# Patient Record
Sex: MALE | Race: WHITE | Employment: OTHER | URBAN - NONMETROPOLITAN AREA
[De-identification: names, ages, dates, MRNs, and addresses within clinical notes are randomized per-mention and may not be internally consistent; named-entity substitution may affect disease eponyms.]

---

## 2017-03-01 ENCOUNTER — HOSPITAL ENCOUNTER (OUTPATIENT)
Dept: PSYCHIATRY | Age: 42
Setting detail: THERAPIES SERIES
Discharge: HOME | End: 2017-03-01
Payer: COMMERCIAL

## 2017-03-01 VITALS
OXYGEN SATURATION: 98 % | WEIGHT: 165 LBS | RESPIRATION RATE: 20 BRPM | BODY MASS INDEX: 27.49 KG/M2 | TEMPERATURE: 98.3 F | DIASTOLIC BLOOD PRESSURE: 81 MMHG | SYSTOLIC BLOOD PRESSURE: 121 MMHG | HEART RATE: 84 BPM | HEIGHT: 65 IN

## 2017-03-01 DIAGNOSIS — F31.9 BIPOLAR AFFECTIVE DISORDER, REMISSION STATUS UNSPECIFIED (HCC): Primary | ICD-10-CM

## 2017-03-01 PROBLEM — F90.0 ADHD (ATTENTION DEFICIT HYPERACTIVITY DISORDER), INATTENTIVE TYPE: Chronic | Status: ACTIVE | Noted: 2017-03-01

## 2017-03-01 PROBLEM — F31.4 BIPOLAR I DISORDER, MOST RECENT EPISODE DEPRESSED, SEVERE WITHOUT PSYCHOTIC FEATURES (HCC): Status: ACTIVE | Noted: 2017-03-01

## 2017-03-01 PROCEDURE — 90792 PSYCH DIAG EVAL W/MED SRVCS: CPT | Performed by: PSYCHIATRY & NEUROLOGY

## 2017-03-01 RX ORDER — GLIPIZIDE 5 MG/1
5 TABLET ORAL
COMMUNITY
End: 2017-09-28 | Stop reason: ALTCHOICE

## 2017-03-01 RX ORDER — OXCARBAZEPINE 300 MG/1
300 TABLET, FILM COATED ORAL 2 TIMES DAILY
Qty: 60 TABLET | Refills: 3 | Status: ON HOLD | OUTPATIENT
Start: 2017-03-01 | End: 2017-03-15

## 2017-03-02 ENCOUNTER — HOSPITAL ENCOUNTER (OUTPATIENT)
Dept: PSYCHIATRY | Age: 42
Setting detail: THERAPIES SERIES
Discharge: HOME | End: 2017-03-02
Payer: COMMERCIAL

## 2017-03-02 PROCEDURE — 90853 GROUP PSYCHOTHERAPY: CPT

## 2017-03-02 PROCEDURE — G0177 OPPS/PHP; TRAIN & EDUC SERV: HCPCS

## 2017-03-03 ENCOUNTER — HOSPITAL ENCOUNTER (OUTPATIENT)
Dept: PSYCHIATRY | Age: 42
Setting detail: THERAPIES SERIES
Discharge: HOME | End: 2017-03-03
Payer: COMMERCIAL

## 2017-03-03 PROCEDURE — 99213 OFFICE O/P EST LOW 20 MIN: CPT | Performed by: PSYCHIATRY & NEUROLOGY

## 2017-03-03 PROCEDURE — 90853 GROUP PSYCHOTHERAPY: CPT

## 2017-03-03 ASSESSMENT — SLEEP AND FATIGUE QUESTIONNAIRES
DO YOU HAVE DIFFICULTY SLEEPING: YES
DIFFICULTY FALLING ASLEEP: NO
DO YOU USE A SLEEP AID: YES
SLEEP PATTERN: DISTURBED/INTERRUPTED SLEEP;RESTLESSNESS
RESTFUL SLEEP: YES
AVERAGE NUMBER OF SLEEP HOURS: 5
DIFFICULTY ARISING: NO
DIFFICULTY STAYING ASLEEP: YES

## 2017-03-03 ASSESSMENT — PATIENT HEALTH QUESTIONNAIRE - PHQ9: SUM OF ALL RESPONSES TO PHQ QUESTIONS 1-9: 12

## 2017-03-03 ASSESSMENT — LIFESTYLE VARIABLES: HISTORY_ALCOHOL_USE: YES

## 2017-03-06 ENCOUNTER — HOSPITAL ENCOUNTER (OUTPATIENT)
Dept: PSYCHIATRY | Age: 42
Setting detail: THERAPIES SERIES
End: 2017-03-06
Payer: COMMERCIAL

## 2017-03-06 ENCOUNTER — HOSPITAL ENCOUNTER (OUTPATIENT)
Dept: PSYCHIATRY | Age: 42
Discharge: HOME | End: 2017-03-06
Payer: COMMERCIAL

## 2017-03-06 DIAGNOSIS — F31.60 MIXED BIPOLAR I DISORDER (HCC): Primary | ICD-10-CM

## 2017-03-06 DIAGNOSIS — F31.60 MIXED BIPOLAR I DISORDER (HCC): ICD-10-CM

## 2017-03-06 PROCEDURE — G0410 GRP PSYCH PARTIAL HOSP 45-50: HCPCS

## 2017-03-06 PROCEDURE — G0177 OPPS/PHP; TRAIN & EDUC SERV: HCPCS

## 2017-03-06 PROCEDURE — 90832 PSYTX W PT 30 MINUTES: CPT | Performed by: NURSE PRACTITIONER

## 2017-03-07 ENCOUNTER — TELEPHONE (OUTPATIENT)
Dept: PSYCHIATRY | Age: 42
End: 2017-03-07

## 2017-03-07 ENCOUNTER — HOSPITAL ENCOUNTER (OUTPATIENT)
Dept: PSYCHIATRY | Age: 42
Discharge: HOME | End: 2017-03-07
Payer: COMMERCIAL

## 2017-03-07 PROCEDURE — G0410 GRP PSYCH PARTIAL HOSP 45-50: HCPCS | Performed by: SOCIAL WORKER

## 2017-03-07 PROCEDURE — G0177 OPPS/PHP; TRAIN & EDUC SERV: HCPCS | Performed by: SOCIAL WORKER

## 2017-03-08 ENCOUNTER — HOSPITAL ENCOUNTER (OUTPATIENT)
Dept: PSYCHIATRY | Age: 42
Discharge: HOME | End: 2017-03-08
Payer: COMMERCIAL

## 2017-03-08 PROCEDURE — 90832 PSYTX W PT 30 MINUTES: CPT | Performed by: NURSE PRACTITIONER

## 2017-03-08 PROCEDURE — G0177 OPPS/PHP; TRAIN & EDUC SERV: HCPCS

## 2017-03-08 PROCEDURE — G0410 GRP PSYCH PARTIAL HOSP 45-50: HCPCS

## 2017-03-08 RX ORDER — NALTREXONE HYDROCHLORIDE 50 MG/1
TABLET, FILM COATED ORAL
Qty: 30 TABLET | Refills: 0 | Status: SHIPPED | OUTPATIENT
Start: 2017-03-08 | End: 2017-04-05 | Stop reason: HOSPADM

## 2017-03-09 ENCOUNTER — HOSPITAL ENCOUNTER (OUTPATIENT)
Dept: PSYCHIATRY | Age: 42
Discharge: HOME | End: 2017-03-09
Payer: COMMERCIAL

## 2017-03-09 PROCEDURE — G0177 OPPS/PHP; TRAIN & EDUC SERV: HCPCS

## 2017-03-09 PROCEDURE — G0410 GRP PSYCH PARTIAL HOSP 45-50: HCPCS

## 2017-03-10 ENCOUNTER — HOSPITAL ENCOUNTER (OUTPATIENT)
Dept: PSYCHIATRY | Age: 42
Discharge: HOME | End: 2017-03-10
Payer: COMMERCIAL

## 2017-03-10 VITALS — HEART RATE: 98 BPM | SYSTOLIC BLOOD PRESSURE: 131 MMHG | RESPIRATION RATE: 18 BRPM | DIASTOLIC BLOOD PRESSURE: 86 MMHG

## 2017-03-10 PROCEDURE — G0410 GRP PSYCH PARTIAL HOSP 45-50: HCPCS

## 2017-03-10 PROCEDURE — G0177 OPPS/PHP; TRAIN & EDUC SERV: HCPCS

## 2017-03-13 ENCOUNTER — HOSPITAL ENCOUNTER (OUTPATIENT)
Dept: PSYCHIATRY | Age: 42
Discharge: HOME | End: 2017-03-13
Payer: COMMERCIAL

## 2017-03-13 ENCOUNTER — HOSPITAL ENCOUNTER (INPATIENT)
Age: 42
LOS: 2 days | Discharge: HOME | DRG: 885 | End: 2017-03-15
Attending: EMERGENCY MEDICINE | Admitting: PSYCHIATRY & NEUROLOGY
Payer: COMMERCIAL

## 2017-03-13 VITALS — HEART RATE: 87 BPM | DIASTOLIC BLOOD PRESSURE: 87 MMHG | SYSTOLIC BLOOD PRESSURE: 134 MMHG | RESPIRATION RATE: 18 BRPM

## 2017-03-13 DIAGNOSIS — F31.9 BIPOLAR 1 DISORDER (HCC): ICD-10-CM

## 2017-03-13 DIAGNOSIS — R45.851 SUICIDE IDEATION: Primary | ICD-10-CM

## 2017-03-13 LAB
ALBUMIN SERPL-MCNC: 4.7 G/DL (ref 3.5–5.2)
ALP BLD-CCNC: 65 U/L (ref 40–130)
ALT SERPL-CCNC: 41 U/L (ref 5–41)
AMPHETAMINE SCREEN, URINE: POSITIVE
ANION GAP SERPL CALCULATED.3IONS-SCNC: 14 MMOL/L (ref 7–19)
AST SERPL-CCNC: 20 U/L (ref 5–40)
BARBITURATE SCREEN URINE: NEGATIVE
BASOPHILS ABSOLUTE: 0 K/UL (ref 0–0.2)
BASOPHILS RELATIVE PERCENT: 0.6 % (ref 0–1)
BENZODIAZEPINE SCREEN, URINE: NEGATIVE
BILIRUB SERPL-MCNC: <0.2 MG/DL (ref 0.2–1.2)
BILIRUBIN URINE: NEGATIVE
BLOOD, URINE: NEGATIVE
BUN BLDV-MCNC: 15 MG/DL (ref 6–20)
CALCIUM SERPL-MCNC: 9.7 MG/DL (ref 8.6–10)
CANNABINOID SCREEN URINE: NEGATIVE
CHLORIDE BLD-SCNC: 97 MMOL/L (ref 98–111)
CLARITY: CLEAR
CO2: 29 MMOL/L (ref 22–29)
COCAINE METABOLITE SCREEN URINE: POSITIVE
COLOR: YELLOW
CREAT SERPL-MCNC: 0.9 MG/DL (ref 0.5–1.2)
EOSINOPHILS ABSOLUTE: 0.7 K/UL (ref 0–0.6)
EOSINOPHILS RELATIVE PERCENT: 10.4 % (ref 0–5)
ETHANOL: <10 MG/DL (ref 0–0.08)
GFR NON-AFRICAN AMERICAN: >60
GLOBULIN: 2.9 G/DL
GLUCOSE BLD-MCNC: 94 MG/DL (ref 74–109)
GLUCOSE URINE: 250 MG/DL
HCT VFR BLD CALC: 45 % (ref 42–52)
HEMOGLOBIN: 15.6 G/DL (ref 14–18)
KETONES, URINE: NEGATIVE MG/DL
LEUKOCYTE ESTERASE, URINE: NEGATIVE
LYMPHOCYTES ABSOLUTE: 2.5 K/UL (ref 1.1–4.5)
LYMPHOCYTES RELATIVE PERCENT: 40 % (ref 20–40)
Lab: ABNORMAL
MCH RBC QN AUTO: 31.1 PG (ref 27–31)
MCHC RBC AUTO-ENTMCNC: 34.7 G/DL (ref 33–37)
MCV RBC AUTO: 89.6 FL (ref 80–94)
MONOCYTES ABSOLUTE: 0.4 K/UL (ref 0–0.9)
MONOCYTES RELATIVE PERCENT: 6.9 % (ref 0–10)
NEUTROPHILS ABSOLUTE: 2.7 K/UL (ref 1.5–7.5)
NEUTROPHILS RELATIVE PERCENT: 41.8 % (ref 50–65)
NITRITE, URINE: NEGATIVE
OPIATE SCREEN URINE: NEGATIVE
PDW BLD-RTO: 12.6 % (ref 11.5–14.5)
PH UA: 6
PLATELET # BLD: 216 K/UL (ref 130–400)
PMV BLD AUTO: 9.4 FL (ref 7.4–10.4)
POTASSIUM SERPL-SCNC: 4.4 MMOL/L (ref 3.5–5)
PROTEIN UA: NEGATIVE MG/DL
RBC # BLD: 5.02 M/UL (ref 4.7–6.1)
SODIUM BLD-SCNC: 140 MMOL/L (ref 136–145)
SPECIFIC GRAVITY UA: 1.01
TOTAL PROTEIN: 7.6 G/DL (ref 6.6–8.7)
UROBILINOGEN, URINE: 0.2 E.U./DL
WBC # BLD: 6.4 K/UL (ref 4.8–10.8)

## 2017-03-13 PROCEDURE — 6370000000 HC RX 637 (ALT 250 FOR IP): Performed by: PSYCHIATRY & NEUROLOGY

## 2017-03-13 PROCEDURE — 80307 DRUG TEST PRSMV CHEM ANLYZR: CPT

## 2017-03-13 PROCEDURE — 99283 EMERGENCY DEPT VISIT LOW MDM: CPT | Performed by: EMERGENCY MEDICINE

## 2017-03-13 PROCEDURE — 36415 COLL VENOUS BLD VENIPUNCTURE: CPT

## 2017-03-13 PROCEDURE — 85025 COMPLETE CBC W/AUTO DIFF WBC: CPT

## 2017-03-13 PROCEDURE — G0480 DRUG TEST DEF 1-7 CLASSES: HCPCS

## 2017-03-13 PROCEDURE — 90832 PSYTX W PT 30 MINUTES: CPT | Performed by: NURSE PRACTITIONER

## 2017-03-13 PROCEDURE — 1240000000 HC EMOTIONAL WELLNESS R&B

## 2017-03-13 PROCEDURE — 99285 EMERGENCY DEPT VISIT HI MDM: CPT

## 2017-03-13 PROCEDURE — G0410 GRP PSYCH PARTIAL HOSP 45-50: HCPCS

## 2017-03-13 PROCEDURE — 81003 URINALYSIS AUTO W/O SCOPE: CPT

## 2017-03-13 PROCEDURE — 80053 COMPREHEN METABOLIC PANEL: CPT

## 2017-03-13 RX ORDER — ACETAMINOPHEN 325 MG/1
650 TABLET ORAL EVERY 4 HOURS PRN
Status: DISCONTINUED | OUTPATIENT
Start: 2017-03-13 | End: 2017-03-13 | Stop reason: SDUPTHER

## 2017-03-13 RX ORDER — MAGNESIUM HYDROXIDE/ALUMINUM HYDROXICE/SIMETHICONE 120; 1200; 1200 MG/30ML; MG/30ML; MG/30ML
30 SUSPENSION ORAL PRN
Status: DISCONTINUED | OUTPATIENT
Start: 2017-03-13 | End: 2017-03-15 | Stop reason: HOSPADM

## 2017-03-13 RX ORDER — SERTRALINE HYDROCHLORIDE 25 MG/1
25 TABLET, FILM COATED ORAL DAILY
Status: DISCONTINUED | OUTPATIENT
Start: 2017-03-13 | End: 2017-03-14

## 2017-03-13 RX ORDER — ACETAMINOPHEN 325 MG/1
650 TABLET ORAL EVERY 4 HOURS PRN
Status: DISCONTINUED | OUTPATIENT
Start: 2017-03-13 | End: 2017-03-15 | Stop reason: HOSPADM

## 2017-03-13 RX ORDER — TRAZODONE HYDROCHLORIDE 50 MG/1
50 TABLET ORAL NIGHTLY PRN
Status: DISCONTINUED | OUTPATIENT
Start: 2017-03-13 | End: 2017-03-14

## 2017-03-13 RX ADMIN — TRAZODONE HYDROCHLORIDE 50 MG: 50 TABLET ORAL at 22:13

## 2017-03-13 ASSESSMENT — SLEEP AND FATIGUE QUESTIONNAIRES
AVERAGE NUMBER OF SLEEP HOURS: 10
SLEEP PATTERN: DIFFICULTY ARISING;DISTURBED/INTERRUPTED SLEEP
RESTFUL SLEEP: NO
DIFFICULTY ARISING: YES
DO YOU USE A SLEEP AID: NO
DIFFICULTY STAYING ASLEEP: YES
DO YOU HAVE DIFFICULTY SLEEPING: YES
DIFFICULTY FALLING ASLEEP: NO

## 2017-03-13 ASSESSMENT — PATIENT HEALTH QUESTIONNAIRE - PHQ9: SUM OF ALL RESPONSES TO PHQ QUESTIONS 1-9: 24

## 2017-03-13 ASSESSMENT — LIFESTYLE VARIABLES: HISTORY_ALCOHOL_USE: YES

## 2017-03-14 PROCEDURE — 6370000000 HC RX 637 (ALT 250 FOR IP): Performed by: PSYCHIATRY & NEUROLOGY

## 2017-03-14 PROCEDURE — 1240000000 HC EMOTIONAL WELLNESS R&B

## 2017-03-14 PROCEDURE — 90792 PSYCH DIAG EVAL W/MED SRVCS: CPT | Performed by: PSYCHIATRY & NEUROLOGY

## 2017-03-14 RX ORDER — GLIPIZIDE 5 MG/1
5 TABLET ORAL
Status: DISCONTINUED | OUTPATIENT
Start: 2017-03-14 | End: 2017-03-15 | Stop reason: HOSPADM

## 2017-03-14 RX ORDER — CLONAZEPAM 0.5 MG/1
0.5 TABLET ORAL 3 TIMES DAILY
Status: DISCONTINUED | OUTPATIENT
Start: 2017-03-14 | End: 2017-03-15 | Stop reason: HOSPADM

## 2017-03-14 RX ORDER — NALTREXONE HYDROCHLORIDE 50 MG/1
25 TABLET, FILM COATED ORAL
Status: DISCONTINUED | OUTPATIENT
Start: 2017-03-15 | End: 2017-03-15 | Stop reason: HOSPADM

## 2017-03-14 RX ORDER — LAMOTRIGINE 100 MG/1
350 TABLET ORAL DAILY
Status: DISCONTINUED | OUTPATIENT
Start: 2017-03-14 | End: 2017-03-15 | Stop reason: HOSPADM

## 2017-03-14 RX ORDER — LAMOTRIGINE 100 MG/1
350 TABLET ORAL DAILY
Status: DISCONTINUED | OUTPATIENT
Start: 2017-03-15 | End: 2017-03-14

## 2017-03-14 RX ORDER — DEXTROAMPHETAMINE SACCHARATE, AMPHETAMINE ASPARTATE MONOHYDRATE, DEXTROAMPHETAMINE SULFATE AND AMPHETAMINE SULFATE 2.5; 2.5; 2.5; 2.5 MG/1; MG/1; MG/1; MG/1
30 CAPSULE, EXTENDED RELEASE ORAL 2 TIMES DAILY
Status: DISCONTINUED | OUTPATIENT
Start: 2017-03-14 | End: 2017-03-15 | Stop reason: HOSPADM

## 2017-03-14 RX ORDER — OXCARBAZEPINE 300 MG/1
300 TABLET, FILM COATED ORAL 2 TIMES DAILY
Status: DISCONTINUED | OUTPATIENT
Start: 2017-03-14 | End: 2017-03-14

## 2017-03-14 RX ORDER — LAMOTRIGINE 150 MG/1
300 TABLET ORAL DAILY
Status: DISCONTINUED | OUTPATIENT
Start: 2017-03-14 | End: 2017-03-14

## 2017-03-14 RX ORDER — IBUPROFEN 400 MG/1
800 TABLET ORAL EVERY 8 HOURS PRN
Status: DISCONTINUED | OUTPATIENT
Start: 2017-03-14 | End: 2017-03-15 | Stop reason: HOSPADM

## 2017-03-14 RX ADMIN — TRAZODONE HYDROCHLORIDE 150 MG: 100 TABLET ORAL at 21:17

## 2017-03-14 RX ADMIN — IBUPROFEN 800 MG: 400 TABLET, FILM COATED ORAL at 13:10

## 2017-03-14 RX ADMIN — OXCARBAZEPINE 450 MG: 300 TABLET, FILM COATED ORAL at 21:17

## 2017-03-14 RX ADMIN — ACETAMINOPHEN 650 MG: 325 TABLET, FILM COATED ORAL at 17:36

## 2017-03-14 RX ADMIN — METFORMIN HYDROCHLORIDE 1000 MG: 500 TABLET, FILM COATED ORAL at 17:37

## 2017-03-14 RX ADMIN — DEXTROAMPHETAMINE SACCHARATE, AMPHETAMINE ASPARTATE MONOHYDRATE, DEXTROAMPHETAMINE SULFATE, AND AMPHETAMINE SULFATE 30 MG: 2.5; 2.5; 2.5; 2.5 CAPSULE, EXTENDED RELEASE ORAL at 13:17

## 2017-03-14 RX ADMIN — GLIPIZIDE 5 MG: 5 TABLET ORAL at 17:36

## 2017-03-14 RX ADMIN — LAMOTRIGINE 350 MG: 100 TABLET ORAL at 13:10

## 2017-03-14 RX ADMIN — IBUPROFEN 800 MG: 400 TABLET, FILM COATED ORAL at 21:16

## 2017-03-14 RX ADMIN — METFORMIN HYDROCHLORIDE 1000 MG: 500 TABLET, FILM COATED ORAL at 12:16

## 2017-03-14 RX ADMIN — CLONAZEPAM 0.5 MG: 0.5 TABLET ORAL at 13:10

## 2017-03-14 RX ADMIN — CLONAZEPAM 0.5 MG: 0.5 TABLET ORAL at 21:18

## 2017-03-14 RX ADMIN — OXCARBAZEPINE 450 MG: 150 TABLET ORAL at 13:26

## 2017-03-14 ASSESSMENT — PAIN SCALES - GENERAL
PAINLEVEL_OUTOF10: 8
PAINLEVEL_OUTOF10: 9
PAINLEVEL_OUTOF10: 4

## 2017-03-15 VITALS
OXYGEN SATURATION: 100 % | BODY MASS INDEX: 26.66 KG/M2 | TEMPERATURE: 98.6 F | RESPIRATION RATE: 20 BRPM | SYSTOLIC BLOOD PRESSURE: 130 MMHG | WEIGHT: 160 LBS | HEIGHT: 65 IN | HEART RATE: 100 BPM | DIASTOLIC BLOOD PRESSURE: 83 MMHG

## 2017-03-15 LAB
GLUCOSE BLD-MCNC: 165 MG/DL (ref 70–99)
PERFORMED ON: ABNORMAL
TSH SERPL DL<=0.05 MIU/L-ACNC: 2.76 UIU/ML (ref 0.27–4.2)
VITAMIN B-12: 521 PG/ML (ref 211–946)
VITAMIN D 25-HYDROXY: 12.1 NG/ML

## 2017-03-15 PROCEDURE — 82306 VITAMIN D 25 HYDROXY: CPT

## 2017-03-15 PROCEDURE — 6370000000 HC RX 637 (ALT 250 FOR IP): Performed by: PSYCHIATRY & NEUROLOGY

## 2017-03-15 PROCEDURE — 99239 HOSP IP/OBS DSCHRG MGMT >30: CPT | Performed by: PSYCHIATRY & NEUROLOGY

## 2017-03-15 PROCEDURE — 5130000000 HC BRIDGE APPOINTMENT

## 2017-03-15 PROCEDURE — 36415 COLL VENOUS BLD VENIPUNCTURE: CPT

## 2017-03-15 PROCEDURE — 82607 VITAMIN B-12: CPT

## 2017-03-15 PROCEDURE — 82948 REAGENT STRIP/BLOOD GLUCOSE: CPT

## 2017-03-15 PROCEDURE — 84443 ASSAY THYROID STIM HORMONE: CPT

## 2017-03-15 RX ORDER — LAMOTRIGINE 100 MG/1
50 TABLET ORAL DAILY
Qty: 30 TABLET | Refills: 0 | Status: SHIPPED | OUTPATIENT
Start: 2017-03-15 | End: 2017-04-05 | Stop reason: HOSPADM

## 2017-03-15 RX ORDER — OXCARBAZEPINE 300 MG/1
450 TABLET, FILM COATED ORAL 2 TIMES DAILY
Qty: 90 TABLET | Refills: 0 | Status: SHIPPED | OUTPATIENT
Start: 2017-03-15 | End: 2017-03-21 | Stop reason: DRUGHIGH

## 2017-03-15 RX ADMIN — METFORMIN HYDROCHLORIDE 1000 MG: 500 TABLET, FILM COATED ORAL at 08:41

## 2017-03-15 RX ADMIN — DEXTROAMPHETAMINE SACCHARATE, AMPHETAMINE ASPARTATE MONOHYDRATE, DEXTROAMPHETAMINE SULFATE, AND AMPHETAMINE SULFATE 30 MG: 2.5; 2.5; 2.5; 2.5 CAPSULE, EXTENDED RELEASE ORAL at 08:53

## 2017-03-15 RX ADMIN — LAMOTRIGINE 350 MG: 100 TABLET ORAL at 08:42

## 2017-03-15 RX ADMIN — GLIPIZIDE 5 MG: 5 TABLET ORAL at 08:40

## 2017-03-15 RX ADMIN — OXCARBAZEPINE 450 MG: 300 TABLET, FILM COATED ORAL at 08:42

## 2017-03-16 ENCOUNTER — HOSPITAL ENCOUNTER (OUTPATIENT)
Dept: PSYCHIATRY | Age: 42
Setting detail: THERAPIES SERIES
Discharge: HOME | End: 2017-03-16
Payer: COMMERCIAL

## 2017-03-16 VITALS
HEIGHT: 65 IN | SYSTOLIC BLOOD PRESSURE: 129 MMHG | HEART RATE: 96 BPM | RESPIRATION RATE: 18 BRPM | TEMPERATURE: 98 F | WEIGHT: 171 LBS | BODY MASS INDEX: 28.49 KG/M2 | DIASTOLIC BLOOD PRESSURE: 83 MMHG

## 2017-03-16 DIAGNOSIS — F31.60 BIPOLAR I DISORDER, MOST RECENT EPISODE MIXED (HCC): Primary | ICD-10-CM

## 2017-03-16 PROCEDURE — 90853 GROUP PSYCHOTHERAPY: CPT

## 2017-03-16 PROCEDURE — 90832 PSYTX W PT 30 MINUTES: CPT | Performed by: NURSE PRACTITIONER

## 2017-03-16 PROCEDURE — G0177 OPPS/PHP; TRAIN & EDUC SERV: HCPCS

## 2017-03-16 RX ORDER — ERGOCALCIFEROL 1.25 MG/1
50000 CAPSULE ORAL WEEKLY
Qty: 4 CAPSULE | Refills: 2 | Status: SHIPPED | OUTPATIENT
Start: 2017-03-16 | End: 2017-09-28 | Stop reason: ALTCHOICE

## 2017-03-17 ASSESSMENT — SLEEP AND FATIGUE QUESTIONNAIRES
DO YOU USE A SLEEP AID: NO
DO YOU HAVE DIFFICULTY SLEEPING: NO
AVERAGE NUMBER OF SLEEP HOURS: 6

## 2017-03-17 ASSESSMENT — LIFESTYLE VARIABLES: HISTORY_ALCOHOL_USE: NO

## 2017-03-20 ENCOUNTER — TELEPHONE (OUTPATIENT)
Dept: PSYCHIATRY | Age: 42
End: 2017-03-20

## 2017-03-21 ENCOUNTER — HOSPITAL ENCOUNTER (OUTPATIENT)
Dept: PSYCHIATRY | Age: 42
Setting detail: THERAPIES SERIES
Discharge: HOME | End: 2017-03-21
Payer: COMMERCIAL

## 2017-03-21 VITALS
TEMPERATURE: 97.8 F | HEART RATE: 94 BPM | RESPIRATION RATE: 18 BRPM | DIASTOLIC BLOOD PRESSURE: 82 MMHG | SYSTOLIC BLOOD PRESSURE: 121 MMHG

## 2017-03-21 DIAGNOSIS — Z79.899 HIGH RISK MEDICATION USE: Primary | ICD-10-CM

## 2017-03-21 DIAGNOSIS — F31.9 BIPOLAR I DISORDER, CURRENT EPISODE DEPRESSED (HCC): ICD-10-CM

## 2017-03-21 DIAGNOSIS — Z79.899 HIGH RISK MEDICATION USE: ICD-10-CM

## 2017-03-21 LAB
ANION GAP SERPL CALCULATED.3IONS-SCNC: 12 MMOL/L (ref 7–19)
BUN BLDV-MCNC: 17 MG/DL (ref 6–20)
CALCIUM SERPL-MCNC: 9 MG/DL (ref 8.6–10)
CHLORIDE BLD-SCNC: 96 MMOL/L (ref 98–111)
CO2: 30 MMOL/L (ref 22–29)
CREAT SERPL-MCNC: 0.9 MG/DL (ref 0.5–1.2)
GFR NON-AFRICAN AMERICAN: >60
GLUCOSE BLD-MCNC: 218 MG/DL (ref 74–109)
POTASSIUM SERPL-SCNC: 3.6 MMOL/L (ref 3.5–5)
SODIUM BLD-SCNC: 138 MMOL/L (ref 136–145)

## 2017-03-21 PROCEDURE — 90853 GROUP PSYCHOTHERAPY: CPT

## 2017-03-21 RX ORDER — OXCARBAZEPINE 300 MG/1
TABLET, FILM COATED ORAL
Qty: 90 TABLET | Refills: 0
Start: 2017-03-21 | End: 2017-04-05

## 2017-03-22 ENCOUNTER — HOSPITAL ENCOUNTER (OUTPATIENT)
Dept: PSYCHIATRY | Age: 42
Setting detail: THERAPIES SERIES
Discharge: HOME | End: 2017-03-22
Payer: COMMERCIAL

## 2017-03-22 VITALS
TEMPERATURE: 98.3 F | SYSTOLIC BLOOD PRESSURE: 124 MMHG | HEART RATE: 100 BPM | RESPIRATION RATE: 18 BRPM | DIASTOLIC BLOOD PRESSURE: 82 MMHG

## 2017-03-22 PROCEDURE — G0177 OPPS/PHP; TRAIN & EDUC SERV: HCPCS | Performed by: SOCIAL WORKER

## 2017-03-22 PROCEDURE — 90853 GROUP PSYCHOTHERAPY: CPT | Performed by: SOCIAL WORKER

## 2017-03-23 ENCOUNTER — HOSPITAL ENCOUNTER (OUTPATIENT)
Dept: PSYCHIATRY | Age: 42
Setting detail: THERAPIES SERIES
Discharge: HOME | End: 2017-03-23
Payer: COMMERCIAL

## 2017-03-23 PROCEDURE — 90853 GROUP PSYCHOTHERAPY: CPT

## 2017-03-27 ENCOUNTER — HOSPITAL ENCOUNTER (OUTPATIENT)
Dept: PSYCHIATRY | Age: 42
Setting detail: THERAPIES SERIES
Discharge: HOME | End: 2017-03-27
Payer: COMMERCIAL

## 2017-03-27 VITALS — DIASTOLIC BLOOD PRESSURE: 79 MMHG | SYSTOLIC BLOOD PRESSURE: 119 MMHG | RESPIRATION RATE: 18 BRPM | HEART RATE: 92 BPM

## 2017-03-27 PROCEDURE — 90853 GROUP PSYCHOTHERAPY: CPT

## 2017-03-27 PROCEDURE — G0177 OPPS/PHP; TRAIN & EDUC SERV: HCPCS

## 2017-03-28 ENCOUNTER — HOSPITAL ENCOUNTER (OUTPATIENT)
Dept: PSYCHIATRY | Age: 42
Setting detail: THERAPIES SERIES
Discharge: HOME | End: 2017-03-28
Payer: COMMERCIAL

## 2017-03-28 ENCOUNTER — TELEPHONE (OUTPATIENT)
Dept: PSYCHIATRY | Age: 42
End: 2017-03-28

## 2017-03-28 PROCEDURE — G0177 OPPS/PHP; TRAIN & EDUC SERV: HCPCS

## 2017-03-28 PROCEDURE — 90853 GROUP PSYCHOTHERAPY: CPT

## 2017-03-30 ENCOUNTER — HOSPITAL ENCOUNTER (OUTPATIENT)
Dept: PSYCHIATRY | Age: 42
Setting detail: THERAPIES SERIES
Discharge: HOME | End: 2017-03-30
Payer: COMMERCIAL

## 2017-03-30 PROCEDURE — G0177 OPPS/PHP; TRAIN & EDUC SERV: HCPCS

## 2017-04-04 ENCOUNTER — APPOINTMENT (OUTPATIENT)
Dept: PSYCHIATRY | Age: 42
End: 2017-04-04
Payer: COMMERCIAL

## 2017-04-05 ENCOUNTER — HOSPITAL ENCOUNTER (OUTPATIENT)
Dept: PSYCHIATRY | Age: 42
Setting detail: THERAPIES SERIES
Discharge: HOME | End: 2017-04-05
Payer: COMMERCIAL

## 2017-04-05 PROCEDURE — 90853 GROUP PSYCHOTHERAPY: CPT

## 2017-04-05 PROCEDURE — G0177 OPPS/PHP; TRAIN & EDUC SERV: HCPCS

## 2017-04-05 PROCEDURE — 90832 PSYTX W PT 30 MINUTES: CPT | Performed by: NURSE PRACTITIONER

## 2017-04-05 RX ORDER — OXCARBAZEPINE 300 MG/1
600 TABLET, FILM COATED ORAL 2 TIMES DAILY
Qty: 120 TABLET | Refills: 0 | Status: SHIPPED | OUTPATIENT
Start: 2017-04-05 | End: 2017-04-20 | Stop reason: HOSPADM

## 2017-04-05 RX ORDER — HYDROXYZINE PAMOATE 25 MG/1
CAPSULE ORAL
Qty: 60 CAPSULE | Refills: 0 | Status: SHIPPED | OUTPATIENT
Start: 2017-04-05 | End: 2017-04-20

## 2017-04-05 RX ORDER — LAMOTRIGINE 200 MG/1
200 TABLET ORAL 2 TIMES DAILY
Qty: 60 TABLET | Refills: 0 | Status: SHIPPED | OUTPATIENT
Start: 2017-04-05 | End: 2017-04-20

## 2017-04-06 ENCOUNTER — HOSPITAL ENCOUNTER (OUTPATIENT)
Dept: PSYCHIATRY | Age: 42
Setting detail: THERAPIES SERIES
Discharge: HOME | End: 2017-04-06
Payer: COMMERCIAL

## 2017-04-06 PROCEDURE — 90853 GROUP PSYCHOTHERAPY: CPT

## 2017-04-06 PROCEDURE — G0177 OPPS/PHP; TRAIN & EDUC SERV: HCPCS

## 2017-04-11 ENCOUNTER — HOSPITAL ENCOUNTER (OUTPATIENT)
Dept: PSYCHIATRY | Age: 42
Setting detail: THERAPIES SERIES
Discharge: HOME | End: 2017-04-11
Payer: COMMERCIAL

## 2017-04-11 VITALS — RESPIRATION RATE: 18 BRPM | DIASTOLIC BLOOD PRESSURE: 79 MMHG | HEART RATE: 95 BPM | SYSTOLIC BLOOD PRESSURE: 120 MMHG

## 2017-04-11 PROCEDURE — G0177 OPPS/PHP; TRAIN & EDUC SERV: HCPCS

## 2017-04-11 PROCEDURE — 90853 GROUP PSYCHOTHERAPY: CPT

## 2017-04-11 RX ORDER — DEXTROAMPHETAMINE SACCHARATE, AMPHETAMINE ASPARTATE, DEXTROAMPHETAMINE SULFATE AND AMPHETAMINE SULFATE 7.5; 7.5; 7.5; 7.5 MG/1; MG/1; MG/1; MG/1
30 TABLET ORAL 2 TIMES DAILY
Qty: 30 TABLET | Refills: 0 | Status: CANCELLED | OUTPATIENT
Start: 2017-04-11

## 2017-04-12 ENCOUNTER — HOSPITAL ENCOUNTER (OUTPATIENT)
Dept: PSYCHIATRY | Age: 42
Setting detail: THERAPIES SERIES
Discharge: HOME | End: 2017-04-12
Payer: COMMERCIAL

## 2017-04-12 PROCEDURE — G0177 OPPS/PHP; TRAIN & EDUC SERV: HCPCS

## 2017-04-12 PROCEDURE — 90853 GROUP PSYCHOTHERAPY: CPT

## 2017-04-12 RX ORDER — DEXTROAMPHETAMINE SACCHARATE, AMPHETAMINE ASPARTATE, DEXTROAMPHETAMINE SULFATE AND AMPHETAMINE SULFATE 7.5; 7.5; 7.5; 7.5 MG/1; MG/1; MG/1; MG/1
30 TABLET ORAL 2 TIMES DAILY
Qty: 60 TABLET | Refills: 0 | Status: SHIPPED | OUTPATIENT
Start: 2017-04-12 | End: 2017-05-04

## 2017-04-13 ENCOUNTER — TELEPHONE (OUTPATIENT)
Dept: PSYCHIATRY | Age: 42
End: 2017-04-13

## 2017-04-17 ENCOUNTER — TELEPHONE (OUTPATIENT)
Dept: PSYCHIATRY | Age: 42
End: 2017-04-17

## 2017-04-18 ENCOUNTER — OFFICE VISIT (OUTPATIENT)
Dept: CARDIOLOGY | Facility: CLINIC | Age: 42
End: 2017-04-18

## 2017-04-18 VITALS
SYSTOLIC BLOOD PRESSURE: 144 MMHG | DIASTOLIC BLOOD PRESSURE: 80 MMHG | BODY MASS INDEX: 27.99 KG/M2 | WEIGHT: 168 LBS | HEART RATE: 100 BPM | HEIGHT: 65 IN

## 2017-04-18 DIAGNOSIS — Z82.49 FAMILY HISTORY OF EARLY CAD: ICD-10-CM

## 2017-04-18 DIAGNOSIS — F41.9 ANXIETY: ICD-10-CM

## 2017-04-18 DIAGNOSIS — I10 ESSENTIAL HYPERTENSION: Primary | ICD-10-CM

## 2017-04-18 DIAGNOSIS — F31.9 BIPOLAR 1 DISORDER (HCC): ICD-10-CM

## 2017-04-18 DIAGNOSIS — E11.9 TYPE 2 DIABETES MELLITUS WITHOUT COMPLICATION, UNSPECIFIED LONG TERM INSULIN USE STATUS: ICD-10-CM

## 2017-04-18 DIAGNOSIS — F98.8 ADD (ATTENTION DEFICIT DISORDER): ICD-10-CM

## 2017-04-18 DIAGNOSIS — R06.09 DOE (DYSPNEA ON EXERTION): ICD-10-CM

## 2017-04-18 PROCEDURE — 99204 OFFICE O/P NEW MOD 45 MIN: CPT | Performed by: INTERNAL MEDICINE

## 2017-04-18 PROCEDURE — 93000 ELECTROCARDIOGRAM COMPLETE: CPT | Performed by: INTERNAL MEDICINE

## 2017-04-18 RX ORDER — TRAZODONE HYDROCHLORIDE 150 MG/1
TABLET ORAL NIGHTLY PRN
COMMUNITY
Start: 2016-08-22 | End: 2022-02-07

## 2017-04-18 RX ORDER — CLONAZEPAM 0.5 MG/1
2 TABLET ORAL NIGHTLY PRN
COMMUNITY
Start: 2016-08-24 | End: 2022-06-28

## 2017-04-18 RX ORDER — DEXTROAMPHETAMINE SACCHARATE, AMPHETAMINE ASPARTATE, DEXTROAMPHETAMINE SULFATE AND AMPHETAMINE SULFATE 7.5; 7.5; 7.5; 7.5 MG/1; MG/1; MG/1; MG/1
TABLET ORAL
COMMUNITY
Start: 2017-04-12 | End: 2022-06-28

## 2017-04-18 RX ORDER — HYDROXYZINE PAMOATE 25 MG/1
CAPSULE ORAL
COMMUNITY
Start: 2017-04-05 | End: 2017-05-19 | Stop reason: ALTCHOICE

## 2017-04-18 RX ORDER — LAMOTRIGINE 200 MG/1
200 TABLET ORAL DAILY
COMMUNITY
Start: 2017-04-05 | End: 2022-06-28

## 2017-04-18 RX ORDER — OXCARBAZEPINE 300 MG/1
TABLET, FILM COATED ORAL
COMMUNITY
Start: 2017-04-05 | End: 2017-04-25

## 2017-04-18 RX ORDER — GLIPIZIDE 5 MG/1
TABLET ORAL
COMMUNITY
End: 2018-04-17

## 2017-04-18 RX ORDER — LISINOPRIL 5 MG/1
5 TABLET ORAL DAILY
COMMUNITY
End: 2018-04-17

## 2017-04-18 NOTE — PROGRESS NOTES
Rah Shin  [id number]  1975  41 y.o.  male    Referring Provider: CARINA Holt    Reason for Visit: Bicuspid aortic valve  Mild shortness of breath  Overall doing well  Denies any chest pain  No excessive shortness of breath  Compliant with medications    History of present illness:  Rah Shin is a 41 y.o. yo male with history of bicuspid aortic valve  who presents today for   Chief Complaint   Patient presents with   • bicuspid aortic valve     new pt    .    History  Past Medical History:   Diagnosis Date   • ADD (attention deficit disorder)    • Anxiety    • Bipolar 1 disorder    • Diabetes mellitus    • Hypertension    ,   Past Surgical History:   Procedure Laterality Date   • CARDIAC CATHETERIZATION     ,   Family History   Problem Relation Age of Onset   • Heart disease Mother    ,   Social History   Substance Use Topics   • Smoking status: Never Smoker   • Smokeless tobacco: Never Used   • Alcohol use No   ,     Medications  Current Outpatient Prescriptions   Medication Sig Dispense Refill   • amphetamine-dextroamphetamine (ADDERALL) 30 MG tablet Take  by mouth.     • clonazePAM (KlonoPIN) 0.5 MG tablet Take  by mouth.     • glipiZIDE (GLUCOTROL) 5 MG tablet Take  by mouth.     • hydrOXYzine (VISTARIL) 25 MG capsule Take one capsule once or twice daily prn itching.     • lamoTRIgine (LaMICtal) 200 MG tablet Take  by mouth.     • lisinopril (PRINIVIL,ZESTRIL) 5 MG tablet Take 5 mg by mouth Daily.     • metFORMIN (GLUCOPHAGE) 1000 MG tablet Take  by mouth.     • OXcarbazepine (TRILEPTAL) 300 MG tablet Take  by mouth.     • traZODone (DESYREL) 150 MG tablet Take  by mouth.       No current facility-administered medications for this visit.        Allergies:  Contrast dye    Review of Systems  Review of Systems   Constitution: Negative.   HENT: Negative.    Eyes: Negative.    Cardiovascular: Positive for dyspnea on exertion. Negative for chest pain, claudication, cyanosis,  "irregular heartbeat, leg swelling, near-syncope, orthopnea, palpitations, paroxysmal nocturnal dyspnea and syncope.   Respiratory: Negative.    Endocrine: Negative.    Hematologic/Lymphatic: Negative.    Skin: Negative.    Gastrointestinal: Negative for anorexia.   Genitourinary: Negative.    Neurological: Negative.    Psychiatric/Behavioral: Negative.        Objective     Physical Exam:  /80  Pulse 100  Ht 65\" (165.1 cm)  Wt 168 lb (76.2 kg)  BMI 27.96 kg/m2  Physical Exam   Constitutional: He appears well-developed.   HENT:   Head: Normocephalic.   Neck: Normal carotid pulses and no JVD present. No tracheal tenderness present. Carotid bruit is not present. No tracheal deviation and no edema present.   Cardiovascular: Regular rhythm and normal pulses.    Murmur heard.   Systolic murmur is present with a grade of 2/6   Pulmonary/Chest: Effort normal. No stridor.   Abdominal: Soft.   Neurological: He is alert. He has normal strength. No cranial nerve deficit or sensory deficit.   Skin: Skin is warm.   Psychiatric: He has a normal mood and affect. His speech is normal and behavior is normal.       Results Review:       ECG 12 Lead  Date/Time: 4/18/2017 9:17 AM  Performed by: REKHA DENG  Authorized by: REKHA DENG   Comparison: not compared with previous ECG   Rhythm: sinus rhythm  Rate: normal  QRS axis: normal  Clinical impression: normal ECG            Assessment/Plan   Patient Active Problem List   Diagnosis   • Bipolar 1 disorder   • Hypertension   • Diabetes mellitus   • ADD (attention deficit disorder)   • Anxiety   • GILL (dyspnea on exertion)   • Family history of early CAD       No palpitations. No significant pedal edema. Compliant with medications and diet. Latest labs and medications reviewed.    Plan:  Echo and treadmill stress test  Close follow up with you as scheduled.  Intensive factor modifications.  See order list.    Counseled regarding disease appropriate diet, fluid, caffeine, " stimulants and sodium intake as well as importance of compliance to diet, exercise and regular follow up.  Avoid NSAIDS and COX2 inhibitors. Use Acetaminophen PRN.    Return in about 4 weeks (around 5/16/2017).

## 2017-04-19 ENCOUNTER — HOSPITAL ENCOUNTER (OUTPATIENT)
Dept: PSYCHIATRY | Age: 42
Setting detail: THERAPIES SERIES
Discharge: HOME | End: 2017-04-19
Payer: COMMERCIAL

## 2017-04-19 PROCEDURE — 90832 PSYTX W PT 30 MINUTES: CPT | Performed by: NURSE PRACTITIONER

## 2017-04-19 PROCEDURE — 90853 GROUP PSYCHOTHERAPY: CPT

## 2017-04-20 ENCOUNTER — HOSPITAL ENCOUNTER (OUTPATIENT)
Dept: PSYCHIATRY | Age: 42
Setting detail: THERAPIES SERIES
Discharge: HOME | End: 2017-04-20
Payer: COMMERCIAL

## 2017-04-20 VITALS
HEART RATE: 72 BPM | SYSTOLIC BLOOD PRESSURE: 130 MMHG | DIASTOLIC BLOOD PRESSURE: 88 MMHG | TEMPERATURE: 98.1 F | RESPIRATION RATE: 18 BRPM

## 2017-04-20 PROCEDURE — G0177 OPPS/PHP; TRAIN & EDUC SERV: HCPCS

## 2017-04-20 PROCEDURE — 90853 GROUP PSYCHOTHERAPY: CPT

## 2017-04-20 PROCEDURE — 90832 PSYTX W PT 30 MINUTES: CPT | Performed by: NURSE PRACTITIONER

## 2017-04-20 RX ORDER — HYDROXYZINE PAMOATE 25 MG/1
CAPSULE ORAL
Qty: 60 CAPSULE | Refills: 1 | Status: SHIPPED | OUTPATIENT
Start: 2017-04-20 | End: 2017-09-28

## 2017-04-20 RX ORDER — LAMOTRIGINE 200 MG/1
200 TABLET ORAL 2 TIMES DAILY
Qty: 60 TABLET | Refills: 1 | Status: SHIPPED | OUTPATIENT
Start: 2017-04-20 | End: 2017-06-07

## 2017-04-20 RX ORDER — CLONAZEPAM 0.5 MG/1
TABLET ORAL
Qty: 90 TABLET | Refills: 0 | Status: SHIPPED | OUTPATIENT
Start: 2017-04-20 | End: 2019-01-23 | Stop reason: HOSPADM

## 2017-04-20 RX ORDER — CLONAZEPAM 0.25 MG/1
TABLET, ORALLY DISINTEGRATING ORAL
Qty: 30 TABLET | Refills: 0 | Status: SHIPPED | OUTPATIENT
Start: 2017-04-20 | End: 2017-05-31

## 2017-04-20 RX ORDER — TRAZODONE HYDROCHLORIDE 150 MG/1
150 TABLET ORAL NIGHTLY
Qty: 30 TABLET | Refills: 0 | Status: SHIPPED | OUTPATIENT
Start: 2017-04-20 | End: 2017-06-07

## 2017-04-24 ENCOUNTER — HOSPITAL ENCOUNTER (OUTPATIENT)
Dept: PSYCHIATRY | Age: 42
Setting detail: THERAPIES SERIES
Discharge: HOME | End: 2017-04-24
Payer: COMMERCIAL

## 2017-04-24 PROCEDURE — G0177 OPPS/PHP; TRAIN & EDUC SERV: HCPCS | Performed by: SOCIAL WORKER

## 2017-04-24 PROCEDURE — 90853 GROUP PSYCHOTHERAPY: CPT | Performed by: SOCIAL WORKER

## 2017-04-25 ENCOUNTER — HOSPITAL ENCOUNTER (OUTPATIENT)
Dept: CARDIOLOGY | Facility: HOSPITAL | Age: 42
Discharge: HOME | End: 2017-04-25
Attending: INTERNAL MEDICINE

## 2017-04-25 ENCOUNTER — HOSPITAL ENCOUNTER (OUTPATIENT)
Dept: CARDIOLOGY | Facility: HOSPITAL | Age: 42
Discharge: HOME | End: 2017-04-25
Attending: INTERNAL MEDICINE | Admitting: INTERNAL MEDICINE

## 2017-04-25 VITALS — SYSTOLIC BLOOD PRESSURE: 95 MMHG | DIASTOLIC BLOOD PRESSURE: 78 MMHG | HEART RATE: 75 BPM

## 2017-04-25 VITALS
DIASTOLIC BLOOD PRESSURE: 61 MMHG | BODY MASS INDEX: 27.99 KG/M2 | HEIGHT: 65 IN | WEIGHT: 168 LBS | SYSTOLIC BLOOD PRESSURE: 144 MMHG

## 2017-04-25 DIAGNOSIS — R06.09 DOE (DYSPNEA ON EXERTION): ICD-10-CM

## 2017-04-25 PROCEDURE — 93018 CV STRESS TEST I&R ONLY: CPT | Performed by: INTERNAL MEDICINE

## 2017-04-25 PROCEDURE — 93306 TTE W/DOPPLER COMPLETE: CPT | Performed by: INTERNAL MEDICINE

## 2017-04-25 PROCEDURE — 93017 CV STRESS TEST TRACING ONLY: CPT

## 2017-04-25 PROCEDURE — 93306 TTE W/DOPPLER COMPLETE: CPT

## 2017-04-27 ENCOUNTER — HOSPITAL ENCOUNTER (OUTPATIENT)
Dept: PSYCHIATRY | Age: 42
Setting detail: THERAPIES SERIES
Discharge: HOME | End: 2017-04-27
Payer: COMMERCIAL

## 2017-04-27 LAB
BH CV STRESS BP STAGE 1: NORMAL
BH CV STRESS BP STAGE 2: NORMAL
BH CV STRESS BP STAGE 3: NORMAL
BH CV STRESS DURATION MIN STAGE 1: 3
BH CV STRESS DURATION MIN STAGE 2: 3
BH CV STRESS DURATION MIN STAGE 3: 3
BH CV STRESS DURATION MIN STAGE 4: 1
BH CV STRESS DURATION SEC STAGE 1: 0
BH CV STRESS DURATION SEC STAGE 2: 0
BH CV STRESS DURATION SEC STAGE 3: 0
BH CV STRESS DURATION SEC STAGE 4: 17
BH CV STRESS GRADE STAGE 1: 10
BH CV STRESS GRADE STAGE 2: 12
BH CV STRESS GRADE STAGE 3: 14
BH CV STRESS GRADE STAGE 4: 16
BH CV STRESS HR STAGE 1: 106
BH CV STRESS HR STAGE 2: 118
BH CV STRESS HR STAGE 3: 138
BH CV STRESS HR STAGE 4: 150
BH CV STRESS METS STAGE 1: 5
BH CV STRESS METS STAGE 2: 7.5
BH CV STRESS METS STAGE 3: 10
BH CV STRESS METS STAGE 4: 13.5
BH CV STRESS PROTOCOL 1: NORMAL
BH CV STRESS RECOVERY BP: NORMAL MMHG
BH CV STRESS RECOVERY HR: 91 BPM
BH CV STRESS SPEED STAGE 1: 1.7
BH CV STRESS SPEED STAGE 2: 2.5
BH CV STRESS SPEED STAGE 3: 3.4
BH CV STRESS SPEED STAGE 4: 4.2
BH CV STRESS STAGE 1: 1
BH CV STRESS STAGE 2: 2
BH CV STRESS STAGE 3: 3
BH CV STRESS STAGE 4: 4
MAXIMAL PREDICTED HEART RATE: 177 BPM
PERCENT MAX PREDICTED HR: 84.75 %
STRESS BASELINE BP: NORMAL MMHG
STRESS BASELINE HR: 75 BPM
STRESS PERCENT HR: 100 %
STRESS POST ESTIMATED WORKLOAD: 13.5 METS
STRESS POST EXERCISE DUR MIN: 10 MIN
STRESS POST EXERCISE DUR SEC: 17 SEC
STRESS POST PEAK HR: 150 BPM
STRESS TARGET HR: 150 BPM

## 2017-04-27 PROCEDURE — G0177 OPPS/PHP; TRAIN & EDUC SERV: HCPCS

## 2017-04-27 PROCEDURE — 90853 GROUP PSYCHOTHERAPY: CPT

## 2017-04-28 ENCOUNTER — TELEPHONE (OUTPATIENT)
Dept: PSYCHIATRY | Age: 42
End: 2017-04-28

## 2017-04-28 ENCOUNTER — TELEPHONE (OUTPATIENT)
Dept: CARDIOLOGY | Facility: CLINIC | Age: 42
End: 2017-04-28

## 2017-04-28 LAB
BH CV ECHO MEAS - AO MAX PG (FULL): 8.4 MMHG
BH CV ECHO MEAS - AO MAX PG: 10.4 MMHG
BH CV ECHO MEAS - AO MEAN PG (FULL): 4 MMHG
BH CV ECHO MEAS - AO MEAN PG: 5 MMHG
BH CV ECHO MEAS - AO ROOT AREA: 8.6 CM^2
BH CV ECHO MEAS - AO ROOT DIAM: 3.3 CM
BH CV ECHO MEAS - AO V2 MAX: 161 CM/SEC
BH CV ECHO MEAS - AO V2 MEAN: 104 CM/SEC
BH CV ECHO MEAS - AO V2 VTI: 28.1 CM
BH CV ECHO MEAS - AVA(I,A): 1.6 CM^2
BH CV ECHO MEAS - AVA(I,D): 1.6 CM^2
BH CV ECHO MEAS - AVA(V,A): 1.4 CM^2
BH CV ECHO MEAS - AVA(V,D): 1.4 CM^2
BH CV ECHO MEAS - CONTRAST EF (2CH): 48.1 ML/M^2
BH CV ECHO MEAS - CONTRAST EF 4CH: 50.1 ML/M^2
BH CV ECHO MEAS - EDV(CUBED): 72.5 ML
BH CV ECHO MEAS - EDV(MOD-SP2): 79.7 ML
BH CV ECHO MEAS - EDV(MOD-SP4): 79.6 ML
BH CV ECHO MEAS - EDV(TEICH): 77.3 ML
BH CV ECHO MEAS - EF(CUBED): 71.3 %
BH CV ECHO MEAS - EF(MOD-SP2): 48.1 %
BH CV ECHO MEAS - EF(MOD-SP4): 50.1 %
BH CV ECHO MEAS - EF(TEICH): 63.4 %
BH CV ECHO MEAS - ESV(CUBED): 20.8 ML
BH CV ECHO MEAS - ESV(MOD-SP2): 41.4 ML
BH CV ECHO MEAS - ESV(MOD-SP4): 39.7 ML
BH CV ECHO MEAS - ESV(TEICH): 28.3 ML
BH CV ECHO MEAS - FS: 34.1 %
BH CV ECHO MEAS - IVS/LVPW: 1
BH CV ECHO MEAS - IVSD: 0.94 CM
BH CV ECHO MEAS - LA DIMENSION: 3.3 CM
BH CV ECHO MEAS - LA/AO: 1
BH CV ECHO MEAS - LAT PEAK E' VEL: 8.6 CM/SEC
BH CV ECHO MEAS - LV MASS(C)D: 121.8 GRAMS
BH CV ECHO MEAS - LV MAX PG: 2 MMHG
BH CV ECHO MEAS - LV MEAN PG: 1 MMHG
BH CV ECHO MEAS - LV V1 MAX: 70.1 CM/SEC
BH CV ECHO MEAS - LV V1 MEAN: 42.3 CM/SEC
BH CV ECHO MEAS - LV V1 VTI: 13.9 CM
BH CV ECHO MEAS - LVIDD: 4.2 CM
BH CV ECHO MEAS - LVIDS: 2.8 CM
BH CV ECHO MEAS - LVLD AP2: 7.1 CM
BH CV ECHO MEAS - LVLD AP4: 7.9 CM
BH CV ECHO MEAS - LVLS AP2: 6.4 CM
BH CV ECHO MEAS - LVLS AP4: 6.8 CM
BH CV ECHO MEAS - LVOT AREA (M): 3.1 CM^2
BH CV ECHO MEAS - LVOT AREA: 3.1 CM^2
BH CV ECHO MEAS - LVOT DIAM: 2 CM
BH CV ECHO MEAS - LVPWD: 0.91 CM
BH CV ECHO MEAS - MV A MAX VEL: 64 CM/SEC
BH CV ECHO MEAS - MV DEC TIME: 0.17 SEC
BH CV ECHO MEAS - MV E MAX VEL: 77.6 CM/SEC
BH CV ECHO MEAS - MV E/A: 1.2
BH CV ECHO MEAS - PI END-D VEL: 121 CM/SEC
BH CV ECHO MEAS - SV(AO): 240.3 ML
BH CV ECHO MEAS - SV(CUBED): 51.7 ML
BH CV ECHO MEAS - SV(LVOT): 43.7 ML
BH CV ECHO MEAS - SV(MOD-SP2): 38.3 ML
BH CV ECHO MEAS - SV(MOD-SP4): 39.9 ML
BH CV ECHO MEAS - SV(TEICH): 49 ML
E/E' RATIO: 9.5
LEFT ATRIUM VOLUME: 34 CM3
LV EF 2D ECHO EST: 55 %

## 2017-05-01 ENCOUNTER — APPOINTMENT (OUTPATIENT)
Dept: PSYCHIATRY | Age: 42
End: 2017-05-01
Payer: COMMERCIAL

## 2017-05-03 ENCOUNTER — HOSPITAL ENCOUNTER (OUTPATIENT)
Dept: PSYCHIATRY | Age: 42
Setting detail: THERAPIES SERIES
Discharge: HOME | End: 2017-05-03
Payer: COMMERCIAL

## 2017-05-03 PROCEDURE — G0177 OPPS/PHP; TRAIN & EDUC SERV: HCPCS | Performed by: SOCIAL WORKER

## 2017-05-03 PROCEDURE — 90853 GROUP PSYCHOTHERAPY: CPT | Performed by: SOCIAL WORKER

## 2017-05-04 ENCOUNTER — HOSPITAL ENCOUNTER (OUTPATIENT)
Dept: PSYCHIATRY | Age: 42
Setting detail: THERAPIES SERIES
Discharge: HOME | End: 2017-05-04
Payer: COMMERCIAL

## 2017-05-04 VITALS — SYSTOLIC BLOOD PRESSURE: 124 MMHG | DIASTOLIC BLOOD PRESSURE: 78 MMHG | HEART RATE: 92 BPM | RESPIRATION RATE: 18 BRPM

## 2017-05-04 DIAGNOSIS — F31.4 BIPOLAR I DISORDER, MOST RECENT EPISODE DEPRESSED, SEVERE WITHOUT PSYCHOTIC FEATURES (HCC): Primary | ICD-10-CM

## 2017-05-04 PROCEDURE — 90853 GROUP PSYCHOTHERAPY: CPT

## 2017-05-04 PROCEDURE — G0177 OPPS/PHP; TRAIN & EDUC SERV: HCPCS

## 2017-05-04 PROCEDURE — 90832 PSYTX W PT 30 MINUTES: CPT | Performed by: NURSE PRACTITIONER

## 2017-05-04 RX ORDER — DEXTROAMPHETAMINE SACCHARATE, AMPHETAMINE ASPARTATE, DEXTROAMPHETAMINE SULFATE AND AMPHETAMINE SULFATE 7.5; 7.5; 7.5; 7.5 MG/1; MG/1; MG/1; MG/1
30 TABLET ORAL 2 TIMES DAILY
Qty: 60 TABLET | Refills: 0 | Status: SHIPPED | OUTPATIENT
Start: 2017-05-04 | End: 2017-06-07

## 2017-05-05 ENCOUNTER — HOSPITAL ENCOUNTER (OUTPATIENT)
Dept: PSYCHIATRY | Age: 42
Setting detail: THERAPIES SERIES
Discharge: HOME | End: 2017-05-05
Payer: COMMERCIAL

## 2017-05-05 DIAGNOSIS — F31.4 BIPOLAR I DISORDER, MOST RECENT EPISODE DEPRESSED, SEVERE WITHOUT PSYCHOTIC FEATURES (HCC): ICD-10-CM

## 2017-05-05 LAB
ANION GAP SERPL CALCULATED.3IONS-SCNC: 15 MMOL/L (ref 7–19)
BUN BLDV-MCNC: 12 MG/DL (ref 6–20)
CALCIUM SERPL-MCNC: 9.5 MG/DL (ref 8.6–10)
CHLORIDE BLD-SCNC: 99 MMOL/L (ref 98–111)
CO2: 26 MMOL/L (ref 22–29)
CREAT SERPL-MCNC: 1 MG/DL (ref 0.5–1.2)
GFR NON-AFRICAN AMERICAN: >60
GLUCOSE BLD-MCNC: 129 MG/DL (ref 74–109)
POTASSIUM SERPL-SCNC: 4.3 MMOL/L (ref 3.5–5)
SODIUM BLD-SCNC: 140 MMOL/L (ref 136–145)

## 2017-05-05 PROCEDURE — G0177 OPPS/PHP; TRAIN & EDUC SERV: HCPCS

## 2017-05-05 PROCEDURE — 90853 GROUP PSYCHOTHERAPY: CPT

## 2017-05-08 LAB
VITAMIN D2 AND D3, TOTAL: 40.6 NG/ML (ref 30–80)
VITAMIN D2, 25 HYDROXY: 32.8 NG/ML
VITAMIN D3,25 HYDROXY: 7.8 NG/ML

## 2017-05-09 ENCOUNTER — HOSPITAL ENCOUNTER (OUTPATIENT)
Dept: PSYCHIATRY | Age: 42
Setting detail: THERAPIES SERIES
Discharge: HOME | End: 2017-05-09
Payer: COMMERCIAL

## 2017-05-09 PROCEDURE — 90853 GROUP PSYCHOTHERAPY: CPT | Performed by: SOCIAL WORKER

## 2017-05-09 PROCEDURE — G0177 OPPS/PHP; TRAIN & EDUC SERV: HCPCS | Performed by: SOCIAL WORKER

## 2017-05-10 ENCOUNTER — APPOINTMENT (OUTPATIENT)
Dept: PSYCHIATRY | Age: 42
End: 2017-05-10
Payer: COMMERCIAL

## 2017-05-11 ENCOUNTER — APPOINTMENT (OUTPATIENT)
Dept: PSYCHIATRY | Age: 42
End: 2017-05-11
Payer: COMMERCIAL

## 2017-05-11 ENCOUNTER — HOSPITAL ENCOUNTER (OUTPATIENT)
Dept: PSYCHIATRY | Age: 42
Setting detail: THERAPIES SERIES
Discharge: HOME | End: 2017-05-11
Payer: COMMERCIAL

## 2017-05-11 PROCEDURE — 90853 GROUP PSYCHOTHERAPY: CPT | Performed by: SOCIAL WORKER

## 2017-05-12 ENCOUNTER — HOSPITAL ENCOUNTER (OUTPATIENT)
Dept: PSYCHIATRY | Age: 42
Setting detail: THERAPIES SERIES
Discharge: HOME | End: 2017-05-12
Payer: COMMERCIAL

## 2017-05-12 PROCEDURE — G0177 OPPS/PHP; TRAIN & EDUC SERV: HCPCS | Performed by: SOCIAL WORKER

## 2017-05-12 PROCEDURE — 90853 GROUP PSYCHOTHERAPY: CPT | Performed by: SOCIAL WORKER

## 2017-05-15 ENCOUNTER — APPOINTMENT (OUTPATIENT)
Dept: PSYCHIATRY | Age: 42
End: 2017-05-15
Payer: COMMERCIAL

## 2017-05-16 ENCOUNTER — HOSPITAL ENCOUNTER (OUTPATIENT)
Dept: PSYCHIATRY | Age: 42
Setting detail: THERAPIES SERIES
Discharge: HOME | End: 2017-05-16
Payer: COMMERCIAL

## 2017-05-16 VITALS — DIASTOLIC BLOOD PRESSURE: 68 MMHG | SYSTOLIC BLOOD PRESSURE: 125 MMHG | HEART RATE: 81 BPM

## 2017-05-16 PROCEDURE — 90832 PSYTX W PT 30 MINUTES: CPT | Performed by: NURSE PRACTITIONER

## 2017-05-16 PROCEDURE — G0177 OPPS/PHP; TRAIN & EDUC SERV: HCPCS | Performed by: SOCIAL WORKER

## 2017-05-16 PROCEDURE — 90853 GROUP PSYCHOTHERAPY: CPT | Performed by: SOCIAL WORKER

## 2017-05-16 RX ORDER — CLONIDINE HYDROCHLORIDE 0.1 MG/1
TABLET ORAL
Qty: 60 TABLET | Refills: 0 | Status: SHIPPED | OUTPATIENT
Start: 2017-05-16 | End: 2017-06-07

## 2017-05-17 ENCOUNTER — HOSPITAL ENCOUNTER (OUTPATIENT)
Dept: PSYCHIATRY | Age: 42
Setting detail: THERAPIES SERIES
Discharge: HOME | End: 2017-05-17
Payer: COMMERCIAL

## 2017-05-17 PROCEDURE — 90853 GROUP PSYCHOTHERAPY: CPT | Performed by: SOCIAL WORKER

## 2017-05-17 PROCEDURE — G0177 OPPS/PHP; TRAIN & EDUC SERV: HCPCS | Performed by: SOCIAL WORKER

## 2017-05-18 ENCOUNTER — HOSPITAL ENCOUNTER (OUTPATIENT)
Dept: PSYCHIATRY | Age: 42
Setting detail: THERAPIES SERIES
Discharge: HOME | End: 2017-05-18
Payer: COMMERCIAL

## 2017-05-18 PROCEDURE — 90853 GROUP PSYCHOTHERAPY: CPT | Performed by: SOCIAL WORKER

## 2017-05-19 ENCOUNTER — OFFICE VISIT (OUTPATIENT)
Dept: CARDIOLOGY | Facility: CLINIC | Age: 42
End: 2017-05-19

## 2017-05-19 ENCOUNTER — APPOINTMENT (OUTPATIENT)
Dept: PSYCHIATRY | Age: 42
End: 2017-05-19
Payer: COMMERCIAL

## 2017-05-19 VITALS
SYSTOLIC BLOOD PRESSURE: 100 MMHG | BODY MASS INDEX: 27.66 KG/M2 | HEART RATE: 78 BPM | HEIGHT: 65 IN | OXYGEN SATURATION: 98 % | DIASTOLIC BLOOD PRESSURE: 52 MMHG | WEIGHT: 166 LBS

## 2017-05-19 DIAGNOSIS — Z82.49 FAMILY HISTORY OF EARLY CAD: ICD-10-CM

## 2017-05-19 DIAGNOSIS — F98.8 ADD (ATTENTION DEFICIT DISORDER): ICD-10-CM

## 2017-05-19 DIAGNOSIS — R06.09 DOE (DYSPNEA ON EXERTION): ICD-10-CM

## 2017-05-19 DIAGNOSIS — F31.9 BIPOLAR 1 DISORDER (HCC): ICD-10-CM

## 2017-05-19 DIAGNOSIS — E11.9 TYPE 2 DIABETES MELLITUS WITHOUT COMPLICATION, UNSPECIFIED LONG TERM INSULIN USE STATUS: ICD-10-CM

## 2017-05-19 DIAGNOSIS — I35.1 NONRHEUMATIC AORTIC VALVE INSUFFICIENCY: ICD-10-CM

## 2017-05-19 DIAGNOSIS — I10 ESSENTIAL HYPERTENSION: Primary | ICD-10-CM

## 2017-05-19 DIAGNOSIS — F41.9 ANXIETY: ICD-10-CM

## 2017-05-19 PROCEDURE — 99214 OFFICE O/P EST MOD 30 MIN: CPT | Performed by: INTERNAL MEDICINE

## 2017-05-19 RX ORDER — CLONIDINE HYDROCHLORIDE 0.1 MG/1
0.1 TABLET ORAL 2 TIMES DAILY
COMMUNITY
Start: 2017-05-18 | End: 2022-06-28

## 2017-05-19 RX ORDER — ERGOCALCIFEROL 1.25 MG/1
CAPSULE ORAL
COMMUNITY
Start: 2017-05-18 | End: 2018-04-17

## 2017-05-24 ENCOUNTER — HOSPITAL ENCOUNTER (OUTPATIENT)
Dept: PSYCHIATRY | Age: 42
Setting detail: THERAPIES SERIES
Discharge: HOME | End: 2017-05-24
Payer: COMMERCIAL

## 2017-05-24 VITALS — SYSTOLIC BLOOD PRESSURE: 104 MMHG | DIASTOLIC BLOOD PRESSURE: 67 MMHG | HEART RATE: 86 BPM | RESPIRATION RATE: 18 BRPM

## 2017-05-24 PROCEDURE — 90853 GROUP PSYCHOTHERAPY: CPT | Performed by: SOCIAL WORKER

## 2017-05-24 PROCEDURE — G0177 OPPS/PHP; TRAIN & EDUC SERV: HCPCS | Performed by: SOCIAL WORKER

## 2017-05-25 ENCOUNTER — TELEPHONE (OUTPATIENT)
Dept: PSYCHIATRY | Age: 42
End: 2017-05-25

## 2017-05-26 ENCOUNTER — HOSPITAL ENCOUNTER (OUTPATIENT)
Dept: PSYCHIATRY | Age: 42
Setting detail: THERAPIES SERIES
Discharge: HOME | End: 2017-05-26
Payer: COMMERCIAL

## 2017-05-26 PROCEDURE — 90853 GROUP PSYCHOTHERAPY: CPT | Performed by: SOCIAL WORKER

## 2017-05-30 ENCOUNTER — HOSPITAL ENCOUNTER (OUTPATIENT)
Dept: PSYCHIATRY | Age: 42
Setting detail: THERAPIES SERIES
Discharge: HOME | End: 2017-05-30
Payer: COMMERCIAL

## 2017-05-30 VITALS — RESPIRATION RATE: 18 BRPM | SYSTOLIC BLOOD PRESSURE: 120 MMHG | DIASTOLIC BLOOD PRESSURE: 79 MMHG | HEART RATE: 90 BPM

## 2017-05-30 PROCEDURE — 90853 GROUP PSYCHOTHERAPY: CPT | Performed by: SOCIAL WORKER

## 2017-05-31 ENCOUNTER — TELEPHONE (OUTPATIENT)
Dept: PSYCHIATRY | Age: 42
End: 2017-05-31

## 2017-05-31 ENCOUNTER — HOSPITAL ENCOUNTER (OUTPATIENT)
Dept: PSYCHIATRY | Age: 42
Setting detail: THERAPIES SERIES
Discharge: HOME | End: 2017-05-31
Payer: COMMERCIAL

## 2017-05-31 PROCEDURE — 90853 GROUP PSYCHOTHERAPY: CPT | Performed by: SOCIAL WORKER

## 2017-06-06 ENCOUNTER — APPOINTMENT (OUTPATIENT)
Dept: PSYCHIATRY | Age: 42
End: 2017-06-06
Payer: COMMERCIAL

## 2017-06-07 ENCOUNTER — HOSPITAL ENCOUNTER (OUTPATIENT)
Dept: PSYCHIATRY | Age: 42
Setting detail: THERAPIES SERIES
Discharge: HOME | End: 2017-06-07
Payer: COMMERCIAL

## 2017-06-07 VITALS — RESPIRATION RATE: 18 BRPM | SYSTOLIC BLOOD PRESSURE: 135 MMHG | DIASTOLIC BLOOD PRESSURE: 85 MMHG | HEART RATE: 104 BPM

## 2017-06-07 PROCEDURE — 90832 PSYTX W PT 30 MINUTES: CPT | Performed by: NURSE PRACTITIONER

## 2017-06-07 PROCEDURE — 90853 GROUP PSYCHOTHERAPY: CPT | Performed by: SOCIAL WORKER

## 2017-06-07 RX ORDER — TRAZODONE HYDROCHLORIDE 150 MG/1
150 TABLET ORAL NIGHTLY
Qty: 30 TABLET | Refills: 0 | Status: SHIPPED | OUTPATIENT
Start: 2017-06-07 | End: 2017-10-26

## 2017-06-07 RX ORDER — LAMOTRIGINE 200 MG/1
200 TABLET ORAL 2 TIMES DAILY
Qty: 60 TABLET | Refills: 1 | Status: SHIPPED | OUTPATIENT
Start: 2017-06-07 | End: 2017-10-26

## 2017-06-07 RX ORDER — DEXTROAMPHETAMINE SACCHARATE, AMPHETAMINE ASPARTATE, DEXTROAMPHETAMINE SULFATE AND AMPHETAMINE SULFATE 7.5; 7.5; 7.5; 7.5 MG/1; MG/1; MG/1; MG/1
30 TABLET ORAL 2 TIMES DAILY
Qty: 60 TABLET | Refills: 0 | Status: SHIPPED | OUTPATIENT
Start: 2017-06-07 | End: 2017-10-17

## 2017-06-07 RX ORDER — CLONIDINE HYDROCHLORIDE 0.1 MG/1
TABLET ORAL
Qty: 60 TABLET | Refills: 0 | Status: SHIPPED | OUTPATIENT
Start: 2017-06-07 | End: 2022-06-09 | Stop reason: ALTCHOICE

## 2017-06-09 ENCOUNTER — HOSPITAL ENCOUNTER (OUTPATIENT)
Dept: PSYCHIATRY | Age: 42
Setting detail: THERAPIES SERIES
Discharge: HOME | End: 2017-06-09
Payer: COMMERCIAL

## 2017-06-09 PROCEDURE — 90853 GROUP PSYCHOTHERAPY: CPT | Performed by: SOCIAL WORKER

## 2017-06-13 ENCOUNTER — HOSPITAL ENCOUNTER (OUTPATIENT)
Dept: PSYCHIATRY | Age: 42
Setting detail: THERAPIES SERIES
Discharge: HOME | End: 2017-06-13
Payer: COMMERCIAL

## 2017-06-13 VITALS
RESPIRATION RATE: 18 BRPM | SYSTOLIC BLOOD PRESSURE: 111 MMHG | HEART RATE: 84 BPM | TEMPERATURE: 98.2 F | DIASTOLIC BLOOD PRESSURE: 75 MMHG

## 2017-06-13 PROCEDURE — 90832 PSYTX W PT 30 MINUTES: CPT | Performed by: NURSE PRACTITIONER

## 2017-06-13 PROCEDURE — 90853 GROUP PSYCHOTHERAPY: CPT | Performed by: SOCIAL WORKER

## 2017-09-28 ENCOUNTER — HOSPITAL ENCOUNTER (OUTPATIENT)
Dept: PSYCHIATRY | Age: 42
Setting detail: THERAPIES SERIES
Discharge: HOME | End: 2017-09-28
Payer: COMMERCIAL

## 2017-09-28 VITALS
BODY MASS INDEX: 27.49 KG/M2 | RESPIRATION RATE: 18 BRPM | SYSTOLIC BLOOD PRESSURE: 130 MMHG | DIASTOLIC BLOOD PRESSURE: 84 MMHG | HEIGHT: 65 IN | OXYGEN SATURATION: 100 % | WEIGHT: 165 LBS | HEART RATE: 104 BPM | TEMPERATURE: 98.3 F

## 2017-09-28 DIAGNOSIS — F43.10 PTSD (POST-TRAUMATIC STRESS DISORDER): ICD-10-CM

## 2017-09-28 DIAGNOSIS — F31.9 BIPOLAR I DISORDER (HCC): Primary | ICD-10-CM

## 2017-09-28 PROCEDURE — 90832 PSYTX W PT 30 MINUTES: CPT

## 2017-09-28 PROCEDURE — 90792 PSYCH DIAG EVAL W/MED SRVCS: CPT | Performed by: NURSE PRACTITIONER

## 2017-09-28 ASSESSMENT — SLEEP AND FATIGUE QUESTIONNAIRES
AVERAGE NUMBER OF SLEEP HOURS: 4
DIFFICULTY FALLING ASLEEP: YES
DO YOU USE A SLEEP AID: YES
DIFFICULTY ARISING: YES
RESTFUL SLEEP: YES
DO YOU HAVE DIFFICULTY SLEEPING: YES
DIFFICULTY STAYING ASLEEP: YES

## 2017-09-28 ASSESSMENT — PATIENT HEALTH QUESTIONNAIRE - PHQ9: SUM OF ALL RESPONSES TO PHQ QUESTIONS 1-9: 21

## 2017-09-28 ASSESSMENT — LIFESTYLE VARIABLES: HISTORY_ALCOHOL_USE: NO

## 2017-10-03 ENCOUNTER — HOSPITAL ENCOUNTER (OUTPATIENT)
Dept: PSYCHIATRY | Age: 42
Setting detail: THERAPIES SERIES
Discharge: HOME | End: 2017-10-03
Payer: COMMERCIAL

## 2017-10-03 VITALS
RESPIRATION RATE: 18 BRPM | HEART RATE: 94 BPM | DIASTOLIC BLOOD PRESSURE: 73 MMHG | SYSTOLIC BLOOD PRESSURE: 112 MMHG | TEMPERATURE: 97.9 F

## 2017-10-03 PROCEDURE — 90853 GROUP PSYCHOTHERAPY: CPT

## 2017-10-03 NOTE — PLAN OF CARE
Problem: Altered Mood, Depressive Behavior  Goal: STG-Medication therapy compliance  Pt will report any med side effects to staff. Outcome: Ongoing  D:  Pt here for IOP this am.  He rates his depression, anger and guilt a 6 with 10 being the highest.  He rates his anxiety a 7 on the same scale. He reports a decreased appetite and extremely low energy level. He denied any suicidal thoughts. He reports that he slept 9 hrs last night. Pt reports med compliance with no side effects. Pt states that his cost of the Vianne Noon with his insurance is over 800.00 which he cannot pay. Pt reports that a PA would not be helpful. Pt given coupon for the Vraylar and pt assist info for his to complete to see if he can get some help. He has samples at this time. A:  Support provided. Will check with his pharmacy to verify issues with Vianne Noon. R:  Pt with worried affect and is somatic with various minor complaints.

## 2017-10-04 ENCOUNTER — HOSPITAL ENCOUNTER (OUTPATIENT)
Dept: PSYCHIATRY | Age: 42
Setting detail: THERAPIES SERIES
Discharge: HOME | End: 2017-10-04
Payer: COMMERCIAL

## 2017-10-04 DIAGNOSIS — F43.10 PTSD (POST-TRAUMATIC STRESS DISORDER): ICD-10-CM

## 2017-10-04 DIAGNOSIS — F31.4 BIPOLAR I DISORDER, MOST RECENT EPISODE DEPRESSED, SEVERE WITHOUT PSYCHOTIC FEATURES (HCC): ICD-10-CM

## 2017-10-04 PROCEDURE — 90853 GROUP PSYCHOTHERAPY: CPT

## 2017-10-04 NOTE — PLAN OF CARE
Problem: Altered Mood, Depressive Behavior  Goal: LTG-Able to verbalize acceptance of life and situations over which he or she has no control                                                                      Group Therapy Note     Date: 10/03/2017  Start Time: 830  End Time:  945  Number of Participants: 8     Type of Group: Psychotherapy        Patient's Goal:  To process emotions and how those relate to others and those relationships     Notes:  Pt was present for group. Pt participated and discussed several issues which are bothering him right now. Pt stated he felt he needed to come back to group because he felt himself becoming more depressed and needed to be around people who understand. The group offered support and encouragement. Status After Intervention:  Unchanged     Participation Level: Active Listener and Interactive     Participation Quality: Appropriate, Attentive and Sharing        Speech:  normal        Thought Process/Content: Logical        Affective Functioning: Blunted        Mood: anxious and depressed        Level of consciousness:  Alert and Oriented x4        Response to Learning: Able to verbalize current knowledge/experience, Able to verbalize/acknowledge new learning and Able to retain information        Endings: None Reported     Modes of Intervention: Support, Socialization, Exploration and Clarifying        Discipline Responsible: /Counselor        Signature:   Duc Price

## 2017-10-04 NOTE — PLAN OF CARE
Problem: Altered Mood, Depressive Behavior  Goal: LTG-Able to verbalize acceptance of life and situations over which he or she has no control                                                                      Group Therapy Note     Date: 10/03/2017  Start Time: 1000  End Time:  1100  Number of Participants: 8     Type of Group: Psychotherapy        Patient's Goal:  To process emotions and how those relate to others and those relationships     Notes:  Pt was present for group. Pt participated and interacted with other group members. Pt was supportive and offered feedback as needed. Status After Intervention:  Unchanged     Participation Level: Active Listener and Interactive     Participation Quality: Appropriate, Attentive, Sharing and Supportive        Speech:  normal        Thought Process/Content: Logical        Affective Functioning: Blunted     Mood: depressed        Level of consciousness:  Alert, Oriented x4 and Attentive        Response to Learning: Able to verbalize current knowledge/experience, Able to verbalize/acknowledge new learning, Able to retain information and Capable of insight        Endings: None Reported     Modes of Intervention: Support, Socialization, Exploration and Clarifying        Discipline Responsible: /Counselor        Signature:   Liam Tao

## 2017-10-05 ENCOUNTER — HOSPITAL ENCOUNTER (OUTPATIENT)
Dept: PSYCHIATRY | Age: 42
Setting detail: THERAPIES SERIES
Discharge: HOME | End: 2017-10-05
Payer: COMMERCIAL

## 2017-10-05 ENCOUNTER — TELEPHONE (OUTPATIENT)
Dept: PSYCHIATRY | Age: 42
End: 2017-10-05

## 2017-10-05 DIAGNOSIS — F43.10 PTSD (POST-TRAUMATIC STRESS DISORDER): ICD-10-CM

## 2017-10-05 DIAGNOSIS — F31.4 BIPOLAR I DISORDER, MOST RECENT EPISODE DEPRESSED, SEVERE WITHOUT PSYCHOTIC FEATURES (HCC): ICD-10-CM

## 2017-10-05 PROCEDURE — 90853 GROUP PSYCHOTHERAPY: CPT

## 2017-10-05 NOTE — PLAN OF CARE
Problem: Altered Mood, Depressive Behavior  Goal: STG-Knowledge of positive coping patterns  Outcome: Ongoing                                                                      Group Therapy Note     Date: 10/5/2017  Start Time: 1000  End Time:  1100  Number of Participants: 9     Type of Group: Cognitive Skills     Wellness Binder Information  Module Name:  Social Wellness  Session Number:  4     Patient's Goal:  To be able to identify signs of healthy boundaries     Notes:  Pt participated in group discussion, identified signs of healthy boundaries.      Status After Intervention:  Unchanged     Participation Level: Interactive     Participation Quality: Attentive        Speech:  normal        Thought Process/Content: Logical        Affective Functioning: Congruent        Mood: anxious and depressed        Level of consciousness:  Alert and Oriented x4        Response to Learning: Able to verbalize current knowledge/experience, Able to verbalize/acknowledge new learning and Progressing to goal        Endings: None Reported     Modes of Intervention: Education        Discipline Responsible: Psychoeducational Specialist        Signature:  Martita Roth

## 2017-10-10 ENCOUNTER — HOSPITAL ENCOUNTER (OUTPATIENT)
Dept: PSYCHIATRY | Age: 42
Setting detail: THERAPIES SERIES
Discharge: HOME | End: 2017-10-10
Payer: COMMERCIAL

## 2017-10-10 VITALS
HEART RATE: 98 BPM | RESPIRATION RATE: 18 BRPM | TEMPERATURE: 98.3 F | DIASTOLIC BLOOD PRESSURE: 92 MMHG | SYSTOLIC BLOOD PRESSURE: 139 MMHG

## 2017-10-10 DIAGNOSIS — F31.4 BIPOLAR I DISORDER, MOST RECENT EPISODE DEPRESSED, SEVERE WITHOUT PSYCHOTIC FEATURES (HCC): ICD-10-CM

## 2017-10-10 DIAGNOSIS — F43.10 PTSD (POST-TRAUMATIC STRESS DISORDER): ICD-10-CM

## 2017-10-10 PROCEDURE — 90853 GROUP PSYCHOTHERAPY: CPT | Performed by: SOCIAL WORKER

## 2017-10-10 NOTE — PLAN OF CARE
Problem: Altered Mood, Depressive Behavior  Goal: LTG-Able to verbalize acceptance of life and situations over which he or she has no control                                                                      Group Therapy Note    Date: 10/10/2017  Start Time: 830  End Time:  945  Number of Participants: 9    Type of Group: Psychotherapy    Patient's Goal:  To process emotions and how those relate to others and those relationships    Notes:  Pt was present for group. Pt participated and offered feed back to other group members. Status After Intervention:  Unchanged    Participation Level: Active Listener    Participation Quality: Appropriate, Attentive and Supportive      Speech:  normal      Thought Process/Content: Logical  Linear      Affective Functioning: Blunted      Mood: anxious and depressed      Level of consciousness:  Alert, Oriented x4 and Attentive      Response to Learning: Able to verbalize current knowledge/experience, Able to verbalize/acknowledge new learning and Able to retain information      Endings: None Reported    Modes of Intervention: Support, Socialization, Exploration and Clarifying      Discipline Responsible: /Counselor      Signature:   Joyce Burgess

## 2017-10-11 ENCOUNTER — HOSPITAL ENCOUNTER (OUTPATIENT)
Dept: PSYCHIATRY | Age: 42
Setting detail: THERAPIES SERIES
Discharge: HOME | End: 2017-10-11
Payer: COMMERCIAL

## 2017-10-11 DIAGNOSIS — F43.10 PTSD (POST-TRAUMATIC STRESS DISORDER): ICD-10-CM

## 2017-10-11 DIAGNOSIS — F31.4 BIPOLAR I DISORDER, MOST RECENT EPISODE DEPRESSED, SEVERE WITHOUT PSYCHOTIC FEATURES (HCC): ICD-10-CM

## 2017-10-11 PROCEDURE — 90853 GROUP PSYCHOTHERAPY: CPT

## 2017-10-11 NOTE — PROGRESS NOTES
ADMINISTRATIVE NOTE:  Reviewed and approved group notes authored by LEISA Valentin today. We discussed progress of this patient and group issues and participation level.

## 2017-10-12 ENCOUNTER — HOSPITAL ENCOUNTER (OUTPATIENT)
Dept: PSYCHIATRY | Age: 42
Setting detail: THERAPIES SERIES
Discharge: HOME | End: 2017-10-12
Payer: COMMERCIAL

## 2017-10-12 DIAGNOSIS — F31.4 BIPOLAR I DISORDER, MOST RECENT EPISODE DEPRESSED, SEVERE WITHOUT PSYCHOTIC FEATURES (HCC): ICD-10-CM

## 2017-10-12 DIAGNOSIS — F43.10 PTSD (POST-TRAUMATIC STRESS DISORDER): ICD-10-CM

## 2017-10-12 PROCEDURE — 90853 GROUP PSYCHOTHERAPY: CPT

## 2017-10-12 NOTE — PLAN OF CARE
Problem: Altered Mood, Depressive Behavior  Goal: LTG-Able to verbalize acceptance of life and situations over which he or she has no control                                                                      Group Therapy Note    Date: 10/11/2017  Start Time: 1000  End Time:  1100  Number of Participants: 8    Type of Group: Psychotherapy      Patient's Goal:  To process emotions and how those relate to others and those relationships    Notes:  Pt was present for group. Pt participated in group and offered support of other group members. Status After Intervention:  Unchanged    Participation Level: Active Listener and Interactive    Participation Quality: Resistant and Lethargic      Speech:  normal      Thought Process/Content: Logical  Linear      Affective Functioning: Blunted      Mood: anxious and depressed      Level of consciousness:  Alert, Oriented x4 and Attentive      Response to Learning: Able to verbalize current knowledge/experience and Able to retain information      Endings: None Reported    Modes of Intervention: Support, Socialization, Exploration and Clarifying      Discipline Responsible: /Counselor      Signature:   Lizy Bullard

## 2017-10-12 NOTE — PROGRESS NOTES
ADMINISTRATIVE NOTE:  Reviewed and approved group notes authored by LEISA Kan today. We discussed progress of this patient and group issues and participation level.

## 2017-10-12 NOTE — PLAN OF CARE
Problem: Altered Mood, Depressive Behavior  Goal: STG-Knowledge of positive coping patterns  Outcome: Ongoing                                                                      Group Therapy Note    Date: 10/12/2017  Start Time: 1115  End Time:  1215  Number of Participants: 10    Type of Group: Cognitive Skills    Patient's Goal:  Handout: Cognitive Restructuring: Thoughts on Trial  Activity: Acting out a jury trail for the thought of \"I'm undeserving. \"  Process emotions and actions in how to relate to others or their relationship with others. Notes:  Pt attended process group as schedule. Pt participated by identified an issue to work on today regarding how they interact and relate to others. Participated in group discussion. Pt gave support and encouragement to group members. Pt was able to participate as the defendant and was supportive of the process. Status After Intervention:  Improved    Participation Level:  Active Listener and Interactive    Participation Quality: Appropriate, Attentive, Sharing and Supportive      Speech:  normal      Thought Process/Content: Logical  Linear      Affective Functioning: Congruent      Mood: anxious and depressed      Level of consciousness:  Alert, Oriented x4 and Attentive      Response to Learning: Able to verbalize current knowledge/experience and Able to change behavior      Endings: None Reported    Modes of Intervention: Education, Support, Socialization, Exploration and Clarifying      Discipline Responsible: /Counselor      Signature:  Lynden Runner, CSW

## 2017-10-12 NOTE — PLAN OF CARE
Problem: Altered Mood, Depressive Behavior  Goal: LTG-Able to verbalize acceptance of life and situations over which he or she has no control                                                                      Group Therapy Note    Date: 10/12/2017  Start Time: 830  End Time:  945  Number of Participants: 10    Type of Group: Psychotherapy      Patient's Goal:  To process emotions and how those relate to others and those relationships    Notes:  Pt was present for group. Pt participated and stated he is very depressed and feels he cannot do anything right now. Pt stated he was going to call social security but then a neighbor came and told him his music was too loud and this ruined his day and he was not able to do anything else. A group member discussed with pt the fact that the pt let the neighbor ruin his day and was spending way too much time worrying about this. The group offered feed back. Status After Intervention:  Unchanged    Participation Level: Active Listener and Interactive    Participation Quality: Sharing      Speech:  normal      Thought Process/Content: Logical  Linear      Affective Functioning: Blunted      Mood: anxious and depressed      Level of consciousness:  Alert and Oriented x4      Response to Learning: Able to verbalize current knowledge/experience and Able to retain information      Endings: None Reported    Modes of Intervention: Support, Socialization, Exploration and Clarifying      Discipline Responsible: /Counselor      Signature:   Lizy Bullard

## 2017-10-12 NOTE — PLAN OF CARE
Problem: Altered Mood, Depressive Behavior  Goal: LTG-Able to verbalize acceptance of life and situations over which he or she has no control                                                                      Group Therapy Note    Date: 10/11/2017  Start Time: 830  End Time:  945  Number of Participants: 9    Type of Group: Psychotherapy      Patient's Goal:  To process emotions and how those relate to others and those relationships    Notes:  Pt was present for group. Pt reported being very depressed but feels the meds he is on now are helping his anxiety. Pt stated he is having trouble taking care of things and has trouble concentrating. The group talked about making a small list of things to do and taking things slowly. The group offered support. Status After Intervention:  Unchanged    Participation Level: Active Listener and Interactive    Participation Quality: Resistant and Lethargic      Speech:  normal      Thought Process/Content: Logical  Linear      Affective Functioning: Blunted      Mood: anxious and depressed      Level of consciousness:  Alert, Oriented x4 and Attentive      Response to Learning: Able to verbalize current knowledge/experience and Able to retain information      Endings: None Reported    Modes of Intervention: Support, Socialization, Exploration and Clarifying      Discipline Responsible: /Counselor      Signature:   Anshul Amaro

## 2017-10-17 ENCOUNTER — HOSPITAL ENCOUNTER (OUTPATIENT)
Dept: PSYCHIATRY | Age: 42
Setting detail: THERAPIES SERIES
Discharge: HOME | End: 2017-10-17
Payer: COMMERCIAL

## 2017-10-17 DIAGNOSIS — F31.4 BIPOLAR I DISORDER, MOST RECENT EPISODE DEPRESSED, SEVERE WITHOUT PSYCHOTIC FEATURES (HCC): ICD-10-CM

## 2017-10-17 DIAGNOSIS — F43.10 PTSD (POST-TRAUMATIC STRESS DISORDER): ICD-10-CM

## 2017-10-17 PROCEDURE — 90853 GROUP PSYCHOTHERAPY: CPT

## 2017-10-17 RX ORDER — DEXTROAMPHETAMINE SACCHARATE, AMPHETAMINE ASPARTATE, DEXTROAMPHETAMINE SULFATE AND AMPHETAMINE SULFATE 7.5; 7.5; 7.5; 7.5 MG/1; MG/1; MG/1; MG/1
30 TABLET ORAL 2 TIMES DAILY
Qty: 60 TABLET | Refills: 0 | Status: SHIPPED | OUTPATIENT
Start: 2017-10-17 | End: 2022-06-09 | Stop reason: ALTCHOICE

## 2017-10-17 NOTE — PLAN OF CARE
Problem: Altered Mood, Depressive Behavior  Goal: LTG-Able to verbalize acceptance of life and situations over which he or she has no control                                                                      Group Therapy Note    Date: 10/17/2017  Start Time: 830  End Time:  945  Number of Participants: 12    Type of Group: Psychotherapy    Patient's Goal:  To process emotions and how those relate to others and those relationships    Notes:  Pt was present for group. Pt participated and stated he has been doing a little better. Pt reported he has been able to take care of several things on his list over the last few days. Pt stated he still feels depressed but it is not as bad as it was. Pt stated he has caught himself smiling and laughing more that before. The group offered support and encouragement. Status After Intervention:  Improved    Participation Level: Active Listener and Interactive    Participation Quality: Appropriate, Attentive, Sharing and Supportive      Speech:  normal      Thought Process/Content: Logical      Affective Functioning: Flat      Mood: anxious and depressed      Level of consciousness:  Alert, Oriented x4 and Attentive      Response to Learning: Able to verbalize current knowledge/experience, Able to verbalize/acknowledge new learning, Able to retain information, Capable of insight and Able to change behavior      Endings: None Reported    Modes of Intervention: Education, Support, Socialization and Exploration      Discipline Responsible: /Counselor      Signature:   Paula Majano

## 2017-10-18 ENCOUNTER — TELEPHONE (OUTPATIENT)
Dept: PSYCHIATRY | Age: 42
End: 2017-10-18

## 2017-10-20 ENCOUNTER — HOSPITAL ENCOUNTER (OUTPATIENT)
Dept: PSYCHIATRY | Age: 42
Setting detail: THERAPIES SERIES
Discharge: HOME | End: 2017-10-20
Payer: COMMERCIAL

## 2017-10-20 DIAGNOSIS — F43.10 PTSD (POST-TRAUMATIC STRESS DISORDER): ICD-10-CM

## 2017-10-20 DIAGNOSIS — F31.4 BIPOLAR I DISORDER, MOST RECENT EPISODE DEPRESSED, SEVERE WITHOUT PSYCHOTIC FEATURES (HCC): ICD-10-CM

## 2017-10-20 PROCEDURE — 90853 GROUP PSYCHOTHERAPY: CPT | Performed by: SOCIAL WORKER

## 2017-10-20 NOTE — PLAN OF CARE
Problem: Altered Mood, Depressive Behavior  Goal: LTG-Absence of self-harm  Outcome: Ongoing                                                                      Group Therapy Note    Date: 10/20/2017  Start Time: 1115  End Time:  1215  Number of Participants: 8    Type of Group: Cognitive Skills    Patient's Goal:  Watched and discussed 2 videos about mental health and suicide. Process emotions and actions in how to relate to others or their relationship with others. Notes:  Pt attended process group as schedule. Pt participated by identified an issue to work on today regarding how they interact and relate to others. Participated in group discussion. Pt gave support and encouragement to group members. Pt shared about personal thoughts about the videos and their past thoughts. Pt reported he could \"really relate\" to the suicide survivor in the video. Status After Intervention:  Unchanged    Participation Level:  Active Listener    Participation Quality: Appropriate and Attentive      Speech:  normal      Thought Process/Content: Linear      Affective Functioning: Blunted      Mood: depressed      Level of consciousness:  Alert, Oriented x4 and Attentive      Response to Learning: Progressing to goal      Endings: None Reported    Modes of Intervention: Education, Socialization, Exploration, Clarifying and Media      Discipline Responsible: /Counselor      Signature:  [facility], CSW

## 2017-10-20 NOTE — PLAN OF CARE
Problem: Altered Mood, Depressive Behavior  Goal: STG-Able to verbalize suicidal ideations  Outcome: Ongoing                                                                      Group Therapy Note    Date: 10/20/2017  Start Time: 1000  End Time:  1100  Number of Participants: 8    Type of Group: Psychoeducation    Patient's Goal:  Process emotions and actions about mental illness and suicide in how to relate to others or their relationship with others. Notes:  Pt attended process group as schedule. Pt participated by identified an issue to work on today regarding how they interact and relate to others. Participated in group discussion. Pt gave support and encouragement to group members. Status After Intervention:  Improved    Participation Level:  Active Listener and Interactive    Participation Quality: Appropriate, Attentive, Sharing and Supportive      Speech:  normal      Thought Process/Content: Logical  Linear      Affective Functioning: Congruent      Mood: euthymic      Level of consciousness:  Alert, Oriented x4 and Attentive      Response to Learning: Able to verbalize current knowledge/experience, Able to verbalize/acknowledge new learning, Able to retain information, Capable of insight, Able to change behavior and Progressing to goal      Endings: None Reported    Modes of Intervention: Education, Support, Socialization, Exploration, Clarifying, Problem-solving, Confrontation, Limit-setting and Reality-testing      Discipline Responsible: /Counselor      Signature:  LEISA Yoder

## 2017-10-24 ENCOUNTER — HOSPITAL ENCOUNTER (OUTPATIENT)
Dept: PSYCHIATRY | Age: 42
Setting detail: THERAPIES SERIES
Discharge: HOME | End: 2017-10-24
Payer: COMMERCIAL

## 2017-10-24 DIAGNOSIS — F43.10 PTSD (POST-TRAUMATIC STRESS DISORDER): ICD-10-CM

## 2017-10-24 DIAGNOSIS — F31.4 BIPOLAR I DISORDER, MOST RECENT EPISODE DEPRESSED, SEVERE WITHOUT PSYCHOTIC FEATURES (HCC): ICD-10-CM

## 2017-10-24 PROCEDURE — 90853 GROUP PSYCHOTHERAPY: CPT | Performed by: SOCIAL WORKER

## 2017-10-24 NOTE — PLAN OF CARE
Problem: Altered Mood, Depressive Behavior  Goal: LTG-Able to verbalize acceptance of life and situations over which he or she has no control                                                                      Group Therapy Note    Date: 10/24/2017  Start Time: 830  End Time:  945  Number of Participants: 7    Type of Group: Psychotherapy      Patient's Goal:  To process emotions and how those relate to others and those relationships    Notes:  Pt was present for group. Pt participated and was able to identify an area to work on today. Pt stated he is working on taking care of things that he has put off for a long time. Pt stated he is making short lists of things to do and then doing them. The group offered support and offered support and encouragement. Status After Intervention:  Improved    Participation Level: Active Listener and Interactive    Participation Quality: Appropriate, Attentive and Sharing      Speech:  normal      Thought Process/Content: Logical      Affective Functioning: Congruent      Mood: depressed      Level of consciousness:  Alert, Oriented x4 and Attentive      Response to Learning: Able to verbalize current knowledge/experience, Able to verbalize/acknowledge new learning and Able to retain information      Endings: None Reported    Modes of Intervention: Support, Socialization, Exploration and Clarifying      Discipline Responsible: /Counselor      Signature:   Dario Mcqueen

## 2017-10-24 NOTE — PROGRESS NOTES
ADMINISTRATIVE NOTE:  Reviewed and approved group notes authored by Quoc Anne for 10/20/2017. We discussed progress of this patient and group issues and participation level.

## 2017-10-24 NOTE — PLAN OF CARE
Problem: Altered Mood, Depressive Behavior  Goal: LTG-Able to verbalize acceptance of life and situations over which he or she has no control                                                                      Group Therapy Note    Date: 10/24/2017  Start Time: 1000  End Time:  1100  Number of Participants: 7    Type of Group: Psychotherapy    Patient's Goal:  To process emotions and how those relate to others and those relationships    Notes:  Pt was present for group. Pt participated and discussed how heis grateful for his parents and the time he has with them and for his cat who he feels is very therapeutic for him. The group offered support and encouragement. Status After Intervention:  Improved    Participation Level: Active Listener and Interactive    Participation Quality: Appropriate and Sharing      Speech:  normal      Thought Process/Content: Logical      Affective Functioning: Congruent      Mood: anxious      Level of consciousness:  Alert, Oriented x4 and Attentive      Response to Learning: Able to verbalize current knowledge/experience, Able to verbalize/acknowledge new learning and Able to retain information      Endings: None Reported    Modes of Intervention: Support, Socialization, Exploration and Clarifying      Discipline Responsible: /Counselor      Signature:   Duc Price

## 2017-10-26 ENCOUNTER — HOSPITAL ENCOUNTER (OUTPATIENT)
Dept: PSYCHIATRY | Age: 42
Setting detail: THERAPIES SERIES
Discharge: HOME | End: 2017-10-26
Payer: COMMERCIAL

## 2017-10-26 DIAGNOSIS — F43.10 PTSD (POST-TRAUMATIC STRESS DISORDER): ICD-10-CM

## 2017-10-26 DIAGNOSIS — F31.4 BIPOLAR I DISORDER, MOST RECENT EPISODE DEPRESSED, SEVERE WITHOUT PSYCHOTIC FEATURES (HCC): ICD-10-CM

## 2017-10-26 PROCEDURE — 90853 GROUP PSYCHOTHERAPY: CPT | Performed by: SOCIAL WORKER

## 2017-10-26 PROCEDURE — 90832 PSYTX W PT 30 MINUTES: CPT | Performed by: NURSE PRACTITIONER

## 2017-10-26 RX ORDER — TRAZODONE HYDROCHLORIDE 150 MG/1
150 TABLET ORAL NIGHTLY
Qty: 30 TABLET | Refills: 0 | Status: SHIPPED | OUTPATIENT
Start: 2017-10-26 | End: 2019-01-23

## 2017-10-26 RX ORDER — LAMOTRIGINE 200 MG/1
200 TABLET ORAL 2 TIMES DAILY
Qty: 60 TABLET | Refills: 1 | Status: SHIPPED | OUTPATIENT
Start: 2017-10-26 | End: 2022-06-09 | Stop reason: ALTCHOICE

## 2017-10-26 NOTE — PROGRESS NOTES
ADMINISTRATIVE NOTE:  Reviewed and approved group notes authored by LEISA Toledo today. We discussed progress of this patient and group issues and participation level.

## 2017-10-26 NOTE — BH NOTE
10/26/2017 3:59 PM   Progress Note        Dea Rasmussen 1975  Med Mgt & Psychotherapy Time Spent: 25 min       Psychotherapy Topics: stressors, coping, medications    CC:  Bipolar I Disorder  PTSD  OCD  ADHD      Subjective:  36 yo  male here for IOP is reports that his agitation and anxiety have decreased on Vraylar 3 mg I po daily. Says that while depression and anxiety are improved, he now feels \"a lot more depressed. \"  He attended a concert a few days ago of a favorite artist and \"I had to leave early; I wasn't able to enjoy it. That tells you how bad I feel. \"  He denies crying spells. \"I want to cry, because I think it would be a relief, but I cant' seem to cry. \"  Says he feels \"overwhelmed to say the least.\". Denies side effects of meds. Feels less hungry, and thinks he has lost some weight. Feels \"better about myself physically. \"  He says he feels better in terms of agitation and anxiety. Yet he continues to feel depressed. \"SSRIs have never really helped my mood in the past. The Abilify helped me a lot before it stopped helping, and now, how I feel about Ashutosh Jacob is almost as good as how I felt about Abilify. ..except it stopped working. \"  Denies SI or HI. Sleeping ok. Denies psychotic symptoms. Patient reports side effects as follows: none. Reports no suicidal ideation. Reports compliance with medications as good . Review of Systems - depressive symptoms; including low interest, low motivation, anhedonia    Current Meds:    Prior to Admission medications    Medication Sig Start Date End Date Taking? Authorizing Provider   amphetamine-dextroamphetamine (ADDERALL) 30 MG tablet Take 1 tablet by mouth 2 times daily .  10/17/17   PAM Glass   Cariprazine HCl (VRAYLAR) 3 MG CAPS Take 1 capsule by mouth Daily 9/28/17   PAM Glass   lamoTRIgine (LAMICTAL) 200 MG tablet Take 1 tablet by mouth 2 times daily 6/7/17   PAM Glass   traZODone (DESYREL) 150 MG tablet Take 1 tablet by mouth nightly 6/7/17   PAM Oscar   cloNIDine (CATAPRES) 0.1 MG tablet Take 1/2 to 1 tablet bid as directed 6/7/17   PAM Oscar   clonazePAM (KLONOPIN) 0.5 MG tablet Indications: Pt states he takes all 3 doses at hs. Take  Patient taking differently: Indications: Pt states he takes all 3 doses at hs.  5/31/17 called to [address] [place], [place] [address] to Kalli Seay as directed by Pam OROZCO  #90 no refill. Take 4/20/17   PAM Oscar   metFORMIN (GLUCOPHAGE) 1000 MG tablet Take 1,000 mg by mouth 2 times daily (with meals)    Historical Provider, MD     MSE:  Patient is  A & O x3. Appearance:  good grooming and good hygiene  Cognition:  Recent memory intact , remote memory intact , good fund of knowledge, above average intelligence level. Speech:  normal  Language: Naming: intact; Word Finding: intact  Conversation mood congruent  Behavior:  Cooperative; good eye contact  Mood:  Mildly irritable, complain of anger, anxiety. Affect: congruent  Thought Content: negative for   Thought Process: linear, goal directed and coherent  Judgement Insight:  Adequate and appropriate  Gait and Station: wnl  Musculoskeletal: no psychomotor agitation or retardation    Assesment:   1. Bipolar I disorder, most recent episode depressed, severe without psychotic features ([address]    2. PTSD (post-traumatic stress disorder)        Plan:  1. The risks, benefits, side effects, indications, contraindications, and adverse effects of the medications have been discussed. 2. The pt has verbalized understanding and has capacity to give informed consent. 3. The Dee Dee Sellers report has been reviewed according to [facility] regulations. 4. Supportive therapy offered. 5. Individual therapy: options discussed  6. Follow up: Continue IOP  7. The patient has been advised to call with any problems. 8. Controlled substance Treatment Plan: maintenance  9.  The above listed medications have been continued, modifications in meds and other orders/labs as follows: No orders of the defined types were placed in this encounter. No orders of the defined types were placed in this encounter.       10. Additional comments:  Carmelina OROZCO

## 2017-10-27 ENCOUNTER — HOSPITAL ENCOUNTER (OUTPATIENT)
Dept: PSYCHIATRY | Age: 42
Setting detail: THERAPIES SERIES
Discharge: HOME | End: 2017-10-27
Payer: COMMERCIAL

## 2017-10-27 DIAGNOSIS — F31.4 BIPOLAR I DISORDER, MOST RECENT EPISODE DEPRESSED, SEVERE WITHOUT PSYCHOTIC FEATURES (HCC): ICD-10-CM

## 2017-10-27 DIAGNOSIS — F43.10 PTSD (POST-TRAUMATIC STRESS DISORDER): ICD-10-CM

## 2017-10-27 PROCEDURE — 90853 GROUP PSYCHOTHERAPY: CPT

## 2017-10-27 NOTE — PLAN OF CARE
Problem: Altered Mood, Depressive Behavior  Goal: LTG-Able to verbalize acceptance of life and situations over which he or she has no control                                                                      Group Therapy Note    Date: 10/27/2017  Start Time: 1000  End Time:  1100  Number of Participants: 12    Type of Group: Psychotherapy      Patient's Goal:  To process emotions and how those relate to others and those relationships    Notes:  Pt was present for group. Pt participated and offered feed back and support to other group members. Status After Intervention:  Improved    Participation Level: Active Listener and Interactive    Participation Quality: Appropriate, Attentive, Sharing and Supportive      Speech:  normal      Thought Process/Content: Logical  Linear      Affective Functioning: Congruent      Mood: anxious and depressed      Level of consciousness:  Alert, Oriented x4 and Attentive      Response to Learning: Able to verbalize current knowledge/experience, Able to verbalize/acknowledge new learning, Able to retain information, Capable of insight and Able to change behavior      Endings: None Reported    Modes of Intervention: Education, Support, Socialization, Exploration and Clarifying      Discipline Responsible: /Counselor      Signature:   Cora Shah

## 2017-10-27 NOTE — PLAN OF CARE
Problem: Altered Mood, Depressive Behavior  Goal: LTG-Able to verbalize acceptance of life and situations over which he or she has no control                                                                      Group Therapy Note    Date: 10/27/2017  Start Time: 830  End Time:  945  Number of Participants: 12    Type of Group: Psychotherapy    Patient's Goal:  To process emotions and how those relate to others and those relationships    Notes:  Pt was present in group. Pt participated and was able to identify an issue to work on today and how this relates to others. Pt stated he was able to have a conversation with his partner about how he is feeling and was able to set some boundaries. Pt stated he feels better after talking to him and getting it off his chest. The group offered support. Status After Intervention:  Improved    Participation Level: Active Listener and Interactive    Participation Quality: Appropriate, Attentive, Sharing and Supportive      Speech:  normal      Thought Process/Content: Logical  Linear      Affective Functioning: Congruent      Mood: anxious and depressed      Level of consciousness:  Alert, Oriented x4 and Attentive      Response to Learning: Able to verbalize current knowledge/experience, Able to verbalize/acknowledge new learning, Able to retain information, Capable of insight and Able to change behavior      Endings: None Reported    Modes of Intervention: Education, Support, Socialization, Exploration and Clarifying      Discipline Responsible: /Counselor      Signature:   Padmini Zelaya

## 2017-10-31 ENCOUNTER — TELEPHONE (OUTPATIENT)
Dept: PSYCHIATRY | Age: 42
End: 2017-10-31

## 2017-11-01 ENCOUNTER — HOSPITAL ENCOUNTER (OUTPATIENT)
Dept: PSYCHIATRY | Age: 42
Setting detail: THERAPIES SERIES
Discharge: HOME | End: 2017-11-01
Payer: COMMERCIAL

## 2017-11-01 DIAGNOSIS — F43.10 PTSD (POST-TRAUMATIC STRESS DISORDER): ICD-10-CM

## 2017-11-01 DIAGNOSIS — F31.4 BIPOLAR I DISORDER, MOST RECENT EPISODE DEPRESSED, SEVERE WITHOUT PSYCHOTIC FEATURES (HCC): ICD-10-CM

## 2017-11-01 PROCEDURE — 90853 GROUP PSYCHOTHERAPY: CPT | Performed by: SOCIAL WORKER

## 2017-11-01 NOTE — PLAN OF CARE
Problem: Altered Mood, Depressive Behavior  Goal: LTG-Able to verbalize acceptance of life and situations over which he or she has no control                                                                      Group Therapy Note    Date: 11/1/2017  Start Time: 1000  End Time:  1100  Number of Participants: 10    Type of Group: Psychotherapy      Patient's Goal:  To process emotions and how those relate to others and those relationships    Notes:  Pt was present for group. Pt participated and was able to offer other group members support and encouragement. Status After Intervention:  Improved    Participation Level: Active Listener and Interactive    Participation Quality: Appropriate, Attentive, Sharing and Supportive      Speech:  normal      Thought Process/Content: Logical  Linear      Affective Functioning: Congruent      Mood: anxious and depressed      Level of consciousness:  Alert, Oriented x4 and Attentive      Response to Learning: Able to verbalize current knowledge/experience, Able to verbalize/acknowledge new learning and Able to retain information      Endings: None Reported    Modes of Intervention: Education, Support, Socialization, Exploration and Clarifying      Discipline Responsible: /Counselor      Signature:   uDc Price

## 2017-11-01 NOTE — PLAN OF CARE
Problem: Altered Mood, Depressive Behavior  Goal: LTG-Able to verbalize acceptance of life and situations over which he or she has no control                                                                      Group Therapy Note    Date: 11/1/2017  Start Time: 830  End Time:  945  Number of Participants: 10    Type of Group: Psychotherapy      Patient's Goal:  To process emotions and how those relate to others and those relationships    Notes:  Pt was present for group. Pt participated and was able to identify an issue to work on today and how this relates to others. Pt discussed how he is stressed over getting his brothers house for sale in [place] ready to sell. Discussed with pt that he is being discharged next week and pt stated he feels like he needs longer med management. Explained to pt we will make sure he has f/u with his doctor. The group discussed support groups that pt can attend in order to find support outside of IOP. Pt stated he was not aware of any support groups in the area and will check them out. The group offered support and encouragement. Status After Intervention:  Improved    Participation Level: Active Listener and Interactive    Participation Quality: Appropriate, Attentive and Sharing      Speech:  normal      Thought Process/Content: Logical  Linear      Affective Functioning: Incongruent      Mood: anxious and depressed      Level of consciousness:  Alert and Oriented x 3      Response to Learning: Able to verbalize current knowledge/experience, Able to verbalize/acknowledge new learning and Able to retain information      Endings: None Reported    Modes of Intervention: Education, Support, Socialization and Exploration      Discipline Responsible: /Counselor      Signature:   Dario Mcqueen

## 2017-11-01 NOTE — PLAN OF CARE
Problem: Altered Mood, Depressive Behavior  Goal: STG-Medication therapy compliance  Pt will report any med side effects to staff. Outcome: Ongoing  D:  Pt reported while he was at IOP this am that he had plenty of Vraylar 3 mg but needed some 1.5 mg samples so he can take the prescribed 4.5 mg.  A: Pt was given Vraylar 1.5 mg #7 samples per Carlo Parker APRMITCH. R:  Pt rated his depression 8 to 9, anger 4 and anxiety 7 with 10 being the highest.  Pt reported a good appetite and energy level. He denied any suicidal thoughts.

## 2017-11-07 ENCOUNTER — TELEPHONE (OUTPATIENT)
Dept: PSYCHIATRY | Age: 42
End: 2017-11-07

## 2017-11-07 NOTE — TELEPHONE ENCOUNTER
Pt was supposed to discharge from [facility] today 11/7/17. Pt needs to attend [facility] for discharge on 11/8/17, pt confirmed he would attend IOP. He has a therapist appointment on 11/9/17 and a Med appointment on 11-10-17.

## 2017-11-08 ENCOUNTER — HOSPITAL ENCOUNTER (OUTPATIENT)
Dept: PSYCHIATRY | Age: 42
Setting detail: THERAPIES SERIES
Discharge: HOME | End: 2017-11-08
Payer: COMMERCIAL

## 2017-11-08 VITALS
DIASTOLIC BLOOD PRESSURE: 82 MMHG | OXYGEN SATURATION: 98 % | SYSTOLIC BLOOD PRESSURE: 122 MMHG | RESPIRATION RATE: 18 BRPM | HEART RATE: 98 BPM

## 2017-11-08 DIAGNOSIS — F31.4 BIPOLAR I DISORDER, MOST RECENT EPISODE DEPRESSED, SEVERE WITHOUT PSYCHOTIC FEATURES (HCC): ICD-10-CM

## 2017-11-08 DIAGNOSIS — F43.10 PTSD (POST-TRAUMATIC STRESS DISORDER): ICD-10-CM

## 2017-11-08 PROCEDURE — 90832 PSYTX W PT 30 MINUTES: CPT | Performed by: NURSE PRACTITIONER

## 2017-11-08 PROCEDURE — 90853 GROUP PSYCHOTHERAPY: CPT

## 2017-11-08 NOTE — PROGRESS NOTES
Discharge Note     Pt discharging on this date. Pt denies SI, HI, and AVH at this time. Pt reports improvement in behavior and is leaving IOP in overall good condition. Pt reports improvement in depression and is functioning better. PT was given info on FISH and [facility].

## 2017-11-08 NOTE — PLAN OF CARE
Problem: Altered Mood, Depressive Behavior  Goal: LTG-Able to verbalize acceptance of life and situations over which he or she has no control                                                                      Group Therapy Note    Date: 11/8/2017  Start Time: 1000  End Time:  1100  Number of Participants: 11    Type of Group: Psychotherapy      Patient's Goal:  To process emotions and how those relate to others and those relationships    Notes:  Pt was present for group. Pt participated and offered other group members support and encouragement. Pt offered feed back as needed. Status After Intervention:  Improved    Participation Level: Active Listener and Interactive    Participation Quality: Appropriate, Attentive and Supportive      Speech:  normal      Thought Process/Content: Logical  Linear      Affective Functioning: Congruent      Mood: anxious and depressed      Level of consciousness:  Alert, Oriented x4 and Attentive      Response to Learning: Able to verbalize current knowledge/experience, Able to verbalize/acknowledge new learning, Able to retain information and Capable of insight      Endings: None Reported    Modes of Intervention: Education, Support, Socialization, Exploration and Clarifying      Discipline Responsible: /Counselor      Signature:   Elyssa Barreto

## 2017-11-08 NOTE — PLAN OF CARE
Problem: Altered Mood, Depressive Behavior  Goal: STG-Medication therapy compliance  Pt will report any med side effects to staff. Outcome: Met This Shift  DISCHARGE NOTE FOR IOP:  Met with pt 1:1 to provide discharge instructions. Pt denies any suicidal thoughts. Pt given written and verbal info FISH and [facility] and benefits discussed. Pt states he is not interested in [facility] but may attend [facility]. Reviewed f/u appts for med mgmt and therapy. Medications reviewed in detail. A:  Discharge instructions reviewed and compliance stressed. Pt admits he has not been compliant with the Metformin. He was encouraged to see PCP as soon as possible. R:  Pt admits that he feels better since on Vraylar. Pt verbalized understanding of above info and plans to follow.

## 2017-11-08 NOTE — PLAN OF CARE
Problem: Altered Mood, Depressive Behavior  Goal: STG-Knowledge of positive coping patterns  Outcome: Completed Date Met: 11/08/17                                                                      Group Therapy Note    Date: 11/8/2017  Start Time: 1115  End Time:  1215  Number of Participants: 11    Type of Group: Cognitive Skills    Patient's Goal:  Handout: Interpersonal Effectiveness Skills. Process emotions and actions in how to relate to others or their relationship with others. Notes:  Pt attended process group as schedule. Pt participated by identified an issue to work on today regarding how they interact and relate to others. Participated in group discussion. Pt gave support and encouragement to group members. Status After Intervention:  Improved    Participation Level:  Active Listener and Interactive    Participation Quality: Appropriate, Attentive, Sharing and Supportive      Speech:  normal      Thought Process/Content: Logical      Affective Functioning: Congruent      Mood: euthymic      Level of consciousness:  Alert, Oriented x4 and Attentive      Response to Learning: Able to verbalize current knowledge/experience and Progressing to goal      Endings: None Reported    Modes of Intervention: Education, Support, Socialization, Exploration and Clarifying      Discipline Responsible: /Counselor      Signature:  LEISA Brown

## 2017-11-08 NOTE — BH NOTE
2017 5:28 PM   Discharge Note        Jayda Flores 1975  Med Mgt and Psychotherapy Time Spent: 25 min          CC: Bipolar I Disorder  OCD       Subjective:  36 yo  male patient of [facility] is being discharged today from [facility]. He has made progress toward his treatment goals. He reports that his mood is improving since starting Vraylar. Now on 4.5 mg daily. He is asking for an increase of dose. He says he has had \"an intense weekend\". 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 \"I've been very involved with that. His house will be on the market soon. \"  Dylan Livings a \"final walk-through\" of brother's home last weekend, and \"I got emotional. My brother was brent mean, he had no empathy and was kind of sadistic, maybe he was even a sociopath, but he was still my brother. I have a lot of memories of him. \"  \"I'm trying to be a good role model for his son, who is 28. \"    States that his family and friends give him positive feedback about his mood since he has been on Vraylar. Veverly Arrant say they can tell a difference in me. I can really tell it's better for me than anything since I was on Abilify (before it stopped working. )\"  \"I still have some depression that comes and goes. Two days ago, my depression was 8/10 (10= worst), and today it's a lot better. \"     Patient reports side effects as follows: none. Reports no suicidal ideation. Reports compliance with medication is good. Review of Systems - some depression. Improved mood, overall. Sleeping well. Current Meds:    Prior to Admission medications    Medication Sig Start Date End Date Taking?  Authorizing Provider   Cariprazine HCl (VRAYLAR) 4.5 MG CAPS Take one capsule daily 10/26/17   PAM Bailey   lamoTRIgine (LAMICTAL) 200 MG tablet Take 1 tablet by mouth 2 times daily 10/26/17   PAM Bailey   traZODone (DESYREL) 150 MG tablet Take 1 tablet by mouth nightly 10/26/17   PAM Milan   Cariprazine HCl (VRAYLAR) 3 MG CAPS Take 1 capsule by mouth Daily 10-26-17  Add sample capsule of 1.5 mg to daily 3 mg capsule for a total daily dose of 4.5 mg daily  #7 samples given 10/26/17   Reinrajan Almaraz APRN   Cariprazine HCl (VRAYLAR) 4.5 MG CAPS Take 4.5 mg by mouth daily 10/26/17 11/25/17  Chano Sung APRN   amphetamine-dextroamphetamine (ADDERALL) 30 MG tablet Take 1 tablet by mouth 2 times daily . 10/17/17   Chano Sung APRMITCH   cloNIDine (CATAPRES) 0.1 MG tablet Take 1/2 to 1 tablet bid as directed 6/7/17   Valarie Almaraz APRMITCH   clonazePAM (KLONOPIN) 0.5 MG tablet Indications: Pt states he takes all 3 doses at hs. Take  Patient taking differently: Indications: Pt states he takes all 3 doses at hs.  5/31/17 called to [address] [place] [address] [address] to [place] Loone[place] as directed by Orlando Shanks APRN  #90 no refill. Take 4/20/17   Chano FERMIN AlmarazN   metFORMIN (GLUCOPHAGE) 1000 MG tablet Take 1,000 mg by mouth 2 times daily (with meals)    Historical Provider, MD       MSE:  Patient is  A & O x3. Appearance:  good grooming and good hygiene  Cognition:  Recent memory intact , remote memory intact , good fund of knowledge,  average intelligence level. Speech:  normal  Language: Naming: intact; Word Finding: intact  Conversation no evidence of delusions and mood congruent  Behavior:  Cooperative and Good eye contact  Mood: mildly depressed  Affect: congruent; smiles intermittently  Thought Content: negative delusions, hallucinations, obsessions, homicidal and suicidal  Thought Process: linear, goal directed and coherent  Judgement Insight:  normal and appropriate  Gait and Station:normal gait and station   Musculoskeletal:    Assesment:   1. Bipolar I disorder, most recent episode depressed, severe without psychotic features ([address]    2. PTSD (post-traumatic stress disorder)        Plan:  1.  The risks, benefits, side effects, indications, contraindications, and adverse effects of the medications

## 2018-01-22 ENCOUNTER — TELEPHONE (OUTPATIENT)
Dept: PSYCHIATRY | Age: 43
End: 2018-01-22

## 2018-04-17 ENCOUNTER — OFFICE VISIT (OUTPATIENT)
Dept: RETAIL CLINIC | Facility: CLINIC | Age: 43
End: 2018-04-17

## 2018-04-17 VITALS
DIASTOLIC BLOOD PRESSURE: 68 MMHG | TEMPERATURE: 99.1 F | RESPIRATION RATE: 18 BRPM | SYSTOLIC BLOOD PRESSURE: 110 MMHG | HEART RATE: 102 BPM | OXYGEN SATURATION: 98 %

## 2018-04-17 DIAGNOSIS — J02.9 PHARYNGITIS, UNSPECIFIED ETIOLOGY: ICD-10-CM

## 2018-04-17 DIAGNOSIS — J01.40 ACUTE PANSINUSITIS, RECURRENCE NOT SPECIFIED: Primary | ICD-10-CM

## 2018-04-17 PROCEDURE — 99213 OFFICE O/P EST LOW 20 MIN: CPT | Performed by: NURSE PRACTITIONER

## 2018-04-17 RX ORDER — ONDANSETRON 4 MG/1
4 TABLET, ORALLY DISINTEGRATING ORAL EVERY 8 HOURS PRN
Qty: 20 TABLET | Refills: 0 | Status: SHIPPED | OUTPATIENT
Start: 2018-04-17 | End: 2022-02-07

## 2018-04-17 RX ORDER — BROMPHENIRAMINE MALEATE, PSEUDOEPHEDRINE HYDROCHLORIDE, AND DEXTROMETHORPHAN HYDROBROMIDE 2; 30; 10 MG/5ML; MG/5ML; MG/5ML
5 SYRUP ORAL 4 TIMES DAILY PRN
Qty: 118 ML | Refills: 0 | Status: SHIPPED | OUTPATIENT
Start: 2018-04-17 | End: 2022-02-07

## 2018-04-17 RX ORDER — AMOXICILLIN AND CLAVULANATE POTASSIUM 875; 125 MG/1; MG/1
1 TABLET, FILM COATED ORAL 2 TIMES DAILY
Qty: 20 TABLET | Refills: 0 | Status: SHIPPED | OUTPATIENT
Start: 2018-04-17 | End: 2018-04-27

## 2018-04-17 NOTE — PATIENT INSTRUCTIONS
Drink plenty of fluids   Gargle with warm salty water  Follow up if symptoms worsen or persist     Pharyngitis  Pharyngitis is redness, pain, and swelling (inflammation) of your pharynx.  What are the causes?  Pharyngitis is usually caused by infection. Most of the time, these infections are from viruses (viral) and are part of a cold. However, sometimes pharyngitis is caused by bacteria (bacterial). Pharyngitis can also be caused by allergies. Viral pharyngitis may be spread from person to person by coughing, sneezing, and personal items or utensils (cups, forks, spoons, toothbrushes). Bacterial pharyngitis may be spread from person to person by more intimate contact, such as kissing.  What are the signs or symptoms?  Symptoms of pharyngitis include:  · Sore throat.  · Tiredness (fatigue).  · Low-grade fever.  · Headache.  · Joint pain and muscle aches.  · Skin rashes.  · Swollen lymph nodes.  · Plaque-like film on throat or tonsils (often seen with bacterial pharyngitis).  How is this diagnosed?  Your health care provider will ask you questions about your illness and your symptoms. Your medical history, along with a physical exam, is often all that is needed to diagnose pharyngitis. Sometimes, a rapid strep test is done. Other lab tests may also be done, depending on the suspected cause.  How is this treated?  Viral pharyngitis will usually get better in 3-4 days without the use of medicine. Bacterial pharyngitis is treated with medicines that kill germs (antibiotics).  Follow these instructions at home:  · Drink enough water and fluids to keep your urine clear or pale yellow.  · Only take over-the-counter or prescription medicines as directed by your health care provider:  ¨ If you are prescribed antibiotics, make sure you finish them even if you start to feel better.  ¨ Do not take aspirin.  · Get lots of rest.  · Gargle with 8 oz of salt water (½ tsp of salt per 1 qt of water) as often as every 1-2 hours to  soothe your throat.  · Throat lozenges (if you are not at risk for choking) or sprays may be used to soothe your throat.  Contact a health care provider if:  · You have large, tender lumps in your neck.  · You have a rash.  · You cough up green, yellow-brown, or bloody spit.  Get help right away if:  · Your neck becomes stiff.  · You drool or are unable to swallow liquids.  · You vomit or are unable to keep medicines or liquids down.  · You have severe pain that does not go away with the use of recommended medicines.  · You have trouble breathing (not caused by a stuffy nose).  This information is not intended to replace advice given to you by your health care provider. Make sure you discuss any questions you have with your health care provider.  Document Released: 12/18/2006 Document Revised: 05/25/2017 Document Reviewed: 08/25/2014  [employer] Interactive Patient Education © 2017 [employer]  Sinusitis, Adult  Sinusitis is soreness and inflammation of your sinuses. Sinuses are hollow spaces in the bones around your face. Your sinuses are located:  · Around your eyes.  · In the middle of your forehead.  · Behind your nose.  · In your cheekbones.  Your sinuses and nasal passages are lined with a stringy fluid (mucus). Mucus normally drains out of your sinuses. When your nasal tissues become inflamed or swollen, the mucus can become trapped or blocked so air cannot flow through your sinuses. This allows bacteria, viruses, and funguses to grow, which leads to infection.  Sinusitis can develop quickly and last for 7?10 days (acute) or for more than 12 weeks (chronic). Sinusitis often develops after a cold.  What are the causes?  This condition is caused by anything that creates swelling in the sinuses or stops mucus from draining, including:  · Allergies.  · Asthma.  · Bacterial or viral infection.  · Abnormally shaped bones between the nasal passages.  · Nasal growths that contain mucus (nasal polyps).  · Narrow sinus  openings.  · Pollutants, such as chemicals or irritants in the air.  · A foreign object stuck in the nose.  · A fungal infection. This is rare.  What increases the risk?  The following factors may make you more likely to develop this condition:  · Having allergies or asthma.  · Having had a recent cold or respiratory tract infection.  · Having structural deformities or blockages in your nose or sinuses.  · Having a weak immune system.  · Doing a lot of swimming or diving.  · Overusing nasal sprays.  · Smoking.  What are the signs or symptoms?  The main symptoms of this condition are pain and a feeling of pressure around the affected sinuses. Other symptoms include:  · Upper toothache.  · Earache.  · Headache.  · Bad breath.  · Decreased sense of smell and taste.  · A cough that may get worse at night.  · Fatigue.  · Fever.  · Thick drainage from your nose. The drainage is often green and it may contain pus (purulent).  · Stuffy nose or congestion.  · Postnasal drip. This is when extra mucus collects in the throat or back of the nose.  · Swelling and warmth over the affected sinuses.  · Sore throat.  · Sensitivity to light.  How is this diagnosed?  This condition is diagnosed based on symptoms, a medical history, and a physical exam. To find out if your condition is acute or chronic, your health care provider may:  · Look in your nose for signs of nasal polyps.  · Tap over the affected sinus to check for signs of infection.  · View the inside of your sinuses using an imaging device that has a light attached (endoscope).  If your health care provider suspects that you have chronic sinusitis, you may also:  · Be tested for allergies.  · Have a sample of mucus taken from your nose (nasal culture) and checked for bacteria.  · Have a mucus sample examined to see if your sinusitis is related to an allergy.  If your sinusitis does not respond to treatment and it lasts longer than 8 weeks, you may have an MRI or CT scan to  check your sinuses. These scans also help to determine how severe your infection is.  In rare cases, a bone biopsy may be done to rule out more serious types of fungal sinus disease.  How is this treated?  Treatment for sinusitis depends on the cause and whether your condition is chronic or acute. If a virus is causing your sinusitis, your symptoms will go away on their own within 10 days. You may be given medicines to relieve your symptoms, including:  · Topical nasal decongestants. They shrink swollen nasal passages and let mucus drain from your sinuses.  · Antihistamines. These drugs block inflammation that is triggered by allergies. This can help to ease swelling in your nose and sinuses.  · Topical nasal corticosteroids. These are nasal sprays that ease inflammation and swelling in your nose and sinuses.  · Nasal saline washes. These rinses can help to get rid of thick mucus in your nose.  If your condition is caused by bacteria, you will be given an antibiotic medicine. If your condition is caused by a fungus, you will be given an antifungal medicine.  Surgery may be needed to correct underlying conditions, such as narrow nasal passages. Surgery may also be needed to remove polyps.  Follow these instructions at home:  Medicines   · Take, use, or apply over-the-counter and prescription medicines only as told by your health care provider. These may include nasal sprays.  · If you were prescribed an antibiotic medicine, take it as told by your health care provider. Do not stop taking the antibiotic even if you start to feel better.  Hydrate and Humidify   · Drink enough water to keep your urine clear or pale yellow. Staying hydrated will help to thin your mucus.  · Use a cool mist humidifier to keep the humidity level in your home above 50%.  · Inhale steam for 10-15 minutes, 3-4 times a day or as told by your health care provider. You can do this in the bathroom while a hot shower is running.  · Limit your  exposure to cool or dry air.  Rest   · Rest as much as possible.  · Sleep with your head raised (elevated).  · Make sure to get enough sleep each night.  General instructions   · Apply a warm, moist washcloth to your face 3-4 times a day or as told by your health care provider. This will help with discomfort.  · Wash your hands often with soap and water to reduce your exposure to viruses and other germs. If soap and water are not available, use hand .  · Do not smoke. Avoid being around people who are smoking (secondhand smoke).  · Keep all follow-up visits as told by your health care provider. This is important.  Contact a health care provider if:  · You have a fever.  · Your symptoms get worse.  · Your symptoms do not improve within 10 days.  Get help right away if:  · You have a severe headache.  · You have persistent vomiting.  · You have pain or swelling around your face or eyes.  · You have vision problems.  · You develop confusion.  · Your neck is stiff.  · You have trouble breathing.  This information is not intended to replace advice given to you by your health care provider. Make sure you discuss any questions you have with your health care provider.  Document Released: 12/18/2006 Document Revised: 08/13/2017 Document Reviewed: 10/12/2016  [employer] Interactive Patient Education © 2017 [employer]

## 2018-04-17 NOTE — PROGRESS NOTES
Chief Complaint   Patient presents with   • Sore Throat     Subjective   Rah Shin is a 42 y.o. male who presents to the clinic today with complaints sore throat and pain on left side of his face and ear. He feels he has had discharge from his ear. His TM is in tact   Sore Throat    This is a new problem. The current episode started in the past 7 days. The problem has been gradually worsening. There has been no fever. Associated symptoms include congestion, coughing, ear pain and headaches. Pertinent negatives include no abdominal pain, diarrhea, drooling, ear discharge, hoarse voice, plugged ear sensation, neck pain, shortness of breath, stridor, swollen glands, trouble swallowing or vomiting. He has had no exposure to strep or mono. He has tried nothing for the symptoms.         Current Outpatient Prescriptions:   •  amphetamine-dextroamphetamine (ADDERALL) 30 MG tablet, Take  by mouth., Disp: , Rfl:   •  Cariprazine HCl 4.5 MG capsule, Take one capsule daily, Disp: , Rfl:   •  clonazePAM (KlonoPIN) 0.5 MG tablet, Take  by mouth., Disp: , Rfl:   •  CloNIDine (CATAPRES) 0.1 MG tablet, , Disp: , Rfl:   •  lamoTRIgine (LaMICtal) 200 MG tablet, Take 200 mg by mouth 2 (Two) Times a Day., Disp: , Rfl:   •  traZODone (DESYREL) 150 MG tablet, Take  by mouth., Disp: , Rfl:   •  amoxicillin-clavulanate (AUGMENTIN) 875-125 MG per tablet, Take 1 tablet by mouth 2 (Two) Times a Day for 10 days., Disp: 20 tablet, Rfl: 0  •  brompheniramine-pseudoephedrine-DM 30-2-10 MG/5ML syrup, Take 5 mL by mouth 4 (Four) Times a Day As Needed for Congestion or Cough., Disp: 118 mL, Rfl: 0  •  ondansetron ODT (ZOFRAN ODT) 4 MG disintegrating tablet, Take 1 tablet by mouth Every 8 (Eight) Hours As Needed for Nausea or Vomiting., Disp: 20 tablet, Rfl: 0    Allergies:  Contrast dye    Past Medical History:   Diagnosis Date   • ADD (attention deficit disorder)    • Anxiety    • Bipolar 1 disorder    • Diabetes mellitus    •  Hypertension      Past Surgical History:   Procedure Laterality Date   • CARDIAC CATHETERIZATION     • INFECTED SKIN DEBRIDEMENT      MRSA     Family History   Problem Relation Age of Onset   • Heart disease Mother    • No Known Problems Father      Social History   Substance Use Topics   • Smoking status: Never Smoker   • Smokeless tobacco: Never Used   • Alcohol use No       Review of Systems  Review of Systems   HENT: Positive for congestion, ear pain and sore throat. Negative for drooling, ear discharge, hoarse voice and trouble swallowing.    Respiratory: Positive for cough. Negative for shortness of breath and stridor.    Gastrointestinal: Negative for abdominal pain, diarrhea and vomiting.   Musculoskeletal: Negative for neck pain.   Neurological: Positive for headaches.       Objective   /68 (BP Location: Left arm, Patient Position: Sitting, Cuff Size: Adult)   Pulse 102   Temp 99.1 °F (37.3 °C) (Tympanic)   Resp 18   SpO2 98%       Physical Exam   Constitutional: He is oriented to person, place, and time. He appears well-developed and well-nourished.   HENT:   Head: Normocephalic and atraumatic.   Right Ear: Hearing, tympanic membrane, external ear and ear canal normal.   Left Ear: Hearing, external ear and ear canal normal. Tympanic membrane is bulging. A middle ear effusion is present.   Nose: Right sinus exhibits no maxillary sinus tenderness and no frontal sinus tenderness. Left sinus exhibits maxillary sinus tenderness and frontal sinus tenderness.   Mouth/Throat: Uvula is midline and mucous membranes are normal. Oropharyngeal exudate and posterior oropharyngeal erythema present. No posterior oropharyngeal edema or tonsillar abscesses. Tonsils are 2+ on the right. Tonsils are 2+ on the left. No tonsillar exudate.   Eyes: EOM are normal. Pupils are equal, round, and reactive to light.   Neck: Normal range of motion. Neck supple.   Cardiovascular: Normal rate, regular rhythm and normal heart  sounds.  Exam reveals no gallop and no friction rub.    No murmur heard.  Pulmonary/Chest: Effort normal and breath sounds normal. No stridor. No respiratory distress. He has no wheezes. He has no rales.   Lymphadenopathy:     He has no cervical adenopathy.   Neurological: He is alert and oriented to person, place, and time.   Skin: Skin is warm and dry.   Psychiatric: He has a normal mood and affect. His behavior is normal.   Vitals reviewed.      Assessment/Plan     Rah was seen today for sore throat.    Diagnoses and all orders for this visit:    Acute pansinusitis, recurrence not specified    Pharyngitis, unspecified etiology    Other orders  -     amoxicillin-clavulanate (AUGMENTIN) 875-125 MG per tablet; Take 1 tablet by mouth 2 (Two) Times a Day for 10 days.  -     ondansetron ODT (ZOFRAN ODT) 4 MG disintegrating tablet; Take 1 tablet by mouth Every 8 (Eight) Hours As Needed for Nausea or Vomiting.  -     brompheniramine-pseudoephedrine-DM 30-2-10 MG/5ML syrup; Take 5 mL by mouth 4 (Four) Times a Day As Needed for Congestion or Cough.         Drink plenty of fluids   Gargle with warm salty water  Follow up if symptoms worsen or persist

## 2018-06-04 ENCOUNTER — HOSPITAL ENCOUNTER (EMERGENCY)
Age: 43
Discharge: HOME | End: 2018-06-04
Attending: EMERGENCY MEDICINE
Payer: MEDICARE

## 2018-06-04 VITALS
OXYGEN SATURATION: 97 % | BODY MASS INDEX: 24.99 KG/M2 | TEMPERATURE: 98.2 F | WEIGHT: 150 LBS | SYSTOLIC BLOOD PRESSURE: 144 MMHG | HEART RATE: 99 BPM | HEIGHT: 65 IN | RESPIRATION RATE: 18 BRPM | DIASTOLIC BLOOD PRESSURE: 89 MMHG

## 2018-06-04 DIAGNOSIS — F31.9 BIPOLAR AFFECTIVE DISORDER, REMISSION STATUS UNSPECIFIED (HCC): Primary | ICD-10-CM

## 2018-06-04 LAB
ALBUMIN SERPL-MCNC: 4.7 G/DL (ref 3.5–5.2)
ALP BLD-CCNC: 105 U/L (ref 40–130)
ALT SERPL-CCNC: 14 U/L (ref 5–41)
AMPHETAMINE SCREEN, URINE: NEGATIVE
ANION GAP SERPL CALCULATED.3IONS-SCNC: 15 MMOL/L (ref 7–19)
AST SERPL-CCNC: 12 U/L (ref 5–40)
BARBITURATE SCREEN URINE: NEGATIVE
BASOPHILS ABSOLUTE: 0.1 K/UL (ref 0–0.2)
BASOPHILS RELATIVE PERCENT: 0.9 % (ref 0–1)
BENZODIAZEPINE SCREEN, URINE: NEGATIVE
BILIRUB SERPL-MCNC: 0.3 MG/DL (ref 0.2–1.2)
BUN BLDV-MCNC: 15 MG/DL (ref 6–20)
CALCIUM SERPL-MCNC: 9.3 MG/DL (ref 8.6–10)
CANNABINOID SCREEN URINE: NEGATIVE
CHLORIDE BLD-SCNC: 94 MMOL/L (ref 98–111)
CO2: 24 MMOL/L (ref 22–29)
COCAINE METABOLITE SCREEN URINE: NEGATIVE
CREAT SERPL-MCNC: 0.8 MG/DL (ref 0.5–1.2)
EOSINOPHILS ABSOLUTE: 0.3 K/UL (ref 0–0.6)
EOSINOPHILS RELATIVE PERCENT: 6 % (ref 0–5)
ETHANOL: <10 MG/DL (ref 0–0.08)
GFR NON-AFRICAN AMERICAN: >60
GLUCOSE BLD-MCNC: 393 MG/DL (ref 74–109)
HCT VFR BLD CALC: 45.9 % (ref 42–52)
HEMOGLOBIN: 16.2 G/DL (ref 14–18)
LYMPHOCYTES ABSOLUTE: 2.3 K/UL (ref 1.1–4.5)
LYMPHOCYTES RELATIVE PERCENT: 41.4 % (ref 20–40)
Lab: NORMAL
MCH RBC QN AUTO: 30.4 PG (ref 27–31)
MCHC RBC AUTO-ENTMCNC: 35.3 G/DL (ref 33–37)
MCV RBC AUTO: 86.1 FL (ref 80–94)
MONOCYTES ABSOLUTE: 0.5 K/UL (ref 0–0.9)
MONOCYTES RELATIVE PERCENT: 8.9 % (ref 0–10)
NEUTROPHILS ABSOLUTE: 2.3 K/UL (ref 1.5–7.5)
NEUTROPHILS RELATIVE PERCENT: 42.4 % (ref 50–65)
OPIATE SCREEN URINE: NEGATIVE
PDW BLD-RTO: 12.7 % (ref 11.5–14.5)
PLATELET # BLD: 221 K/UL (ref 130–400)
PMV BLD AUTO: 8.8 FL (ref 9.4–12.4)
POTASSIUM SERPL-SCNC: 4.4 MMOL/L (ref 3.5–5)
RBC # BLD: 5.33 M/UL (ref 4.7–6.1)
SODIUM BLD-SCNC: 133 MMOL/L (ref 136–145)
TOTAL PROTEIN: 7.7 G/DL (ref 6.6–8.7)
WBC # BLD: 5.5 K/UL (ref 4.8–10.8)

## 2018-06-04 PROCEDURE — 80307 DRUG TEST PRSMV CHEM ANLYZR: CPT

## 2018-06-04 PROCEDURE — 85025 COMPLETE CBC W/AUTO DIFF WBC: CPT

## 2018-06-04 PROCEDURE — G0480 DRUG TEST DEF 1-7 CLASSES: HCPCS

## 2018-06-04 PROCEDURE — 99284 EMERGENCY DEPT VISIT MOD MDM: CPT | Performed by: EMERGENCY MEDICINE

## 2018-06-04 PROCEDURE — 80053 COMPREHEN METABOLIC PANEL: CPT

## 2018-06-04 PROCEDURE — 99283 EMERGENCY DEPT VISIT LOW MDM: CPT

## 2018-06-04 PROCEDURE — 36415 COLL VENOUS BLD VENIPUNCTURE: CPT

## 2018-06-04 ASSESSMENT — ENCOUNTER SYMPTOMS
SHORTNESS OF BREATH: 0
BACK PAIN: 0
ABDOMINAL PAIN: 0

## 2019-01-03 ENCOUNTER — HOSPITAL ENCOUNTER (OUTPATIENT)
Dept: PSYCHIATRY | Age: 44
Setting detail: THERAPIES SERIES
Discharge: HOME | End: 2019-01-03
Payer: MEDICARE

## 2019-01-03 VITALS
WEIGHT: 151 LBS | HEIGHT: 65 IN | TEMPERATURE: 98.8 F | RESPIRATION RATE: 18 BRPM | HEART RATE: 94 BPM | OXYGEN SATURATION: 98 % | SYSTOLIC BLOOD PRESSURE: 118 MMHG | BODY MASS INDEX: 25.16 KG/M2 | DIASTOLIC BLOOD PRESSURE: 82 MMHG

## 2019-01-03 PROCEDURE — 99205 OFFICE O/P NEW HI 60 MIN: CPT | Performed by: NURSE PRACTITIONER

## 2019-01-03 RX ORDER — LORAZEPAM 1 MG/1
1 TABLET ORAL 2 TIMES DAILY PRN
COMMUNITY
Start: 2018-12-18 | End: 2022-06-09 | Stop reason: ALTCHOICE

## 2019-01-03 ASSESSMENT — SLEEP AND FATIGUE QUESTIONNAIRES
DO YOU USE A SLEEP AID: YES
SLEEP PATTERN: RESTLESSNESS
DIFFICULTY STAYING ASLEEP: NO
AVERAGE NUMBER OF SLEEP HOURS: 14
DO YOU HAVE DIFFICULTY SLEEPING: YES
DIFFICULTY FALLING ASLEEP: NO
RESTFUL SLEEP: YES
DIFFICULTY ARISING: YES

## 2019-01-03 ASSESSMENT — LIFESTYLE VARIABLES: HISTORY_ALCOHOL_USE: YES

## 2019-01-03 ASSESSMENT — PATIENT HEALTH QUESTIONNAIRE - PHQ9: SUM OF ALL RESPONSES TO PHQ QUESTIONS 1-9: 23

## 2019-01-04 ENCOUNTER — HOSPITAL ENCOUNTER (OUTPATIENT)
Dept: PSYCHIATRY | Age: 44
Setting detail: THERAPIES SERIES
Discharge: HOME | End: 2019-01-04
Payer: MEDICARE

## 2019-01-04 PROCEDURE — 90853 GROUP PSYCHOTHERAPY: CPT | Performed by: SOCIAL WORKER

## 2019-01-04 PROCEDURE — 90832 PSYTX W PT 30 MINUTES: CPT | Performed by: SOCIAL WORKER

## 2019-01-07 ENCOUNTER — TELEPHONE (OUTPATIENT)
Dept: PSYCHIATRY | Age: 44
End: 2019-01-07

## 2019-01-09 ENCOUNTER — HOSPITAL ENCOUNTER (OUTPATIENT)
Dept: PSYCHIATRY | Age: 44
Setting detail: THERAPIES SERIES
Discharge: HOME | End: 2019-01-09
Payer: MEDICARE

## 2019-01-09 PROCEDURE — 90853 GROUP PSYCHOTHERAPY: CPT | Performed by: SOCIAL WORKER

## 2019-01-10 ENCOUNTER — TELEPHONE (OUTPATIENT)
Dept: PSYCHIATRY | Age: 44
End: 2019-01-10

## 2019-01-11 ENCOUNTER — TELEPHONE (OUTPATIENT)
Dept: PSYCHIATRY | Age: 44
End: 2019-01-11

## 2019-01-14 ENCOUNTER — HOSPITAL ENCOUNTER (OUTPATIENT)
Dept: PSYCHIATRY | Age: 44
Setting detail: THERAPIES SERIES
Discharge: HOME | End: 2019-01-14
Payer: MEDICARE

## 2019-01-14 PROCEDURE — 90853 GROUP PSYCHOTHERAPY: CPT | Performed by: SOCIAL WORKER

## 2019-01-16 ENCOUNTER — HOSPITAL ENCOUNTER (OUTPATIENT)
Dept: PSYCHIATRY | Age: 44
Setting detail: THERAPIES SERIES
Discharge: HOME | End: 2019-01-16
Payer: MEDICARE

## 2019-01-16 PROCEDURE — 90853 GROUP PSYCHOTHERAPY: CPT | Performed by: SOCIAL WORKER

## 2019-01-17 ENCOUNTER — HOSPITAL ENCOUNTER (OUTPATIENT)
Dept: PSYCHIATRY | Age: 44
Setting detail: THERAPIES SERIES
Discharge: HOME | End: 2019-01-17
Payer: MEDICARE

## 2019-01-17 PROCEDURE — G0410 GRP PSYCH PARTIAL HOSP 45-50: HCPCS | Performed by: SOCIAL WORKER

## 2019-01-17 PROCEDURE — 90853 GROUP PSYCHOTHERAPY: CPT | Performed by: SOCIAL WORKER

## 2019-01-21 ENCOUNTER — TELEPHONE (OUTPATIENT)
Dept: PSYCHIATRY | Age: 44
End: 2019-01-21

## 2019-01-22 ENCOUNTER — HOSPITAL ENCOUNTER (OUTPATIENT)
Dept: PSYCHIATRY | Age: 44
Setting detail: THERAPIES SERIES
Discharge: HOME | End: 2019-01-22
Payer: MEDICARE

## 2019-01-22 PROCEDURE — 90853 GROUP PSYCHOTHERAPY: CPT | Performed by: SOCIAL WORKER

## 2019-01-23 ENCOUNTER — HOSPITAL ENCOUNTER (OUTPATIENT)
Dept: PSYCHIATRY | Age: 44
Setting detail: THERAPIES SERIES
Discharge: HOME | End: 2019-01-23
Payer: MEDICARE

## 2019-01-23 VITALS
TEMPERATURE: 98.4 F | RESPIRATION RATE: 18 BRPM | SYSTOLIC BLOOD PRESSURE: 128 MMHG | HEART RATE: 99 BPM | OXYGEN SATURATION: 99 % | DIASTOLIC BLOOD PRESSURE: 88 MMHG

## 2019-01-23 DIAGNOSIS — F31.9 BIPOLAR 1 DISORDER (HCC): Primary | ICD-10-CM

## 2019-01-23 PROBLEM — F31.4 BIPOLAR I DISORDER, MOST RECENT EPISODE DEPRESSED, SEVERE WITHOUT PSYCHOTIC FEATURES (HCC): Status: RESOLVED | Noted: 2017-03-01 | Resolved: 2019-01-23

## 2019-01-23 PROBLEM — F43.10 PTSD (POST-TRAUMATIC STRESS DISORDER): Status: RESOLVED | Noted: 2017-09-28 | Resolved: 2019-01-23

## 2019-01-23 PROCEDURE — 99214 OFFICE O/P EST MOD 30 MIN: CPT | Performed by: NURSE PRACTITIONER

## 2019-01-23 RX ORDER — TRAZODONE HYDROCHLORIDE 150 MG/1
75 TABLET ORAL NIGHTLY PRN
Qty: 15 TABLET | Refills: 0
Start: 2019-01-23 | End: 2022-06-09 | Stop reason: ALTCHOICE

## 2019-01-26 ENCOUNTER — APPOINTMENT (OUTPATIENT)
Dept: CT IMAGING | Age: 44
End: 2019-01-26
Payer: MEDICARE

## 2019-01-26 ENCOUNTER — HOSPITAL ENCOUNTER (EMERGENCY)
Age: 44
Discharge: HOME | End: 2019-01-26
Attending: EMERGENCY MEDICINE
Payer: MEDICARE

## 2019-01-26 ENCOUNTER — APPOINTMENT (OUTPATIENT)
Dept: GENERAL RADIOLOGY | Age: 44
End: 2019-01-26
Payer: MEDICARE

## 2019-01-26 VITALS
RESPIRATION RATE: 16 BRPM | WEIGHT: 150 LBS | BODY MASS INDEX: 24.99 KG/M2 | HEART RATE: 82 BPM | SYSTOLIC BLOOD PRESSURE: 130 MMHG | HEIGHT: 65 IN | DIASTOLIC BLOOD PRESSURE: 76 MMHG | TEMPERATURE: 98.4 F | OXYGEN SATURATION: 97 %

## 2019-01-26 VITALS
BODY MASS INDEX: 24.99 KG/M2 | WEIGHT: 150 LBS | RESPIRATION RATE: 17 BRPM | TEMPERATURE: 97.9 F | OXYGEN SATURATION: 94 % | HEART RATE: 89 BPM | DIASTOLIC BLOOD PRESSURE: 95 MMHG | SYSTOLIC BLOOD PRESSURE: 124 MMHG | HEIGHT: 65 IN

## 2019-01-26 DIAGNOSIS — R07.89 NON-CARDIAC CHEST PAIN: Primary | ICD-10-CM

## 2019-01-26 DIAGNOSIS — N20.0 NEPHROLITHIASIS: Primary | ICD-10-CM

## 2019-01-26 DIAGNOSIS — E11.65 UNCONTROLLED TYPE 2 DIABETES MELLITUS WITH HYPERGLYCEMIA (HCC): ICD-10-CM

## 2019-01-26 DIAGNOSIS — R73.9 HYPERGLYCEMIA: ICD-10-CM

## 2019-01-26 DIAGNOSIS — F41.9 ANXIETY: ICD-10-CM

## 2019-01-26 LAB
ALBUMIN SERPL-MCNC: 4.8 G/DL (ref 3.5–5.2)
ALP BLD-CCNC: 107 U/L (ref 40–130)
ALT SERPL-CCNC: 13 U/L (ref 5–41)
ANION GAP SERPL CALCULATED.3IONS-SCNC: 15 MMOL/L (ref 7–19)
APTT: 23 SEC (ref 26–36.2)
AST SERPL-CCNC: 11 U/L (ref 5–40)
BASOPHILS ABSOLUTE: 0.1 K/UL (ref 0–0.2)
BASOPHILS RELATIVE PERCENT: 1.6 % (ref 0–1)
BILIRUB SERPL-MCNC: 0.4 MG/DL (ref 0.2–1.2)
BILIRUBIN URINE: NEGATIVE
BLOOD, URINE: NEGATIVE
BUN BLDV-MCNC: 11 MG/DL (ref 6–20)
CALCIUM SERPL-MCNC: 9.6 MG/DL (ref 8.6–10)
CHLORIDE BLD-SCNC: 93 MMOL/L (ref 98–111)
CLARITY: CLEAR
CO2: 28 MMOL/L (ref 22–29)
COLOR: YELLOW
CREAT SERPL-MCNC: 0.9 MG/DL (ref 0.5–1.2)
EOSINOPHILS ABSOLUTE: 0.5 K/UL (ref 0–0.6)
EOSINOPHILS RELATIVE PERCENT: 6.1 % (ref 0–5)
GFR NON-AFRICAN AMERICAN: >60
GLUCOSE BLD-MCNC: 245 MG/DL (ref 70–99)
GLUCOSE BLD-MCNC: 338 MG/DL
GLUCOSE BLD-MCNC: 338 MG/DL (ref 70–99)
GLUCOSE BLD-MCNC: 344 MG/DL (ref 74–109)
GLUCOSE BLD-MCNC: 462 MG/DL
GLUCOSE BLD-MCNC: 462 MG/DL (ref 70–99)
GLUCOSE URINE: >=1000 MG/DL
HBA1C MFR BLD: 11.8 % (ref 4–6)
HCT VFR BLD CALC: 48.5 % (ref 42–52)
HEMOGLOBIN: 17.3 G/DL (ref 14–18)
INR BLD: 0.94 (ref 0.88–1.18)
KETONES, URINE: ABNORMAL MG/DL
LEUKOCYTE ESTERASE, URINE: NEGATIVE
LYMPHOCYTES ABSOLUTE: 3.7 K/UL (ref 1.1–4.5)
LYMPHOCYTES RELATIVE PERCENT: 43.6 % (ref 20–40)
MCH RBC QN AUTO: 30.2 PG (ref 27–31)
MCHC RBC AUTO-ENTMCNC: 35.7 G/DL (ref 33–37)
MCV RBC AUTO: 84.6 FL (ref 80–94)
MONOCYTES ABSOLUTE: 0.8 K/UL (ref 0–0.9)
MONOCYTES RELATIVE PERCENT: 9.4 % (ref 0–10)
NEUTROPHILS ABSOLUTE: 3.3 K/UL (ref 1.5–7.5)
NEUTROPHILS RELATIVE PERCENT: 38.9 % (ref 50–65)
NITRITE, URINE: NEGATIVE
PDW BLD-RTO: 12.1 % (ref 11.5–14.5)
PERFORMED ON: ABNORMAL
PERFORMED ON: NORMAL
PH UA: 7.5
PLATELET # BLD: 284 K/UL (ref 130–400)
PMV BLD AUTO: 9.1 FL (ref 9.4–12.4)
POC TROPONIN I: 0.01 NG/ML (ref 0–0.08)
POTASSIUM SERPL-SCNC: 3.6 MMOL/L (ref 3.5–5)
PROTEIN UA: NEGATIVE MG/DL
PROTHROMBIN TIME: 12 SEC (ref 12–14.6)
RBC # BLD: 5.73 M/UL (ref 4.7–6.1)
SODIUM BLD-SCNC: 136 MMOL/L (ref 136–145)
SPECIFIC GRAVITY UA: 1.04
TOTAL PROTEIN: 8.5 G/DL (ref 6.6–8.7)
TROPONIN: <0.01 NG/ML (ref 0–0.03)
TROPONIN: <0.01 NG/ML (ref 0–0.03)
TSH SERPL DL<=0.05 MIU/L-ACNC: 1.94 UIU/ML (ref 0.27–4.2)
URINE REFLEX TO CULTURE: ABNORMAL
UROBILINOGEN, URINE: 1 E.U./DL
WBC # BLD: 8.5 K/UL (ref 4.8–10.8)

## 2019-01-26 PROCEDURE — 71046 X-RAY EXAM CHEST 2 VIEWS: CPT

## 2019-01-26 PROCEDURE — 99284 EMERGENCY DEPT VISIT MOD MDM: CPT

## 2019-01-26 PROCEDURE — 87491 CHLMYD TRACH DNA AMP PROBE: CPT

## 2019-01-26 PROCEDURE — 6370000000 HC RX 637 (ALT 250 FOR IP): Performed by: EMERGENCY MEDICINE

## 2019-01-26 PROCEDURE — 93005 ELECTROCARDIOGRAM TRACING: CPT

## 2019-01-26 PROCEDURE — 83036 HEMOGLOBIN GLYCOSYLATED A1C: CPT

## 2019-01-26 PROCEDURE — 80053 COMPREHEN METABOLIC PANEL: CPT

## 2019-01-26 PROCEDURE — 85610 PROTHROMBIN TIME: CPT

## 2019-01-26 PROCEDURE — 99285 EMERGENCY DEPT VISIT HI MDM: CPT | Performed by: EMERGENCY MEDICINE

## 2019-01-26 PROCEDURE — 82948 REAGENT STRIP/BLOOD GLUCOSE: CPT

## 2019-01-26 PROCEDURE — 84484 ASSAY OF TROPONIN QUANT: CPT

## 2019-01-26 PROCEDURE — 85730 THROMBOPLASTIN TIME PARTIAL: CPT

## 2019-01-26 PROCEDURE — 36415 COLL VENOUS BLD VENIPUNCTURE: CPT

## 2019-01-26 PROCEDURE — 74150 CT ABDOMEN W/O CONTRAST: CPT

## 2019-01-26 PROCEDURE — 85025 COMPLETE CBC W/AUTO DIFF WBC: CPT

## 2019-01-26 PROCEDURE — 99285 EMERGENCY DEPT VISIT HI MDM: CPT

## 2019-01-26 PROCEDURE — 99284 EMERGENCY DEPT VISIT MOD MDM: CPT | Performed by: EMERGENCY MEDICINE

## 2019-01-26 PROCEDURE — 81003 URINALYSIS AUTO W/O SCOPE: CPT

## 2019-01-26 PROCEDURE — 87591 N.GONORRHOEAE DNA AMP PROB: CPT

## 2019-01-26 PROCEDURE — 84443 ASSAY THYROID STIM HORMONE: CPT

## 2019-01-26 RX ADMIN — LIDOCAINE HYDROCHLORIDE: 20 SOLUTION ORAL; TOPICAL at 06:24

## 2019-01-26 RX ADMIN — INSULIN LISPRO 8 UNITS: 100 INJECTION, SOLUTION INTRAVENOUS; SUBCUTANEOUS at 12:15

## 2019-01-26 ASSESSMENT — HEART SCORE: ECG: 0

## 2019-01-26 ASSESSMENT — ENCOUNTER SYMPTOMS
ABDOMINAL PAIN: 0
NAUSEA: 1
BLOOD IN STOOL: 0
COLOR CHANGE: 0
ABDOMINAL PAIN: 0
APNEA: 0
DIARRHEA: 0
EYE DISCHARGE: 0
CONSTIPATION: 0
SORE THROAT: 0
COUGH: 0
BACK PAIN: 1
EYE REDNESS: 0
VOICE CHANGE: 0
VOMITING: 0
SINUS PRESSURE: 0
CHEST TIGHTNESS: 0
PHOTOPHOBIA: 0
SHORTNESS OF BREATH: 0
WHEEZING: 0
ABDOMINAL DISTENTION: 0
FACIAL SWELLING: 0
SORE THROAT: 0
NAUSEA: 0
EYE PAIN: 0
TROUBLE SWALLOWING: 0
DIARRHEA: 0
CONSTIPATION: 0
SINUS PRESSURE: 0
SHORTNESS OF BREATH: 0
CHOKING: 0

## 2019-01-26 ASSESSMENT — PAIN DESCRIPTION - FREQUENCY: FREQUENCY: INTERMITTENT

## 2019-01-26 ASSESSMENT — PAIN SCALES - GENERAL
PAINLEVEL_OUTOF10: 7
PAINLEVEL_OUTOF10: 3

## 2019-01-26 ASSESSMENT — PAIN DESCRIPTION - LOCATION: LOCATION: CHEST

## 2019-01-26 ASSESSMENT — PAIN DESCRIPTION - PAIN TYPE: TYPE: ACUTE PAIN

## 2019-01-29 LAB
APTIMA MEDIA TYPE: NORMAL
CHLAMYDIA TRACHOMATIS AMPLIFIED DET: NEGATIVE
EKG P AXIS: 54 DEGREES
EKG P AXIS: 56 DEGREES
EKG P-R INTERVAL: 146 MS
EKG P-R INTERVAL: 152 MS
EKG Q-T INTERVAL: 354 MS
EKG Q-T INTERVAL: 354 MS
EKG QRS DURATION: 86 MS
EKG QRS DURATION: 88 MS
EKG QTC CALCULATION (BAZETT): 413 MS
EKG QTC CALCULATION (BAZETT): 417 MS
EKG T AXIS: 55 DEGREES
EKG T AXIS: 66 DEGREES
N GONORRHOEAE AMPLIFIED DET: NEGATIVE
SPECIMEN SOURCE: NORMAL

## 2020-03-20 ENCOUNTER — HOSPITAL ENCOUNTER (EMERGENCY)
Facility: HOSPITAL | Age: 45
Discharge: HOME | End: 2020-03-20
Admitting: EMERGENCY MEDICINE

## 2020-03-20 ENCOUNTER — APPOINTMENT (OUTPATIENT)
Dept: GENERAL RADIOLOGY | Facility: HOSPITAL | Age: 45
End: 2020-03-20

## 2020-03-20 VITALS
HEART RATE: 75 BPM | SYSTOLIC BLOOD PRESSURE: 124 MMHG | BODY MASS INDEX: 26.66 KG/M2 | TEMPERATURE: 98.6 F | WEIGHT: 160 LBS | DIASTOLIC BLOOD PRESSURE: 83 MMHG | OXYGEN SATURATION: 100 % | HEIGHT: 65 IN | RESPIRATION RATE: 16 BRPM

## 2020-03-20 DIAGNOSIS — B34.9 VIRAL ILLNESS: Primary | ICD-10-CM

## 2020-03-20 LAB
ACETONE BLD QL: NEGATIVE
ALBUMIN SERPL-MCNC: 4.6 G/DL (ref 3.5–5.2)
ALBUMIN/GLOB SERPL: 1.4 G/DL
ALP SERPL-CCNC: 92 U/L (ref 39–117)
ALT SERPL W P-5'-P-CCNC: 23 U/L (ref 1–41)
ANION GAP SERPL CALCULATED.3IONS-SCNC: 14 MMOL/L (ref 5–15)
AST SERPL-CCNC: 13 U/L (ref 1–40)
B PARAPERT DNA SPEC QL NAA+PROBE: NOT DETECTED
B PERT DNA SPEC QL NAA+PROBE: NOT DETECTED
BASOPHILS # BLD AUTO: 0.06 10*3/MM3 (ref 0–0.2)
BASOPHILS NFR BLD AUTO: 1.1 % (ref 0–1.5)
BILIRUB SERPL-MCNC: 0.3 MG/DL (ref 0.2–1.2)
BILIRUB UR QL STRIP: NEGATIVE
BUN BLD-MCNC: 10 MG/DL (ref 6–20)
BUN/CREAT SERPL: 11.8 (ref 7–25)
C PNEUM DNA NPH QL NAA+NON-PROBE: NOT DETECTED
CALCIUM SPEC-SCNC: 9.4 MG/DL (ref 8.6–10.5)
CHLORIDE SERPL-SCNC: 94 MMOL/L (ref 98–107)
CLARITY UR: CLEAR
CO2 SERPL-SCNC: 25 MMOL/L (ref 22–29)
COLOR UR: YELLOW
CREAT BLD-MCNC: 0.85 MG/DL (ref 0.76–1.27)
D-LACTATE SERPL-SCNC: 1.6 MMOL/L (ref 0.5–2)
D-LACTATE SERPL-SCNC: 2.5 MMOL/L (ref 0.5–2)
DEPRECATED RDW RBC AUTO: 35.8 FL (ref 37–54)
EOSINOPHIL # BLD AUTO: 0.42 10*3/MM3 (ref 0–0.4)
EOSINOPHIL NFR BLD AUTO: 7.8 % (ref 0.3–6.2)
ERYTHROCYTE [DISTWIDTH] IN BLOOD BY AUTOMATED COUNT: 11.9 % (ref 12.3–15.4)
FLUAV H1 2009 PAND RNA NPH QL NAA+PROBE: NOT DETECTED
FLUAV H1 HA GENE NPH QL NAA+PROBE: NOT DETECTED
FLUAV H3 RNA NPH QL NAA+PROBE: NOT DETECTED
FLUAV SUBTYP SPEC NAA+PROBE: NOT DETECTED
FLUBV RNA ISLT QL NAA+PROBE: NOT DETECTED
GFR SERPL CREATININE-BSD FRML MDRD: 97 ML/MIN/1.73
GLOBULIN UR ELPH-MCNC: 3.3 GM/DL
GLUCOSE BLD-MCNC: 450 MG/DL (ref 65–99)
GLUCOSE BLDC GLUCOMTR-MCNC: 296 MG/DL (ref 70–130)
GLUCOSE UR STRIP-MCNC: ABNORMAL MG/DL
HADV DNA SPEC NAA+PROBE: NOT DETECTED
HCOV 229E RNA SPEC QL NAA+PROBE: NOT DETECTED
HCOV HKU1 RNA SPEC QL NAA+PROBE: NOT DETECTED
HCOV NL63 RNA SPEC QL NAA+PROBE: NOT DETECTED
HCOV OC43 RNA SPEC QL NAA+PROBE: NOT DETECTED
HCT VFR BLD AUTO: 42.9 % (ref 37.5–51)
HGB BLD-MCNC: 15.9 G/DL (ref 13–17.7)
HGB UR QL STRIP.AUTO: NEGATIVE
HMPV RNA NPH QL NAA+NON-PROBE: NOT DETECTED
HOLD SPECIMEN: NORMAL
HOLD SPECIMEN: NORMAL
HPIV1 RNA SPEC QL NAA+PROBE: NOT DETECTED
HPIV2 RNA SPEC QL NAA+PROBE: NOT DETECTED
HPIV3 RNA NPH QL NAA+PROBE: NOT DETECTED
HPIV4 P GENE NPH QL NAA+PROBE: NOT DETECTED
KETONES UR QL STRIP: ABNORMAL
LACTATE HOLD SPECIMEN: NORMAL
LEUKOCYTE ESTERASE UR QL STRIP.AUTO: NEGATIVE
LYMPHOCYTES # BLD AUTO: 2.34 10*3/MM3 (ref 0.7–3.1)
LYMPHOCYTES NFR BLD AUTO: 43.7 % (ref 19.6–45.3)
M PNEUMO IGG SER IA-ACNC: NOT DETECTED
MCH RBC QN AUTO: 30.8 PG (ref 26.6–33)
MCHC RBC AUTO-ENTMCNC: 37.1 G/DL (ref 31.5–35.7)
MCV RBC AUTO: 83.1 FL (ref 79–97)
MONOCYTES # BLD AUTO: 0.44 10*3/MM3 (ref 0.1–0.9)
MONOCYTES NFR BLD AUTO: 8.2 % (ref 5–12)
NEUTROPHILS # BLD AUTO: 2.08 10*3/MM3 (ref 1.7–7)
NEUTROPHILS NFR BLD AUTO: 38.8 % (ref 42.7–76)
NITRITE UR QL STRIP: NEGATIVE
PH UR STRIP.AUTO: 5.5 [PH] (ref 5–8)
PLATELET # BLD AUTO: 231 10*3/MM3 (ref 140–450)
PMV BLD AUTO: 9 FL (ref 6–12)
POTASSIUM BLD-SCNC: 4.1 MMOL/L (ref 3.5–5.2)
PROT SERPL-MCNC: 7.9 G/DL (ref 6–8.5)
PROT UR QL STRIP: NEGATIVE
RBC # BLD AUTO: 5.16 10*6/MM3 (ref 4.14–5.8)
RHINOVIRUS RNA SPEC NAA+PROBE: NOT DETECTED
RSV RNA NPH QL NAA+NON-PROBE: NOT DETECTED
S PYO AG THROAT QL: NEGATIVE
SODIUM BLD-SCNC: 133 MMOL/L (ref 136–145)
SP GR UR STRIP: >1.03 (ref 1–1.03)
UROBILINOGEN UR QL STRIP: ABNORMAL
WBC NRBC COR # BLD: 5.36 10*3/MM3 (ref 3.4–10.8)
WHOLE BLOOD HOLD SPECIMEN: NORMAL
WHOLE BLOOD HOLD SPECIMEN: NORMAL

## 2020-03-20 PROCEDURE — 82009 KETONE BODYS QUAL: CPT | Performed by: NURSE PRACTITIONER

## 2020-03-20 PROCEDURE — 99283 EMERGENCY DEPT VISIT LOW MDM: CPT

## 2020-03-20 PROCEDURE — 96374 THER/PROPH/DIAG INJ IV PUSH: CPT

## 2020-03-20 PROCEDURE — 81003 URINALYSIS AUTO W/O SCOPE: CPT | Performed by: NURSE PRACTITIONER

## 2020-03-20 PROCEDURE — 0100U HC BIOFIRE FILMARRAY RESP PANEL 2: CPT | Performed by: NURSE PRACTITIONER

## 2020-03-20 PROCEDURE — 36415 COLL VENOUS BLD VENIPUNCTURE: CPT

## 2020-03-20 PROCEDURE — 87635 SARS-COV-2 COVID-19 AMP PRB: CPT | Performed by: PHYSICIAN ASSISTANT

## 2020-03-20 PROCEDURE — 63710000001 INSULIN REGULAR HUMAN PER 5 UNITS: Performed by: NURSE PRACTITIONER

## 2020-03-20 PROCEDURE — 80053 COMPREHEN METABOLIC PANEL: CPT | Performed by: NURSE PRACTITIONER

## 2020-03-20 PROCEDURE — 87040 BLOOD CULTURE FOR BACTERIA: CPT | Performed by: NURSE PRACTITIONER

## 2020-03-20 PROCEDURE — 85025 COMPLETE CBC W/AUTO DIFF WBC: CPT | Performed by: NURSE PRACTITIONER

## 2020-03-20 PROCEDURE — 25010000002 ONDANSETRON PER 1 MG: Performed by: NURSE PRACTITIONER

## 2020-03-20 PROCEDURE — 87081 CULTURE SCREEN ONLY: CPT | Performed by: NURSE PRACTITIONER

## 2020-03-20 PROCEDURE — 82962 GLUCOSE BLOOD TEST: CPT

## 2020-03-20 PROCEDURE — 83605 ASSAY OF LACTIC ACID: CPT | Performed by: NURSE PRACTITIONER

## 2020-03-20 PROCEDURE — 71045 X-RAY EXAM CHEST 1 VIEW: CPT

## 2020-03-20 PROCEDURE — 87880 STREP A ASSAY W/OPTIC: CPT | Performed by: NURSE PRACTITIONER

## 2020-03-20 RX ORDER — ONDANSETRON 2 MG/ML
4 INJECTION INTRAMUSCULAR; INTRAVENOUS ONCE
Status: COMPLETED | OUTPATIENT
Start: 2020-03-20 | End: 2020-03-20

## 2020-03-20 RX ADMIN — ONDANSETRON HYDROCHLORIDE 4 MG: 2 SOLUTION INTRAMUSCULAR; INTRAVENOUS at 16:01

## 2020-03-20 RX ADMIN — INSULIN HUMAN 8 UNITS: 100 INJECTION, SOLUTION PARENTERAL at 18:10

## 2020-03-20 RX ADMIN — SODIUM CHLORIDE 500 ML: 9 INJECTION, SOLUTION INTRAVENOUS at 16:00

## 2020-03-22 LAB — BACTERIA SPEC AEROBE CULT: NORMAL

## 2020-03-23 ENCOUNTER — NURSE TRIAGE (OUTPATIENT)
Dept: CALL CENTER | Facility: HOSPITAL | Age: 45
End: 2020-03-23

## 2020-03-25 LAB
BACTERIA SPEC AEROBE CULT: NORMAL
BACTERIA SPEC AEROBE CULT: NORMAL

## 2021-01-04 ENCOUNTER — HOSPITAL ENCOUNTER (EMERGENCY)
Facility: HOSPITAL | Age: 46
Discharge: LEFT BEFORE DISCHARGE | End: 2021-01-04

## 2021-01-04 PROCEDURE — 99211 OFF/OP EST MAY X REQ PHY/QHP: CPT

## 2021-03-25 ENCOUNTER — IMMUNIZATION (OUTPATIENT)
Dept: VACCINE CLINIC | Facility: HOSPITAL | Age: 46
End: 2021-03-25

## 2021-03-25 PROCEDURE — 91301 HC SARSCO02 VAC 100MCG/0.5ML IM: CPT | Performed by: OBSTETRICS & GYNECOLOGY

## 2021-03-25 PROCEDURE — 0011A: CPT | Performed by: OBSTETRICS & GYNECOLOGY

## 2021-04-22 ENCOUNTER — IMMUNIZATION (OUTPATIENT)
Dept: VACCINE CLINIC | Facility: HOSPITAL | Age: 46
End: 2021-04-22

## 2021-04-22 PROCEDURE — 0012A: CPT | Performed by: OBSTETRICS & GYNECOLOGY

## 2021-04-22 PROCEDURE — 91301 HC SARSCO02 VAC 100MCG/0.5ML IM: CPT | Performed by: OBSTETRICS & GYNECOLOGY

## 2022-01-14 ENCOUNTER — HOSPITAL ENCOUNTER (EMERGENCY)
Facility: HOSPITAL | Age: 47
Discharge: HOME | End: 2022-01-14
Attending: EMERGENCY MEDICINE | Admitting: EMERGENCY MEDICINE

## 2022-01-14 ENCOUNTER — APPOINTMENT (OUTPATIENT)
Dept: GENERAL RADIOLOGY | Facility: HOSPITAL | Age: 47
End: 2022-01-14

## 2022-01-14 VITALS
SYSTOLIC BLOOD PRESSURE: 118 MMHG | WEIGHT: 152 LBS | TEMPERATURE: 98.1 F | RESPIRATION RATE: 18 BRPM | BODY MASS INDEX: 25.33 KG/M2 | OXYGEN SATURATION: 99 % | DIASTOLIC BLOOD PRESSURE: 84 MMHG | HEIGHT: 65 IN | HEART RATE: 94 BPM

## 2022-01-14 DIAGNOSIS — F19.90 RECREATIONAL DRUG USE: ICD-10-CM

## 2022-01-14 DIAGNOSIS — R73.9 HYPERGLYCEMIA: ICD-10-CM

## 2022-01-14 DIAGNOSIS — E13.9 OTHER SPECIFIED DIABETES MELLITUS WITHOUT COMPLICATION, WITHOUT LONG-TERM CURRENT USE OF INSULIN: Primary | ICD-10-CM

## 2022-01-14 LAB
ACETONE BLD QL: NEGATIVE
ALBUMIN SERPL-MCNC: 4 G/DL (ref 3.5–5.2)
ALBUMIN/GLOB SERPL: 1.1 G/DL
ALP SERPL-CCNC: 102 U/L (ref 39–117)
ALT SERPL W P-5'-P-CCNC: 23 U/L (ref 1–41)
AMPHET+METHAMPHET UR QL: POSITIVE
AMPHETAMINES UR QL: POSITIVE
ANION GAP SERPL CALCULATED.3IONS-SCNC: 13 MMOL/L (ref 5–15)
AST SERPL-CCNC: 10 U/L (ref 1–40)
ATMOSPHERIC PRESS: 751 MMHG
BARBITURATES UR QL SCN: NEGATIVE
BASE EXCESS BLDV CALC-SCNC: 4.5 MMOL/L (ref 0–2)
BASOPHILS # BLD AUTO: 0.06 10*3/MM3 (ref 0–0.2)
BASOPHILS NFR BLD AUTO: 0.8 % (ref 0–1.5)
BDY SITE: ABNORMAL
BENZODIAZ UR QL SCN: NEGATIVE
BILIRUB SERPL-MCNC: 0.4 MG/DL (ref 0–1.2)
BODY TEMPERATURE: 37 C
BUN SERPL-MCNC: 18 MG/DL (ref 6–20)
BUN/CREAT SERPL: 17.5 (ref 7–25)
BUPRENORPHINE SERPL-MCNC: NEGATIVE NG/ML
CALCIUM SPEC-SCNC: 9.5 MG/DL (ref 8.6–10.5)
CANNABINOIDS SERPL QL: NEGATIVE
CHLORIDE SERPL-SCNC: 89 MMOL/L (ref 98–107)
CK SERPL-CCNC: 60 U/L (ref 20–200)
CO2 SERPL-SCNC: 26 MMOL/L (ref 22–29)
COCAINE UR QL: NEGATIVE
CREAT SERPL-MCNC: 1.03 MG/DL (ref 0.76–1.27)
D-LACTATE SERPL-SCNC: 4.4 MMOL/L (ref 0.5–2)
DEPRECATED RDW RBC AUTO: 34.2 FL (ref 37–54)
EOSINOPHIL # BLD AUTO: 0.77 10*3/MM3 (ref 0–0.4)
EOSINOPHIL NFR BLD AUTO: 10.5 % (ref 0.3–6.2)
ERYTHROCYTE [DISTWIDTH] IN BLOOD BY AUTOMATED COUNT: 11.2 % (ref 12.3–15.4)
ETHANOL UR QL: <0.01 %
GFR SERPL CREATININE-BSD FRML MDRD: 77 ML/MIN/1.73
GLOBULIN UR ELPH-MCNC: 3.6 GM/DL
GLUCOSE BLDC GLUCOMTR-MCNC: 556 MG/DL (ref 70–130)
GLUCOSE BLDC GLUCOMTR-MCNC: 559 MG/DL (ref 70–130)
GLUCOSE SERPL-MCNC: 573 MG/DL (ref 65–99)
HCO3 BLDV-SCNC: 30.4 MMOL/L (ref 22–28)
HCT VFR BLD AUTO: 46.3 % (ref 37.5–51)
HGB BLD-MCNC: 16.4 G/DL (ref 13–17.7)
IMM GRANULOCYTES # BLD AUTO: 0.02 10*3/MM3 (ref 0–0.05)
IMM GRANULOCYTES NFR BLD AUTO: 0.3 % (ref 0–0.5)
LYMPHOCYTES # BLD AUTO: 2.51 10*3/MM3 (ref 0.7–3.1)
LYMPHOCYTES NFR BLD AUTO: 34.1 % (ref 19.6–45.3)
Lab: ABNORMAL
MAGNESIUM SERPL-MCNC: 1.8 MG/DL (ref 1.6–2.6)
MCH RBC QN AUTO: 29.6 PG (ref 26.6–33)
MCHC RBC AUTO-ENTMCNC: 35.4 G/DL (ref 31.5–35.7)
MCV RBC AUTO: 83.6 FL (ref 79–97)
METHADONE UR QL SCN: NEGATIVE
MODALITY: ABNORMAL
MONOCYTES # BLD AUTO: 0.61 10*3/MM3 (ref 0.1–0.9)
MONOCYTES NFR BLD AUTO: 8.3 % (ref 5–12)
NEUTROPHILS NFR BLD AUTO: 3.38 10*3/MM3 (ref 1.7–7)
NEUTROPHILS NFR BLD AUTO: 46 % (ref 42.7–76)
NRBC BLD AUTO-RTO: 0 /100 WBC (ref 0–0.2)
OPIATES UR QL: NEGATIVE
OXYCODONE UR QL SCN: NEGATIVE
PCO2 BLDV: 48.3 MM HG (ref 41–51)
PCP UR QL SCN: NEGATIVE
PH BLDV: 7.41 PH UNITS (ref 7.32–7.42)
PLATELET # BLD AUTO: 240 10*3/MM3 (ref 140–450)
PMV BLD AUTO: 9.5 FL (ref 6–12)
PO2 BLDV: 22.3 MM HG (ref 27–53)
POTASSIUM SERPL-SCNC: 4.3 MMOL/L (ref 3.5–5.2)
PROCALCITONIN SERPL-MCNC: 0.05 NG/ML (ref 0–0.25)
PROPOXYPH UR QL: NEGATIVE
PROT SERPL-MCNC: 7.6 G/DL (ref 6–8.5)
RBC # BLD AUTO: 5.54 10*6/MM3 (ref 4.14–5.8)
SAO2 % BLDCOV: 43.4 % (ref 45–75)
SARS-COV-2 RNA PNL SPEC NAA+PROBE: NOT DETECTED
SODIUM SERPL-SCNC: 128 MMOL/L (ref 136–145)
TRICYCLICS UR QL SCN: NEGATIVE
TROPONIN T SERPL-MCNC: <0.01 NG/ML (ref 0–0.03)
VENTILATOR MODE: ABNORMAL
WBC NRBC COR # BLD: 7.35 10*3/MM3 (ref 3.4–10.8)

## 2022-01-14 PROCEDURE — 83735 ASSAY OF MAGNESIUM: CPT | Performed by: EMERGENCY MEDICINE

## 2022-01-14 PROCEDURE — 82009 KETONE BODYS QUAL: CPT | Performed by: EMERGENCY MEDICINE

## 2022-01-14 PROCEDURE — 93010 ELECTROCARDIOGRAM REPORT: CPT | Performed by: INTERNAL MEDICINE

## 2022-01-14 PROCEDURE — 82962 GLUCOSE BLOOD TEST: CPT

## 2022-01-14 PROCEDURE — 84145 PROCALCITONIN (PCT): CPT | Performed by: EMERGENCY MEDICINE

## 2022-01-14 PROCEDURE — 71045 X-RAY EXAM CHEST 1 VIEW: CPT

## 2022-01-14 PROCEDURE — 87635 SARS-COV-2 COVID-19 AMP PRB: CPT | Performed by: EMERGENCY MEDICINE

## 2022-01-14 PROCEDURE — 80306 DRUG TEST PRSMV INSTRMNT: CPT | Performed by: EMERGENCY MEDICINE

## 2022-01-14 PROCEDURE — 80053 COMPREHEN METABOLIC PANEL: CPT | Performed by: EMERGENCY MEDICINE

## 2022-01-14 PROCEDURE — 82803 BLOOD GASES ANY COMBINATION: CPT

## 2022-01-14 PROCEDURE — 93005 ELECTROCARDIOGRAM TRACING: CPT | Performed by: EMERGENCY MEDICINE

## 2022-01-14 PROCEDURE — 85025 COMPLETE CBC W/AUTO DIFF WBC: CPT | Performed by: EMERGENCY MEDICINE

## 2022-01-14 PROCEDURE — 83605 ASSAY OF LACTIC ACID: CPT | Performed by: EMERGENCY MEDICINE

## 2022-01-14 PROCEDURE — 82550 ASSAY OF CK (CPK): CPT | Performed by: EMERGENCY MEDICINE

## 2022-01-14 PROCEDURE — 84484 ASSAY OF TROPONIN QUANT: CPT | Performed by: EMERGENCY MEDICINE

## 2022-01-14 PROCEDURE — 99283 EMERGENCY DEPT VISIT LOW MDM: CPT

## 2022-01-14 PROCEDURE — 63710000001 INSULIN REGULAR HUMAN PER 5 UNITS: Performed by: EMERGENCY MEDICINE

## 2022-01-14 PROCEDURE — 82077 ASSAY SPEC XCP UR&BREATH IA: CPT | Performed by: EMERGENCY MEDICINE

## 2022-01-14 RX ORDER — BLOOD SUGAR DIAGNOSTIC
STRIP MISCELLANEOUS
Qty: 3 EACH | Refills: 0 | Status: SHIPPED | OUTPATIENT
Start: 2022-01-14 | End: 2022-06-28

## 2022-01-14 RX ORDER — GLIMEPIRIDE 4 MG/1
4 TABLET ORAL DAILY
Qty: 20 TABLET | Refills: 0 | Status: SHIPPED | OUTPATIENT
Start: 2022-01-14 | End: 2022-06-28

## 2022-01-14 RX ORDER — LANCETS
EACH MISCELLANEOUS
Qty: 3 EACH | Refills: 0 | Status: SHIPPED | OUTPATIENT
Start: 2022-01-14 | End: 2022-06-28

## 2022-01-14 RX ADMIN — SODIUM CHLORIDE, POTASSIUM CHLORIDE, SODIUM LACTATE AND CALCIUM CHLORIDE 500 ML: 600; 310; 30; 20 INJECTION, SOLUTION INTRAVENOUS at 15:41

## 2022-01-14 RX ADMIN — SODIUM CHLORIDE, POTASSIUM CHLORIDE, SODIUM LACTATE AND CALCIUM CHLORIDE 1000 ML: 600; 310; 30; 20 INJECTION, SOLUTION INTRAVENOUS at 14:42

## 2022-01-14 RX ADMIN — INSULIN HUMAN 8 UNITS: 100 INJECTION, SOLUTION PARENTERAL at 15:48

## 2022-01-14 NOTE — CASE MANAGEMENT/SOCIAL WORK
Spoke with pt about getting diabetic  Supplies (glucometer and strips).  He will be provided with script upon d/c.  LM for Loida Carmen Diabetic educator to see if she could see pt but no return call at this time.  She has probably already gone home for w/e.  Spoke with our retail pharmacy and was told that most of the time there is no problem getting glucometer and strips through insurance.  Have shared this info with pt and told him that he can use our retail pharm if before 5pm. He is new to this area and asking for PCP info also.  Have given him list of PCP's and pointed out [facility] info.  Also provided him with [facility] list. He says he is on disability.  .16:11 CST

## 2022-01-14 NOTE — DISCHARGE INSTRUCTIONS
Please give blood sugar diary blood sugar checks.  Follow up with one of the [facility] physician groups below to setup primary care. If you have trouble making an appointment, please call the [facility] Nurse Line at [phone]    Dr. Rosa Marsh DO, Dr. Landry Garcia DO, and CARINA Ham  [facility]  [address] [place] [address], [address] [phone]    Dr. Gerson Newell MD  [facility] - [address], [address], [place], [place] [id number] [phone]    Dr. Henri Dillard DO, Dr. Lorenzo Cameron DO,  CARINA Doyle, and CARINA Ulrich  [facility] - [address], [address], [place] [address] [phone]     Dr. Joseline Caruso MD, and Bethanie Garza, CARINA  [facility] [address], [place], [place] [address] [phone]    Dr. Brian Mcintyre MD and Dr. Mal Banegas MD  [facility] [address], [address] [phone]    Dr. Amari Matos MD  [facility]  [address], [address], [place], [place], [address] [phone]    Dr. Asif Nelson MD  [facility]  [address], [place], [address] [phone]

## 2022-01-14 NOTE — ED PROVIDER NOTES
Subjective   Patient is a noncompliant diabetic who does not take any medications came to the ER elevated blood sugar and paresthesias in his lower extremities.      Hyperglycemia  Severity:  Moderate  Onset quality:  Gradual  Timing:  Intermittent  Progression:  Waxing and waning  Chronicity:  Recurrent  Current diabetic treatments: Noncompliant.  Context: noncompliance    Context: not change in medication and not insulin pump use    Relieved by:  Nothing  Ineffective treatments:  None tried  Associated symptoms: nausea    Associated symptoms: no abdominal pain, no altered mental status, no blurred vision, no chest pain, no confusion, no dehydration, no diaphoresis, no dizziness, no dysuria, no fatigue, no fever, no increased appetite, no increased thirst, no malaise, no shortness of breath, no syncope, no vomiting, no weakness and no weight change    Risk factors: no family hx of diabetes        Review of Systems   Constitutional: Negative.  Negative for chills, diaphoresis, fatigue and fever.   HENT: Negative.  Negative for congestion.    Eyes: Negative for blurred vision.   Respiratory: Negative.  Negative for cough, chest tightness, shortness of breath and stridor.    Cardiovascular: Negative.  Negative for chest pain and syncope.   Gastrointestinal: Positive for nausea. Negative for abdominal distention, abdominal pain and vomiting.   Endocrine: Negative.  Negative for polydipsia.   Genitourinary: Negative.  Negative for difficulty urinating, dysuria and flank pain.   Musculoskeletal: Negative.    Skin: Negative.  Negative for color change.   Neurological: Negative.  Negative for dizziness, weakness and headaches.   Psychiatric/Behavioral: Negative for confusion.   All other systems reviewed and are negative.      Past Medical History:   Diagnosis Date   • ADD (attention deficit disorder)    • Anxiety    • Bipolar 1 disorder (CMS/Formerly [facility])    • Diabetes mellitus (CMS/Formerly [facility])    • Hypertension    • Kidney stones         Allergies   Allergen Reactions   • Contrast Dye Hives       Past Surgical History:   Procedure Laterality Date   • CARDIAC CATHETERIZATION     • INFECTED SKIN DEBRIDEMENT      MRSA       Family History   Problem Relation Age of Onset   • Heart disease Mother    • No Known Problems Father        Social History     Socioeconomic History   • Marital status: Single   Tobacco Use   • Smoking status: Never Smoker   • Smokeless tobacco: Never Used   Substance and Sexual Activity   • Alcohol use: No   • Drug use: Yes     Types: Marijuana, Methamphetamines     Comment: recently used past 3 days   • Sexual activity: Defer           Objective   Physical Exam  Vitals and nursing note reviewed. Exam conducted with a chaperone present.   Constitutional:       General: He is awake.      Appearance: Normal appearance. He is well-developed. He is not ill-appearing.   HENT:      Head: Normocephalic and atraumatic.   Eyes:      General: Lids are normal.      Conjunctiva/sclera: Conjunctivae normal.      Pupils: Pupils are equal, round, and reactive to light.   Cardiovascular:      Rate and Rhythm: Regular rhythm. Tachycardia present.      Chest Wall: PMI is not displaced.      Pulses: Normal pulses.      Heart sounds: Normal heart sounds.   Pulmonary:      Effort: Pulmonary effort is normal.      Breath sounds: Normal breath sounds. No decreased breath sounds.   Abdominal:      General: Abdomen is flat. Bowel sounds are normal.      Palpations: Abdomen is soft.      Tenderness: There is no abdominal tenderness.   Musculoskeletal:         General: Normal range of motion.      Cervical back: Full passive range of motion without pain, normal range of motion and neck supple.   Skin:     General: Skin is warm and dry.      Capillary Refill: Capillary refill takes less than 2 seconds.   Neurological:      General: No focal deficit present.      Mental Status: He is alert and oriented to person, place, and time.      Cranial Nerves:  Cranial nerves are intact.      Motor: Motor function is intact.      Deep Tendon Reflexes: Reflexes are normal and symmetric.   Psychiatric:         Behavior: Behavior is cooperative.         Procedures           ED Course  ED Course as of 01/14/22 1609   Fri Jan 14, 2022   1443 Normal sinus rhythm left axis deviation [TS]   1538 Lactic acid is not related to sepsis his pH is normal and there is no anion gap.  Drug screens are positive ketones are negative we will discharge him home with a follow-up with her primary MD on oral hypoglycemics which he supposed to be on recommendation to stop using recreational drugs. [TS]   1604 With hyperglycemia I have discussed this case at length with the patient informed him that methamphetamine use is going to be very harmful the patient will be discharged home hyponatremia is pseudohyponatremia corrects for his blood sugar.  Will be discharged home with a follow-up with primary MD return there for any worsening symptoms. [TS]   1604 Risks and benefits of treatments given and alternative treatment options discussed with patient/family. I answered all the questions in simple, plain language, and there was voiced understanding and agreement with plan of care. There were no further questions. Differential diagnosis discussed. Patient/family was advised that the practice of medicine is not always an exact science, and sometimes tests, physical exam, or history may not show the underlying conditions with certainty. Additionally, the condition may change or show itself later after initial presentation. There was also expressed understanding and agreement with this limitation of emergency medicine practice. Patient/family was asked to return to ED if any problem or issues or if condition worsens or does not improved. Patient/family agreed to follow up with PCP/specialist as advised, or return to ED if unable to see a provider in a timely fashion for continued symptoms.  [TS]      ED  Course User Index  [TS] Ketan Garcia MD                                                 MDM  Number of Diagnoses or Management Options  Diagnosis management comments: Hyperglycemic possible DKA       Amount and/or Complexity of Data Reviewed  Clinical lab tests: ordered and reviewed  Tests in the radiology section of CPT®: ordered and reviewed  Tests in the medicine section of CPT®: reviewed and ordered    Risk of Complications, Morbidity, and/or Mortality  Presenting problems: moderate  Diagnostic procedures: moderate  Management options: moderate        Final diagnoses:   Other specified diabetes mellitus without complication, without long-term current use of insulin (HCC)   Hyperglycemia   Recreational drug use       ED Disposition  ED Disposition     ED Disposition Condition Comment    Discharge Stable           No follow-up provider specified.       Medication List      New Prescriptions    accu-chek multiclix lancets  Test daily before all meals/snacks and once before bedtime.     glimepiride 4 MG tablet  Commonly known as: Amaryl  Take 1 tablet by mouth Daily.     * glucose blood test strip  Commonly known as: Accu-Chek Salma  Test daily before all meals/snacks and once before bedtime.     * Accu-Chek Salma Plus test strip  Generic drug: glucose blood  Test daily before all meals/snacks and once before bedtime.         * This list has 2 medication(s) that are the same as other medications prescribed for you. Read the directions carefully, and ask your doctor or other care provider to review them with you.               Where to Get Your Medications      These medications were sent to [facility] [address] [place] [address] AT  60 - [phone]  - [phone] [address] [address]    Phone: [phone]   · Accu-Chek Salma Plus test strip  · accu-chek multiclix lancets  · glimepiride 4 MG tablet  · glucose blood test strip          eKtan Garcia MD  01/14/22 7559

## 2022-01-17 LAB
QT INTERVAL: 330 MS
QTC INTERVAL: 425 MS

## 2022-02-07 ENCOUNTER — HOSPITAL ENCOUNTER (EMERGENCY)
Facility: HOSPITAL | Age: 47
Discharge: HOME | End: 2022-02-07
Admitting: STUDENT IN AN ORGANIZED HEALTH CARE EDUCATION/TRAINING PROGRAM

## 2022-02-07 VITALS
HEART RATE: 105 BPM | SYSTOLIC BLOOD PRESSURE: 108 MMHG | OXYGEN SATURATION: 98 % | TEMPERATURE: 98.4 F | WEIGHT: 145 LBS | BODY MASS INDEX: 24.16 KG/M2 | RESPIRATION RATE: 16 BRPM | DIASTOLIC BLOOD PRESSURE: 80 MMHG | HEIGHT: 65 IN

## 2022-02-07 DIAGNOSIS — E11.65 UNCONTROLLED TYPE 2 DIABETES MELLITUS WITH HYPERGLYCEMIA: Primary | ICD-10-CM

## 2022-02-07 LAB
ACETONE BLD QL: NEGATIVE
ALBUMIN SERPL-MCNC: 4.3 G/DL (ref 3.5–5.2)
ALBUMIN/GLOB SERPL: 1.2 G/DL
ALP SERPL-CCNC: 109 U/L (ref 39–117)
ALT SERPL W P-5'-P-CCNC: 20 U/L (ref 1–41)
AMPHET+METHAMPHET UR QL: POSITIVE
AMPHETAMINES UR QL: NEGATIVE
ANION GAP SERPL CALCULATED.3IONS-SCNC: 11 MMOL/L (ref 5–15)
ARTERIAL PATENCY WRIST A: POSITIVE
AST SERPL-CCNC: 13 U/L (ref 1–40)
ATMOSPHERIC PRESS: 757 MMHG
BARBITURATES UR QL SCN: NEGATIVE
BASE EXCESS BLDA CALC-SCNC: 2.2 MMOL/L (ref 0–2)
BASOPHILS # BLD AUTO: 0.06 10*3/MM3 (ref 0–0.2)
BASOPHILS NFR BLD AUTO: 0.9 % (ref 0–1.5)
BDY SITE: ABNORMAL
BENZODIAZ UR QL SCN: NEGATIVE
BILIRUB SERPL-MCNC: 0.5 MG/DL (ref 0–1.2)
BILIRUB UR QL STRIP: NEGATIVE
BODY TEMPERATURE: 37 C
BUN SERPL-MCNC: 21 MG/DL (ref 6–20)
BUN/CREAT SERPL: 26.6 (ref 7–25)
BUPRENORPHINE SERPL-MCNC: NEGATIVE NG/ML
CALCIUM SPEC-SCNC: 9.3 MG/DL (ref 8.6–10.5)
CANNABINOIDS SERPL QL: NEGATIVE
CHLORIDE SERPL-SCNC: 98 MMOL/L (ref 98–107)
CLARITY UR: CLEAR
CO2 SERPL-SCNC: 27 MMOL/L (ref 22–29)
COCAINE UR QL: NEGATIVE
COLOR UR: YELLOW
CREAT SERPL-MCNC: 0.79 MG/DL (ref 0.76–1.27)
D-LACTATE SERPL-SCNC: 1.4 MMOL/L (ref 0.5–2)
DEPRECATED RDW RBC AUTO: 36.8 FL (ref 37–54)
EOSINOPHIL # BLD AUTO: 0.54 10*3/MM3 (ref 0–0.4)
EOSINOPHIL NFR BLD AUTO: 8.2 % (ref 0.3–6.2)
ERYTHROCYTE [DISTWIDTH] IN BLOOD BY AUTOMATED COUNT: 12 % (ref 12.3–15.4)
ETHANOL UR QL: <0.01 %
GFR SERPL CREATININE-BSD FRML MDRD: 105 ML/MIN/1.73
GLOBULIN UR ELPH-MCNC: 3.7 GM/DL
GLUCOSE BLDC GLUCOMTR-MCNC: 263 MG/DL (ref 70–130)
GLUCOSE SERPL-MCNC: 268 MG/DL (ref 65–99)
GLUCOSE UR STRIP-MCNC: ABNORMAL MG/DL
HCO3 BLDA-SCNC: 25.8 MMOL/L (ref 20–26)
HCT VFR BLD AUTO: 46.4 % (ref 37.5–51)
HGB BLD-MCNC: 16.1 G/DL (ref 13–17.7)
HGB UR QL STRIP.AUTO: NEGATIVE
IMM GRANULOCYTES # BLD AUTO: 0.02 10*3/MM3 (ref 0–0.05)
IMM GRANULOCYTES NFR BLD AUTO: 0.3 % (ref 0–0.5)
INR PPP: 1.06 (ref 0.91–1.09)
KETONES UR QL STRIP: ABNORMAL
LEUKOCYTE ESTERASE UR QL STRIP.AUTO: NEGATIVE
LIPASE SERPL-CCNC: 25 U/L (ref 13–60)
LYMPHOCYTES # BLD AUTO: 2.59 10*3/MM3 (ref 0.7–3.1)
LYMPHOCYTES NFR BLD AUTO: 39.4 % (ref 19.6–45.3)
Lab: ABNORMAL
MCH RBC QN AUTO: 29.5 PG (ref 26.6–33)
MCHC RBC AUTO-ENTMCNC: 34.7 G/DL (ref 31.5–35.7)
MCV RBC AUTO: 85 FL (ref 79–97)
METHADONE UR QL SCN: NEGATIVE
MODALITY: ABNORMAL
MONOCYTES # BLD AUTO: 0.6 10*3/MM3 (ref 0.1–0.9)
MONOCYTES NFR BLD AUTO: 9.1 % (ref 5–12)
NEUTROPHILS NFR BLD AUTO: 2.76 10*3/MM3 (ref 1.7–7)
NEUTROPHILS NFR BLD AUTO: 42.1 % (ref 42.7–76)
NITRITE UR QL STRIP: NEGATIVE
NRBC BLD AUTO-RTO: 0 /100 WBC (ref 0–0.2)
OPIATES UR QL: NEGATIVE
OXYCODONE UR QL SCN: NEGATIVE
PCO2 BLDA: 36.3 MM HG (ref 35–45)
PCO2 TEMP ADJ BLD: 36.3 MM HG (ref 35–45)
PCP UR QL SCN: NEGATIVE
PH BLDA: 7.46 PH UNITS (ref 7.35–7.45)
PH UR STRIP.AUTO: 6 [PH] (ref 5–8)
PH, TEMP CORRECTED: 7.46 PH UNITS (ref 7.35–7.45)
PLATELET # BLD AUTO: 263 10*3/MM3 (ref 140–450)
PMV BLD AUTO: 9 FL (ref 6–12)
PO2 BLDA: 86.2 MM HG (ref 83–108)
PO2 TEMP ADJ BLD: 86.2 MM HG (ref 83–108)
POTASSIUM SERPL-SCNC: 4.1 MMOL/L (ref 3.5–5.2)
PROCALCITONIN SERPL-MCNC: 0.04 NG/ML (ref 0–0.25)
PROPOXYPH UR QL: NEGATIVE
PROT SERPL-MCNC: 8 G/DL (ref 6–8.5)
PROT UR QL STRIP: ABNORMAL
PROTHROMBIN TIME: 13.4 SECONDS (ref 11.9–14.6)
RBC # BLD AUTO: 5.46 10*6/MM3 (ref 4.14–5.8)
SAO2 % BLDCOA: 98.3 % (ref 94–99)
SARS-COV-2 RNA PNL SPEC NAA+PROBE: NOT DETECTED
SODIUM SERPL-SCNC: 136 MMOL/L (ref 136–145)
SP GR UR STRIP: >1.03 (ref 1–1.03)
TRICYCLICS UR QL SCN: NEGATIVE
TROPONIN T SERPL-MCNC: <0.01 NG/ML (ref 0–0.03)
UROBILINOGEN UR QL STRIP: ABNORMAL
VENTILATOR MODE: ABNORMAL
WBC NRBC COR # BLD: 6.57 10*3/MM3 (ref 3.4–10.8)

## 2022-02-07 PROCEDURE — 36415 COLL VENOUS BLD VENIPUNCTURE: CPT

## 2022-02-07 PROCEDURE — 93010 ELECTROCARDIOGRAM REPORT: CPT | Performed by: INTERNAL MEDICINE

## 2022-02-07 PROCEDURE — 87635 SARS-COV-2 COVID-19 AMP PRB: CPT | Performed by: NURSE PRACTITIONER

## 2022-02-07 PROCEDURE — 81003 URINALYSIS AUTO W/O SCOPE: CPT | Performed by: NURSE PRACTITIONER

## 2022-02-07 PROCEDURE — 80053 COMPREHEN METABOLIC PANEL: CPT | Performed by: NURSE PRACTITIONER

## 2022-02-07 PROCEDURE — 84484 ASSAY OF TROPONIN QUANT: CPT | Performed by: NURSE PRACTITIONER

## 2022-02-07 PROCEDURE — 85610 PROTHROMBIN TIME: CPT | Performed by: NURSE PRACTITIONER

## 2022-02-07 PROCEDURE — 36600 WITHDRAWAL OF ARTERIAL BLOOD: CPT

## 2022-02-07 PROCEDURE — 84145 PROCALCITONIN (PCT): CPT | Performed by: NURSE PRACTITIONER

## 2022-02-07 PROCEDURE — 82077 ASSAY SPEC XCP UR&BREATH IA: CPT | Performed by: NURSE PRACTITIONER

## 2022-02-07 PROCEDURE — 99283 EMERGENCY DEPT VISIT LOW MDM: CPT

## 2022-02-07 PROCEDURE — 82962 GLUCOSE BLOOD TEST: CPT

## 2022-02-07 PROCEDURE — 93005 ELECTROCARDIOGRAM TRACING: CPT | Performed by: NURSE PRACTITIONER

## 2022-02-07 PROCEDURE — 82803 BLOOD GASES ANY COMBINATION: CPT

## 2022-02-07 PROCEDURE — 80306 DRUG TEST PRSMV INSTRMNT: CPT | Performed by: NURSE PRACTITIONER

## 2022-02-07 PROCEDURE — C9803 HOPD COVID-19 SPEC COLLECT: HCPCS

## 2022-02-07 PROCEDURE — 85025 COMPLETE CBC W/AUTO DIFF WBC: CPT | Performed by: NURSE PRACTITIONER

## 2022-02-07 PROCEDURE — 83690 ASSAY OF LIPASE: CPT | Performed by: NURSE PRACTITIONER

## 2022-02-07 PROCEDURE — 83605 ASSAY OF LACTIC ACID: CPT | Performed by: NURSE PRACTITIONER

## 2022-02-07 PROCEDURE — 82009 KETONE BODYS QUAL: CPT | Performed by: NURSE PRACTITIONER

## 2022-02-07 RX ORDER — SODIUM CHLORIDE 0.9 % (FLUSH) 0.9 %
10 SYRINGE (ML) INJECTION AS NEEDED
Status: DISCONTINUED | OUTPATIENT
Start: 2022-02-07 | End: 2022-02-08 | Stop reason: HOSPADM

## 2022-02-07 RX ORDER — BLOOD-GLUCOSE METER
1 KIT MISCELLANEOUS AS NEEDED
Qty: 1 EACH | Refills: 0 | Status: SHIPPED | OUTPATIENT
Start: 2022-02-07 | End: 2022-06-28

## 2022-02-07 RX ADMIN — SODIUM CHLORIDE, POTASSIUM CHLORIDE, SODIUM LACTATE AND CALCIUM CHLORIDE 1000 ML: 600; 310; 30; 20 INJECTION, SOLUTION INTRAVENOUS at 19:55

## 2022-02-08 NOTE — ED NOTES
DC instructions provided to pt who verbalized understanding. All questions/concerns answered prior to leaving rm 13. Pt ambulated unassisted to ED lobby where his friend Melissa is waiting to take him home.     Melissa Marquez RN  02/07/22 2231

## 2022-02-08 NOTE — DISCHARGE INSTRUCTIONS
Follow up with one of the [facility] physician groups below to setup primary care. If you have trouble making an appointment, please call the [facility] Nurse Line at [phone]    Dr. Rosa Marsh DO, Dr. Landry Garcia DO, and CARINA Ham  [facility]  [address] [place] [address], [id number] [phone]    Dr. Gerson Newell MD  [facility] - [address] [place] [address], [address], [place], [place] [id number] [phone]    Dr. Henri Dillard DO, Dr. Lorenzo Cameron DO,  CARINA Doyle, and CARINA Ulrich  [facility] - [address] [address], [address], [place], [place] [id number] [phone]     Dr. Joseline Caruso MD, and CARINA Infante  [facility] [address] [id number] [phone]    Dr. Brian Mcintyre MD and Dr. Mal Banegas MD  [address], [address] [phone]    Dr. Amari Matos MD  [facility]  [address] [place] [address], [address], [place], [place], [address] [phone]    Dr. Asif Nelson MD  [facility]  [address] [place], [place], [address] [phone]      Drink plenty of fluid.  Monitor blood sugar.  Medication as previously ordered. You must see an ophthalmologist (Jen is on call). Call tomorrow for appointment.  Follow up with Pcp - call tomorrow for appointment (list given).  Return to ED if condition does not improve or worsens

## 2022-02-08 NOTE — ED PROVIDER NOTES
Subjective   46 yom with PMH of DM, HTN, bipolar disorder, anxiety, ADD and kidney stones presents with c/o blurred vision and fatigue. He states he has had uncontrolled diabetes for years.  He states for the last two days he has felt tired and had blurry vision.  He denies headache or head injury.  He denies dizziness.  He denies CP or SOB. He denies abdomen pain or n/v/d.  He is alert and oriented x 3 and answers all questions appropriately.  He was seen a few weeks ago for hyperglycemia but states he hasn't started the medication as he does not have a meter.  He states his BS is 'always high.'  He states it was 600 a couple of weeks ago.  He wears glasses and states he hasn't seen an optometrist in around 2 years.  He has never seen an ophthalmologist.  He adds he does not have a PCP either.   He has discoloration to the nail of the left great toe he is concerned about. He denies any injury to the toe.          Review of Systems   Constitutional: Positive for fatigue. Negative for activity change, appetite change and fever.   HENT: Negative for congestion, ear pain, facial swelling and sore throat.    Eyes: Positive for visual disturbance. Negative for discharge.   Respiratory: Negative for apnea, chest tightness, shortness of breath, wheezing and stridor.    Cardiovascular: Negative for chest pain and palpitations.   Gastrointestinal: Negative for abdominal distention, abdominal pain, diarrhea, nausea and vomiting.   Genitourinary: Negative for difficulty urinating and dysuria.   Musculoskeletal: Negative for arthralgias and myalgias.   Skin: Negative for rash and wound.   Neurological: Negative for dizziness and seizures.   Psychiatric/Behavioral: Negative for agitation and confusion.       Past Medical History:   Diagnosis Date   • ADD (attention deficit disorder)    • Anxiety    • Bipolar 1 disorder (HCC)    • Depression     major depressive disorder   • Diabetes mellitus (HCC)    • Hypertension    • Kidney  stones        Allergies   Allergen Reactions   • Contrast Dye Hives       Past Surgical History:   Procedure Laterality Date   • CARDIAC CATHETERIZATION     • INFECTED SKIN DEBRIDEMENT      MRSA       Family History   Problem Relation Age of Onset   • Heart disease Mother    • No Known Problems Father        Social History     Socioeconomic History   • Marital status: Single   Tobacco Use   • Smoking status: Never Smoker   • Smokeless tobacco: Never Used   Substance and Sexual Activity   • Alcohol use: No   • Drug use: Yes     Types: Methamphetamines     Comment: Meth last use 1.5 wk ago   • Sexual activity: Defer           Objective   Physical Exam  Vitals and nursing note reviewed.   Constitutional:       Appearance: He is well-developed.   HENT:      Head: Normocephalic.   Eyes:      General:         Right eye: No discharge.         Left eye: No discharge.      Conjunctiva/sclera: Conjunctivae normal.      Pupils: Pupils are equal, round, and reactive to light.      Comments: No redness or drainage present.   Cardiovascular:      Rate and Rhythm: Normal rate and regular rhythm.      Heart sounds: No murmur heard.      Pulmonary:      Effort: Pulmonary effort is normal.      Breath sounds: Normal breath sounds.   Abdominal:      General: Bowel sounds are normal.      Palpations: Abdomen is soft.   Musculoskeletal:         General: Normal range of motion.      Cervical back: Normal range of motion and neck supple.        Legs:       Comments: The nail of the left great toe is yellow and discolored as are the other nails of the left foot.  There is no bruising, redness or open area to the toe of concern or the other toes.  BLE are pink, warm and dry with +PMS.   Skin:     General: Skin is warm and dry.   Neurological:      Mental Status: He is alert and oriented to person, place, and time.         Procedures           ED Course  ED Course as of 02/08/22 2019 Mon Feb 07, 2022 2239 His CBC was WNL. CMP - glucose  283.  His anion gap was normal.  His ABG did not show an acidosis.  He was given a sandwich at his request so I will not repeat his glucose.  He is having some blurry vision.  He states he has been a diabetic since 2008 and it has been uncontrolled.  He wears glasses and has not seen an optometrist in 2 years.  He has never been evaluated by an ophthalmologist.  I stressed the importance of seeing an ophthalmologist and controlling his BS.  I will write him a monitor at his request and give him a list of PCP as he does not have one.  He voiced understanding of all results and instructions. [KS]   6870 I witnessed the patient ambulating down the cai.  He did not require any assistance.  He was not unsteady.  He was alert and oriented with no distress observed.   [KS]      ED Course User Index  [KS] Rhea Moya, CARINA                                                 MDM  Number of Diagnoses or Management Options  Uncontrolled type 2 diabetes mellitus with hyperglycemia (HCC): established and worsening     Amount and/or Complexity of Data Reviewed  Clinical lab tests: ordered and reviewed  Tests in the radiology section of CPT®: ordered and reviewed    Risk of Complications, Morbidity, and/or Mortality  Presenting problems: low  Diagnostic procedures: low  Management options: low    Patient Progress  Patient progress: stable      Final diagnoses:   Uncontrolled type 2 diabetes mellitus with hyperglycemia (HCC)       ED Disposition  ED Disposition     ED Disposition Condition Comment    Discharge Stable           Provider, No Known  [facility]  [place] [address]  [phone]    Schedule an appointment as soon as possible for a visit in 1 day  Routine ED follow up         Medication List      New Prescriptions    glucose monitor monitoring kit  1 each As Needed (blood sugar monitoring). Please dispense Accucheck Guide Me monitor           Where to Get Your Medications      These medications were  sent to [facility] [address] [place] [address] [facility] AT  60 - [phone]  - [phone]   [address] [place] [address]    Phone: [phone]   · glucose monitor monitoring kit          Rhea Moya, CARINA  02/08/22 2024

## 2022-02-09 LAB
QT INTERVAL: 346 MS
QTC INTERVAL: 457 MS

## 2022-05-28 ENCOUNTER — HOSPITAL ENCOUNTER (EMERGENCY)
Facility: HOSPITAL | Age: 47
Discharge: HOME | End: 2022-05-28
Admitting: EMERGENCY MEDICINE

## 2022-05-28 VITALS
SYSTOLIC BLOOD PRESSURE: 128 MMHG | HEART RATE: 108 BPM | BODY MASS INDEX: 23.99 KG/M2 | OXYGEN SATURATION: 100 % | HEIGHT: 65 IN | RESPIRATION RATE: 16 BRPM | WEIGHT: 144 LBS | DIASTOLIC BLOOD PRESSURE: 99 MMHG | TEMPERATURE: 97.8 F

## 2022-05-28 DIAGNOSIS — E11.65 TYPE 2 DIABETES MELLITUS WITH HYPERGLYCEMIA, WITHOUT LONG-TERM CURRENT USE OF INSULIN: ICD-10-CM

## 2022-05-28 DIAGNOSIS — Z13.9 ENCOUNTER FOR MEDICAL SCREENING EXAMINATION: Primary | ICD-10-CM

## 2022-05-28 LAB — GLUCOSE BLDC GLUCOMTR-MCNC: 308 MG/DL (ref 70–130)

## 2022-05-28 PROCEDURE — 82962 GLUCOSE BLOOD TEST: CPT

## 2022-05-28 PROCEDURE — 99282 EMERGENCY DEPT VISIT SF MDM: CPT

## 2022-06-09 ENCOUNTER — HOSPITAL ENCOUNTER (EMERGENCY)
Age: 47
Discharge: HOME | End: 2022-06-09
Attending: EMERGENCY MEDICINE
Payer: MEDICARE

## 2022-06-09 VITALS
DIASTOLIC BLOOD PRESSURE: 80 MMHG | BODY MASS INDEX: 24.16 KG/M2 | HEIGHT: 65 IN | SYSTOLIC BLOOD PRESSURE: 112 MMHG | OXYGEN SATURATION: 95 % | TEMPERATURE: 98 F | HEART RATE: 85 BPM | RESPIRATION RATE: 16 BRPM | WEIGHT: 145 LBS

## 2022-06-09 DIAGNOSIS — E11.65 TYPE 2 DIABETES MELLITUS WITH HYPERGLYCEMIA, WITH LONG-TERM CURRENT USE OF INSULIN (HCC): ICD-10-CM

## 2022-06-09 DIAGNOSIS — E86.0 DEHYDRATION: ICD-10-CM

## 2022-06-09 DIAGNOSIS — Z79.4 TYPE 2 DIABETES MELLITUS WITH HYPERGLYCEMIA, WITH LONG-TERM CURRENT USE OF INSULIN (HCC): ICD-10-CM

## 2022-06-09 DIAGNOSIS — G57.93 NEUROPATHIC PAIN, LEG, BILATERAL: Primary | ICD-10-CM

## 2022-06-09 LAB
ALBUMIN SERPL-MCNC: 4.3 G/DL (ref 3.5–5.2)
ALP BLD-CCNC: 102 U/L (ref 40–130)
ALT SERPL-CCNC: 14 U/L (ref 5–41)
ANION GAP SERPL CALCULATED.3IONS-SCNC: 11 MMOL/L (ref 7–19)
AST SERPL-CCNC: 12 U/L (ref 5–40)
BACTERIA: NEGATIVE /HPF
BASOPHILS ABSOLUTE: 0 K/UL (ref 0–0.2)
BASOPHILS RELATIVE PERCENT: 0.8 % (ref 0–1)
BILIRUB SERPL-MCNC: 0.6 MG/DL (ref 0.2–1.2)
BILIRUBIN URINE: NEGATIVE
BLOOD, URINE: ABNORMAL
BUN BLDV-MCNC: 24 MG/DL (ref 6–20)
CALCIUM SERPL-MCNC: 9.6 MG/DL (ref 8.6–10)
CHLORIDE BLD-SCNC: 96 MMOL/L (ref 98–111)
CLARITY: CLEAR
CO2: 27 MMOL/L (ref 22–29)
COLOR: ABNORMAL
CREAT SERPL-MCNC: 0.7 MG/DL (ref 0.5–1.2)
CRYSTALS, UA: ABNORMAL /HPF
EOSINOPHILS ABSOLUTE: 0.2 K/UL (ref 0–0.6)
EOSINOPHILS RELATIVE PERCENT: 3.2 % (ref 0–5)
EPITHELIAL CELLS, UA: 2 /HPF (ref 0–5)
GFR AFRICAN AMERICAN: >59
GFR NON-AFRICAN AMERICAN: >60
GLUCOSE BLD-MCNC: 278 MG/DL (ref 74–109)
GLUCOSE URINE: =>1000 MG/DL
HCT VFR BLD CALC: 47.8 % (ref 42–52)
HEMOGLOBIN: 16.8 G/DL (ref 14–18)
HYALINE CASTS: 10 /HPF (ref 0–8)
IMMATURE GRANULOCYTES #: 0 K/UL
KETONES, URINE: 15 MG/DL
LEUKOCYTE ESTERASE, URINE: NEGATIVE
LYMPHOCYTES ABSOLUTE: 2.3 K/UL (ref 1.1–4.5)
LYMPHOCYTES RELATIVE PERCENT: 43.3 % (ref 20–40)
MCH RBC QN AUTO: 30.3 PG (ref 27–31)
MCHC RBC AUTO-ENTMCNC: 35.1 G/DL (ref 33–37)
MCV RBC AUTO: 86.3 FL (ref 80–94)
MONOCYTES ABSOLUTE: 0.4 K/UL (ref 0–0.9)
MONOCYTES RELATIVE PERCENT: 8.1 % (ref 0–10)
NEUTROPHILS ABSOLUTE: 2.4 K/UL (ref 1.5–7.5)
NEUTROPHILS RELATIVE PERCENT: 44.4 % (ref 50–65)
NITRITE, URINE: NEGATIVE
PDW BLD-RTO: 11.5 % (ref 11.5–14.5)
PH UA: 5.5 (ref 5–8)
PLATELET # BLD: 231 K/UL (ref 130–400)
PMV BLD AUTO: 9.3 FL (ref 9.4–12.4)
POTASSIUM REFLEX MAGNESIUM: 4.1 MMOL/L (ref 3.5–5)
PRO-BNP: 27 PG/ML (ref 0–450)
PROTEIN UA: 30 MG/DL
RBC # BLD: 5.54 M/UL (ref 4.7–6.1)
RBC UA: 4 /HPF (ref 0–4)
SODIUM BLD-SCNC: 134 MMOL/L (ref 136–145)
SPECIFIC GRAVITY UA: 1.04 (ref 1–1.03)
TOTAL PROTEIN: 7.7 G/DL (ref 6.6–8.7)
UROBILINOGEN, URINE: 1 E.U./DL
WBC # BLD: 5.3 K/UL (ref 4.8–10.8)
WBC UA: 3 /HPF (ref 0–5)

## 2022-06-09 PROCEDURE — 6370000000 HC RX 637 (ALT 250 FOR IP): Performed by: EMERGENCY MEDICINE

## 2022-06-09 PROCEDURE — 96361 HYDRATE IV INFUSION ADD-ON: CPT

## 2022-06-09 PROCEDURE — 83880 ASSAY OF NATRIURETIC PEPTIDE: CPT

## 2022-06-09 PROCEDURE — 96374 THER/PROPH/DIAG INJ IV PUSH: CPT

## 2022-06-09 PROCEDURE — 6360000002 HC RX W HCPCS: Performed by: EMERGENCY MEDICINE

## 2022-06-09 PROCEDURE — 2580000003 HC RX 258: Performed by: EMERGENCY MEDICINE

## 2022-06-09 PROCEDURE — 36415 COLL VENOUS BLD VENIPUNCTURE: CPT

## 2022-06-09 PROCEDURE — 81001 URINALYSIS AUTO W/SCOPE: CPT

## 2022-06-09 PROCEDURE — 85025 COMPLETE CBC W/AUTO DIFF WBC: CPT

## 2022-06-09 PROCEDURE — 80053 COMPREHEN METABOLIC PANEL: CPT

## 2022-06-09 PROCEDURE — 99284 EMERGENCY DEPT VISIT MOD MDM: CPT

## 2022-06-09 RX ORDER — GABAPENTIN 100 MG/1
100 CAPSULE ORAL 2 TIMES DAILY
Qty: 20 CAPSULE | Refills: 0 | Status: SHIPPED | OUTPATIENT
Start: 2022-06-09 | End: 2022-06-29

## 2022-06-09 RX ORDER — KETOROLAC TROMETHAMINE 30 MG/ML
15 INJECTION, SOLUTION INTRAMUSCULAR; INTRAVENOUS ONCE
Status: COMPLETED | OUTPATIENT
Start: 2022-06-09 | End: 2022-06-09

## 2022-06-09 RX ORDER — GABAPENTIN 600 MG/1
300 TABLET ORAL ONCE
Status: COMPLETED | OUTPATIENT
Start: 2022-06-09 | End: 2022-06-09

## 2022-06-09 RX ORDER — SODIUM CHLORIDE, SODIUM LACTATE, POTASSIUM CHLORIDE, AND CALCIUM CHLORIDE .6; .31; .03; .02 G/100ML; G/100ML; G/100ML; G/100ML
1000 INJECTION, SOLUTION INTRAVENOUS ONCE
Status: COMPLETED | OUTPATIENT
Start: 2022-06-09 | End: 2022-06-09

## 2022-06-09 RX ADMIN — KETOROLAC TROMETHAMINE 15 MG: 30 INJECTION, SOLUTION INTRAMUSCULAR at 03:53

## 2022-06-09 RX ADMIN — GABAPENTIN 300 MG: 600 TABLET, FILM COATED ORAL at 03:54

## 2022-06-09 RX ADMIN — SODIUM CHLORIDE, POTASSIUM CHLORIDE, SODIUM LACTATE AND CALCIUM CHLORIDE 1000 ML: 600; 310; 30; 20 INJECTION, SOLUTION INTRAVENOUS at 03:54

## 2022-06-09 ASSESSMENT — PAIN DESCRIPTION - ORIENTATION: ORIENTATION: RIGHT;LOWER

## 2022-06-09 ASSESSMENT — PAIN DESCRIPTION - LOCATION: LOCATION: LEG

## 2022-06-09 ASSESSMENT — ENCOUNTER SYMPTOMS
EYE PAIN: 0
RHINORRHEA: 0
SHORTNESS OF BREATH: 0
DIARRHEA: 0
VOICE CHANGE: 0
ABDOMINAL PAIN: 0
VOMITING: 0
COUGH: 0
EYE REDNESS: 0
NAUSEA: 1

## 2022-06-09 ASSESSMENT — PAIN DESCRIPTION - DESCRIPTORS: DESCRIPTORS: SHARP

## 2022-06-09 ASSESSMENT — PAIN SCALES - GENERAL: PAINLEVEL_OUTOF10: 5

## 2022-06-09 ASSESSMENT — PAIN - FUNCTIONAL ASSESSMENT: PAIN_FUNCTIONAL_ASSESSMENT: 0-10

## 2022-06-09 NOTE — ED PROVIDER NOTES
[facility] EMERGENCY DEPT  EMERGENCY DEPARTMENT ENCOUNTER      Pt Name: Steve Ibrahim  MRN: [id number]  [place] 1975  Date of evaluation: 6/9/2022  Provider: Sergey Ureña MD    [address] [place] [address]       Chief Complaint   Patient presents with    Foot Pain     bilateral foot pain and numbness. diabetic. right calf hurting and heat sensation in calf. HISTORY OF PRESENT ILLNESS   (Location/Symptom, Timing/Onset,Context/Setting, Quality, Duration, Modifying Factors, Severity)  Note limiting factors. Steve Ibrahim is a 48 y.o. male who presents to the emergency department for evaluation after having pain down both legs into his feet that has been going on for at least 3 or 4 weeks. States he has been seen at [facility] for this and symptoms have continued. Has a history of uncontrolled type 2 diabetes. States he is currently in between primary care providers and is working on getting an appointment scheduled for next week. Denies any associated swelling. States the pain goes between hot and cold sensations down his legs. Also has had decreased appetite, fatigue, and several other symptoms. Says that he has increased thirst and he craves salt frequently. States he also has history of mental health problems and is scheduled for next week to get back on medications for that. HPI    NursingNotes were reviewed. REVIEW OF SYSTEMS    (2-9 systems for level 4, 10 or more for level 5)     Review of Systems   Constitutional: Positive for activity change and appetite change. Negative for fatigue and fever. HENT: Negative for congestion, rhinorrhea and voice change. Eyes: Negative for pain and redness. Respiratory: Negative for cough and shortness of breath. Cardiovascular: Negative for chest pain. Gastrointestinal: Positive for nausea. Negative for abdominal pain, diarrhea and vomiting. Endocrine: Positive for polydipsia. Genitourinary: Negative.     Musculoskeletal: Positive for myalgias. Negative for arthralgias and gait problem. Skin: Negative for rash and wound. Neurological: Negative for weakness and headaches. Hematological: Negative. Psychiatric/Behavioral: Negative. All other systems reviewed and are negative. A complete review of systems was performed and is negative except as noted above in the HPI. PAST MEDICAL HISTORY     Past Medical History:   Diagnosis Date    ADHD (attention deficit hyperactivity disorder)     Bipolar disorder ([facility]     Depression     [facility]     Diabetes ([facility] 2004    Heart abnormality          SURGICAL HISTORY       Past Surgical History:   Procedure Laterality Date    SKIN BIOPSY  2006    MRSA groin area R    WISDOM TOOTH EXTRACTION           CURRENT MEDICATIONS       Discharge Medication List as of 6/9/2022  5:20 AM          ALLERGIES     Contrast [iodides], Depakote [divalproex sodium], Seroquel [quetiapine fumarate], and Wellbutrin [bupropion]    FAMILY HISTORY       Family History   Problem Relation Age of Onset    Heart Disease Mother     Diabetes Mother     Depression Mother     High Blood Pressure Mother     Kidney Disease Mother     Hearing Loss Mother     Depression Brother     Substance Abuse Brother           SOCIAL HISTORY       Social History     Socioeconomic History    Marital status: Single     Spouse name: None    Number of children: 0    Years of education: 13    Highest education level: None   Occupational History    None   Tobacco Use    Smoking status: Never Smoker    Smokeless tobacco: Never Used   Vaping Use    Vaping Use: Never used   Substance and Sexual Activity    Alcohol use: Yes     Comment: Social--Pt reports rare use, a couple of times a year. , 1-2 cocktail type drinks.  Drug use: Not Currently     Types: Marijuana Liv Kales), Cocaine     Comment: has not used in 3 weeks.      Sexual activity: Yes     Partners: Male   Other Topics Concern    None   Social History Narrative    None     Social Determinants of Health     Financial Resource Strain:     Difficulty of Paying Living Expenses: Not on file   Food Insecurity:     Worried About Running Out of Food in the Last Year: Not on file    Sheeba of Food in the Last Year: Not on file   Transportation Needs:     Lack of Transportation (Medical): Not on file    Lack of Transportation (Non-Medical): Not on file   Physical Activity:     Days of Exercise per Week: Not on file    Minutes of Exercise per Session: Not on file   Stress:     Feeling of Stress : Not on file   Social Connections:     Frequency of Communication with Friends and Family: Not on file    Frequency of Social Gatherings with Friends and Family: Not on file    Attends Anabaptist Services: Not on file    Active Member of [address] [place] [address] [employer] or Organizations: Not on file    Attends Club or Organization Meetings: Not on file    Marital Status: Not on file   Intimate Partner Violence:     Fear of Current or Ex-Partner: Not on file    Emotionally Abused: Not on file    Physically Abused: Not on file    Sexually Abused: Not on file   Housing Stability:     Unable to Pay for Housing in the Last Year: Not on file    Number of [place] in the Last Year: Not on file    Unstable Housing in the Last Year: Not on file       SCREENINGS    Carri Coma Scale  Eye Opening: Spontaneous  Best Verbal Response: Oriented  Best Motor Response: Obeys commands  Carri Coma Scale Score: 15        PHYSICAL EXAM    (up to 7 for level 4, 8 or more for level 5)     ED Triage Vitals [06/09/22 0050]   BP Temp Temp src Heart Rate Resp SpO2 Height Weight   124/86 98.4 °F (36.9 °C) -- 92 20 98 % 5' 5\" (1.651 m) 145 lb (65.8 kg)       Physical Exam  Vitals and nursing note reviewed. Constitutional:       General: He is not in acute distress. Appearance: Normal appearance. He is well-developed. He is not diaphoretic. HENT:      Head: Normocephalic and atraumatic. Mouth/Throat:      Pharynx: No oropharyngeal exudate. Eyes:      General: No scleral icterus. Pupils: Pupils are equal, round, and reactive to light. Neck:      Trachea: No tracheal deviation. Cardiovascular:      Rate and Rhythm: Normal rate. Pulses: Normal pulses. Heart sounds: Normal heart sounds. Pulmonary:      Effort: Pulmonary effort is normal.      Breath sounds: Normal breath sounds. No stridor. No wheezing or rhonchi. Abdominal:      General: There is no distension. Palpations: Abdomen is soft. Abdomen is not rigid. Tenderness: There is no abdominal tenderness. There is no guarding. Hernia: No hernia is present. Musculoskeletal:         General: No deformity. Cervical back: Normal range of motion. Skin:     General: Skin is warm and dry. Findings: No rash. Neurological:      Mental Status: He is alert and oriented to person, place, and time. Cranial Nerves: No cranial nerve deficit.       Coordination: Coordination normal.   Psychiatric:         Behavior: Behavior normal.         DIAGNOSTIC RESULTS     EKG: All EKG's are interpreted by the Emergency Department Physician who either signs or Co-signs this chart in the absence of a cardiologist.        RADIOLOGY:   Non-plain film images such as CT, Ultrasound and MRI are read by the radiologist. Emaline Marie images are visualized and preliminarily interpreted by the emergency physician with the below findings:        Interpretation per the Radiologist below, if available at the time of this note:    No orders to display         ED BEDSIDE ULTRASOUND:   Performed by ED Physician - none    LABS:  Labs Reviewed   CBC WITH AUTO DIFFERENTIAL - Abnormal; Notable for the following components:       Result Value    MPV 9.3 (*)     Neutrophils % 44.4 (*)     Lymphocytes % 43.3 (*)     All other components within normal limits   COMPREHENSIVE METABOLIC PANEL W/ REFLEX TO MG FOR LOW K - Abnormal; Notable for the following components:    Sodium 134 (*)     Chloride 96 (*)     Glucose 278 (*)     BUN 24 (*)     All other components within normal limits   URINALYSIS WITH REFLEX TO CULTURE - Abnormal; Notable for the following components:    Color, UA DARK YELLOW (*)     Ketones, Urine 15 (*)     Blood, Urine TRACE (*)     Protein, UA 30 (*)     All other components within normal limits   MICROSCOPIC URINALYSIS - Abnormal; Notable for the following components:    Bacteria, UA NEGATIVE (*)     Crystals, UA NEG (*)     Hyaline Casts, UA 10 (*)     All other components within normal limits   BRAIN NATRIURETIC PEPTIDE       All other labs were within normal range or not returned as of this dictation. EMERGENCY DEPARTMENT COURSE and DIFFERENTIALDIAGNOSIS/MDM:   Vitals:    Vitals:    06/09/22 0050 06/09/22 0357 06/09/22 0515   BP: 124/86  112/80   Pulse: 92 98 85   Resp: 20 18 16   Temp: 98.4 °F (36.9 °C)  98 °F (36.7 °C)   SpO2: 98% 94% 95%   Weight: 145 lb (65.8 kg)     Height: 5' 5\" (1.651 m)         MDM      Patient with no leg swelling, redness, or skin changes. No signs of DVT, arterial insufficiency, cellulitis, or other more serious underlying etiology. Given uncontrolled diabetes, suspect neuropathy as a cause of symptoms. Labs show mild hyponatremia as well as slight elevation in BUN, suggesting some dehydration likely related to chronic glucosuria. Evaluation and work-up here revealed no signs of emergent or life-threatening pathology that would necessitate admission for further work-up or management at this time. Patient is felt to be stable for discharge home with return precautions for worsening of the condition or development of new concerning symptoms. Patient was encouraged to follow-up with their primary care doctor in the appropriate timeframe. Necessary prescriptions and information have been provided for treatment at home.   Patient voices understanding and agreement with the plan.        CONSULTS:  None    PROCEDURES:  Unless otherwise notedbelow, none     Procedures    FINAL IMPRESSION     1. Neuropathic pain, leg, bilateral    2. Type 2 diabetes mellitus with hyperglycemia, with long-term current use of insulin ([facility])    3. Dehydration          DISPOSITION/PLAN   DISPOSITION Decision To Discharge 06/09/2022 03:50:04 AM      No notes of EC Admission Criteria type on file. PATIENT REFERRED TO:  [facility] EMERGENCY DEPT  Julian Nevarez  [phone]    If symptoms worsen      DISCHARGE MEDICATIONS:  Discharge Medication List as of 6/9/2022  5:20 AM      START taking these medications    Details   gabapentin (NEURONTIN) 100 MG capsule Take 1 capsule by mouth 2 times daily for 10 days. , Disp-20 capsule, R-0Normal                (Please note that portions of this note were completed with a voice recognition program.  Efforts were made to edit the dictations butoccasionally words are mis-transcribed.)    Prakash Merida MD (electronically signed)  Emergency Physician         Prakash Merida., MD  06/09/22 4910

## 2022-06-28 ENCOUNTER — OFFICE VISIT (OUTPATIENT)
Dept: FAMILY MEDICINE CLINIC | Facility: CLINIC | Age: 47
End: 2022-06-28

## 2022-06-28 VITALS
DIASTOLIC BLOOD PRESSURE: 73 MMHG | BODY MASS INDEX: 24.53 KG/M2 | WEIGHT: 147.2 LBS | SYSTOLIC BLOOD PRESSURE: 111 MMHG | HEART RATE: 102 BPM | TEMPERATURE: 97.6 F | OXYGEN SATURATION: 97 % | HEIGHT: 65 IN

## 2022-06-28 DIAGNOSIS — E78.2 MIXED HYPERLIPIDEMIA: ICD-10-CM

## 2022-06-28 DIAGNOSIS — Z82.49 FAMILY HISTORY OF HEART ATTACK: ICD-10-CM

## 2022-06-28 DIAGNOSIS — D48.5 NEOPLASM OF UNCERTAIN BEHAVIOR OF SKIN OF CHIN: ICD-10-CM

## 2022-06-28 DIAGNOSIS — Z79.899 HIGH RISK MEDICATION USE: ICD-10-CM

## 2022-06-28 DIAGNOSIS — F19.90 SUBSTANCE USE DISORDER: ICD-10-CM

## 2022-06-28 DIAGNOSIS — F31.9 BIPOLAR 1 DISORDER: ICD-10-CM

## 2022-06-28 DIAGNOSIS — F90.2 ATTENTION DEFICIT HYPERACTIVITY DISORDER (ADHD), COMBINED TYPE: ICD-10-CM

## 2022-06-28 DIAGNOSIS — R61 NIGHT SWEAT: ICD-10-CM

## 2022-06-28 DIAGNOSIS — M25.541 JOINT PAIN IN FINGERS OF BOTH HANDS: ICD-10-CM

## 2022-06-28 DIAGNOSIS — Q23.1 BICUSPID AORTIC VALVE: ICD-10-CM

## 2022-06-28 DIAGNOSIS — E11.40 TYPE 2 DIABETES MELLITUS WITH DIABETIC NEUROPATHY, WITHOUT LONG-TERM CURRENT USE OF INSULIN: Primary | ICD-10-CM

## 2022-06-28 DIAGNOSIS — M25.542 JOINT PAIN IN FINGERS OF BOTH HANDS: ICD-10-CM

## 2022-06-28 DIAGNOSIS — M25.562 CHRONIC PAIN OF BOTH KNEES: ICD-10-CM

## 2022-06-28 DIAGNOSIS — R25.2 CRAMP OF TOE: ICD-10-CM

## 2022-06-28 DIAGNOSIS — G89.29 CHRONIC PAIN OF BOTH KNEES: ICD-10-CM

## 2022-06-28 DIAGNOSIS — E55.9 VITAMIN D DEFICIENCY: ICD-10-CM

## 2022-06-28 DIAGNOSIS — L60.3 NAIL DYSTROPHY: ICD-10-CM

## 2022-06-28 DIAGNOSIS — M25.561 CHRONIC PAIN OF BOTH KNEES: ICD-10-CM

## 2022-06-28 PROCEDURE — 99204 OFFICE O/P NEW MOD 45 MIN: CPT | Performed by: FAMILY MEDICINE

## 2022-06-29 ENCOUNTER — HOSPITAL ENCOUNTER (EMERGENCY)
Age: 47
Discharge: HOME | End: 2022-06-29
Attending: EMERGENCY MEDICINE
Payer: MEDICARE

## 2022-06-29 VITALS
SYSTOLIC BLOOD PRESSURE: 123 MMHG | DIASTOLIC BLOOD PRESSURE: 97 MMHG | TEMPERATURE: 98.4 F | HEART RATE: 96 BPM | WEIGHT: 147 LBS | BODY MASS INDEX: 24.49 KG/M2 | RESPIRATION RATE: 20 BRPM | OXYGEN SATURATION: 98 % | HEIGHT: 65 IN

## 2022-06-29 DIAGNOSIS — M79.604 RIGHT LEG PAIN: Primary | ICD-10-CM

## 2022-06-29 DIAGNOSIS — M79.2 NEUROPATHIC PAIN OF LOWER EXTREMITY, RIGHT: ICD-10-CM

## 2022-06-29 PROCEDURE — 99283 EMERGENCY DEPT VISIT LOW MDM: CPT

## 2022-06-29 RX ORDER — GABAPENTIN 300 MG/1
300 CAPSULE ORAL DAILY
Qty: 30 CAPSULE | Refills: 0 | Status: SHIPPED | OUTPATIENT
Start: 2022-06-29 | End: 2022-08-11

## 2022-06-29 ASSESSMENT — ENCOUNTER SYMPTOMS
EYE PAIN: 0
VOMITING: 0
EYE DISCHARGE: 0
FACIAL SWELLING: 0
SORE THROAT: 0
BACK PAIN: 0
ABDOMINAL PAIN: 0
RECTAL PAIN: 0
SHORTNESS OF BREATH: 0
SINUS PRESSURE: 0
PHOTOPHOBIA: 0
RHINORRHEA: 0
WHEEZING: 0
EYE ITCHING: 0
NAUSEA: 0
CHEST TIGHTNESS: 0
DIARRHEA: 0
CONSTIPATION: 0
COUGH: 0

## 2022-06-29 NOTE — ED PROVIDER NOTES
Negative for cold intolerance and heat intolerance. Genitourinary: Negative for difficulty urinating and dysuria. Musculoskeletal: Negative for arthralgias, back pain, joint swelling, myalgias and neck stiffness. Skin: Negative for pallor and rash. Neurological: Negative for dizziness, speech difficulty, weakness, numbness and headaches. Psychiatric/Behavioral: Negative for agitation, behavioral problems, confusion and decreased concentration. The patient is nervous/anxious. All other systems reviewed and are negative. PAST MEDICALHISTORY     Past Medical History:   Diagnosis Date    ADHD (attention deficit hyperactivity disorder)     B[facility] disorder ([facility]     Depression     Diabetes ([facility]     Diabetes ([facility] 2004    Heart abnormality          SURGICAL HISTORY       Past Surgical History:   Procedure Laterality Date    SKIN BIOPSY  2006    MRSA groin area R    WISDOM TOOTH EXTRACTION           CURRENT MEDICATIONS     Discharge Medication List as of 6/29/2022  5:40 AM          ALLERGIES     Contrast [iodides], Depakote [divalproex sodium], Seroquel [quetiapine fumarate], and Wellbutrin [bupropion]    FAMILY HISTORY       Family History   Problem Relation Age of Onset    Heart Disease Mother     Diabetes Mother     Depression Mother     High Blood Pressure Mother     Kidney Disease Mother     Hearing Loss Mother     Depression Brother     Substance Abuse Brother           SOCIAL HISTORY       Social History     Socioeconomic History    Marital status: Single     Spouse name: None    Number of children: 0    Years of education: 13    Highest education level: None   Occupational History    None   Tobacco Use    Smoking status: Never Smoker    Smokeless tobacco: Never Used   Vaping Use    Vaping Use: Never used   Substance and Sexual Activity    Alcohol use: Yes     Comment: Social--Pt reports rare use, a couple of times a year. , 1-2 cocktail type drinks.     Drug use: Not Currently     Types: Marijuana Estil Catena), Cocaine, Methamphetamines (Crystal Meth)     Comment: has not used in 3 weeks.  Sexual activity: Yes     Partners: Male   Other Topics Concern    None   Social History Narrative    None     Social Determinants of Health     Financial Resource Strain:     Difficulty of Paying Living Expenses: Not on file   Food Insecurity:     Worried About Running Out of Food in the Last Year: Not on file    Sheeba of Food in the Last Year: Not on file   Transportation Needs:     Lack of Transportation (Medical): Not on file    Lack of Transportation (Non-Medical):  Not on file   Physical Activity:     Days of Exercise per Week: Not on file    Minutes of Exercise per Session: Not on file   Stress:     Feeling of Stress : Not on file   Social Connections:     Frequency of Communication with Friends and Family: Not on file    Frequency of Social Gatherings with Friends and Family: Not on file    Attends Scientologist Services: Not on file    Active Member of [address] [place] [address] or Organizations: Not on file    Attends Club or Organization Meetings: Not on file    Marital Status: Not on file   Intimate Partner Violence:     Fear of Current or Ex-Partner: Not on file    Emotionally Abused: Not on file    Physically Abused: Not on file    Sexually Abused: Not on file   Housing Stability:     Unable to Pay for Housing in the Last Year: Not on file    Number of [place] in the Last Year: Not on file    Unstable Housing in the Last Year: Not on file       SCREENINGS    Carri Coma Scale  Eye Opening: Spontaneous  Best Verbal Response: Oriented  Best Motor Response: Obeys commands  Carri Coma Scale Score: 15        PHYSICAL EXAM    (up to 7 for level 4, 8 or more for level 5)     ED Triage Vitals [06/29/22 0500]   BP Temp Temp Source Heart Rate Resp SpO2 Height Weight   (!) 123/97 98.4 °F (36.9 °C) Oral 96 20 98 % 5' 5\" (1.651 m) 147 lb (66.7 kg)       Physical Exam  Vitals and nursing note reviewed. Constitutional:       Appearance: He is well-developed. HENT:      Head: Normocephalic and atraumatic. Eyes:      General:         Right eye: No discharge. Left eye: No discharge. Conjunctiva/sclera: Conjunctivae normal.      Pupils: Pupils are equal, round, and reactive to light. Cardiovascular:      Rate and Rhythm: Normal rate and regular rhythm. Heart sounds: Normal heart sounds. Pulmonary:      Effort: Pulmonary effort is normal. No respiratory distress. Breath sounds: Normal breath sounds. No wheezing or rales. Abdominal:      General: Bowel sounds are normal.      Palpations: Abdomen is soft. There is no mass. Tenderness: There is no abdominal tenderness. There is no guarding or rebound. Musculoskeletal:         General: No tenderness. Normal range of motion. Cervical back: Normal range of motion and neck supple. Comments: Painful paresthesia to right leg. Skin:     General: Skin is warm and dry. Capillary Refill: Capillary refill takes less than 2 seconds. Neurological:      Mental Status: He is alert and oriented to person, place, and time. Psychiatric:         Behavior: Behavior normal.         Thought Content: Thought content normal.         Judgment: Judgment normal.      Comments: anixous         DIAGNOSTIC RESULTS     EKG: All EKG's areinterpreted by the Emergency Department Physician who either signs or Co-signs this chart in the absence of a cardiologist.    RADIOLOGY:  Non-plain film images such as CT, Ultrasound and MRI are read by the radiologist. Plain radiographic images are visualized and preliminarily interpreted bythe emergency physician with the below findings:    No orders to display     LABS:  Labs Reviewed - No data to display    All other labs were within normal range or not returned as of this dictation.     EMERGENCY DEPARTMENT COURSE and DIFFERENTIAL DIAGNOSIS/MDM:   Vitals:    Vitals:    06/29/22 0500   BP: (!) 123/97   Pulse: 96   Resp: 20   Temp: 98.4 °F (36.9 °C)   TempSrc: Oral   SpO2: 98%   Weight: 147 lb (66.7 kg)   Height: 5' 5\" (1.651 m)       MDM     Acute on chronic diabetic neuropathy. Desires further medication and has been unable to follow up with a primary care doctor in a timely fashion. He will follow up his PMD; discussed escalating dosing with gabapentin and the need for urgent follow up. Reassessment      CONSULTS:  None    PROCEDURES:  Unless otherwise noted below, none     Procedures    FINAL IMPRESSION      1. Right leg pain    2. Neuropathic pain of lower extremity, right          DISPOSITION/PLAN   DISPOSITION Decision To Discharge 06/29/2022 05:25:48 AM      PATIENT REFERRED TO:  [address]   [address] [place] [id number] [phone]  In 1 day        DISCHARGE MEDICATIONS:  Discharge Medication List as of 6/29/2022  5:40 AM             (Please note that portions of this note were completed with a voice recognition program.  Efforts were made to edit thedictations but occasionally words are mis-transcribed.)    Marcial Zepeda MD (electronically signed)  Attending Emergency Physician          Marcial Zepeda MD  06/30/22 [id number]

## 2022-06-30 ENCOUNTER — HOSPITAL ENCOUNTER (OUTPATIENT)
Dept: GENERAL RADIOLOGY | Facility: HOSPITAL | Age: 47
Discharge: HOME | End: 2022-06-30

## 2022-06-30 ENCOUNTER — LAB (OUTPATIENT)
Dept: LAB | Facility: HOSPITAL | Age: 47
End: 2022-06-30

## 2022-06-30 DIAGNOSIS — Q23.1 BICUSPID AORTIC VALVE: ICD-10-CM

## 2022-06-30 DIAGNOSIS — E55.9 VITAMIN D DEFICIENCY: ICD-10-CM

## 2022-06-30 DIAGNOSIS — M25.542 JOINT PAIN IN FINGERS OF BOTH HANDS: ICD-10-CM

## 2022-06-30 DIAGNOSIS — G89.29 CHRONIC PAIN OF BOTH KNEES: ICD-10-CM

## 2022-06-30 DIAGNOSIS — M25.562 CHRONIC PAIN OF BOTH KNEES: ICD-10-CM

## 2022-06-30 DIAGNOSIS — E11.40 TYPE 2 DIABETES MELLITUS WITH DIABETIC NEUROPATHY, WITHOUT LONG-TERM CURRENT USE OF INSULIN: ICD-10-CM

## 2022-06-30 DIAGNOSIS — E78.9 BORDERLINE HIGH CHOLESTEROL: ICD-10-CM

## 2022-06-30 DIAGNOSIS — R25.2 CRAMP OF TOE: ICD-10-CM

## 2022-06-30 DIAGNOSIS — L60.3 NAIL DYSTROPHY: ICD-10-CM

## 2022-06-30 DIAGNOSIS — R61 NIGHT SWEAT: ICD-10-CM

## 2022-06-30 DIAGNOSIS — M25.541 JOINT PAIN IN FINGERS OF BOTH HANDS: ICD-10-CM

## 2022-06-30 DIAGNOSIS — F31.9 BIPOLAR 1 DISORDER: ICD-10-CM

## 2022-06-30 DIAGNOSIS — M25.561 CHRONIC PAIN OF BOTH KNEES: ICD-10-CM

## 2022-06-30 DIAGNOSIS — F90.2 ATTENTION DEFICIT HYPERACTIVITY DISORDER (ADHD), COMBINED TYPE: ICD-10-CM

## 2022-06-30 DIAGNOSIS — F19.90 SUBSTANCE USE DISORDER: ICD-10-CM

## 2022-06-30 DIAGNOSIS — Z79.899 HIGH RISK MEDICATION USE: ICD-10-CM

## 2022-06-30 DIAGNOSIS — Z82.49 FAMILY HISTORY OF HEART ATTACK: ICD-10-CM

## 2022-06-30 LAB
25(OH)D3 SERPL-MCNC: 14.3 NG/ML (ref 30–100)
ALBUMIN SERPL-MCNC: 4.4 G/DL (ref 3.5–5.2)
ALBUMIN UR-MCNC: 3.9 MG/DL
ALBUMIN/GLOB SERPL: 1.4 G/DL
ALP SERPL-CCNC: 93 U/L (ref 39–117)
ALT SERPL W P-5'-P-CCNC: 16 U/L (ref 1–41)
AMPHET+METHAMPHET UR QL: NEGATIVE
AMPHETAMINES UR QL: NEGATIVE
ANION GAP SERPL CALCULATED.3IONS-SCNC: 14.2 MMOL/L (ref 5–15)
AST SERPL-CCNC: 15 U/L (ref 1–40)
BARBITURATES UR QL SCN: NEGATIVE
BENZODIAZ UR QL SCN: NEGATIVE
BILIRUB SERPL-MCNC: 0.6 MG/DL (ref 0–1.2)
BILIRUB UR QL STRIP: NEGATIVE
BUN SERPL-MCNC: 16 MG/DL (ref 6–20)
BUN/CREAT SERPL: 18.6 (ref 7–25)
BUPRENORPHINE SERPL-MCNC: NEGATIVE NG/ML
CALCIUM SPEC-SCNC: 9.6 MG/DL (ref 8.6–10.5)
CANNABINOIDS SERPL QL: NEGATIVE
CHLORIDE SERPL-SCNC: 98 MMOL/L (ref 98–107)
CHOLEST SERPL-MCNC: 221 MG/DL (ref 0–200)
CHROMATIN AB SERPL-ACNC: <10 IU/ML (ref 0–14)
CLARITY UR: CLEAR
CO2 SERPL-SCNC: 24.8 MMOL/L (ref 22–29)
COCAINE UR QL: NEGATIVE
COLOR UR: YELLOW
CREAT SERPL-MCNC: 0.86 MG/DL (ref 0.76–1.27)
DEPRECATED RDW RBC AUTO: 39.1 FL (ref 37–54)
EGFRCR SERPLBLD CKD-EPI 2021: 106.8 ML/MIN/1.73
ERYTHROCYTE [DISTWIDTH] IN BLOOD BY AUTOMATED COUNT: 12 % (ref 12.3–15.4)
ERYTHROCYTE [SEDIMENTATION RATE] IN BLOOD: 13 MM/HR (ref 0–15)
GLOBULIN UR ELPH-MCNC: 3.1 GM/DL
GLUCOSE SERPL-MCNC: 237 MG/DL (ref 65–99)
GLUCOSE UR STRIP-MCNC: ABNORMAL MG/DL
HBA1C MFR BLD: 11.6 % (ref 4.8–5.6)
HCT VFR BLD AUTO: 46.4 % (ref 37.5–51)
HDLC SERPL-MCNC: 53 MG/DL (ref 40–60)
HGB BLD-MCNC: 15.8 G/DL (ref 13–17.7)
HGB UR QL STRIP.AUTO: NEGATIVE
IRON 24H UR-MRATE: 112 MCG/DL (ref 59–158)
IRON SATN MFR SERPL: 28 % (ref 20–50)
KETONES UR QL STRIP: NEGATIVE
LDLC SERPL CALC-MCNC: 140 MG/DL (ref 0–100)
LDLC/HDLC SERPL: 2.59 {RATIO}
LEUKOCYTE ESTERASE UR QL STRIP.AUTO: NEGATIVE
MAGNESIUM SERPL-MCNC: 1.8 MG/DL (ref 1.6–2.6)
MCH RBC QN AUTO: 30.3 PG (ref 26.6–33)
MCHC RBC AUTO-ENTMCNC: 34.1 G/DL (ref 31.5–35.7)
MCV RBC AUTO: 88.9 FL (ref 79–97)
METHADONE UR QL SCN: NEGATIVE
NITRITE UR QL STRIP: NEGATIVE
OPIATES UR QL: NEGATIVE
OXYCODONE UR QL SCN: NEGATIVE
PCP UR QL SCN: NEGATIVE
PH UR STRIP.AUTO: 5.5 [PH] (ref 5–8)
PLATELET # BLD AUTO: 235 10*3/MM3 (ref 140–450)
PMV BLD AUTO: 9.7 FL (ref 6–12)
POTASSIUM SERPL-SCNC: 4.3 MMOL/L (ref 3.5–5.2)
PROPOXYPH UR QL: NEGATIVE
PROT SERPL-MCNC: 7.5 G/DL (ref 6–8.5)
PROT UR QL STRIP: NEGATIVE
RBC # BLD AUTO: 5.22 10*6/MM3 (ref 4.14–5.8)
SODIUM SERPL-SCNC: 137 MMOL/L (ref 136–145)
SP GR UR STRIP: 1.02 (ref 1–1.03)
TIBC SERPL-MCNC: 399 MCG/DL (ref 298–536)
TRANSFERRIN SERPL-MCNC: 268 MG/DL (ref 200–360)
TRICYCLICS UR QL SCN: NEGATIVE
TRIGL SERPL-MCNC: 154 MG/DL (ref 0–150)
TSH SERPL DL<=0.05 MIU/L-ACNC: 1.03 UIU/ML (ref 0.27–4.2)
UROBILINOGEN UR QL STRIP: ABNORMAL
VIT B12 BLD-MCNC: 681 PG/ML (ref 211–946)
VLDLC SERPL-MCNC: 28 MG/DL (ref 5–40)
WBC NRBC COR # BLD: 5.5 10*3/MM3 (ref 3.4–10.8)

## 2022-06-30 PROCEDURE — 86038 ANTINUCLEAR ANTIBODIES: CPT

## 2022-06-30 PROCEDURE — 82043 UR ALBUMIN QUANTITATIVE: CPT

## 2022-06-30 PROCEDURE — 81003 URINALYSIS AUTO W/O SCOPE: CPT

## 2022-06-30 PROCEDURE — 82607 VITAMIN B-12: CPT

## 2022-06-30 PROCEDURE — 80306 DRUG TEST PRSMV INSTRMNT: CPT

## 2022-06-30 PROCEDURE — 73560 X-RAY EXAM OF KNEE 1 OR 2: CPT

## 2022-06-30 PROCEDURE — 82306 VITAMIN D 25 HYDROXY: CPT

## 2022-06-30 PROCEDURE — 83735 ASSAY OF MAGNESIUM: CPT

## 2022-06-30 PROCEDURE — 80061 LIPID PANEL: CPT

## 2022-06-30 PROCEDURE — 36415 COLL VENOUS BLD VENIPUNCTURE: CPT

## 2022-06-30 PROCEDURE — 85652 RBC SED RATE AUTOMATED: CPT

## 2022-06-30 PROCEDURE — 83540 ASSAY OF IRON: CPT

## 2022-06-30 PROCEDURE — 86431 RHEUMATOID FACTOR QUANT: CPT

## 2022-06-30 PROCEDURE — 83036 HEMOGLOBIN GLYCOSYLATED A1C: CPT

## 2022-06-30 PROCEDURE — 85027 COMPLETE CBC AUTOMATED: CPT

## 2022-06-30 PROCEDURE — 84466 ASSAY OF TRANSFERRIN: CPT

## 2022-06-30 PROCEDURE — 84443 ASSAY THYROID STIM HORMONE: CPT

## 2022-06-30 PROCEDURE — 80053 COMPREHEN METABOLIC PANEL: CPT

## 2022-07-01 ENCOUNTER — PATIENT ROUNDING (BHMG ONLY) (OUTPATIENT)
Dept: FAMILY MEDICINE CLINIC | Facility: CLINIC | Age: 47
End: 2022-07-01

## 2022-07-01 NOTE — PROGRESS NOTES
July 1, 2022    Hello, may I speak with Rah Shin?    My name is Citlaly Whyte     I am  with [facility]  [address] [place] [id number] [phone].    Before we get started may I verify your date of birth? 1975    I am calling to officially welcome you to our practice and ask about your recent visit. Is this a good time to talk? yes    Tell me about your visit with us. What things went well? Visit was good and he liked the provider.       We're always looking for ways to make our patients' experiences even better. Do you have recommendations on ways we may improve?  no    Overall were you satisfied with your first visit to our practice? yes       I appreciate you taking the time to speak with me today. Is there anything else I can do for you? no      Thank you, and have a great day.

## 2022-07-03 LAB
ANA SER QL IF: NEGATIVE
LABORATORY COMMENT REPORT: NORMAL

## 2022-07-25 PROBLEM — E78.2 MIXED HYPERLIPIDEMIA: Status: ACTIVE | Noted: 2022-07-25

## 2022-07-25 PROBLEM — E55.9 VITAMIN D DEFICIENCY: Status: ACTIVE | Noted: 2022-07-25

## 2022-07-28 NOTE — PROGRESS NOTES
Tried to reach patient and number not in service. Will send patient a [employer] message.

## 2022-08-11 ENCOUNTER — HOSPITAL ENCOUNTER (EMERGENCY)
Age: 47
Discharge: HOME | End: 2022-08-12
Attending: EMERGENCY MEDICINE
Payer: MEDICARE

## 2022-08-11 DIAGNOSIS — F19.10 DRUG ABUSE (HCC): ICD-10-CM

## 2022-08-11 DIAGNOSIS — F41.9 ACUTE ANXIETY: Primary | ICD-10-CM

## 2022-08-11 LAB
ACETAMINOPHEN LEVEL: <15 UG/ML
ALBUMIN SERPL-MCNC: 4.2 G/DL (ref 3.5–5.2)
ALP BLD-CCNC: 132 U/L (ref 40–130)
ALT SERPL-CCNC: 12 U/L (ref 5–41)
AMPHETAMINE SCREEN, URINE: POSITIVE
ANION GAP SERPL CALCULATED.3IONS-SCNC: 12 MMOL/L (ref 7–19)
AST SERPL-CCNC: 12 U/L (ref 5–40)
BARBITURATE SCREEN URINE: NEGATIVE
BASOPHILS ABSOLUTE: 0.1 K/UL (ref 0–0.2)
BASOPHILS RELATIVE PERCENT: 0.9 % (ref 0–1)
BENZODIAZEPINE SCREEN, URINE: NEGATIVE
BILIRUB SERPL-MCNC: 0.4 MG/DL (ref 0.2–1.2)
BUN BLDV-MCNC: 15 MG/DL (ref 6–20)
BUPRENORPHINE URINE: NEGATIVE
CALCIUM SERPL-MCNC: 9.2 MG/DL (ref 8.6–10)
CANNABINOID SCREEN URINE: NEGATIVE
CHLORIDE BLD-SCNC: 93 MMOL/L (ref 98–111)
CO2: 25 MMOL/L (ref 22–29)
COCAINE METABOLITE SCREEN URINE: NEGATIVE
CREAT SERPL-MCNC: 0.9 MG/DL (ref 0.5–1.2)
EOSINOPHILS ABSOLUTE: 0.2 K/UL (ref 0–0.6)
EOSINOPHILS RELATIVE PERCENT: 3 % (ref 0–5)
ETHANOL: <10 MG/DL (ref 0–0.08)
GFR AFRICAN AMERICAN: >59
GFR NON-AFRICAN AMERICAN: >60
GLUCOSE BLD-MCNC: 465 MG/DL (ref 74–109)
HCT VFR BLD CALC: 44.4 % (ref 42–52)
HEMOGLOBIN: 16.1 G/DL (ref 14–18)
IMMATURE GRANULOCYTES #: 0 K/UL
LYMPHOCYTES ABSOLUTE: 3.1 K/UL (ref 1.1–4.5)
LYMPHOCYTES RELATIVE PERCENT: 38.1 % (ref 20–40)
Lab: ABNORMAL
MCH RBC QN AUTO: 30.4 PG (ref 27–31)
MCHC RBC AUTO-ENTMCNC: 36.3 G/DL (ref 33–37)
MCV RBC AUTO: 83.9 FL (ref 80–94)
METHADONE SCREEN, URINE: NEGATIVE
METHAMPHETAMINE, URINE: POSITIVE
MONOCYTES ABSOLUTE: 1.1 K/UL (ref 0–0.9)
MONOCYTES RELATIVE PERCENT: 13.9 % (ref 0–10)
NEUTROPHILS ABSOLUTE: 3.5 K/UL (ref 1.5–7.5)
NEUTROPHILS RELATIVE PERCENT: 43.7 % (ref 50–65)
OPIATE SCREEN URINE: NEGATIVE
OXYCODONE URINE: NEGATIVE
PDW BLD-RTO: 11.9 % (ref 11.5–14.5)
PHENCYCLIDINE SCREEN URINE: NEGATIVE
PLATELET # BLD: 293 K/UL (ref 130–400)
PMV BLD AUTO: 9.6 FL (ref 9.4–12.4)
POTASSIUM SERPL-SCNC: 3.6 MMOL/L (ref 3.5–5)
PROPOXYPHENE SCREEN: NEGATIVE
RBC # BLD: 5.29 M/UL (ref 4.7–6.1)
SALICYLATE, SERUM: <3 MG/DL (ref 3–10)
SARS-COV-2, NAAT: NOT DETECTED
SODIUM BLD-SCNC: 130 MMOL/L (ref 136–145)
TOTAL CK: 103 U/L (ref 39–308)
TOTAL PROTEIN: 7.9 G/DL (ref 6.6–8.7)
TRICYCLIC, URINE: NEGATIVE
TROPONIN: <0.01 NG/ML (ref 0–0.03)
WBC # BLD: 8 K/UL (ref 4.8–10.8)

## 2022-08-11 PROCEDURE — 2580000003 HC RX 258: Performed by: EMERGENCY MEDICINE

## 2022-08-11 PROCEDURE — 80306 DRUG TEST PRSMV INSTRMNT: CPT

## 2022-08-11 PROCEDURE — 36415 COLL VENOUS BLD VENIPUNCTURE: CPT

## 2022-08-11 PROCEDURE — 80053 COMPREHEN METABOLIC PANEL: CPT

## 2022-08-11 PROCEDURE — 80179 DRUG ASSAY SALICYLATE: CPT

## 2022-08-11 PROCEDURE — 82550 ASSAY OF CK (CPK): CPT

## 2022-08-11 PROCEDURE — 96361 HYDRATE IV INFUSION ADD-ON: CPT

## 2022-08-11 PROCEDURE — 85025 COMPLETE CBC W/AUTO DIFF WBC: CPT

## 2022-08-11 PROCEDURE — 93005 ELECTROCARDIOGRAM TRACING: CPT | Performed by: EMERGENCY MEDICINE

## 2022-08-11 PROCEDURE — 80143 DRUG ASSAY ACETAMINOPHEN: CPT

## 2022-08-11 PROCEDURE — 6360000002 HC RX W HCPCS: Performed by: EMERGENCY MEDICINE

## 2022-08-11 PROCEDURE — 96374 THER/PROPH/DIAG INJ IV PUSH: CPT

## 2022-08-11 PROCEDURE — 84484 ASSAY OF TROPONIN QUANT: CPT

## 2022-08-11 PROCEDURE — 87635 SARS-COV-2 COVID-19 AMP PRB: CPT

## 2022-08-11 PROCEDURE — 99284 EMERGENCY DEPT VISIT MOD MDM: CPT

## 2022-08-11 PROCEDURE — 82077 ASSAY SPEC XCP UR&BREATH IA: CPT

## 2022-08-11 RX ORDER — MIDAZOLAM HYDROCHLORIDE 2 MG/2ML
2 INJECTION, SOLUTION INTRAMUSCULAR; INTRAVENOUS ONCE
Status: COMPLETED | OUTPATIENT
Start: 2022-08-11 | End: 2022-08-11

## 2022-08-11 RX ORDER — 0.9 % SODIUM CHLORIDE 0.9 %
1000 INTRAVENOUS SOLUTION INTRAVENOUS ONCE
Status: COMPLETED | OUTPATIENT
Start: 2022-08-11 | End: 2022-08-11

## 2022-08-11 RX ADMIN — SODIUM CHLORIDE 1000 ML: 9 INJECTION, SOLUTION INTRAVENOUS at 21:32

## 2022-08-11 RX ADMIN — MIDAZOLAM HYDROCHLORIDE 2 MG: 2 INJECTION, SOLUTION INTRAMUSCULAR; INTRAVENOUS at 21:39

## 2022-08-11 ASSESSMENT — ENCOUNTER SYMPTOMS
BACK PAIN: 0
NAUSEA: 0
COUGH: 0
DIARRHEA: 0
ABDOMINAL PAIN: 0
SORE THROAT: 0
VOMITING: 0
RHINORRHEA: 0
SHORTNESS OF BREATH: 0

## 2022-08-12 ENCOUNTER — HOSPITAL ENCOUNTER (EMERGENCY)
Age: 47
Discharge: LEFT BEFORE DISCHARGE | End: 2022-08-13
Payer: MEDICARE

## 2022-08-12 VITALS
OXYGEN SATURATION: 97 % | WEIGHT: 140 LBS | HEART RATE: 90 BPM | RESPIRATION RATE: 18 BRPM | BODY MASS INDEX: 23.3 KG/M2 | DIASTOLIC BLOOD PRESSURE: 82 MMHG | SYSTOLIC BLOOD PRESSURE: 101 MMHG | TEMPERATURE: 98 F

## 2022-08-12 VITALS
HEART RATE: 93 BPM | HEIGHT: 65 IN | WEIGHT: 140 LBS | OXYGEN SATURATION: 97 % | DIASTOLIC BLOOD PRESSURE: 85 MMHG | BODY MASS INDEX: 23.32 KG/M2 | TEMPERATURE: 98.1 F | RESPIRATION RATE: 20 BRPM | SYSTOLIC BLOOD PRESSURE: 112 MMHG

## 2022-08-12 LAB
GLUCOSE BLD-MCNC: 406 MG/DL
GLUCOSE BLD-MCNC: 406 MG/DL (ref 70–99)
PERFORMED ON: ABNORMAL

## 2022-08-12 PROCEDURE — 82947 ASSAY GLUCOSE BLOOD QUANT: CPT
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
## 2022-08-12 NOTE — PSYCHOTHERAPY
SAFETY PLAN    A suicide Safety Plan is a document that supports someone when they are having thoughts of suicide. Warning Signs that indicate a suicidal crisis may be developing: What (situations, thoughts, feelings, body sensations, behaviors, etc.) do you experience that lets you know you are beginning to think about suicide? 1. Feeling alone or isolated. 2.   3. Internal Coping Strategies:  What things can I do (relaxation techniques, hobbies, physical activities, etc.) to take my mind off my problems without contacting another person? 1. Crafts  2. Animal therapy  3. People and social settings that provide distraction: Who can I call or where can I go to distract me? 1. Name: na  Phone: na  2. Name: na  Phone: na  3. Place:   [employer] to visit with pets   4. Place: [place] to visit pets    People whom I can ask for help: Who can I call when I need help - for example, friends, family, clergy, someone else? 1. Name:   n/a             Phone:   2. Name:    n/a             Phone:   3. Name:  N/a                   Phone:       Professionals or [facility] agencies I can contact during a crisis: Who can I call for help - for example, my doctor, my psychiatrist, my psychologist, a mental health provider, a suicide hotline? 1. Clinician Name: [id number]  Phone: [phone]      Clinician Pager or Emergency Contact #: [phone]    2. Clinician Name: [address]  Phone: [phone]      Clinician Pager or Emergency Contact #: [phone]    3. Suicide Prevention Lifeline: 1-118-164-TALK (1238)    4. [facility] Emergency Department  [facility]    Making the environment safe: How can I make my environment (house/apartment/living space) safer? For example, can I remove guns, medications, and other items? 1. Remove weapons from the home  2.  Remove extra medications from the

## 2022-08-12 NOTE — ED PROVIDER NOTES
[facility] EMERGENCY DEPT  eMERGENCY dEPARTMENT eNCOUnter      Pt Name: Eliana Hanson  MRN: [id number]  [place] 1975  Date of evaluation: 8/11/2022  Provider: Nitin Spivey MD    CHIEF COMPLAINT       Chief Complaint   Patient presents with    Mental Health Problem     Pt states he is having mental health issues, states \" I'm at a point in my life were i'm alone, everybody has passed away. I'm in a mess, I have a problem with hoarding. Pt states that his apartment doesn't have AC at this time. HISTORY OF PRESENT ILLNESS   (Location/Symptom, Timing/Onset,Context/Setting, Quality, Duration, Modifying Factors, Severity)  Note limiting factors. Eliana Hanson is a 48 y.o. male who presents to the emergency department for mental health evaluation and heat exposure. Patient states that he lives in an apartment and the air conditioning has been out for the last week or so. He has not called his landlord to notify him because he has a hoarding problem and is concerned to let him come over and see his apartment. He showed me on his phone that his current apartment temperature is 94 degrees. States he has been sweating a significant amount but also admits to smoking methamphetamine most recent use was tonight around 6 PM.  He denies any chest pain or shortness of breath but is tachycardic here. Admits to feeling anxious and depressed after losing multiple family members and friends. He denies any suicidal or homicidal thoughts. HPI    NursingNotes were reviewed. REVIEW OF SYSTEMS    (2-9 systems for level 4, 10 or more for level 5)     Review of Systems   Constitutional:  Negative for chills and fever. HENT:  Negative for rhinorrhea and sore throat. Respiratory:  Negative for cough and shortness of breath. Cardiovascular:  Negative for chest pain and leg swelling. Gastrointestinal:  Negative for abdominal pain, diarrhea, nausea and vomiting. Genitourinary:  Negative for dysuria and frequency. Musculoskeletal:  Negative for back pain and neck pain. Neurological:  Negative for dizziness and headaches. Psychiatric/Behavioral:  Positive for dysphoric mood. Negative for suicidal ideas. The patient is nervous/anxious. All other systems reviewed and are negative. PAST MEDICALHISTORY     Past Medical History:   Diagnosis Date    ADHD (attention deficit hyperactivity disorder)     Bipolar disorder ([facility]     Depression     Diabetes ([facility]     [facility] 2004    Heart abnormality          SURGICAL HISTORY       Past Surgical History:   Procedure Laterality Date    SKIN BIOPSY  2006    MRSA groin area R    WISDOM TOOTH EXTRACTION           CURRENT MEDICATIONS     Previous Medications    No medications on file       ALLERGIES     Contrast [iodides], Depakote [divalproex sodium], Seroquel [quetiapine fumarate], and Wellbutrin [bupropion]    FAMILY HISTORY       Family History   Problem Relation Age of Onset    Heart Disease Mother     Diabetes Mother     Depression Mother     High Blood Pressure Mother     Kidney Disease Mother     Hearing Loss Mother     Depression Brother     Substance Abuse Brother           SOCIAL HISTORY       Social History     Socioeconomic History    Marital status: Single    Number of children: 0    Years of education: 15   Tobacco Use    Smoking status: Never    Smokeless tobacco: Never   Vaping Use    Vaping Use: Never used   Substance and Sexual Activity    Alcohol use: Yes     Comment: Social--Pt reports rare use, a couple of times a year. , 1-2 cocktail type drinks.     Drug use: Not Currently     Types: Methamphetamines (Crystal Meth)     Comment: used today    Sexual activity: Yes     Partners: Male       SCREENINGS    Carri Coma Scale  Eye Opening: Spontaneous  Best Verbal Response: Oriented  Best Motor Response: Obeys commands  Carri Coma Scale Score: 15        PHYSICAL EXAM    (up to 7 for level 4, 8 or more for level 5)     ED Triage Vitals [08/11/22 2059] BP Temp Temp Source Heart Rate Resp SpO2 Height Weight   (!) 148/96 98.1 °F (36.7 °C) Oral (!) 160 18 98 % -- 140 lb (63.5 kg)       Physical Exam  Vitals and nursing note reviewed. Constitutional:       Appearance: He is well-developed. He is not ill-appearing or diaphoretic. HENT:      Head: Normocephalic and atraumatic. Eyes:      Conjunctiva/sclera: Conjunctivae normal.   Neck:      Trachea: No tracheal deviation. Cardiovascular:      Rate and Rhythm: Regular rhythm. Tachycardia present. Heart sounds: Normal heart sounds. No murmur heard. Pulmonary:      Breath sounds: Normal breath sounds. No wheezing or rales. Abdominal:      Palpations: Abdomen is soft. Tenderness: There is no abdominal tenderness. Musculoskeletal:         General: Normal range of motion. Cervical back: Normal range of motion and neck supple. Skin:     General: Skin is warm and dry. Neurological:      Mental Status: He is alert and oriented to person, place, and time. GCS: GCS eye subscore is 4. GCS verbal subscore is 5. GCS motor subscore is 6. Psychiatric:         Mood and Affect: Mood is anxious. Speech: Speech normal.         Behavior: Behavior is cooperative. Thought Content: Thought content is not paranoid or delusional. Thought content does not include homicidal or suicidal ideation.        DIAGNOSTIC RESULTS     EKG: All EKG's areinterpreted by the Emergency Department Physician who either signs or Co-signs this chart in the absence of a cardiologist.  149 sinus tachycardia mild rate related subtle depression in several leads normal QTC nondiagnostic EKG              No orders to display           LABS:  Labs Reviewed   CBC WITH AUTO DIFFERENTIAL - Abnormal; Notable for the following components:       Result Value    Neutrophils % 43.7 (*)     Monocytes % 13.9 (*)     Monocytes Absolute 1.10 (*)     All other components within normal limits   COMPREHENSIVE METABOLIC PANEL - 1. Acute anxiety    2. Drug abuse [facility])          DISPOSITION/PLAN   DISPOSITION Decision To Discharge 08/12/2022 12:50:25 AM      PATIENT REFERRED TO:  [id number]  [phone]  Call in 1 day      DISCHARGE MEDICATIONS:  New Prescriptions    METFORMIN (GLUCOPHAGE) 500 MG TABLET    Take 1 tablet by mouth in the morning and 1 tablet in the evening. Take with meals.           (Please note that portions of this note were completed with a voice recognition program.  Efforts were made to edit thedictations but occasionally words are mis-transcribed.)    Kimberlee Hein MD (electronically signed)  Attending Emergency Physician        Jun Bauer MD  08/12/22 7836

## 2022-08-13 LAB
EKG P AXIS: 33 DEGREES
EKG P-R INTERVAL: 152 MS
EKG Q-T INTERVAL: 434 MS
EKG QRS DURATION: 118 MS
EKG QTC CALCULATION (BAZETT): 427 MS
EKG T AXIS: 65 DEGREES

## 2022-08-13 PROCEDURE — 93010 ELECTROCARDIOGRAM REPORT: CPT | Performed by: INTERNAL MEDICINE

## 2022-08-13 PROCEDURE — 93005 ELECTROCARDIOGRAM TRACING: CPT | Performed by: HOSPITALIST

## 2022-08-15 LAB
EKG P AXIS: NORMAL DEGREES
EKG P-R INTERVAL: NORMAL MS
EKG Q-T INTERVAL: 390 MS
EKG QRS DURATION: 96 MS
EKG QTC CALCULATION (BAZETT): 404 MS
EKG T AXIS: 9 DEGREES

## 2022-08-15 PROCEDURE — 93010 ELECTROCARDIOGRAM REPORT: CPT | Performed by: INTERNAL MEDICINE

## 2022-08-16 ENCOUNTER — HOSPITAL ENCOUNTER (OUTPATIENT)
Age: 47
Setting detail: OBSERVATION
Discharge: HOME | End: 2022-08-19
Attending: EMERGENCY MEDICINE | Admitting: HOSPITALIST
Payer: MEDICARE

## 2022-08-16 DIAGNOSIS — R07.9 CHEST PAIN, UNSPECIFIED TYPE: Primary | ICD-10-CM

## 2022-08-16 DIAGNOSIS — F32.A DEPRESSION, UNSPECIFIED DEPRESSION TYPE: ICD-10-CM

## 2022-08-16 DIAGNOSIS — R73.9 HYPERGLYCEMIA: ICD-10-CM

## 2022-08-16 DIAGNOSIS — D64.9 ANEMIA, UNSPECIFIED TYPE: ICD-10-CM

## 2022-08-16 PROCEDURE — 99285 EMERGENCY DEPT VISIT HI MDM: CPT

## 2022-08-17 ENCOUNTER — APPOINTMENT (OUTPATIENT)
Dept: GENERAL RADIOLOGY | Age: 47
End: 2022-08-17
Payer: MEDICARE

## 2022-08-17 PROBLEM — R45.851 SUICIDAL IDEATION: Status: ACTIVE | Noted: 2022-08-17

## 2022-08-17 PROBLEM — R07.9 CHEST PAIN: Status: ACTIVE | Noted: 2022-08-17

## 2022-08-17 PROBLEM — I25.9 CHEST PAIN DUE TO MYOCARDIAL ISCHEMIA, UNSPECIFIED ISCHEMIC CHEST PAIN TYPE: Status: ACTIVE | Noted: 2022-08-17

## 2022-08-17 LAB
ALBUMIN SERPL-MCNC: 3.6 G/DL (ref 3.5–5.2)
ALP BLD-CCNC: 97 U/L (ref 40–130)
ALT SERPL-CCNC: 13 U/L (ref 5–41)
AMPHETAMINE SCREEN, URINE: NEGATIVE
ANION GAP SERPL CALCULATED.3IONS-SCNC: 11 MMOL/L (ref 7–19)
APTT: 27 SEC (ref 26–36.2)
AST SERPL-CCNC: 14 U/L (ref 5–40)
BARBITURATE SCREEN URINE: NEGATIVE
BENZODIAZEPINE SCREEN, URINE: NEGATIVE
BILIRUB SERPL-MCNC: <0.2 MG/DL (ref 0.2–1.2)
BUN BLDV-MCNC: 14 MG/DL (ref 6–20)
BUPRENORPHINE URINE: NEGATIVE
CALCIUM SERPL-MCNC: 8.5 MG/DL (ref 8.6–10)
CANNABINOID SCREEN URINE: NEGATIVE
CHLORIDE BLD-SCNC: 96 MMOL/L (ref 98–111)
CHOLESTEROL, TOTAL: 176 MG/DL (ref 160–199)
CO2: 26 MMOL/L (ref 22–29)
COCAINE METABOLITE SCREEN URINE: NEGATIVE
CREAT SERPL-MCNC: 0.9 MG/DL (ref 0.5–1.2)
D DIMER: 0.5 UG/ML FEU (ref 0–0.48)
ETHANOL: <10 MG/DL (ref 0–0.08)
GFR AFRICAN AMERICAN: >59
GFR NON-AFRICAN AMERICAN: >60
GLUCOSE BLD-MCNC: 188 MG/DL (ref 70–99)
GLUCOSE BLD-MCNC: 200 MG/DL (ref 70–99)
GLUCOSE BLD-MCNC: 203 MG/DL (ref 70–99)
GLUCOSE BLD-MCNC: 255 MG/DL
GLUCOSE BLD-MCNC: 255 MG/DL (ref 70–99)
GLUCOSE BLD-MCNC: 261 MG/DL (ref 70–99)
GLUCOSE BLD-MCNC: 331 MG/DL (ref 70–99)
GLUCOSE BLD-MCNC: 450 MG/DL (ref 74–109)
HBA1C MFR BLD: 11.5 % (ref 4–6)
HCT VFR BLD CALC: 36.7 % (ref 42–52)
HDLC SERPL-MCNC: 44 MG/DL (ref 55–121)
HEMOGLOBIN: 13 G/DL (ref 14–18)
INR BLD: 0.99 (ref 0.88–1.18)
LDL CHOLESTEROL CALCULATED: 92 MG/DL
LV EF: 60 %
LVEF MODALITY: NORMAL
Lab: NORMAL
MAGNESIUM: 1.7 MG/DL (ref 1.6–2.6)
MCH RBC QN AUTO: 30.2 PG (ref 27–31)
MCHC RBC AUTO-ENTMCNC: 35.4 G/DL (ref 33–37)
MCV RBC AUTO: 85.3 FL (ref 80–94)
METHADONE SCREEN, URINE: NEGATIVE
METHAMPHETAMINE, URINE: NEGATIVE
OPIATE SCREEN URINE: NEGATIVE
OXYCODONE URINE: NEGATIVE
PDW BLD-RTO: 11.9 % (ref 11.5–14.5)
PERFORMED ON: ABNORMAL
PHENCYCLIDINE SCREEN URINE: NEGATIVE
PHOSPHORUS: 3.2 MG/DL (ref 2.5–4.5)
PLATELET # BLD: 240 K/UL (ref 130–400)
PMV BLD AUTO: 9.6 FL (ref 9.4–12.4)
POTASSIUM SERPL-SCNC: 3.7 MMOL/L (ref 3.5–5)
PRO-BNP: 135 PG/ML (ref 0–450)
PROPOXYPHENE SCREEN: NEGATIVE
PROTHROMBIN TIME: 13 SEC (ref 12–14.6)
RBC # BLD: 4.3 M/UL (ref 4.7–6.1)
SARS-COV-2, NAAT: NOT DETECTED
SODIUM BLD-SCNC: 133 MMOL/L (ref 136–145)
T4 FREE: 1.19 NG/DL (ref 0.93–1.7)
TOTAL PROTEIN: 6.4 G/DL (ref 6.6–8.7)
TRICYCLIC, URINE: NEGATIVE
TRIGL SERPL-MCNC: 198 MG/DL (ref 0–149)
TROPONIN: <0.01 NG/ML (ref 0–0.03)
TSH REFLEX FT4: 4.56 UIU/ML (ref 0.35–5.5)
WBC # BLD: 5.1 K/UL (ref 4.8–10.8)

## 2022-08-17 PROCEDURE — 85610 PROTHROMBIN TIME: CPT

## 2022-08-17 PROCEDURE — 84484 ASSAY OF TROPONIN QUANT: CPT

## 2022-08-17 PROCEDURE — 80306 DRUG TEST PRSMV INSTRMNT: CPT

## 2022-08-17 PROCEDURE — 80053 COMPREHEN METABOLIC PANEL: CPT

## 2022-08-17 PROCEDURE — 85379 FIBRIN DEGRADATION QUANT: CPT

## 2022-08-17 PROCEDURE — 6370000000 HC RX 637 (ALT 250 FOR IP): Performed by: INTERNAL MEDICINE

## 2022-08-17 PROCEDURE — 83880 ASSAY OF NATRIURETIC PEPTIDE: CPT

## 2022-08-17 PROCEDURE — G0378 HOSPITAL OBSERVATION PER HR: HCPCS

## 2022-08-17 PROCEDURE — 36415 COLL VENOUS BLD VENIPUNCTURE: CPT

## 2022-08-17 PROCEDURE — 6360000002 HC RX W HCPCS: Performed by: HOSPITALIST

## 2022-08-17 PROCEDURE — 2580000003 HC RX 258: Performed by: HOSPITALIST

## 2022-08-17 PROCEDURE — 82947 ASSAY GLUCOSE BLOOD QUANT: CPT

## 2022-08-17 PROCEDURE — 83036 HEMOGLOBIN GLYCOSYLATED A1C: CPT

## 2022-08-17 PROCEDURE — 96372 THER/PROPH/DIAG INJ SC/IM: CPT

## 2022-08-17 PROCEDURE — 6370000000 HC RX 637 (ALT 250 FOR IP): Performed by: HOSPITALIST

## 2022-08-17 PROCEDURE — 84100 ASSAY OF PHOSPHORUS: CPT

## 2022-08-17 PROCEDURE — 99222 1ST HOSP IP/OBS MODERATE 55: CPT | Performed by: PSYCHIATRY & NEUROLOGY

## 2022-08-17 PROCEDURE — 93005 ELECTROCARDIOGRAM TRACING: CPT | Performed by: EMERGENCY MEDICINE

## 2022-08-17 PROCEDURE — 84443 ASSAY THYROID STIM HORMONE: CPT

## 2022-08-17 PROCEDURE — 2140000000 HC CCU INTERMEDIATE R&B

## 2022-08-17 PROCEDURE — 85027 COMPLETE CBC AUTOMATED: CPT

## 2022-08-17 PROCEDURE — 71045 X-RAY EXAM CHEST 1 VIEW: CPT | Performed by: RADIOLOGY

## 2022-08-17 PROCEDURE — 6370000000 HC RX 637 (ALT 250 FOR IP): Performed by: EMERGENCY MEDICINE

## 2022-08-17 PROCEDURE — 83735 ASSAY OF MAGNESIUM: CPT

## 2022-08-17 PROCEDURE — 99205 OFFICE O/P NEW HI 60 MIN: CPT | Performed by: INTERNAL MEDICINE

## 2022-08-17 PROCEDURE — 85730 THROMBOPLASTIN TIME PARTIAL: CPT

## 2022-08-17 PROCEDURE — 82077 ASSAY SPEC XCP UR&BREATH IA: CPT

## 2022-08-17 PROCEDURE — 87635 SARS-COV-2 COVID-19 AMP PRB: CPT

## 2022-08-17 PROCEDURE — 93306 TTE W/DOPPLER COMPLETE: CPT

## 2022-08-17 PROCEDURE — 71045 X-RAY EXAM CHEST 1 VIEW: CPT

## 2022-08-17 PROCEDURE — 80061 LIPID PANEL: CPT

## 2022-08-17 PROCEDURE — 6370000000 HC RX 637 (ALT 250 FOR IP): Performed by: PSYCHIATRY & NEUROLOGY

## 2022-08-17 PROCEDURE — 84439 ASSAY OF FREE THYROXINE: CPT

## 2022-08-17 RX ORDER — ACETAMINOPHEN 650 MG/1
650 SUPPOSITORY RECTAL EVERY 6 HOURS PRN
Status: DISCONTINUED | OUTPATIENT
Start: 2022-08-17 | End: 2022-08-19 | Stop reason: HOSPADM

## 2022-08-17 RX ORDER — NITROGLYCERIN 0.4 MG/1
0.4 TABLET SUBLINGUAL ONCE
Status: DISCONTINUED | OUTPATIENT
Start: 2022-08-17 | End: 2022-08-19 | Stop reason: HOSPADM

## 2022-08-17 RX ORDER — ATORVASTATIN CALCIUM 40 MG/1
40 TABLET, FILM COATED ORAL NIGHTLY
Status: ACTIVE | OUTPATIENT
Start: 2022-08-17 | End: 2022-08-18

## 2022-08-17 RX ORDER — INSULIN LISPRO 100 [IU]/ML
0-4 INJECTION, SOLUTION INTRAVENOUS; SUBCUTANEOUS NIGHTLY
Status: DISCONTINUED | OUTPATIENT
Start: 2022-08-17 | End: 2022-08-19 | Stop reason: HOSPADM

## 2022-08-17 RX ORDER — DIPHENHYDRAMINE HYDROCHLORIDE 50 MG/ML
50 INJECTION INTRAMUSCULAR; INTRAVENOUS ONCE
Status: DISCONTINUED | OUTPATIENT
Start: 2022-08-18 | End: 2022-08-18

## 2022-08-17 RX ORDER — MAGNESIUM SULFATE IN WATER 40 MG/ML
2000 INJECTION, SOLUTION INTRAVENOUS PRN
Status: DISCONTINUED | OUTPATIENT
Start: 2022-08-17 | End: 2022-08-19 | Stop reason: HOSPADM

## 2022-08-17 RX ORDER — PREDNISONE 50 MG/1
50 TABLET ORAL ONCE
Status: COMPLETED | OUTPATIENT
Start: 2022-08-17 | End: 2022-08-17

## 2022-08-17 RX ORDER — PREDNISONE 50 MG/1
50 TABLET ORAL ONCE
Status: DISCONTINUED | OUTPATIENT
Start: 2022-08-18 | End: 2022-08-18

## 2022-08-17 RX ORDER — DEXTROSE MONOHYDRATE 100 MG/ML
INJECTION, SOLUTION INTRAVENOUS CONTINUOUS PRN
Status: DISCONTINUED | OUTPATIENT
Start: 2022-08-17 | End: 2022-08-19 | Stop reason: HOSPADM

## 2022-08-17 RX ORDER — ONDANSETRON 2 MG/ML
4 INJECTION INTRAMUSCULAR; INTRAVENOUS EVERY 6 HOURS PRN
Status: DISCONTINUED | OUTPATIENT
Start: 2022-08-17 | End: 2022-08-19 | Stop reason: HOSPADM

## 2022-08-17 RX ORDER — INSULIN LISPRO 100 [IU]/ML
0-8 INJECTION, SOLUTION INTRAVENOUS; SUBCUTANEOUS
Status: DISCONTINUED | OUTPATIENT
Start: 2022-08-17 | End: 2022-08-19 | Stop reason: HOSPADM

## 2022-08-17 RX ORDER — LAMOTRIGINE 25 MG/1
25 TABLET ORAL 2 TIMES DAILY
Status: DISCONTINUED | OUTPATIENT
Start: 2022-08-17 | End: 2022-08-19 | Stop reason: HOSPADM

## 2022-08-17 RX ORDER — INSULIN GLARGINE 100 [IU]/ML
5 INJECTION, SOLUTION SUBCUTANEOUS ONCE
Status: COMPLETED | OUTPATIENT
Start: 2022-08-17 | End: 2022-08-17

## 2022-08-17 RX ORDER — INSULIN GLARGINE 100 [IU]/ML
10 INJECTION, SOLUTION SUBCUTANEOUS ONCE
Status: DISCONTINUED | OUTPATIENT
Start: 2022-08-17 | End: 2022-08-17

## 2022-08-17 RX ORDER — SODIUM CHLORIDE 0.9 % (FLUSH) 0.9 %
5-40 SYRINGE (ML) INJECTION EVERY 12 HOURS SCHEDULED
Status: DISCONTINUED | OUTPATIENT
Start: 2022-08-17 | End: 2022-08-19 | Stop reason: HOSPADM

## 2022-08-17 RX ORDER — ASPIRIN 81 MG/1
324 TABLET, CHEWABLE ORAL ONCE
Status: COMPLETED | OUTPATIENT
Start: 2022-08-17 | End: 2022-08-17

## 2022-08-17 RX ORDER — ENOXAPARIN SODIUM 100 MG/ML
40 INJECTION SUBCUTANEOUS DAILY
Status: DISCONTINUED | OUTPATIENT
Start: 2022-08-17 | End: 2022-08-19 | Stop reason: HOSPADM

## 2022-08-17 RX ORDER — POTASSIUM CHLORIDE 20 MEQ/1
40 TABLET, EXTENDED RELEASE ORAL PRN
Status: DISCONTINUED | OUTPATIENT
Start: 2022-08-17 | End: 2022-08-19 | Stop reason: HOSPADM

## 2022-08-17 RX ORDER — PREDNISONE 50 MG/1
50 TABLET ORAL ONCE
Status: COMPLETED | OUTPATIENT
Start: 2022-08-18 | End: 2022-08-18

## 2022-08-17 RX ORDER — ONDANSETRON 4 MG/1
4 TABLET, ORALLY DISINTEGRATING ORAL EVERY 8 HOURS PRN
Status: DISCONTINUED | OUTPATIENT
Start: 2022-08-17 | End: 2022-08-19 | Stop reason: HOSPADM

## 2022-08-17 RX ORDER — SODIUM CHLORIDE 9 MG/ML
INJECTION, SOLUTION INTRAVENOUS PRN
Status: DISCONTINUED | OUTPATIENT
Start: 2022-08-17 | End: 2022-08-19 | Stop reason: HOSPADM

## 2022-08-17 RX ORDER — POTASSIUM CHLORIDE 7.45 MG/ML
10 INJECTION INTRAVENOUS PRN
Status: DISCONTINUED | OUTPATIENT
Start: 2022-08-17 | End: 2022-08-19 | Stop reason: HOSPADM

## 2022-08-17 RX ORDER — ACETAMINOPHEN 325 MG/1
650 TABLET ORAL EVERY 6 HOURS PRN
Status: DISCONTINUED | OUTPATIENT
Start: 2022-08-17 | End: 2022-08-19 | Stop reason: HOSPADM

## 2022-08-17 RX ORDER — SODIUM CHLORIDE 0.9 % (FLUSH) 0.9 %
5-40 SYRINGE (ML) INJECTION PRN
Status: DISCONTINUED | OUTPATIENT
Start: 2022-08-17 | End: 2022-08-19 | Stop reason: HOSPADM

## 2022-08-17 RX ORDER — INSULIN GLARGINE 100 [IU]/ML
10 INJECTION, SOLUTION SUBCUTANEOUS NIGHTLY
Status: DISCONTINUED | OUTPATIENT
Start: 2022-08-17 | End: 2022-08-18

## 2022-08-17 RX ORDER — ATORVASTATIN CALCIUM 40 MG/1
40 TABLET, FILM COATED ORAL NIGHTLY
Status: DISCONTINUED | OUTPATIENT
Start: 2022-08-17 | End: 2022-08-19 | Stop reason: HOSPADM

## 2022-08-17 RX ORDER — ASPIRIN 81 MG/1
81 TABLET, CHEWABLE ORAL DAILY
Status: DISCONTINUED | OUTPATIENT
Start: 2022-08-18 | End: 2022-08-19 | Stop reason: HOSPADM

## 2022-08-17 RX ADMIN — ENOXAPARIN SODIUM 40 MG: 100 INJECTION SUBCUTANEOUS at 09:19

## 2022-08-17 RX ADMIN — PREDNISONE 50 MG: 50 TABLET ORAL at 20:13

## 2022-08-17 RX ADMIN — ASPIRIN 81 MG 324 MG: 81 TABLET ORAL at 02:56

## 2022-08-17 RX ADMIN — SODIUM CHLORIDE, PRESERVATIVE FREE 10 ML: 5 INJECTION INTRAVENOUS at 20:14

## 2022-08-17 RX ADMIN — INSULIN GLARGINE 10 UNITS: 100 INJECTION, SOLUTION SUBCUTANEOUS at 20:13

## 2022-08-17 RX ADMIN — INSULIN LISPRO 4 UNITS: 100 INJECTION, SOLUTION INTRAVENOUS; SUBCUTANEOUS at 20:13

## 2022-08-17 RX ADMIN — INSULIN LISPRO 4 UNITS: 100 INJECTION, SOLUTION INTRAVENOUS; SUBCUTANEOUS at 17:40

## 2022-08-17 RX ADMIN — ACETAMINOPHEN 650 MG: 325 TABLET, FILM COATED ORAL at 16:28

## 2022-08-17 RX ADMIN — LAMOTRIGINE 25 MG: 25 TABLET ORAL at 16:28

## 2022-08-17 RX ADMIN — INSULIN GLARGINE 5 UNITS: 100 INJECTION, SOLUTION SUBCUTANEOUS at 04:30

## 2022-08-17 RX ADMIN — ATORVASTATIN CALCIUM 40 MG: 40 TABLET, FILM COATED ORAL at 20:14

## 2022-08-17 RX ADMIN — LAMOTRIGINE 25 MG: 25 TABLET ORAL at 20:13

## 2022-08-17 RX ADMIN — SODIUM CHLORIDE, PRESERVATIVE FREE 10 ML: 5 INJECTION INTRAVENOUS at 09:20

## 2022-08-17 ASSESSMENT — ENCOUNTER SYMPTOMS
EYES NEGATIVE: 1
VOMITING: 0
DIARRHEA: 0
RESPIRATORY NEGATIVE: 1
ABDOMINAL PAIN: 0
NAUSEA: 0
EYE PAIN: 0
SHORTNESS OF BREATH: 0
GASTROINTESTINAL NEGATIVE: 1

## 2022-08-17 ASSESSMENT — PAIN - FUNCTIONAL ASSESSMENT
PAIN_FUNCTIONAL_ASSESSMENT: 0-10
PAIN_FUNCTIONAL_ASSESSMENT: ACTIVITIES ARE NOT PREVENTED

## 2022-08-17 ASSESSMENT — PAIN SCALES - GENERAL
PAINLEVEL_OUTOF10: 0
PAINLEVEL_OUTOF10: 5

## 2022-08-17 ASSESSMENT — PAIN DESCRIPTION - LOCATION
LOCATION: HEAD
LOCATION: CHEST;LEG

## 2022-08-17 ASSESSMENT — PAIN DESCRIPTION - DESCRIPTORS: DESCRIPTORS: DISCOMFORT;TIGHTNESS

## 2022-08-17 NOTE — ED NOTES
Pt changed into maroon scrubs, belongings removed from room; ligature risks removed from room, cabinets locked. Security notified. Sitter initiated.        Rehan Fernandez RN  08/17/22 6120

## 2022-08-17 NOTE — CONSULTS
[facility]  Psychiatry Consult    Reason for Consult: Concern   Passive suicidal thoughts    The primary source(s) of information include(s):  Patient    The patient is a 48 y.o. male with previous psychiatric history of depression, bipolar disorder, ADHD, hoarding disorder, alcohol use disorder, stimulants use disorder, cannabis use disorder, who has been admitted to medical services secondary to treatment noncompliance, depression and chest pain. During the initial evaluation, patient reported passive suicidal ideations. Patient's chart has been reviewed. Patient was consulted by cardiology due to his chest pain and recommended echocardiogram, CT pulmonary and Lexiscan. Patient has been seen in his room with presence of one-to-one sitter. Patient reported that he move permanently to [facility], however, was not able to establish psychiatric care and run off of his prescribed psychotropic medications. He reported that last time when he was taking any of psychotropic medications is 6 months ago. Patient reported that he continues to get psychotherapy through the telehealth with his therapist in [place], [place]. Patient reported that he does not have any family support, stated that everybody passed away and he feels lonely at home. Also, patient stated that he is dealing with hoarding disorder and his house is full of the stuff and it makes him feel even more depressed. Patient reported that he was dealing with drug addiction for many years and was using methamphetamine daily until May 2022. However, he was able to stay sober since May until a few weeks ago, when he relapsed in using methamphetamine again. Patient reported that last time when he used methamphetamine is \"2-3 days before this admission\".   Patient denies significant withdrawal symptoms from stimulants, with an exception of low energy level, feeling of tiredness, general muscle weakness, and dysphoric delusions or paranoia appreciated. Perceptions: Seems patient does not have any auditory or visual hallucinations at present time. Patient did not appear to be responding to internal stimuli. No overt psychosis. Executive Functions: Appear mildly impaired. Concentration: Slightly decreased. Reasoning: Appears impaired based on interaction from interview   Orientation: to person, place, date, and situation. Alert. Language: Intact. Fund of information: Intact. Memory: recent and remote appear intact. Impulsivity: Questionable  Neurovegitative: Fair appetite, fair sleep  Insight: Limited  Judgment: Limited    Assessment  DSM 5 DIAGNOSIS:  Bipolar disorder, most recent episode depressed, without psychotic features  Alcohol use disorder  Stimulants (cocaine, methamphetamine) use disorder  Cannabis use disorder  History of ADHD, per patient  Hoarding disorder  Treatment noncompliance    Medical conditions, pertinent to the patient's mental health  Chest pain  Diabetes    Recommendations  1. Currently patient is not suicidal, homicidal or psychotic. 2.  Recommended to discontinue one-to-one sitter. 3.  Will restart Lamictal 25 mg twice a day for mood stabilization. 4.  Patient expressed a desire to be transferred to rehabilitation facility for substance use disorder. Discussed with , and asked to provide patient with assistance with placement to rehab facility. 5.  Psychiatry will continue to follow with patient during this hospital stay and will reevaluate patient's condition tomorrow.       Dario Thurman MD

## 2022-08-17 NOTE — PROGRESS NOTES
4 Eyes Skin Assessment    Shanon Gotti is being assessed upon: Admission    I agree that I, Trang Hernandez RN, along with Franci Gonsalez RN (either 2 RN's or 1 LPN and 1 RN) have performed a thorough Head to Toe Skin Assessment on the patient. ALL assessment sites listed below have been assessed. Areas assessed by both nurses:     [x]   Head, Face, and Ears   [x]   Shoulders, Back, and Chest  [x]   Arms, Elbows, and Hands   [x]   Coccyx, Sacrum, and Ischium  [x]   Legs, Feet, and Heels    Does the Patient have Skin Breakdown?  No    John Prevention initiated: No  Wound Care Orders initiated: No    [facility] nurse consulted for Pressure Injury (Stage 3,4, Unstageable, DTI, NWPT, and Complex wounds) and New or Established Ostomies: No        Primary Nurse eSignature: Trang Hernandez RN on 8/17/2022 at 5:14 AM      Co-Signer eSignature: Electronically signed by Trang Hernandez RN on 8/17/22 at 5:14 AM CDT

## 2022-08-17 NOTE — CARE COORDINATION
Teresa Pa #[id number] ([id number]:[id number]) (ZXU:[id number]/[id number] 48 y.o. M) (Adm: 08/16/22)  [facility] [id number]    PCP    None    Demographics  Comment        Address   [address] [address]    Home Phone   [phone]    Work [employer] Phone   [phone]             Social Security Number       [address] [place]    Marital Status   Single    Taoism   None               [place] Status   No [002]       Insurance Payors as of 8/17/2022      [facility] MEDICARE    Plan: Teresa Garza ESSENTIAL/PLUS Group: [id number] Member: [id number]   Effective from: 1/1/2022 Subscriber: Renate Randall Subscriber ID: [id number]   Guarantor: Renate Randall       Documents Filed to Patient    Power of  Living Will Clinical Unknown Study Attachment Consent Form ABN Waiver After Visit Summary Lab Result Scan Code Status MyChart Status Advance Care Planning    Not on File  Not on File  Not on File  Not on File  Not on File  Not on File  Filed  Not on File  FULL [Updated on 08/17/22 0522]  Active Jump to the Activity        Auth/Cert Information    Create auth/cert for hospital account [de-identified]          Patient Contacts    None on File    Admission Information      Current Information    Attending Provider Admitting Provider Admission Type Admission Status   MD Nati Alvarado MD Emergency Confirmed Admission          Admission Date/Time Discharge Date Hospital Service Auth/Cert Status   33/85/66  11:57 PM  [id number] [facility] Unit Room/Bed    [address] Account    Name Acct ID Class Status Primary Coverage   Teresa Pa [id number] [place] ESSENTIAL/PLUS            Guarantor Account (for Hospital Account [de-identified])    Name Relation to Pt Service Area Active?  Acct Type   Teresa Pa [facility] Yes Personal/Family   Address Phone     [address]   [place], [address] [place] [address] Rd [phone](E)              Coverage Information (for Hospital Account [de-identified])    F/O Payor/Plan Precert #   [address] ESSENTIAL/PLUS    Subscriber Subscriber #   Valarie Rao [id number]   Address Phone   [address]   [address], [address] [place] [address]

## 2022-08-17 NOTE — H&P
[facility] History and Physical    Patient Information:  Patient: Mary Benito  MRN: [id number]   Acct: [de-identified]  YOB: 1975  Admit Date: 8/17/2022  Primary Care Physician: No primary care provider on file. Advance Directive: Full Code  Health Care Proxy: \"nobody\"        SUBJECTIVE:    Chief Complaint   Patient presents with    Mental Health Problem     Pt stated he was seen here last week for heat exhaustion, wanted to get checked out, but  chief complaint is depression. Pt stated\" I wouldn't say im suicidal, but im at the giving up part\" denies HI     EP Sign Out:  Chest Pain reported in triage  Hx Meth use, Hx cocaine use, HTN, HLD, DM  Extensive family history of heart diease  Is rather depressed    HPI:  Mr. Mary Benito is a pleasant 48year old  [place] man. He presented to the ED for depression and suicidal thoughts He did state that he has been having chest pains the last week that are becoming more frequent the last two days. He states the chest pain radiates to the left arm. He has had radiate to the jaw before but it has been a long time. The pain is alleviated by rest. The pan typically occurs at rest. The pain is exacerbated by any tension. The pain is intermittent in temporality. The chest pain is associated with: chest pressure, confusion, nausea, near syncope, dyspnea, diaphoresis, weakness, fatigue, and palpitations \"racing, skipping. \" The pain is not associated with: emesis, edema, and syncope. He has seen cardiology before, the last time was 2016 or 2017 and he had a stress test. He had a cardiac cath before that in 2008.      Review of Systems:   12+ point ROS above in HPI    Past Medical History:   Diagnosis Date    ADHD (attention deficit hyperactivity disorder)     Bicuspid cardiac valve     Bipolar disorder ([address]     Depression     Diabetes ([address] 2004    Hoarding disorder     HTN (hypertension)     Methamphetamine abuse ([address] Moderate mixed hyperlipidemia not requiring statin therapy      Past Psychiatric History:  As per above    Past Surgical History:   Procedure Laterality Date    SKIN BIOPSY  2006    MRSA groin area R    TONSILLECTOMY      at age 1    TYMPANOSTOMY TUBE PLACEMENT Bilateral     when he was a small child    WISDOM TOOTH EXTRACTION      [id number]     Social History       Tobacco History       Smoking Status  Never      Smokeless Tobacco Use  Never              Alcohol History       Alcohol Use Status  Not Currently Comment  Social--Pt reports rare use, a couple of times a year. , 1-2 cocktail type drinks. Drug Use       Drug Use Status  Yes Types  Cocaine, MDMA (Ecstacy), Methamphetamines (Crystal Meth) Comment  used Meth on 8/12/2022, Quit  Ectasy 2002, Quit Cocaine 2010              Sexual Activity       Sexually Active  Yes Partners  Male Comment  has no kids             CODE STATUS: Full Code  HEALTH CARE PROXY: \"nobody\"  AMBULATES: independently nut has balance problems  DOMICILED: states he has a hoarding disorder, lives alone, has stairs in the home, has no pets     Family History   Problem Relation Age of Onset    Heart Disease Mother         cause of death    Diabetes Mother     Depression Mother     High Blood Pressure Mother     Kidney Disease Mother     Hearing Loss Mother     Heart Failure Mother     Other Father         COVID-19    Depression Half-Brother     Substance Abuse Half-Brother         overdosed fatally    COPD Half-Brother         smoker    No Known Problems Half-Sister      Allergies   Allergen Reactions    Depakote [Divalproex Sodium]     Seroquel [Quetiapine Fumarate] Other (See Comments)     hallucinations    Wellbutrin [Bupropion]     Contrast [Iodides] Hives, Itching and Rash     Had this when he had a heart cath in 2008     Home Medications:  Prior to Admission medications    Medication Sig Start Date End Date Taking?  Authorizing Provider   metFORMIN (GLUCOPHAGE) 500 MG tablet Take 1 tablet by mouth in the morning and 1 tablet in the evening. Take with meals. 8/12/22   Karen Nguyễn MD         OBJECTIVE:    Vitals:    08/17/22 0310   BP:    Pulse: 73   Resp: 19   Temp:    SpO2: 94%   Breathing room air    /86   Pulse 73   Temp 98.2 °F (36.8 °C) (Oral)   Resp 19   Ht 5' 5\" (1.651 m)   Wt 140 lb (63.5 kg)   SpO2 94%   BMI 23.30 kg/m²     No intake or output data in the 24 hours ending 08/17/22 0333    Physical Exam  Vitals reviewed. Constitutional:       General: He is not in acute distress. Appearance: Normal appearance. He is normal weight. He is not ill-appearing or toxic-appearing. HENT:      Head: Normocephalic and atraumatic. Nose: No congestion or rhinorrhea. Eyes:      General:         Right eye: No discharge. Left eye: No discharge. Neck:      Comments: Supple, trachea appears midline  Cardiovascular:      Rate and Rhythm: Normal rate and regular rhythm. Heart sounds: No murmur heard. No friction rub. No gallop. Pulmonary:      Effort: Pulmonary effort is normal. No respiratory distress. Breath sounds: No stridor. No wheezing, rhonchi or rales. Chest:      Chest wall: No tenderness. Abdominal:      General: Bowel sounds are normal.      Palpations: Abdomen is soft. Tenderness: There is no abdominal tenderness. There is no guarding or rebound. Musculoskeletal:         General: No tenderness or signs of injury. Right lower leg: No edema. Left lower leg: No edema. Skin:     General: Skin is warm. Coloration: Skin is not jaundiced. Comments: nondiaphoretic   Neurological:      Mental Status: He is alert and oriented to person, place, and time.    Psychiatric:         Mood and Affect: Mood normal.         Behavior: Behavior normal.        LABORATORY DATA:    CBC:   Recent Labs     08/17/22  0009   WBC 5.1   HGB 13.0*   HCT 36.7*        BMP:   Recent Labs     08/17/22 0009   *   K 3.7   CL 96*   CO2 26   BUN 14   CREATININE 0.9   CALCIUM 8.5*     Hepatic Profile:   Recent Labs     08/17/22 0009   AST 14   ALT 13   BILITOT <0.2   ALKPHOS 97     Coag Panel:   Recent Labs     08/17/22 0009   INR 0.99   PROTIME 13.0   APTT 27.0     Cardiac Enzymes:   Recent Labs     08/17/22 0009   TROPONINI <0.01     Pro-BNP: No results for input(s): PROBNP in the last 72 hours. A1C:   Recent Labs     08/17/22 0009   LABA1C 11.5*     TSH: Invalid input(s): LABTSH  Lipid Panel: pending      EKG:   NoEKG findings reported    IMAGING:  XR CHEST PORTABLE  Result Date: 8/17/2022  No evidence of acute cardiopulmonary process. Recommendation: Follow up as clinically indicated.  Electronically Signed by Doris Duque at 17-Aug-2022 03:13:45 AM                 ASESSMENTS & PLANS:    Chest Pain:  Tele  Admit to cardiac brock as OBSERVATION status patient  Cardio consult in AM  Trend TnI  Mag, Phos, TSH, Lipid Panel, HbA1c - will run as add ons to ED labs if able  CBC and BMP with Reflex for following AM  ASA as per protocol (324-325mg chewed STAT unless on 81ASA daily in which case 162mg chewed STAT if not yet given, THEN from following AM 81mg Daily.)  Statin as per protocol (High intensity Statin, if already on high intensity Stain of Simvasttain 80 continue it, if Lipitor 40+ then Lipitor 80 (if less than 40 just double so long as >=40), if Rosuvastatin 20mg+ then Rosuvastatin 40mg PO QHS (if less than 20 just double so long as >=20mg)  NG SL PRN CP  TTE in AM    Suicidality:  Wants help quitting drugs:  Meth abuse:  Hx cocaine abuse:  Hx ectasy abuse:  Consult to Psychiatry  Sitter    DM:  POCT scheduled and PRN  Lantus & ISS in place of home Metformin  Hypoglycemic order sets    Supoportive and Prophylactic Txx:  DVT Prophylaxis: lovenox SW  GI (PUD) Ppx: not indicated  PT consult for evalutation and Txx as indicated: not indicated  NPO except for sips with meds      Care time of >45 minutes  Pt seen/examined and admitted to inpatient status. Inpatient status is used for patients with an expected LOS extending past two midnights due to medical therapy and or critical care needs, otherwise patients are placed to OBServation status. Signed:  Electronically signed by Merrill Hopson MD on 8/17/22 at 03:51 AM CDT.

## 2022-08-17 NOTE — PROGRESS NOTES
Randall Funes arrived to room # 725. Presented with: chestpain  Mental Status: Patient is oriented. Vitals:    08/17/22 0502   BP:    Pulse: 72   Resp:    Temp:    SpO2:      Patient safety contract and falls prevention contract reviewed with patient Yes. Oriented Patient to room. Call light within reach. Yes. Needs, issues or concerns expressed at this time: yes, .       Electronically signed by Howard Wills RN on 8/17/2022 at 5:15 AM

## 2022-08-17 NOTE — PROGRESS NOTES
Pt states that he has no emergency contact information. Also states that he does not take any medicines at home. Pt is on glucophage but did not get it filled per pt.

## 2022-08-17 NOTE — CONSULTS
(hypertension)     Methamphetamine abuse (HCC)     Moderate mixed hyperlipidemia not requiring statin therapy         Past Surgical History:  Past Surgical History:   Procedure Laterality Date    SKIN BIOPSY  2006    MRSA groin area R    TONSILLECTOMY      at age 1    TYMPANOSTOMY TUBE PLACEMENT Bilateral     when he was a small child    WISDOM TOOTH EXTRACTION      [id number]       Past Family History:  Family History   Problem Relation Age of Onset    Heart Disease Mother         cause of death    Diabetes Mother     Depression Mother     High Blood Pressure Mother     Kidney Disease Mother     Hearing Loss Mother     Heart Failure Mother     Other Father         COVID-19    Depression Half-Brother     Substance Abuse Half-Brother         overdosed fatally    COPD Half-Brother         smoker    No Known Problems Half-Sister        Past Social History:  Social History     Socioeconomic History    Marital status: Single     Spouse name: Not on file    Number of children: 0    Years of education: 15    Highest education level: Not on file   Occupational History    Occupation: Sales     Comment: disabled   Tobacco Use    Smoking status: Never    Smokeless tobacco: Never   Vaping Use    Vaping Use: Never used   Substance and Sexual Activity    Alcohol use: Not Currently     Comment: Social--Pt reports rare use, a couple of times a year. , 1-2 cocktail type drinks.     Drug use: Yes     Types: Methamphetamines (Crystal Meth), Cocaine, MDMA (Ecstacy)     Comment: used Meth on 8/12/2022, Quit  Ectasy 2002, Quit Cocaine 2010    Sexual activity: Not Currently     Partners: Male     Comment: has no kids   Other Topics Concern    Not on file   Social History Narrative    CODE STATUS: Full Code    HEALTH CARE PROXY: \"nobody\"    AMBULATES: independently nut has balance problems    DOMICILED: states he has a hoarding disorder, lives alone, has stairs in the home, has no pets     Social Determinants of Health     Financial Resource Strain: Not on file   Food Insecurity: Not on file   Transportation Needs: Not on file   Physical Activity: Not on file   Stress: Not on file   Social Connections: Not on file   Intimate Partner Violence: Not on file   Housing Stability: Not on file       Allergies: Allergies   Allergen Reactions    Depakote [Divalproex Sodium]     Seroquel [Quetiapine Fumarate] Other (See Comments)     hallucinations    Wellbutrin [Bupropion]     Contrast [Iodides] Hives, Itching and Rash     Had this when he had a heart cath in 2008       Home Meds:  Prior to Admission medications    Medication Sig Start Date End Date Taking? Authorizing Provider   metFORMIN (GLUCOPHAGE) 500 MG tablet Take 1 tablet by mouth in the morning and 1 tablet in the evening. Take with meals. 8/12/22   Bernard Dyson MD       Current Meds:   nitroGLYCERIN  0.4 mg SubLINGual Once    sodium chloride flush  5-40 mL IntraVENous 2 times per day    [START ON 8/18/2022] aspirin  81 mg Oral Daily    enoxaparin  40 mg SubCUTAneous Daily    atorvastatin  40 mg Oral Nightly    atorvastatin  40 mg Oral Nightly    insulin glargine  10 Units SubCUTAneous Nightly    insulin lispro  0-8 Units SubCUTAneous TID WC    insulin lispro  0-4 Units SubCUTAneous Nightly       Current Infused Meds:   sodium chloride      dextrose         Physical Exam:  Vitals:    08/17/22 1013   BP: 111/77   Pulse: 77   Resp: 24   Temp: 97.3 °F (36.3 °C)   SpO2: 94%       Intake/Output Summary (Last 24 hours) at 8/17/2022 1110  Last data filed at 8/17/2022 0545  Gross per 24 hour   Intake --   Output 500 ml   Net -500 ml     Estimated body mass index is 24.96 kg/m² as calculated from the following:    Height as of this encounter: 5' 5\" (1.651 m). Weight as of this encounter: 150 lb (68 kg). Physical Exam  Vitals reviewed. Constitutional:       General: He is not in acute distress. Appearance: Normal appearance. He is well-developed. He is obese.  He is not ill-appearing, toxic-appearing or diaphoretic. HENT:      Head: Normocephalic and atraumatic. Nose: Nose normal.      Mouth/Throat:      Mouth: Mucous membranes are moist.   Eyes:      General: No scleral icterus. Extraocular Movements: Extraocular movements intact. Pupils: Pupils are equal, round, and reactive to light. Neck:      Vascular: No carotid bruit or JVD. Cardiovascular:      Rate and Rhythm: Normal rate and regular rhythm. Heart sounds: Normal heart sounds. No murmur heard. No friction rub. No gallop. Pulmonary:      Effort: Pulmonary effort is normal. No respiratory distress. Breath sounds: Normal breath sounds. No stridor. No wheezing, rhonchi or rales. Chest:      Chest wall: No tenderness. Abdominal:      General: Abdomen is flat. Bowel sounds are normal. There is no distension. Palpations: Abdomen is soft. There is no mass. Tenderness: There is no abdominal tenderness. There is no right CVA tenderness, left CVA tenderness, guarding or rebound. Hernia: No hernia is present. Musculoskeletal:         General: No swelling, tenderness, deformity or signs of injury. Cervical back: Normal range of motion and neck supple. No rigidity or tenderness. Right lower leg: No edema. Left lower leg: No edema. Lymphadenopathy:      Cervical: No cervical adenopathy. Skin:     General: Skin is warm and dry. Neurological:      General: No focal deficit present. Mental Status: He is alert and oriented to person, place, and time. Mental status is at baseline. Cranial Nerves: No cranial nerve deficit. Sensory: No sensory deficit. Motor: No weakness. Coordination: Coordination normal.   Psychiatric:         Mood and Affect: Mood normal.         Behavior: Behavior normal.         Thought Content:  Thought content normal.         Judgment: Judgment normal.       Labs:  Recent Labs     08/17/22  0009   WBC 5.1   HGB 13.0*    Recent Labs     08/17/22  0009   *   K 3.7   CL 96*   CO2 26   BUN 14   CREATININE 0.9   LABGLOM >60   MG 1.7   CALCIUM 8.5*   PHOS 3.2       CK, CKMB, Troponin: @LABRCNT (CKTOTAL:3, CKMB:3, TROPONINI:3)@    Last 3 BNP:  No results for input(s): BNP in the last 72 hours. IMAGING:  XR CHEST PORTABLE    Result Date: 8/17/2022  NO PRIOR REPORT AVAILABLE Exam: X-RAY OF [facility] Clinical data:Chest pain. Technique:Single view of the chest. Prior studies: No prior studies submitted. Findings: The lungs are grossly clear; noevidence of acute infiltrate or pleural effusion. Cardiac silhouette is within normal limits. No acute osseous abnormality is detected. No evidence of acute cardiopulmonary process. Recommendation: Follow up as clinically indicated.  Electronically Signed by José Pittman at 17-Aug-2022 03:13:45 AM               Assessment:  Admission 8/17/2022 complains primarily of depression but also complained of chest tightness intermittently last week or 2 not related to physical exercise  History of depression  History of bipolar disorder  Bicuspid aortic valve  Prior cardiac catheterization [facility] reportedly negative  Reported negative stress test 2017  ADHD  Obsessive-compulsive disorder  Hoarding behavior  PTSD  Anxiety  History of methamphetamine abuse  Hypertension  Hyperlipidemia  Diabetes  Chronic kidney disease      Recommendations:  Echocardiogram  Lexiscan  CTA pulmonary  Further comments to follow

## 2022-08-17 NOTE — CARE COORDINATION
Referral to [facility] faxed. Awaiting return call for possible admission.     The [address] [place] [address] [place]:  [phone]  F:  [phone]  Electronically signed by Jori Uribe RN on 8/17/2022 at 3:05 PM

## 2022-08-17 NOTE — PLAN OF CARE
[place], [facility]    DEPARTMENT OF HOSPITALIST MEDICINE      PLAN  OF  CARE  NOTE:      Patient was admitted earlier today by our nocturnist.  Please see the history and physical for details. I saw and examined the patient at the bedside today. Patient admitted with chest pain. He is undergoing cardiology work-up after evaluation by cardiologist.  Psychiatry evaluated the patient and prescribed psychiatric meds. He is not currently suicidal, homicidal or psychotic as per psychiatrist.  Patient wants to be transferred to rehabilitation facility for treatment of his substance abuse disorder which is being worked up by case management. Repeat labs in a.m. Electrolyte replacement as per protocol. Patient will be monitored very closely on the floor. Further recommendations as per the hospital course. Patient  is on DVT prophylaxis  Current medications reviewed  Lab work reviewed  Radiology/Chest x-ray films reviewed  Treatment recommendations from suspecialities reviewed, appreciated and agreed with  Discussed with the nurse and addressed all questions/concerns  Discussed with Patient and/or Family at the bedside in detail . .. they understand and agree with the management plan.         Saúl Cobos MD  8/17/2022, 3:22 PM

## 2022-08-18 ENCOUNTER — APPOINTMENT (OUTPATIENT)
Dept: NUCLEAR MEDICINE | Age: 47
End: 2022-08-18
Payer: MEDICARE

## 2022-08-18 ENCOUNTER — APPOINTMENT (OUTPATIENT)
Dept: CT IMAGING | Age: 47
End: 2022-08-18
Payer: MEDICARE

## 2022-08-18 PROBLEM — E78.2 MODERATE MIXED HYPERLIPIDEMIA NOT REQUIRING STATIN THERAPY: Status: ACTIVE | Noted: 2022-08-18

## 2022-08-18 PROBLEM — Q23.1 BICUSPID CARDIAC VALVE: Status: ACTIVE | Noted: 2022-08-18

## 2022-08-18 PROBLEM — Z79.4 LONG TERM (CURRENT) USE OF INSULIN (HCC): Status: ACTIVE | Noted: 2022-08-18

## 2022-08-18 PROBLEM — Z91.199 PERSONAL HISTORY OF NONCOMPLIANCE WITH MEDICAL TREATMENT AND REGIMEN: Status: ACTIVE | Noted: 2022-08-18

## 2022-08-18 PROBLEM — I10 HTN (HYPERTENSION): Status: ACTIVE | Noted: 2022-08-18

## 2022-08-18 PROBLEM — N18.9 CHRONIC KIDNEY DISEASE: Status: ACTIVE | Noted: 2022-08-18

## 2022-08-18 PROBLEM — E11.65 UNCONTROLLED TYPE 2 DIABETES MELLITUS WITH HYPERGLYCEMIA (HCC): Status: ACTIVE | Noted: 2022-08-18

## 2022-08-18 PROBLEM — F32.A DEPRESSION: Status: ACTIVE | Noted: 2022-08-18

## 2022-08-18 PROBLEM — R07.9 CHEST PAIN WITH MODERATE RISK FOR CARDIAC ETIOLOGY: Status: ACTIVE | Noted: 2022-08-18

## 2022-08-18 PROBLEM — F15.10 METHAMPHETAMINE ABUSE (HCC): Status: ACTIVE | Noted: 2022-08-18

## 2022-08-18 LAB
ANION GAP SERPL CALCULATED.3IONS-SCNC: 12 MMOL/L (ref 7–19)
BUN BLDV-MCNC: 15 MG/DL (ref 6–20)
CALCIUM SERPL-MCNC: 8.9 MG/DL (ref 8.6–10)
CHLORIDE BLD-SCNC: 101 MMOL/L (ref 98–111)
CO2: 22 MMOL/L (ref 22–29)
CREAT SERPL-MCNC: 0.7 MG/DL (ref 0.5–1.2)
EKG P AXIS: 15 DEGREES
EKG P AXIS: 62 DEGREES
EKG P-R INTERVAL: 156 MS
EKG P-R INTERVAL: 174 MS
EKG Q-T INTERVAL: 394 MS
EKG Q-T INTERVAL: 422 MS
EKG QRS DURATION: 80 MS
EKG QRS DURATION: 82 MS
EKG QTC CALCULATION (BAZETT): 420 MS
EKG QTC CALCULATION (BAZETT): 437 MS
EKG T AXIS: 41 DEGREES
EKG T AXIS: 63 DEGREES
GFR AFRICAN AMERICAN: >59
GFR NON-AFRICAN AMERICAN: >60
GLUCOSE BLD-MCNC: 377 MG/DL (ref 74–109)
GLUCOSE BLD-MCNC: 413 MG/DL (ref 70–99)
GLUCOSE BLD-MCNC: 474 MG/DL (ref 70–99)
GLUCOSE BLD-MCNC: 483 MG/DL (ref 70–99)
HCT VFR BLD CALC: 38.7 % (ref 42–52)
HEMOGLOBIN: 13.8 G/DL (ref 14–18)
MCH RBC QN AUTO: 30.4 PG (ref 27–31)
MCHC RBC AUTO-ENTMCNC: 35.7 G/DL (ref 33–37)
MCV RBC AUTO: 85.2 FL (ref 80–94)
PDW BLD-RTO: 12.1 % (ref 11.5–14.5)
PERFORMED ON: ABNORMAL
PLATELET # BLD: 255 K/UL (ref 130–400)
PMV BLD AUTO: 10.1 FL (ref 9.4–12.4)
POTASSIUM REFLEX MAGNESIUM: 4.4 MMOL/L (ref 3.5–5)
RBC # BLD: 4.54 M/UL (ref 4.7–6.1)
SODIUM BLD-SCNC: 135 MMOL/L (ref 136–145)
TROPONIN: <0.01 NG/ML (ref 0–0.03)
WBC # BLD: 5.8 K/UL (ref 4.8–10.8)

## 2022-08-18 PROCEDURE — 85027 COMPLETE CBC AUTOMATED: CPT

## 2022-08-18 PROCEDURE — 36415 COLL VENOUS BLD VENIPUNCTURE: CPT

## 2022-08-18 PROCEDURE — G0378 HOSPITAL OBSERVATION PER HR: HCPCS

## 2022-08-18 PROCEDURE — 96372 THER/PROPH/DIAG INJ SC/IM: CPT

## 2022-08-18 PROCEDURE — 2580000003 HC RX 258: Performed by: HOSPITALIST

## 2022-08-18 PROCEDURE — 96374 THER/PROPH/DIAG INJ IV PUSH: CPT

## 2022-08-18 PROCEDURE — 3430000000 HC RX DIAGNOSTIC RADIOPHARMACEUTICAL: Performed by: HOSPITALIST

## 2022-08-18 PROCEDURE — 99233 SBSQ HOSP IP/OBS HIGH 50: CPT | Performed by: PSYCHIATRY & NEUROLOGY

## 2022-08-18 PROCEDURE — 6370000000 HC RX 637 (ALT 250 FOR IP): Performed by: PSYCHIATRY & NEUROLOGY

## 2022-08-18 PROCEDURE — 6360000002 HC RX W HCPCS: Performed by: INTERNAL MEDICINE

## 2022-08-18 PROCEDURE — 93017 CV STRESS TEST TRACING ONLY: CPT

## 2022-08-18 PROCEDURE — 80048 BASIC METABOLIC PNL TOTAL CA: CPT

## 2022-08-18 PROCEDURE — 82947 ASSAY GLUCOSE BLOOD QUANT: CPT

## 2022-08-18 PROCEDURE — 71275 CT ANGIOGRAPHY CHEST: CPT

## 2022-08-18 PROCEDURE — 6360000002 HC RX W HCPCS: Performed by: HOSPITALIST

## 2022-08-18 PROCEDURE — A9502 TC99M TETROFOSMIN: HCPCS | Performed by: HOSPITALIST

## 2022-08-18 PROCEDURE — 6370000000 HC RX 637 (ALT 250 FOR IP): Performed by: INTERNAL MEDICINE

## 2022-08-18 PROCEDURE — 93005 ELECTROCARDIOGRAM TRACING: CPT | Performed by: HOSPITALIST

## 2022-08-18 PROCEDURE — 71275 CT ANGIOGRAPHY CHEST: CPT | Performed by: RADIOLOGY

## 2022-08-18 PROCEDURE — 6370000000 HC RX 637 (ALT 250 FOR IP): Performed by: HOSPITALIST

## 2022-08-18 PROCEDURE — 99214 OFFICE O/P EST MOD 30 MIN: CPT | Performed by: INTERNAL MEDICINE

## 2022-08-18 PROCEDURE — 84484 ASSAY OF TROPONIN QUANT: CPT

## 2022-08-18 RX ORDER — PREDNISONE 50 MG/1
50 TABLET ORAL ONCE
Status: COMPLETED | OUTPATIENT
Start: 2022-08-18 | End: 2022-08-18

## 2022-08-18 RX ORDER — INSULIN GLARGINE 100 [IU]/ML
10 INJECTION, SOLUTION SUBCUTANEOUS 2 TIMES DAILY
Status: DISCONTINUED | OUTPATIENT
Start: 2022-08-18 | End: 2022-08-19

## 2022-08-18 RX ORDER — DIPHENHYDRAMINE HYDROCHLORIDE 50 MG/ML
50 INJECTION INTRAMUSCULAR; INTRAVENOUS ONCE
Status: COMPLETED | OUTPATIENT
Start: 2022-08-18 | End: 2022-08-18

## 2022-08-18 RX ADMIN — INSULIN LISPRO 4 UNITS: 100 INJECTION, SOLUTION INTRAVENOUS; SUBCUTANEOUS at 19:49

## 2022-08-18 RX ADMIN — PREDNISONE 50 MG: 50 TABLET ORAL at 08:25

## 2022-08-18 RX ADMIN — PREDNISONE 50 MG: 50 TABLET ORAL at 01:12

## 2022-08-18 RX ADMIN — TETROFOSMIN 24 MILLICURIE: 1.38 INJECTION, POWDER, LYOPHILIZED, FOR SOLUTION INTRAVENOUS at 11:45

## 2022-08-18 RX ADMIN — REGADENOSON 0.4 MG: 0.08 INJECTION, SOLUTION INTRAVENOUS at 12:12

## 2022-08-18 RX ADMIN — TETROFOSMIN 8 MILLICURIE: 1.38 INJECTION, POWDER, LYOPHILIZED, FOR SOLUTION INTRAVENOUS at 10:15

## 2022-08-18 RX ADMIN — LAMOTRIGINE 25 MG: 25 TABLET ORAL at 08:25

## 2022-08-18 RX ADMIN — ATORVASTATIN CALCIUM 40 MG: 40 TABLET, FILM COATED ORAL at 19:48

## 2022-08-18 RX ADMIN — DIPHENHYDRAMINE HYDROCHLORIDE 50 MG: 50 INJECTION, SOLUTION INTRAMUSCULAR; INTRAVENOUS at 08:25

## 2022-08-18 RX ADMIN — ENOXAPARIN SODIUM 40 MG: 100 INJECTION SUBCUTANEOUS at 08:25

## 2022-08-18 RX ADMIN — INSULIN LISPRO 8 UNITS: 100 INJECTION, SOLUTION INTRAVENOUS; SUBCUTANEOUS at 17:45

## 2022-08-18 RX ADMIN — INSULIN GLARGINE 10 UNITS: 100 INJECTION, SOLUTION SUBCUTANEOUS at 14:39

## 2022-08-18 RX ADMIN — ASPIRIN 81 MG 81 MG: 81 TABLET ORAL at 08:25

## 2022-08-18 RX ADMIN — SODIUM CHLORIDE, PRESERVATIVE FREE 10 ML: 5 INJECTION INTRAVENOUS at 19:48

## 2022-08-18 RX ADMIN — INSULIN LISPRO 8 UNITS: 100 INJECTION, SOLUTION INTRAVENOUS; SUBCUTANEOUS at 08:50

## 2022-08-18 RX ADMIN — INSULIN GLARGINE 10 UNITS: 100 INJECTION, SOLUTION SUBCUTANEOUS at 19:49

## 2022-08-18 RX ADMIN — SODIUM CHLORIDE, PRESERVATIVE FREE 10 ML: 5 INJECTION INTRAVENOUS at 08:26

## 2022-08-18 RX ADMIN — LAMOTRIGINE 25 MG: 25 TABLET ORAL at 19:49

## 2022-08-18 ASSESSMENT — PAIN SCALES - GENERAL: PAINLEVEL_OUTOF10: 0

## 2022-08-18 NOTE — CARE COORDINATION
Received call from Cordell Sorenson at [facility] and their medical director states that this patient is more appropriate for Outpatient rehab than inpt and can not offer a bed.   The [address]:  [phone]  F:  [phone]  Electronically signed by Booker Madrid RN on 8/18/2022 at 10:46 AM

## 2022-08-18 NOTE — CARE COORDINATION
08/18/22 1525   IMM Letter   Observation Status Letter date given: 08/18/22   Observation Status Letter time given: 1245   Observation Status Letter given to Patient/Family/Significant other/Guardian/POA/by: given to patient by [address] [place] [address] Drive letter given to patient with oral explanation and questions addressed by: Germaine Alfred [facility]  Electronically signed by Germaine Alfred RN on 8/18/2022 at 3:27 PM

## 2022-08-18 NOTE — PROGRESS NOTES
Cardiology Daily Note Shaw Dawn MD      Patient:  Curly Cousins  [id number]    Patient Active Problem List    Diagnosis Date Noted    Chest pain with moderate risk for cardiac etiology 08/18/2022    Chest pain 08/17/2022    Suicidal ideation 08/17/2022    Hoarding behavior     Bipolar I disorder ([address]     ADHD (attention deficit hyperactivity disorder), inattentive type 03/01/2017       Admit Date:  8/16/2022    Admission Problem List: Present on Admission:   Chest pain   Suicidal ideation   Chest pain with moderate risk for cardiac etiology      Cardiac Specific Data:  Specialty Problems          Cardiology Problems    * (Principal) Chest pain        Chest pain with moderate risk for cardiac etiology           Subjective:  Mr. Meera Ace seen today symptoms about the same no significant chest discomfort blood pressure 109/74 heart 73. Kacy Pandya planned for today. Objective:   /74   Pulse 73   Temp (!) 96.1 °F (35.6 °C) (Temporal)   Resp 18   Ht 5' 5\" (1.651 m)   Wt 147 lb 6.4 oz (66.9 kg)   SpO2 97%   BMI 24.53 kg/m²       Intake/Output Summary (Last 24 hours) at 8/18/2022 1145  Last data filed at 8/18/2022 0847  Gross per 24 hour   Intake 960 ml   Output 4550 ml   Net -3590 ml       Prior to Admission medications    Medication Sig Start Date End Date Taking? Authorizing Provider   metFORMIN (GLUCOPHAGE) 500 MG tablet Take 1 tablet by mouth in the morning and 1 tablet in the evening. Take with meals.  8/12/22   Karen Nguyễn MD        insulin glargine  10 Units SubCUTAneous BID    nitroGLYCERIN  0.4 mg SubLINGual Once    sodium chloride flush  5-40 mL IntraVENous 2 times per day    aspirin  81 mg Oral Daily    enoxaparin  40 mg SubCUTAneous Daily    atorvastatin  40 mg Oral Nightly    atorvastatin  40 mg Oral Nightly    insulin lispro  0-8 Units SubCUTAneous TID WC    insulin lispro  0-4 Units SubCUTAneous Nightly    lamoTRIgine  25 mg Oral BID       TELEMETRY: Sinus     Physical Exam:      Physical Exam      General:  Awake, alert, NAD  Skin:  Warm and dry  Neck:  no jvd , no carotid bruits  Chest:  Clear to auscultation, no wheezing or rales  Cardiovascular:  RRR N2B7 no murmurs, clicks, gallups, or rubs  Abdomen:  Soft nontender, nondistended, bowel sounds present  Extremities:  Edema: none       Lab Data:  CBC:   Recent Labs     08/17/22 0009 08/18/22 0218   WBC 5.1 5.8   HGB 13.0* 13.8*   HCT 36.7* 38.7*   MCV 85.3 85.2    255     BMP:   Recent Labs     08/17/22 0009 08/18/22 0218   * 135*   K 3.7 4.4   CL 96* 101   CO2 26 22   PHOS 3.2  --    BUN 14 15   CREATININE 0.9 0.7     LIVER PROFILE:   Recent Labs     08/17/22 0009   AST 14   ALT 13   BILITOT <0.2   ALKPHOS 97     PT/INR:   Recent Labs     08/17/22 0009   PROTIME 13.0   INR 0.99     APTT:   Recent Labs     08/17/22 0009   APTT 27.0     BNP:  No results for input(s): BNP in the last 72 hours. CK, CKMB, Troponin: @LABRCNT (CKTOTAL:3, CKMB:3, TROPONINI:3)@    IMAGING:  Echo Complete    Result Date: 8/17/2022  Transthoracic Echocardiography Report (TTE)  Demographics   Patient Name   Vitaliy Calixto  Date of Study           08/17/2022   MRN            [id number]           Gender                  Male   Date of Birth  1975       Room Number             MHL-0725   Age            48 year(s)   Height:        65 inches        Referring Physician     Nitin Daigle MD   Weight:        150 pounds       Sonographer             Carolina Diaz RDCS   BSA:           1.75 m^2         Interpreting Physician  Myles Oliveros   BMI:           24.96 kg/m^2  Procedure Type of Study   TTE procedure:ECHO NO CONTRAST WITH DOP/COLR. Study Location: Echo Lab Technical Quality: Adequate visualization Patient Status: Inpatient HR: 68 bpm BP: 119/83 mmHg Indications:Chest pain. Conclusions   Summary  Normal left ventricular size with preserved LV function and an estimated  ejection fraction of approximately 60%.  Normal left ventricular wall  thickness. Normal diastolic filling pattern. Normal right ventricular size with preserved RV function. Aortic valve is not well visualized. Probably bicuspid aortic valve. Trace  aortic regurgitation. No aortic stenosis is noted. Mild tricuspid regurgitation with estimated RVSP of 31 mmHg. No evidence of significant pericardial effusion is noted. Aortic root is within normal limits. The rhythm is sinus. Signature   ----------------------------------------------------------------  Electronically signed by Lilia Becker(Interpreting physician)  on 08/17/2022 01:19 PM  ----------------------------------------------------------------   Findings   Mitral Valve  Mild sclerosis of the mitral valve with normal leaflet mobility. No  evidence of mitral valve stenosis. Trace mitral regurgitation. Aortic Valve  Aortic valve is not well visualized. Probably bicuspid valve. Mildly thickened aortic valve leaflets with preserved leaflet mobility. Trace aortic regurgitation. No aortic stenosis is noted. Tricuspid Valve  Tricuspid valve is structurally normal.  Mild tricuspid regurgitation with estimated RVSP of 31 mmHg. Pulmonic Valve  The pulmonic valve was not well visualized. Left Atrium  Normal size left atrium. Left Ventricle  Normal left ventricular size with preserved LV function and an estimated  ejection fraction of approximately 60%. Normal left ventricular wall thickness. No evidence of left ventricular mass or thrombus noted. Normal diastolic filling pattern. Right Atrium  Normal right atrial dimension with no evidence of thrombus or mass noted. Right Ventricle  Normal right ventricular size with preserved RV function. Pericardial Effusion  No evidence of significant pericardial effusion is noted. Pleural Effusion  Left pleural effusion. Miscellaneous  Aortic root is within normal limits. The rhythm is sinus.   IVC normal.  2D Measurements and Calculations(cm)   LVIDd: 4.19 cm LVIDs: 2.75 cm  IVSd: 1 cm  LVPWd: 0.98 cm                       AO Root Dimension: 3.4 cm  % Ejection Fraction: 59 %            LA Dimension: 3.3 cm  LA Volume: 35.7 ml                   LA Area: 14.8 cm^2  LA Volume Index: 20 ml/m^2           LV Systolic dimension: 2.74BH  LV dimension: 4.19 cm                LV PW diastolic: 6.58KE                                       LVOT diameter: 2 cm  LVEDV: 79.3 ml                       RA Systolic pressure: 3mmHg  LVESV:33.8 ml                        LVEDVI: 45 ml/m^2  Cardic Output (CO): 3.99l/min        LVESVI: 19 ml/m^2  Ascending Aorta: 3.6 cm  Doppler Measurements and Calculations   AV Peak Velocity:189 cm/s               MV Peak E-Wave: 89.5 cm/s  AV Mean Velocity:126 cm/s               MV Peak A-Wave: 75.4 cm/s  AV Peak Gradient: 14.29 mmHg            MV E/A Ratio: 1.19 %  AV Mean Gradient: 7 mmHg                MV Mean velocity: NaNcm/s  AV Area (Continuity):1.46 cm^2          MV Peak Gradient: 3.2 mmHg  AV VTI:40.3 cm/s                        MV P1/2t: 63 msec  TR Velocity:267 cm/s                    MVA by PHT3.49 cm^2  TR Gradient:28.52 mmHg  Estimated RAP:3 mmHg  RVSP:31 mmHg   MV E' septal velocity: 10.4cm/s  MV E' lateral velocity:10.2 cm/s      XR CHEST PORTABLE    Result Date: 8/17/2022  NO PRIOR REPORT AVAILABLE Exam: X-RAY OF [facility] Clinical data:Chest pain. Technique:Single view of the chest. Prior studies: No prior studies submitted. Findings: The lungs are grossly clear; noevidence of acute infiltrate or pleural effusion. Cardiac silhouette is within normal limits. No acute osseous abnormality is detected. No evidence of acute cardiopulmonary process. Recommendation: Follow up as clinically indicated.  Electronically Signed by Teresa Santoyo at 17-Aug-2022 03:13:45 AM                 Assessment:  Admission 8/17/2022 complains primarily of depression but also complained of chest tightness intermittently last week or 2 not related to physical exercise  History of depression  History of bipolar disorder  Bicuspid aortic valve  Prior cardiac catheterization [facility] in [place] 2008 reportedly negative  Reported negative stress test 2017  ADHD  Obsessive-compulsive disorder  Hoarding behavior  PTSD  Anxiety  History of methamphetamine abuse  Hypertension  Hyperlipidemia  Diabetes  Chronic kidney disease    Plan:  Jeri Garcia planned for today further comments to follow    Bairon Earl MD, MD 8/18/2022 11:45 AM

## 2022-08-18 NOTE — CARE COORDINATION
Spoke with Cady Albright from [facility] [phone]. She stated that [employer] would be a short term option, [facility] in [place] is a efe based program that takes [employer], and the [facility] in [place].  Electronically signed by Stefany Atkinson on 8/18/2022 at 4:21 PM

## 2022-08-18 NOTE — BH NOTE
Department of Psychiatry  Attending Progress Note      SUBJECTIVE:   48 y.o. male with previous psychiatric history of depression, bipolar disorder, ADHD, hoarding disorder, alcohol use disorder, stimulants use disorder, cannabis use disorder, who has been admitted to medical services secondary to treatment noncompliance, depression and chest pain. Patient has been seen in his room. He was lying down on the bed and was wearing hospital attire. Patient reported that his condition mildly improved since time of admission to the hospital, as he get a chance to get some rest and his physical symptoms have been addressed appropriately by primary treatment team.  Patient reported that his mood is \"fine\" today. He endorses fair appetite and fair quality of sleep during the last night. Patient is compliant with his currently prescribed psychotropic medications and denies any side effects. He continues to endorse feeling of depression, anxiety, decreased motivation, decreased concentration. Patient denies feeling of hopelessness or helplessness today. He denies current active suicidal or homicidal ideations, denies any plans. Also, patient denies auditory and visual hallucinations. This provider had a discussion with patient's , who reported that rehab facility in [place], [place], where patient wanted to be admitted, declined his admission and she will try to find another rehabilitation facility for placement after discussion with patient. OBJECTIVE    Physical  VITALS:  /74   Pulse 73   Temp (!) 96.1 °F (35.6 °C) (Temporal)   Resp 18   Ht 5' 5\" (1.651 m)   Wt 147 lb 6.4 oz (66.9 kg)   SpO2 97%   BMI 24.53 kg/m²   TEMPERATURE:  Current - Temp: (!) 96.1 °F (35.6 °C);  Max - Temp  Av.5 °F (36.4 °C)  Min: 96.1 °F (35.6 °C)  Max: 98.6 °F (37 °C)  RESPIRATIONS RANGE: Resp  Av  Min: 18  Max: 24  PULSE RANGE: Pulse  Av.8  Min: 71  Max: 79  BLOOD PRESSURE RANGE:  Systolic (24hrs), Av , Min:109 , EAY:997  ; Diastolic (56AIC), LSA:52, Min:68, Max:87   PULSE OXIMETRY RANGE: SpO2  Av.5 %  Min: 97 %  Max: 98 %      Mental Status Examination:    Appearance: Appropriately groomed and in hospital attire. Made good eye contact. Behavior: Calm, cooperative, friendly, and socially appropriate. No psychomotor retardation/agitation appreciated. Gait unremarkable. Speech: Normal in tone, volume, and quality. Mood: \"fine\"   Affect: Mood congruent. Range is mildly restricted  Thought Process: Mostly linear and goal oriented, sometimes circumstantial  Thought Content: Patient does not have any current active suicidal and homicidal ideations. No overt delusions or paranoia appreciated. Perceptions: Seems patient does not have any auditory or visual hallucinations at present time. Patient did not appear to be responding to internal stimuli. No overt psychosis. Orientation: to person, place, date, and situation. Alert. Impulsivity: Seems fair.    Neurovegitative: Fair appetite, fair sleep  Insight: Limited  Judgment: Limited       Data    Lab Results   Component Value Date    WBC 5.8 2022    HGB 13.8 (L) 2022    HCT 38.7 (L) 2022    MCV 85.2 2022     2022     Lab Results   Component Value Date     (L) 2022    K 4.4 2022     2022    CO2 22 2022    BUN 15 2022    CREATININE 0.7 2022    GLUCOSE 377 (H) 2022    CALCIUM 8.9 2022    PROT 6.4 (L) 2022    LABALBU 3.6 2022    BILITOT <0.2 2022    ALKPHOS 97 2022    AST 14 2022    ALT 13 2022    LABGLOM >60 2022    GFRAA >59 2022    GLOB 2.9 2017       Medications  Current Facility-Administered Medications: regadenoson (LEXISCAN) injection 0.4 mg, 0.4 mg, IntraVENous, ONCE PRN  insulin glargine (LANTUS) injection vial 10 Units, 10 Units, SubCUTAneous, BID  iopamidol (ISOVUE-370) 76 % injection 90 mL, 90 mL, IntraVENous, ONCE PRN  technetium tetrofosmin (Tc-MYOVIEW) injection 8 millicurie, 8 millicurie, IntraVENous, ONCE PRN  technetium tetrofosmin (Tc-MYOVIEW) injection 24 millicurie, 24 millicurie, IntraVENous, ONCE PRN  nitroGLYCERIN (NITROSTAT) SL tablet 0.4 mg, 0.4 mg, SubLINGual, Once  sodium chloride flush 0.9 % injection 5-40 mL, 5-40 mL, IntraVENous, 2 times per day  sodium chloride flush 0.9 % injection 5-40 mL, 5-40 mL, IntraVENous, PRN  0.9 % sodium chloride infusion, , IntraVENous, PRN  ondansetron (ZOFRAN-ODT) disintegrating tablet 4 mg, 4 mg, Oral, Q8H PRN **OR** ondansetron (ZOFRAN) injection 4 mg, 4 mg, IntraVENous, Q6H PRN  acetaminophen (TYLENOL) tablet 650 mg, 650 mg, Oral, Q6H PRN **OR** acetaminophen (TYLENOL) suppository 650 mg, 650 mg, Rectal, Q6H PRN  aspirin chewable tablet 81 mg, 81 mg, Oral, Daily  perflutren lipid microspheres (DEFINITY) injection 1.65 mg, 1.5 mL, IntraVENous, ONCE PRN  potassium chloride (KLOR-CON M) extended release tablet 40 mEq, 40 mEq, Oral, PRN **OR** potassium bicarb-citric acid (EFFER-K) effervescent tablet 40 mEq, 40 mEq, Oral, PRN **OR** potassium chloride 10 mEq/100 mL IVPB (Peripheral Line), 10 mEq, IntraVENous, PRN  magnesium sulfate 2000 mg in 50 mL IVPB premix, 2,000 mg, IntraVENous, PRN  enoxaparin (LOVENOX) injection 40 mg, 40 mg, SubCUTAneous, Daily  atorvastatin (LIPITOR) tablet 40 mg, 40 mg, Oral, Nightly  atorvastatin (LIPITOR) tablet 40 mg, 40 mg, Oral, Nightly  insulin lispro (HUMALOG) injection vial 0-8 Units, 0-8 Units, SubCUTAneous, TID WC  insulin lispro (HUMALOG) injection vial 0-4 Units, 0-4 Units, SubCUTAneous, Nightly  glucose chewable tablet 16 g, 4 tablet, Oral, PRN  dextrose bolus 10% 125 mL, 125 mL, IntraVENous, PRN **OR** dextrose bolus 10% 250 mL, 250 mL, IntraVENous, PRN  glucagon (rDNA) injection 1 mg, 1 mg, SubCUTAneous, PRN  dextrose 10 % infusion, , IntraVENous, Continuous PRN  lamoTRIgine (LAMICTAL) tablet 25 mg, 25 mg, Oral, BID    ASSESSMENT AND PLAN    DSM 5 DIAGNOSIS:  Bipolar disorder, most recent episode depressed, without psychotic features  Alcohol use disorder  Stimulants (cocaine, methamphetamine) use disorder  Cannabis use disorder  History of ADHD, per patient  Hoarding disorder  Treatment noncompliance     Medical conditions, pertinent to the patient's mental health  Chest pain  Diabetes     Recommendations  Currently patient is not suicidal, homicidal or psychotic. Patient tolerates psychotropic medications well and denies any side effects. No recommendations with changes of dose of psychotropic medications today.  continues to search for rehabilitation facility for patient's transfer after his discharge from the hospital.  Psychiatry will continue to follow with patient and will reevaluate patient's condition for final disposition tomorrow.

## 2022-08-19 VITALS
WEIGHT: 147.6 LBS | OXYGEN SATURATION: 100 % | RESPIRATION RATE: 16 BRPM | SYSTOLIC BLOOD PRESSURE: 120 MMHG | DIASTOLIC BLOOD PRESSURE: 86 MMHG | HEART RATE: 74 BPM | HEIGHT: 65 IN | TEMPERATURE: 98.2 F | BODY MASS INDEX: 24.59 KG/M2

## 2022-08-19 PROBLEM — R07.89 NON-CARDIAC CHEST PAIN: Status: ACTIVE | Noted: 2022-08-18

## 2022-08-19 PROBLEM — R45.851 SUICIDAL IDEATION: Status: RESOLVED | Noted: 2022-08-17 | Resolved: 2022-08-19

## 2022-08-19 LAB
GLUCOSE BLD-MCNC: 216 MG/DL (ref 70–99)
GLUCOSE BLD-MCNC: 261 MG/DL (ref 70–99)
LV EF: 51 %
LVEF MODALITY: NORMAL
PERFORMED ON: ABNORMAL
PERFORMED ON: ABNORMAL

## 2022-08-19 PROCEDURE — 93016 CV STRESS TEST SUPVJ ONLY: CPT | Performed by: INTERNAL MEDICINE

## 2022-08-19 PROCEDURE — 96372 THER/PROPH/DIAG INJ SC/IM: CPT

## 2022-08-19 PROCEDURE — 2580000003 HC RX 258: Performed by: HOSPITALIST

## 2022-08-19 PROCEDURE — 99233 SBSQ HOSP IP/OBS HIGH 50: CPT | Performed by: PSYCHIATRY & NEUROLOGY

## 2022-08-19 PROCEDURE — 6370000000 HC RX 637 (ALT 250 FOR IP): Performed by: HOSPITALIST

## 2022-08-19 PROCEDURE — 6370000000 HC RX 637 (ALT 250 FOR IP): Performed by: PSYCHIATRY & NEUROLOGY

## 2022-08-19 PROCEDURE — 82947 ASSAY GLUCOSE BLOOD QUANT: CPT

## 2022-08-19 PROCEDURE — G0378 HOSPITAL OBSERVATION PER HR: HCPCS

## 2022-08-19 PROCEDURE — 6360000002 HC RX W HCPCS: Performed by: HOSPITALIST

## 2022-08-19 PROCEDURE — 78452 HT MUSCLE IMAGE SPECT MULT: CPT | Performed by: INTERNAL MEDICINE

## 2022-08-19 PROCEDURE — 93018 CV STRESS TEST I&R ONLY: CPT | Performed by: INTERNAL MEDICINE

## 2022-08-19 RX ORDER — ATORVASTATIN CALCIUM 20 MG/1
20 TABLET, FILM COATED ORAL NIGHTLY
Qty: 30 TABLET | Refills: 0 | Status: SHIPPED | OUTPATIENT
Start: 2022-08-19 | End: 2022-09-18

## 2022-08-19 RX ORDER — INSULIN GLARGINE 100 [IU]/ML
15 INJECTION, SOLUTION SUBCUTANEOUS 2 TIMES DAILY
Status: DISCONTINUED | OUTPATIENT
Start: 2022-08-19 | End: 2022-08-19 | Stop reason: HOSPADM

## 2022-08-19 RX ORDER — INSULIN GLARGINE 100 [IU]/ML
15 INJECTION, SOLUTION SUBCUTANEOUS 2 TIMES DAILY
Qty: 10 ML | Refills: 0 | Status: SHIPPED | OUTPATIENT
Start: 2022-08-19 | End: 2022-09-21

## 2022-08-19 RX ORDER — LAMOTRIGINE 25 MG/1
25 TABLET ORAL 2 TIMES DAILY
Qty: 60 TABLET | Refills: 0 | Status: SHIPPED | OUTPATIENT
Start: 2022-08-19 | End: 2022-09-18

## 2022-08-19 RX ORDER — ASPIRIN 81 MG/1
81 TABLET, CHEWABLE ORAL DAILY
Qty: 100 TABLET | Refills: 0 | Status: SHIPPED | OUTPATIENT
Start: 2022-08-20 | End: 2022-11-28

## 2022-08-19 RX ADMIN — LAMOTRIGINE 25 MG: 25 TABLET ORAL at 08:59

## 2022-08-19 RX ADMIN — SODIUM CHLORIDE, PRESERVATIVE FREE 10 ML: 5 INJECTION INTRAVENOUS at 09:00

## 2022-08-19 RX ADMIN — INSULIN LISPRO 2 UNITS: 100 INJECTION, SOLUTION INTRAVENOUS; SUBCUTANEOUS at 11:56

## 2022-08-19 RX ADMIN — INSULIN GLARGINE 10 UNITS: 100 INJECTION, SOLUTION SUBCUTANEOUS at 09:05

## 2022-08-19 RX ADMIN — ASPIRIN 81 MG 81 MG: 81 TABLET ORAL at 09:00

## 2022-08-19 RX ADMIN — INSULIN LISPRO 4 UNITS: 100 INJECTION, SOLUTION INTRAVENOUS; SUBCUTANEOUS at 09:06

## 2022-08-19 RX ADMIN — ENOXAPARIN SODIUM 40 MG: 100 INJECTION SUBCUTANEOUS at 08:59

## 2022-08-19 NOTE — PROGRESS NOTES
[place], [place], [address]    DEPARTMENT OF HOSPITALIST MEDICINE      PROGRESS  NOTE:    NOTE: Portions of this note have been copied forward, however, changed to reflect the most current clinical status of this patient. Patient:  Chelsey Wang  YOB: 1975  Date of Service: 8/18/2022  MRN: [id number]   Acct: [de-identified]   Primary Care Physician: No primary care provider on file. Advance Directive: Full Code  Admit Date: 8/16/2022       Hospital Day: 1      CHIEF COMPLAINT:  Chief Complaint   Patient presents with    Mental Health Problem     Pt stated he was seen here last week for heat exhaustion, wanted to get checked out, but  chief complaint is depression. Pt stated\" I wouldn't say im suicidal, but im at the giving up part\" denies HI       SUBJECTIVE:  Patient seen and evaluated bedside today. His chest pain has improved. He underwent nuclear stress test today. Wants to go to drug rehab facility. [address]  COURSE:    08/18/2022:  Patient seen and evaluated by psychiatrist and meds adjusted. Patient wants to go to a substance abuse rehab for which case management is working on. Patient chest pain has resolved. He underwent nuclear stress test which is still pending. His blood sugar levels are elevated in 400s hence I doubled the dose of Lantus to 10 units twice daily. 08/17/2022:  Patient was admitted earlier today by our nocturnist.  Please see the history and physical for details. I saw and examined the patient at the bedside today. Patient admitted with chest pain. He is undergoing cardiology work-up after evaluation by cardiologist.  Psychiatry evaluated the patient and prescribed psychiatric meds. He is not currently suicidal, homicidal or psychotic as per psychiatrist.  Patient wants to be transferred to rehabilitation facility for treatment of his substance abuse disorder which is being worked up by case management.     08/17/2022:  HPI:   Maren Barraza is a pleasant 48year old [de-identified] [facility] man. He presented to the ED for depression and suicidal thoughts He did state that he has been having chest pains the last week that are becoming more frequent the last two days. He states the chest pain radiates to the left arm. He has had radiate to the jaw before but it has been a long time. The pain is alleviated by rest. The pan typically occurs at rest. The pain is exacerbated by any tension. The pain is intermittent in temporality. The chest pain is associated with: chest pressure, confusion, nausea, near syncope, dyspnea, diaphoresis, weakness, fatigue, and palpitations \"racing, skipping. \" The pain is not associated with: emesis, edema, and syncope. He has seen cardiology before, the last time was 2016 or 2017 and he had a stress test. He had a cardiac cath before that in 2008.        REVIEW  OF  SYSTEMS:    Constitutional:  No fevers, chills, nausea, vomiting, + tiredness and fatigue   Lungs:   No hemoptysis, pleurisy, cough, SOB   Heart:  No chest pressure with exertion, palpitations,    Abdomen:   No new masses, no bright red blood per rectum   Extremities: No acute pain while ambulating, no new lesions   Neurologic: No new motor or sensory changes       14 point review of systems addressed with patient which is essentially negative except as specifically addressed above:    PAST MEDICAL HISTORY:  Past Medical History:   Diagnosis Date    ADHD (attention deficit hyperactivity disorder)     Bicuspid cardiac valve     Bipolar disorder ([facility] [address]     Chronic kidney disease     Depression     Diabetes ([facility] [address] 2004    Hoarding disorder     HTN (hypertension)     HTN (hypertension) 8/18/2022    Methamphetamine abuse ([facility] [address]     Moderate mixed hyperlipidemia not requiring statin therapy     Moderate mixed hyperlipidemia not requiring statin therapy 8/18/2022         PAST SURGICAL HISTORY:  Past Surgical History:   Procedure Laterality Date    SKIN BIOPSY  2006 MRSA groin area R    TONSILLECTOMY      at age 1    TYMPANOSTOMY TUBE PLACEMENT Bilateral     when he was a small child    WISDOM TOOTH EXTRACTION      [id number]        FAMILY HISTORY:  Family History   Problem Relation Age of Onset    Heart Disease Mother         cause of death    Diabetes Mother     Depression Mother     High Blood Pressure Mother     Kidney Disease Mother     Hearing Loss Mother     Heart Failure Mother     Other Father         COVID-19    Depression Half-Brother     Substance Abuse Half-Brother         overdosed fatally    COPD Half-Brother         smoker    No Known Problems Half-Sister            OBJECTIVE:  /81   Pulse (!) 101   Temp 97.6 °F (36.4 °C) (Temporal)   Resp 14   Ht 5' 5\" (1.651 m)   Wt 147 lb 6.4 oz (66.9 kg)   SpO2 97%   BMI 24.53 kg/m²   I/O this shift:  In: 250 [P.O.:240;  I.V.:10]  Out: -     PHYSICAL  EXAMINATION:    JING:  Awake, alert, oriented x 3, patient appears tired and fatigued   Head/Eyes:  Normocephalic, atraumatic, EOMI and PERRLA bilaterally   Respiratory:   Bilateral fair air entry in both lung fields, mild B/L crackles, symmetric expansion of chest   Cardiovascular:  Regular rate and rhythm, S1+S2+0, no murmurs/rubs   Abdomen:   Soft, non-tender, bowel sounds +ve, no organomegaly   Extremities: Moves all, full range of motion, no edema   Neurologic: Awake, alert, oriented x 3, cranial nerves II-XII intact, no focal neurological deficits, sensory system intact   Psychiatric: Normal mood, non-suicidal       CURRENT MEDICATIONS:  Scheduled:   insulin glargine  10 Units SubCUTAneous BID    nitroGLYCERIN  0.4 mg SubLINGual Once    sodium chloride flush  5-40 mL IntraVENous 2 times per day    aspirin  81 mg Oral Daily    enoxaparin  40 mg SubCUTAneous Daily    atorvastatin  40 mg Oral Nightly    atorvastatin  40 mg Oral Nightly    insulin lispro  0-8 Units SubCUTAneous TID WC    insulin lispro  0-4 Units SubCUTAneous Nightly    lamoTRIgine  25 mg Oral BID        PRN:  iopamidol, 90 mL, ONCE PRN  sodium chloride flush, 5-40 mL, PRN  sodium chloride, , PRN  ondansetron, 4 mg, Q8H PRN   Or  ondansetron, 4 mg, Q6H PRN  acetaminophen, 650 mg, Q6H PRN   Or  acetaminophen, 650 mg, Q6H PRN  perflutren lipid microspheres, 1.5 mL, ONCE PRN  potassium chloride, 40 mEq, PRN   Or  potassium alternative oral replacement, 40 mEq, PRN   Or  potassium chloride, 10 mEq, PRN  magnesium sulfate, 2,000 mg, PRN  glucose, 4 tablet, PRN  dextrose bolus, 125 mL, PRN   Or  dextrose bolus, 250 mL, PRN  glucagon (rDNA), 1 mg, PRN  dextrose, , Continuous PRN      Infusions:   sodium chloride      dextrose         Laboratory Data:  Recent Labs     08/17/22 0009 08/18/22 0218   WBC 5.1 5.8   HGB 13.0* 13.8*    255     Recent Labs     08/17/22  0009 08/17/22  0423 08/18/22 0218   *  --  135*   K 3.7  --  4.4   CL 96*  --  101   CO2 26  --  22   BUN 14  --  15   CREATININE 0.9  --  0.7   GLUCOSE 450* 255 377*     Recent Labs     08/17/22 0009   AST 14   ALT 13   BILITOT <0.2   ALKPHOS 97     Troponin T:   Recent Labs     08/17/22  0916 08/17/22  1236 08/18/22 0218   TROPONINI <0.01 <0.01 <0.01     Pro-BNP: No results for input(s): BNP in the last 72 hours. INR:   Recent Labs     08/17/22 0009   INR 0.99     UA:No results for input(s): NITRITE, COLORU, PHUR, LABCAST, WBCUA, RBCUA, MUCUS, TRICHOMONAS, YEAST, BACTERIA, CLARITYU, SPECGRAV, LEUKOCYTESUR, UROBILINOGEN, BILIRUBINUR, BLOODU, GLUCOSEU, AMORPHOUS in the last 72 hours. Invalid input(s): [place]ago Luda  A1C:   Recent Labs     08/17/22 0009   LABA1C 11.5*     ABG:No results for input(s): PHART, NGY4GBM, PO2ART, BVC7UKR, BEART, HGBAE, N3UDVWYD, CARBOXHGBART in the last 72 hours. Imaging:    XR CHEST PORTABLE    Result Date: 8/17/2022  No evidence of acute cardiopulmonary process. Recommendation: Follow up as clinically indicated.  Electronically Signed by Kristy Gonsales at 17-Aug-2022 03:13:45 AM                ASSESSMENT & PLAN:    Principal Problem:    Chest pain  Active Problems:    Suicidal ideation    Chest pain with moderate risk for cardiac etiology    Chronic kidney disease    Depression    HTN (hypertension)    Methamphetamine abuse ([facility] [address]    Uncontrolled type 2 diabetes mellitus with hyperglycemia ([facility] [address]    Long term (current) use of insulin (HCC)    Personal history of noncompliance with medical treatment and regimen    ADHD (attention deficit hyperactivity disorder), inattentive type    Bipolar I disorder ([facility] [address]    Hoarding behavior  Resolved Problems:    * No resolved hospital problems. *      Continue with current medications  Continue with cardiopulmonary monitoring  Patient had 4 sets of cardiac enzymes done which are all within normal limits  Lexiscan nuclear stress test with pending results  Patient blood sugar levels are elevated in 400s  Increase Lantus insulin to 10 units subcu twice daily  Accu-Cheks qAC and qHS ordered with medium dose insulin coverage as per protocol, based on patient's blood sugar levels  Patient started on metformin 500 mg p.o. twice daily as well  Nutrition consult given for evaluation and further treatment recommendations guarding diabetic teaching  Psychiatry evaluated the patient and determined him not to be suicidal, homicidal or psychotic  Psychiatric meds adjusted as per psychiatry  Patient wishes to be transferred to a drug rehab facility  Case management is working on patient transfer to a drug rehab facility      Chronic medical issues . .. Continue with home meds. Monitor patient closely while admitted. Advised very close f/u with patient's PCP as an outpatient to address chronic medical issues.         Personal history of noncompliance with medical treatment and regimen  STRICTLY advised patient to be compliant with meds and medical recommendations  Advised patient to get established with a PCP upon discharge, take care of meds, diet and bring patient's self and life back on track      Repeat labs in a.m. Electrolyte replacement as per protocol. Patient will be monitored very closely on the floor. Further recommendations as per the hospital course. Discharge Plan: DC patient to drug rehab facility in the morning if arranged by case management, otherwise home if cleared by psychiatry      Patient  is on DVT prophylaxis  Current medications reviewed  Lab work reviewed  Radiology/Chest x-ray films reviewed  Treatment recommendations from suspecialities reviewed, appreciated and agreed with  Discussed with the nurse and addressed all questions/concerns  Discussed with Patient and/or Family at the bedside in detail . .. they understand and agree with the management plan. Marcela Steward MD  8/18/2022 8:14 PM      DISCLAIMER: This note was created with electronic voice recognition which does have occasional errors. If you have any questions regarding the content within the note please do not hesitate to contact me. .. Thanks.

## 2022-08-19 NOTE — BH NOTE
Department of Psychiatry  Attending Progress Note      SUBJECTIVE:    48 y.o. male with previous psychiatric history of depression, bipolar disorder, ADHD, hoarding disorder, alcohol use disorder, stimulants use disorder, cannabis use disorder, who has been admitted to medical services secondary to treatment noncompliance, depression and chest pain. Patient has been seen in his room. He was sitting on his bed and was eating a lunch. Patient reported that his condition significantly improved during this hospital stay and his mood is \"much better\" today. He endorses good appetite and good quality of sleep during the last night. Patient is compliant with currently prescribed medications and denies any side effects. He endorses beneficial effect of prescribed medications for his mood. Patient denies significant affective symptomatology today, and only endorses mild feeling of depression and mild anxiety. Patient denies feeling of hopelessness, helplessness and worthlessness. He denies current active suicidal or homicidal ideations, denies any plans. Also, he denies any auditory or visual hallucinations. He did not endorse any delusions or paranoid thoughts. Patient reported that he had a discussion with his cardiologist today, who informed him that there is no abnormalities in his stress test and patient is ready to be discharged from the hospital today. However, patient stated that there is no rehab facility available, where he would like to be transferred for treatment of substance use disorder. Patient was informed that he can be admitted to psychiatry today and will be transferred to rehab facility on Monday. Patient stated that he needs some time today to think about this offer, otherwise, he will be discharged home.     OBJECTIVE    Physical  VITALS:  /75   Pulse 74   Temp 96.9 °F (36.1 °C)   Resp 16   Ht 5' 5\" (1.651 m)   Wt 147 lb 9.6 oz (67 kg)   SpO2 97%   BMI 24.56 kg/m² 8/18/2022   Indications: chest pain   Procedure: Stress was performed with injection of 0.4 mg Lexiscan. Vital signs and EKG were monitored. Technetium-99 Myoview was injected   in divided doses, approximately 8.7 mCi and 24.7 mCi respectively for   rest and stress imaging. The patient was discharged in stable   condition. Results: Patient had symptoms of dyspnea during infusion that resolved   in recovery. Baseline EKG showed normal sinus rhythm with nonspecific   ST/T changes. During stress there were no significant EKG changes or   rhythm changes. Baseline and peak blood pressures were 115/91, and   123/75 respectively. Baseline and peak heart rates were 87 and  109   respectively. Review of rest and stress images obtained utilizing a gated SPECT   acquisition protocol along with review of the polar plot revealed:   1. Ejection fraction 51%   2. Wall motion study unremarkable   3. Myocardial perfusion imaging demonstrated homogeneous uptake of the   tracer in all visualized segments no definite areas of ischemia or   infarction are identified.    Summary impressions:   Normal ejection fraction 51% no definite areas of ischemia or   infarction on myocardial perfusion imaging   Signed by Dr Loly Andres           Medications  Current Facility-Administered Medications: insulin glargine (LANTUS) injection vial 15 Units, 15 Units, SubCUTAneous, BID  iopamidol (ISOVUE-370) 76 % injection 90 mL, 90 mL, IntraVENous, ONCE PRN  nitroGLYCERIN (NITROSTAT) SL tablet 0.4 mg, 0.4 mg, SubLINGual, Once  sodium chloride flush 0.9 % injection 5-40 mL, 5-40 mL, IntraVENous, 2 times per day  sodium chloride flush 0.9 % injection 5-40 mL, 5-40 mL, IntraVENous, PRN  0.9 % sodium chloride infusion, , IntraVENous, PRN  ondansetron (ZOFRAN-ODT) disintegrating tablet 4 mg, 4 mg, Oral, Q8H PRN **OR** ondansetron (ZOFRAN) injection 4 mg, 4 mg, IntraVENous, Q6H PRN  acetaminophen (TYLENOL) tablet 650 mg, 650 mg, Oral, Q6H PRN **OR** acetaminophen (TYLENOL) suppository 650 mg, 650 mg, Rectal, Q6H PRN  aspirin chewable tablet 81 mg, 81 mg, Oral, Daily  perflutren lipid microspheres (DEFINITY) injection 1.65 mg, 1.5 mL, IntraVENous, ONCE PRN  potassium chloride (KLOR-CON M) extended release tablet 40 mEq, 40 mEq, Oral, PRN **OR** potassium bicarb-citric acid (EFFER-K) effervescent tablet 40 mEq, 40 mEq, Oral, PRN **OR** potassium chloride 10 mEq/100 mL IVPB (Peripheral Line), 10 mEq, IntraVENous, PRN  magnesium sulfate 2000 mg in 50 mL IVPB premix, 2,000 mg, IntraVENous, PRN  enoxaparin (LOVENOX) injection 40 mg, 40 mg, SubCUTAneous, Daily  atorvastatin (LIPITOR) tablet 40 mg, 40 mg, Oral, Nightly  insulin lispro (HUMALOG) injection vial 0-8 Units, 0-8 Units, SubCUTAneous, TID WC  insulin lispro (HUMALOG) injection vial 0-4 Units, 0-4 Units, SubCUTAneous, Nightly  glucose chewable tablet 16 g, 4 tablet, Oral, PRN  dextrose bolus 10% 125 mL, 125 mL, IntraVENous, PRN **OR** dextrose bolus 10% 250 mL, 250 mL, IntraVENous, PRN  glucagon (rDNA) injection 1 mg, 1 mg, SubCUTAneous, PRN  dextrose 10 % infusion, , IntraVENous, Continuous PRN  lamoTRIgine (LAMICTAL) tablet 25 mg, 25 mg, Oral, BID      ASSESSMENT AND PLAN    DSM 5 DIAGNOSIS:  Bipolar disorder, most recent episode depressed, without psychotic features  Alcohol use disorder  Stimulants (cocaine, methamphetamine) use disorder  Cannabis use disorder  History of ADHD, per patient  Hoarding disorder  Treatment noncompliance     Medical conditions, pertinent to the patient's mental health  Chest pain  Diabetes     Recommendations  Currently patient is not suicidal, homicidal or psychotic. Patient is compliant with currently prescribed medications and denies any side effects. No additional recommendations with adjustment of psychotropic medications today. . reported that a few rehab facilities were ready to accept the patient, however, patient declined those options.   He does not have any rehab facility at this time, where he can be transferred. Patient can be discharged from the hospital when he is medically stable. He was informed if he agreed for admission to psychiatric unit, he will get help of psychiatry  with placement to rehab facility on Monday, and can be transferred to psychiatric unit today. Psychiatry will sign off today.

## 2022-08-19 NOTE — DISCHARGE SUMMARY
[place] [address]    DEPARTMENT OF HOSPITALIST MEDICINE      DISCHARGE SUMMARY:      PATIENT NAME:  Stephanie Funes  :  1975  MRN:  [id number]    Admission Date:   2022 11:57 PM Attending: Shantell Healy MD   Discharge Date:   2022              PCP: No primary care provider on file. Length of Stay: 1 days     Chief Complaint on Admission:   Chief Complaint   Patient presents with    Mental Health Problem     Pt stated he was seen here last week for heat exhaustion, wanted to get checked out, but  chief complaint is depression. Pt stated\" I wouldn't say im suicidal, but im at the giving up part\" denies HI       Consultants:     IP CONSULT TO CARDIOLOGY  IP CONSULT TO PSYCHIATRY  IP CONSULT TO DIETITIAN       Discharge Problem List:   Principal Problem:    Non-cardiac chest pain  Active Problems:    Chest pain    Chronic kidney disease    Depression    HTN (hypertension)    Methamphetamine abuse ([address]    Uncontrolled type 2 diabetes mellitus with hyperglycemia ([address]    Long term (current) use of insulin ([address]    Personal history of noncompliance with medical treatment and regimen    ADHD (attention deficit hyperactivity disorder), inattentive type    Bipolar I disorder ([address]    Hoarding behavior  Resolved Problems:    Suicidal ideation       Personal history of noncompliance with medical treatment and regimen  STRICTLY advised patient to be compliant with meds and medical recommendations  Advised patient to get established with a PCP upon discharge, take care of meds, diet and bring patient's self and life back on track    [facility]    2022:  Patient seen and evaluated at bedside. He is feeling better today. His chest pain has resolved. [facility] nuclear stress test was done which showed no evidence of myocardial ischemia and normal EF at 51%. Patient given multiple options to be transferred to a rehab facility which he has refused.   He was seen by psychiatrist as well and given the option to be admitted in the inpatient psychiatric unit to get him arranged for transfer to rehab facility on Monday which he refused as well. He wants to go home. He has been cleared by psychiatry to be discharged. He has been strictly advised to quit illicit drug use. Please see psychiatric recommendations from today as below:    Psychiatric recommendations from today . .. 08/19/2022:    He has uncontrolled diabetes mellitus with hemoglobin A1c 11.5%. Increased his Lantus insulin to 15 units subcu twice daily. Advised patient to monitor blood sugar levels 4 times a day, continue with diabetic meds as prescribed, keep track of blood sugar levels in a log form, and follow-up closely with PCP for medication adjustment and adequate control of diabetes mellitus. Patient is advised to watch diet for high carb food and utilize 4-5 small meals a day. The patient verbalizes understanding and promises to take care of diet pattern and diabetes mellitus!    08/18/2022:  Patient seen and evaluated by psychiatrist and meds adjusted. Patient wants to go to a substance abuse rehab for which case management is working on. Patient chest pain has resolved. He underwent nuclear stress test which is still pending. His blood sugar levels are elevated in 400s hence I doubled the dose of Lantus to 10 units twice daily. 08/17/2022:  Patient was admitted earlier today by our nocturnist.  Please see the history and physical for details. I saw and examined the patient at the bedside today. Patient admitted with chest pain. He is undergoing cardiology work-up after evaluation by cardiologist.  Psychiatry evaluated the patient and prescribed psychiatric meds.   He is not currently suicidal, homicidal or psychotic as per psychiatrist.  Patient wants to be transferred to rehabilitation facility for treatment of his substance abuse disorder which is being worked up by case management. 08/17/2022:  HPI:  Mr. Graciela Olson is a pleasant 48year old [de-identified] [place] man. He presented to the ED for depression and suicidal thoughts He did state that he has been having chest pains the last week that are becoming more frequent the last two days. He states the chest pain radiates to the left arm. He has had radiate to the jaw before but it has been a long time. The pain is alleviated by rest. The pan typically occurs at rest. The pain is exacerbated by any tension. The pain is intermittent in temporality. The chest pain is associated with: chest pressure, confusion, nausea, near syncope, dyspnea, diaphoresis, weakness, fatigue, and palpitations \"racing, skipping. \" The pain is not associated with: emesis, edema, and syncope. He has seen cardiology before, the last time was 2016 or 2017 and he had a stress test. He had a cardiac cath before that in 2008. OBJECTIVE:  /86   Pulse 74   Temp 98.2 °F (36.8 °C) (Oral)   Resp 16   Ht 5' 5\" (1.651 m)   Wt 147 lb 9.6 oz (67 kg)   SpO2 100%   BMI 24.56 kg/m²       Heart: RRR   Lungs: Bilateral decreased air entry   Abdomen: Soft, non-tender   Extremities: No edema   Neurologic: Alert and oriented   Skin: Warm and dry          Laboratory Data:  Recent Labs     08/17/22 0009 08/18/22 0218   WBC 5.1 5.8   HGB 13.0* 13.8*    255     Recent Labs     08/17/22  0009 08/17/22  0423 08/18/22 0218   *  --  135*   K 3.7  --  4.4   CL 96*  --  101   CO2 26  --  22   BUN 14  --  15   CREATININE 0.9  --  0.7   GLUCOSE 450* 255 377*     Recent Labs     08/17/22 0009   AST 14   ALT 13   BILITOT <0.2   ALKPHOS 97     Troponin T:   Recent Labs     08/17/22  0916 08/17/22  1236 08/18/22 0218   TROPONINI <0.01 <0.01 <0.01     Pro-BNP: No results for input(s): BNP in the last 72 hours.   INR:   Recent Labs     08/17/22  0009   INR 0.99     UA:No results for input(s): NITRITE, COLORU, PHUR, LABCAST, WBCUA, RBCUA, MUCUS, TRICHOMONAS, YEAST, BACTERIA, CLARITYU, SPECGRAV, LEUKOCYTESUR, UROBILINOGEN, BILIRUBINUR, BLOODU, GLUCOSEU, AMORPHOUS in the last 72 hours. Invalid input(s): Joanna Buenrostro  A1C:   Recent Labs     08/17/22  0009   LABA1C 11.5*     ABG:No results for input(s): PHART, KLH8URJ, PO2ART, HWP1PDR, BEART, HGBAE, M8SQTPZY, CARBOXHGBART in the last 72 hours. Impressions of imaging performed in 48 hours before discharge:    CTA PULMONARY W CONTRAST    Result Date: 8/19/2022  1. No acute or chronic pulmonary thrombus embolic disease. 2.  A few fibrotic changes in both lung bases-old ineffective sequel. Recommendation: Follow up as clinically indicated. All CT scans at this facility utilize dose modulation, iterative reconstruction, and/or weight based dosing when appropriate to reduce radiation dose to as low as reasonably achievable. Electronically Signed by Rhoda Ceja MD at 19-Aug-2022 06:30:35 AM                   Medication List        START taking these medications      aspirin 81 MG chewable tablet  Take 1 tablet by mouth daily  Start taking on: August 20, 2022     atorvastatin 20 MG tablet  Commonly known as: LIPITOR  Take 1 tablet by mouth nightly     insulin glargine 100 UNIT/ML injection vial  Commonly known as: LANTUS  Inject 15 Units into the skin 2 times daily     lamoTRIgine 25 MG tablet  Commonly known as: LAMICTAL  Take 1 tablet by mouth 2 times daily            CONTINUE taking these medications      metFORMIN 500 MG tablet  Commonly known as: GLUCOPHAGE  Take 1 tablet by mouth in the morning and 1 tablet in the evening. Take with meals.                Where to Get Your Medications        These medications were sent to [address] [place] [address], [address] [phone]  [facility][note 3] [address] [address]      Phone: [phone]   aspirin 81 MG chewable tablet  atorvastatin 20 MG tablet  insulin glargine 100 UNIT/ML injection vial  lamoTRIgine 25 MG tablet           ISSUES TO BE ADDRESSED AT HOSPITAL FOLLOW-UP VISIT:    Advised patient to follow-up closely with PCP upon discharge for management of chronic medical issues  Please see the detailed discharge directions delivered from earlier today! Condition on Discharge: gradually improving  Discharge Disposition: Home    Recommended Follow Up:  PAM Mueller NP  [facility]  [phone]    Follow up on 9/29/2022  9:15 am    Followup Appointments Scheduled at Time of Discharge:  Future Appointments   Date Time Provider Bobo Delgadillo   9/29/2022  [address] [place] [address], APRN - NP N [facility] Cardio [id number]        Discharge Instructions:   Please see the discharge paperwork. Patient was seen at bedside today, and the examination shows improvement since yesterday. Detailed discharge directions delivered to the patient by myself and our nursing staff, who verbalizes understanding and is very happy and satisfied with the plan. Patient has been advised to continue all medications as prescribed and advised, and f/u with PCP within 1 week. Patient is stable from medical standpoint to be discharged. Total time spent during patient evaluation and assessment, discussion with the nurse/family, addressing discharge medications/scripts and coordination of care for safe discharge was in excess of 35 minutes.       Signed Electronically:    Chico Nichole MD  3:55 PM 8/19/2022

## 2022-08-19 NOTE — CARE COORDINATION
Spoke with pt who reports is no longer willing to go to any treatment rehab and just wants to dc home. Pt confirmed and showed SW the lists of rehab options provided from RN GARRY Yepez. Pt confirmed has a place to go for dc and has his own vehicle located int he parking lot of [facility] for transport. Pt denies any further dc needs at this time. SW offered her contact information for any further assistance that may be needed.

## 2022-08-19 NOTE — CONSULTS
Comprehensive Nutrition Assessment    Type and Reason for Visit:  Initial, Consult    Nutrition Recommendations/Plan:   Follow for DM education     Malnutrition Assessment:  Malnutrition Status:  No malnutrition (08/19/22 1413)    Context:  Acute Illness     Findings of the 6 clinical characteristics of malnutrition:  Energy Intake:  No significant decrease in energy intake  Weight Loss:  No significant weight loss     Body Fat Loss:  No significant body fat loss     Muscle Mass Loss:  No significant muscle mass loss    Fluid Accumulation:  No significant fluid accumulation     Strength:  Not Performed    Nutrition Assessment:    Received consult for DM education. Aware pt has not been taking any meds and does not have a PCP. Have attempted visits x 2--both times asleep. Per nursing tells everyone \"let me sleep. \"  Will try again in a.m. Nutrition Related Findings:    A1C 11.5 Wound Type: None       Current Nutrition Intake & Therapies:    Average Meal Intake: %  Average Supplements Intake: None Ordered  ADULT DIET; Regular; 3 carb choices (45 gm/meal); No Added Salt (3-4 gm)    Anthropometric Measures:  Height: 5' 5\" (165.1 cm)  Ideal Body Weight (IBW): 136 lbs (62 kg)    Admission Body Weight: 150 lb (68 kg)  Current Body Weight: 147 lb 9.6 oz (67 kg), 108.5 % IBW. Current BMI (kg/m2): 24.6  Usual Body Weight: 144 lb (65.3 kg) (5/2022)  % Weight Change (Calculated): 2.5    BMI Categories: Normal Weight (BMI 18.5-24. 9)    Nutrition Diagnosis:   Altered nutrition-related lab values related to endocrine dysfuntion as evidenced by lab values    Nutrition Interventions:   Food and/or Nutrient Delivery: Continue Current Diet  Nutrition Education/Counseling: Education needed  Coordination of Nutrition Care: No recommendation at this time       Goals:     Goals: Meet at least 75% of estimated needs, PO intake 50% or greater       Nutrition Monitoring and Evaluation:   Behavioral-Environmental Outcomes: Readiness for Change, Knowledge or Skill, Beliefs and Attitutes  Food/Nutrient Intake Outcomes: Food and Nutrient Intake  Physical Signs/Symptoms Outcomes: Biochemical Data, Weight, Skin    Discharge Planning:    Continue current diet     Layla Go MS, RD, LD  Contact: [phone]

## 2022-08-19 NOTE — DISCHARGE INSTRUCTIONS
PRIMARY CARE PHYSICIAN (PCP) FOLLOW UP . .. PATIENT INSTRUCTIONS:    Please make sure you follow-up with your primary care physician within [address] [place] [address] When you call, ask for a \" [address] Steiner \" . .. and take this paperwork with you. Please fill, TODAY, all the prescriptions provided/e-scribed in the hospital upon discharge. Please take all of your new prescription meds to your primary care physician to update your medical record . .. and to get any refills. Strictly advised patient to quit illegal drug use    Advised patient to monitor blood sugar levels 4 times a day, continue with diabetic meds as prescribed, keep track of blood sugar levels in a log form, and follow-up closely with PCP for medication adjustment and adequate control of diabetes mellitus. Patient is advised to watch diet for high carb food and utilize 4-5 small meals a day. The patient verbalizes understanding and promises to take care of diet pattern and diabetes mellitus! Personal history of noncompliance with medical treatment and regimen  STRICTLY advised patient to be compliant with meds and medical recommendations  Advised patient to get established with a PCP upon discharge, take care of meds, diet and bring patient's self and life back on track    Advised patient to follow-up closely with the psychiatry as an outpatient and follow their recommendations as below:    Psychiatric recommendations from today . .. 08/19/2022:

## 2022-08-19 NOTE — PROGRESS NOTES
Patient spoken to about his rehab options. He stated, \"None of those options have worked for me. I'd just like to go home. \" Dr. Amor November notified via secure message, waiting for response.

## 2022-08-22 ENCOUNTER — CARE COORDINATION (OUTPATIENT)
Dept: CASE MANAGEMENT | Age: 47
End: 2022-08-22

## 2022-08-22 NOTE — CARE COORDINATION
[facility] Transitions Initial Follow Up Call    Call within 2 business days of discharge: Yes    Patient: Sebastián Lewis Patient : 1975   MRN: [id number]  Reason for Admission: CP  Discharge Date: 22 RARS: Readmission Risk Score: 10.2      Last Discharge [facility]       Date Complaint Diagnosis Description Type Department Provider    22 Mental Health Problem Chest pain, unspecified type . .. ED to Hosp-Admission (Discharged) (ADMITTED) L G Saúl Dow MD; Remy Avalos. .. Attempted to contact patient for initial follow up transition call. Left voicemail message to return call with an update on condition since discharge. Contact information provided. Will try again.           Care Transitions 24 Hour Call    Care Transitions Interventions         Follow Up  Future Appointments   Date Time Provider Bobo Delgadillo   2022  9:15 AM PAM Melton - NP N [facility] Cardio JOSEP-CARLOS Huddleston LPN

## 2022-08-23 ENCOUNTER — CARE COORDINATION (OUTPATIENT)
Dept: CASE MANAGEMENT | Age: 47
End: 2022-08-23

## 2022-08-23 NOTE — CARE COORDINATION
[facility] Transitions Initial Follow Up Call    Call within 2 business days of discharge: Yes    Patient: Nora Gallegos Patient : 1975   MRN: [id number]  Reason for Admission: CP  Discharge Date: 22 RARS: Readmission Risk Score: 10.2      Last Discharge [facility]       Date Complaint Diagnosis Description Type Department Provider    22 Mental Health Problem Chest pain, unspecified type . .. ED to Hosp-Admission (Discharged) (ADMITTED) [facility] PR[facility] Saúl Mills MD; Kaylin Alberto. .. Attempted to contact patient for initial follow up transition call. Left voicemail message to return call with an update on condition since discharge. Contact information provided. No further outreach scheduled. Patient returned call. Stated he isn't doing good. He doesn't have his medication. He is on insulin and can't afford it. He denies cp, sob, fever, chills, cough or wheezing. He is tired, weak and anxious. He doesn't have any bladder or bowel problems. He has a follow up with the cardiologist scheduled. No PCP. I explained to him the importance of getting a PCP. He stated he is so mixed up he doesn't know where to begin. He wanted information on assistance with medication and inpatient therapy. Will send a referral to social work. No further outreach scheduled. Transitions of Care Initial Call    Was this an external facility discharge? No Discharge Facility: [facility]      Challenges to be reviewed by the provider   Additional needs identified to be addressed with provider: No  none             Method of communication with provider : none    Advance Care Planning:   Does patient have an Advance Directive: not on file. LPN Care Coordinator contacted the patient by telephone to perform post hospital discharge assessment. Verified name and  with patient as identifiers. Provided introduction to self, and explanation of the LPN CC role.      LPN CC reviewed discharge instructions, medical action plan and red flags with patient who verbalized understanding. Patient given an opportunity to ask questions and does not have any further questions or concerns at this time. Were discharge instructions available to patient? They were at his home. Reviewed appropriate site of care based on symptoms and resources available to patient including: PCP  Specialist.   Medication reconciliation was performed with patient, who verbalizes understanding of administration of home medications. Advised obtaining a 90-day supply of all daily and as-needed medications. Was patient discharged with a pulse oximeter? no    LPN CC provided contact information. No further follow-up call indicated based on severity of symptoms and risk factors.   Plan for next call: referrals-Social work        Care Transitions 24 Hour Call    Care Transitions Interventions         Follow Up  Future Appointments   Date Time Provider Bobo Delgadillo   9/29/2022  9:15 AM PAM Willams NP Cardio [facility]       Dariana Shane LPN

## 2022-08-25 ENCOUNTER — CARE COORDINATION (OUTPATIENT)
Dept: CARE COORDINATION | Age: 47
End: 2022-08-25

## 2022-08-25 NOTE — CARE COORDINATION
Incoming call from patient. He reported that he has meds at the pharmacy that he cannot afford. Med total approx $140. Pt monthly income $1700, rent $600. Patient has not picked up any meds this month. SW expressed the importance of having a PCP to refill meds. Pt stated he has not been pleased with recent PCPs. SW offered to contact local PCPs in his area to schedule new patient visit near the time he receives his SS. Pt declined, stating he was too tired at the moment. Offered to provide him with the number for him to call at his convenience. Patient reiterated that he was tired and going to bed, he will call SW back. SW will f/u.

## 2022-09-06 ENCOUNTER — CARE COORDINATION (OUTPATIENT)
Dept: CARE COORDINATION | Age: 47
End: 2022-09-06

## 2022-09-16 ENCOUNTER — APPOINTMENT (OUTPATIENT)
Dept: GENERAL RADIOLOGY | Facility: HOSPITAL | Age: 47
End: 2022-09-16

## 2022-09-16 ENCOUNTER — HOSPITAL ENCOUNTER (EMERGENCY)
Facility: HOSPITAL | Age: 47
Discharge: HOME | End: 2022-09-16
Attending: STUDENT IN AN ORGANIZED HEALTH CARE EDUCATION/TRAINING PROGRAM | Admitting: STUDENT IN AN ORGANIZED HEALTH CARE EDUCATION/TRAINING PROGRAM

## 2022-09-16 ENCOUNTER — NURSE TRIAGE (OUTPATIENT)
Dept: CALL CENTER | Facility: HOSPITAL | Age: 47
End: 2022-09-16

## 2022-09-16 VITALS
WEIGHT: 140 LBS | OXYGEN SATURATION: 98 % | HEART RATE: 89 BPM | SYSTOLIC BLOOD PRESSURE: 153 MMHG | TEMPERATURE: 98.4 F | BODY MASS INDEX: 23.32 KG/M2 | RESPIRATION RATE: 22 BRPM | HEIGHT: 65 IN | DIASTOLIC BLOOD PRESSURE: 103 MMHG

## 2022-09-16 DIAGNOSIS — S99.921A INJURY OF RIGHT FOOT, INITIAL ENCOUNTER: ICD-10-CM

## 2022-09-16 DIAGNOSIS — S97.101A CRUSHING INJURY OF TOE OF RIGHT FOOT, INITIAL ENCOUNTER: Primary | ICD-10-CM

## 2022-09-16 DIAGNOSIS — B35.1 ONYCHOMYCOSIS: ICD-10-CM

## 2022-09-16 PROCEDURE — 73630 X-RAY EXAM OF FOOT: CPT

## 2022-09-16 PROCEDURE — 99282 EMERGENCY DEPT VISIT SF MDM: CPT

## 2022-09-16 NOTE — ED PROVIDER NOTES
"EMERGENCY DEPARTMENT HISTORY AND PHYSICAL EXAM    Patient Name: Rah Shin    Chief Complaint   Patient presents with   • Foot Injury   • Toe Pain       History of Presenting Illness:  Rah Shin is a 46 y.o. male with no significant past medical who presents emerged department due to right foot pain.    Pain states he dropped swelling on his foot a few days ago.  States he grew concerned as his pain is not completely improved he is worried he might of fractured one of his toes.  States he dropped a heavy he cannot recall exactly what.  He still has been ambulatory.  No associated ankle pain.  Noted some bruising to his foot but no other lacerations per report    Past Medical History:   Past Medical History:   Diagnosis Date   • ADD (attention deficit disorder)    • Anxiety    • Bipolar 1 disorder (HCC)    • Depression     major depressive disorder   • Diabetes mellitus (HCC)    • GERD (gastroesophageal reflux disease)    • Hypertension    • Kidney stones        Past Surgical History:   Past Surgical History:   Procedure Laterality Date   • CARDIAC CATHETERIZATION     • INFECTED SKIN DEBRIDEMENT      MRSA   • TONSILLECTOMY         Family History:   Family History   Problem Relation Age of Onset   • Hyperlipidemia Mother    • Hypertension Mother    • Kidney disease Mother    • Heart disease Mother    • Hyperlipidemia Father        Social History:   Denies tobacco  Denies EtOH  Denies marijuana, cocaine, or IV drugs    Allergies:   Allergies   Allergen Reactions   • Contrast Dye Hives       Medications:   No daily home medications    Review of Systems:  A full review of systems was obtained and is negative unless otherwise stated in HPI.    Physical Exam:  VS: BP (!) 153/103 (BP Location: Left arm, Patient Position: Sitting)   Pulse 89   Temp 98.4 °F (36.9 °C) (Oral)   Resp 22   Ht 165.1 cm (65\")   Wt 63.5 kg (140 lb)   SpO2 98%   BMI 23.30 kg/m²   GENERAL: Well-appearing middle-age man " sitting in stretcher no acute; well nourished, well developed, awake, alert, no acute distress, nontoxic appearing, comfortable  MUSCULOSKELETAL/EXTREMITIES: Ecchymosis with slight tenderness of the distal toes mostly second and third I seen the photo below; otherwise extremities without obvious deformity, no cyanosis or clubbing  SKIN: Healing laceration near the nailbed of the third digit as seen in photo below; some ecchymosis but otherwise skin is warm and dry with no obvious rashes    PSYCHIATRIC: alert, pleasant and cooperative. Appropriate mood and affect.      Radiology:   XR Foot 3+ View Right   ED Interpretation   No evidence of tuft fracture in the area of ecchymosis over the second and third digits.          Medical Decision Making:  Rah Shin is a 46 y.o. male after crush injury to his foot with toe pain.    Exam reassuring.  Exam    Imaging was ordered and reviewed.  X-ray per my read with no evidence of tuft fracture in the area of his swelling and pain.    Nursing notes were reviewed.    Patient's presentation is most consistent with crush injury with likely contusion.  No evidence of tuft fracture on my read of x-ray.  Definitely no open fracture.  Likely prior toenail laceration that is well-healed given his exam.  Evidence of onychomycosis.  In the setting of his diabetes I would consider diabetic foot ulcer but given all the swelling only occurred after a crush injury I feel this is less likely and his exam is not consistent with a diabetic foot ulcer.    Patient discharged home with plan to go to his primary care provider schedule later today and return emerged part if symptoms worsen.      ED Diagnosis:  Injury of right foot, initial encounter; Crushing injury of toe of right foot, initial encounter; Onychomycosis      Disposition: to home    Follow up plan: PCP follow up as scheduled later today, return to ED immediately if symptoms worsen        Signed:  Tee Michaels,  MD  Emergency Medicine Physician    Please note that portions of this note were completed with a voice recognition program.      Tee Michaels MD  09/16/22 [id number]
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
## 2022-09-16 NOTE — TELEPHONE ENCOUNTER
"Caller states foot injury on Monday and he is diabetic. Caller states getting worried as first three toes looking dark. Caller states  numbness and now swelling going up leg. Caller denies fever. Caller denies foot cool to touch. Caller advised per guideline.      Reason for Disposition  • Purple or black skin on foot or toe  • [1] Purple or black skin on foot or toe AND [2] new or increased    Additional Information  • Negative: Followed a foot injury  • Negative: Ankle pain is main symptom  • Negative: Thigh or calf pain is main symptom  • Negative: Entire foot is cool or blue in comparison to other foot  • Diabetes mellitus  • Negative: Sounds like a life-threatening emergency to the triager  • Negative: Caused by an animal bite  • Negative: Caused by a human bite  • Negative: Foreign body in skin (e.g., splinter, sliver)  • Negative: Injury is a puncture wound  • Negative: Followed a foot or ankle injury  • Negative: SEVERE pain  • Negative: Foot is cool or blue in comparison to other side  • Negative: [1] Looks infected (spreading redness, red streak, or pus) AND [2] fever  • Negative: Patient sounds very sick or weak to the triager  • Negative: Fever > 100.4 F (38.0 C)  • Negative: [1] Drainage from foot AND [2] new or increased  • Negative: [1] Foul-smelling odor from foot AND [2] new or increased  • Negative: [1] Spreading redness or red streak AND [2] area > 2 in. (5 cm)    Answer Assessment - Initial Assessment Questions  1. ONSET: \"When did the pain start?\"       Wednesday   2. LOCATION: \"Where is the pain located?\"       Right foot and first three and dropped something on it   3. PAIN: \"How bad is the pain?\"    (Scale 1-10; or mild, moderate, severe)   - MILD (1-3): doesn't interfere with normal activities.    - MODERATE (4-7): interferes with normal activities (e.g., work or school) or awakens from sleep, limping.    - SEVERE (8-10): excruciating pain, unable to do any normal activities, unable to walk. " "      Not hurting but numb   4. WORK OR EXERCISE: \"Has there been any recent work or exercise that involved this part of the body?\"       Injury from dropping something on it   5. CAUSE: \"What do you think is causing the foot pain?\"      Dropped something on it   6. OTHER SYMPTOMS: \"Do you have any other symptoms?\" (e.g., leg pain, rash, fever, numbness)      The whole foot numb and swelling. Redness and toes and some discoloration   7. PREGNANCY: \"Is there any chance you are pregnant?\" \"When was your last menstrual period?\"    Answer Assessment - Initial Assessment Questions  1. SYMPTOM: \"What's the main symptom you're concerned about?\" (e.g., rash, sore, callus, drainage, numbness)      Injury now numbness, swelling and discolored  2. LOCATION: \"Where is the  injury located?\" (e.g., foot/toe, top/bottom, left/right)      Foot   3. ONSET: \"When did the  injury  start?\"      Monday   4. PAIN: \"Is there any pain?\" If Yes, ask: \"How bad is it?\" (Scale: 1-10; mild, moderate, severe)        5. CAUSE: \"What do you think is causing the symptoms?\"      Injury   6. OTHER SYMPTOMS: \"Do you have any other symptoms?\" (e.g., fever, weakness)      Denies Fever   7. PREGNANCY: \"Is there any chance you are pregnant?\" \"When was your last menstrual period?\"    Protocols used: FOOT PAIN-ADULT-, DIABETES - FOOT PROBLEMS AND QUESTIONS-ADULT-      "

## 2022-09-28 ENCOUNTER — TELEPHONE (OUTPATIENT)
Dept: CARDIOLOGY CLINIC | Age: 47
End: 2022-09-28

## 2022-09-29 ENCOUNTER — TELEPHONE (OUTPATIENT)
Dept: CARDIOLOGY CLINIC | Age: 47
End: 2022-09-29

## 2022-11-02 ENCOUNTER — HOSPITAL ENCOUNTER (EMERGENCY)
Facility: HOSPITAL | Age: 47
Discharge: LEFT BEFORE DISCHARGE | End: 2022-11-03
Attending: STUDENT IN AN ORGANIZED HEALTH CARE EDUCATION/TRAINING PROGRAM

## 2022-11-02 ENCOUNTER — HOSPITAL ENCOUNTER (EMERGENCY)
Facility: HOSPITAL | Age: 47
Discharge: LEFT BEFORE DISCHARGE | End: 2022-11-02

## 2022-11-02 VITALS
HEIGHT: 65 IN | RESPIRATION RATE: 20 BRPM | BODY MASS INDEX: 22.99 KG/M2 | DIASTOLIC BLOOD PRESSURE: 88 MMHG | HEART RATE: 99 BPM | TEMPERATURE: 98.8 F | OXYGEN SATURATION: 100 % | SYSTOLIC BLOOD PRESSURE: 117 MMHG | WEIGHT: 138 LBS

## 2022-11-02 PROCEDURE — 99211 OFF/OP EST MAY X REQ PHY/QHP: CPT | Performed by: STUDENT IN AN ORGANIZED HEALTH CARE EDUCATION/TRAINING PROGRAM

## 2022-11-02 PROCEDURE — 99211 OFF/OP EST MAY X REQ PHY/QHP: CPT

## 2022-11-02 PROCEDURE — 82962 GLUCOSE BLOOD TEST: CPT

## 2022-11-03 ENCOUNTER — HOSPITAL ENCOUNTER (EMERGENCY)
Facility: HOSPITAL | Age: 47
Discharge: HOME | End: 2022-11-03
Admitting: STUDENT IN AN ORGANIZED HEALTH CARE EDUCATION/TRAINING PROGRAM

## 2022-11-03 VITALS
TEMPERATURE: 98.2 F | DIASTOLIC BLOOD PRESSURE: 71 MMHG | RESPIRATION RATE: 18 BRPM | WEIGHT: 138 LBS | BODY MASS INDEX: 22.99 KG/M2 | OXYGEN SATURATION: 99 % | HEART RATE: 94 BPM | HEIGHT: 65 IN | SYSTOLIC BLOOD PRESSURE: 100 MMHG

## 2022-11-03 DIAGNOSIS — Z91.14 HISTORY OF MEDICATION NONCOMPLIANCE: ICD-10-CM

## 2022-11-03 DIAGNOSIS — F15.10 METHAMPHETAMINE ABUSE: Primary | ICD-10-CM

## 2022-11-03 DIAGNOSIS — Z86.39 HISTORY OF DIABETES MELLITUS: ICD-10-CM

## 2022-11-03 DIAGNOSIS — Z86.59 HISTORY OF BIPOLAR DISORDER: ICD-10-CM

## 2022-11-03 LAB
A-A DO2: 13.6 MMHG
ACETONE BLD QL: NEGATIVE
ALBUMIN SERPL-MCNC: 4.3 G/DL (ref 3.5–5.2)
ALBUMIN/GLOB SERPL: 1.2 G/DL
ALP SERPL-CCNC: 103 U/L (ref 39–117)
ALT SERPL W P-5'-P-CCNC: 9 U/L (ref 1–41)
AMPHET+METHAMPHET UR QL: POSITIVE
AMPHETAMINES UR QL: POSITIVE
ANION GAP SERPL CALCULATED.3IONS-SCNC: 11 MMOL/L (ref 5–15)
ARTERIAL PATENCY WRIST A: ABNORMAL
AST SERPL-CCNC: 14 U/L (ref 1–40)
ATMOSPHERIC PRESS: 753 MMHG
BARBITURATES UR QL SCN: NEGATIVE
BASE EXCESS BLDA CALC-SCNC: 3.6 MMOL/L (ref 0–2)
BASOPHILS # BLD AUTO: 0.04 10*3/MM3 (ref 0–0.2)
BASOPHILS NFR BLD AUTO: 0.9 % (ref 0–1.5)
BDY SITE: ABNORMAL
BENZODIAZ UR QL SCN: NEGATIVE
BILIRUB SERPL-MCNC: 0.6 MG/DL (ref 0–1.2)
BILIRUB UR QL STRIP: NEGATIVE
BODY TEMPERATURE: 37 C
BUN SERPL-MCNC: 23 MG/DL (ref 6–20)
BUN/CREAT SERPL: 24.7 (ref 7–25)
BUPRENORPHINE SERPL-MCNC: NEGATIVE NG/ML
CALCIUM SPEC-SCNC: 9.5 MG/DL (ref 8.6–10.5)
CANNABINOIDS SERPL QL: NEGATIVE
CHLORIDE SERPL-SCNC: 94 MMOL/L (ref 98–107)
CLARITY UR: CLEAR
CO2 SERPL-SCNC: 29 MMOL/L (ref 22–29)
COCAINE UR QL: NEGATIVE
COHGB MFR BLD: 1.6 % (ref 0–5)
COLOR UR: ABNORMAL
CREAT SERPL-MCNC: 0.93 MG/DL (ref 0.76–1.27)
DEPRECATED RDW RBC AUTO: 36.4 FL (ref 37–54)
EGFRCR SERPLBLD CKD-EPI 2021: 101.3 ML/MIN/1.73
EOSINOPHIL # BLD AUTO: 0.2 10*3/MM3 (ref 0–0.4)
EOSINOPHIL NFR BLD AUTO: 4.3 % (ref 0.3–6.2)
ERYTHROCYTE [DISTWIDTH] IN BLOOD BY AUTOMATED COUNT: 11.9 % (ref 12.3–15.4)
ETHANOL UR QL: <0.01 %
FLUAV RNA RESP QL NAA+PROBE: NOT DETECTED
FLUBV RNA RESP QL NAA+PROBE: NOT DETECTED
GLOBULIN UR ELPH-MCNC: 3.7 GM/DL
GLUCOSE BLDC GLUCOMTR-MCNC: 379 MG/DL (ref 70–130)
GLUCOSE SERPL-MCNC: 350 MG/DL (ref 65–99)
GLUCOSE UR STRIP-MCNC: ABNORMAL MG/DL
HCO3 BLDA-SCNC: 27.3 MMOL/L (ref 20–26)
HCT VFR BLD AUTO: 46.3 % (ref 37.5–51)
HCT VFR BLD CALC: 49.2 % (ref 38–51)
HGB BLD-MCNC: 16.7 G/DL (ref 13–17.7)
HGB BLDA-MCNC: 16.1 G/DL (ref 14–18)
HGB UR QL STRIP.AUTO: NEGATIVE
IMM GRANULOCYTES # BLD AUTO: 0.01 10*3/MM3 (ref 0–0.05)
IMM GRANULOCYTES NFR BLD AUTO: 0.2 % (ref 0–0.5)
KETONES UR QL STRIP: ABNORMAL
LEUKOCYTE ESTERASE UR QL STRIP.AUTO: NEGATIVE
LIPASE SERPL-CCNC: 16 U/L (ref 13–60)
LYMPHOCYTES # BLD AUTO: 1.87 10*3/MM3 (ref 0.7–3.1)
LYMPHOCYTES NFR BLD AUTO: 40 % (ref 19.6–45.3)
Lab: ABNORMAL
MAGNESIUM SERPL-MCNC: 2 MG/DL (ref 1.6–2.6)
MCH RBC QN AUTO: 30.2 PG (ref 26.6–33)
MCHC RBC AUTO-ENTMCNC: 36.1 G/DL (ref 31.5–35.7)
MCV RBC AUTO: 83.7 FL (ref 79–97)
METHADONE UR QL SCN: NEGATIVE
METHGB BLD QL: 0.4 % (ref 0–3)
MODALITY: ABNORMAL
MONOCYTES # BLD AUTO: 0.35 10*3/MM3 (ref 0.1–0.9)
MONOCYTES NFR BLD AUTO: 7.5 % (ref 5–12)
NEUTROPHILS NFR BLD AUTO: 2.21 10*3/MM3 (ref 1.7–7)
NEUTROPHILS NFR BLD AUTO: 47.1 % (ref 42.7–76)
NITRITE UR QL STRIP: NEGATIVE
NOTE: ABNORMAL
NRBC BLD AUTO-RTO: 0 /100 WBC (ref 0–0.2)
OPIATES UR QL: NEGATIVE
OXYCODONE UR QL SCN: NEGATIVE
OXYHGB MFR BLDV: 96.7 % (ref 94–99)
PCO2 BLDA: 37.8 MM HG (ref 35–45)
PCO2 TEMP ADJ BLD: 37.8 MM HG (ref 35–45)
PCP UR QL SCN: NEGATIVE
PH BLDA: 7.47 PH UNITS (ref 7.35–7.45)
PH UR STRIP.AUTO: 5.5 [PH] (ref 5–8)
PH, TEMP CORRECTED: 7.47 PH UNITS (ref 7.35–7.45)
PLATELET # BLD AUTO: 269 10*3/MM3 (ref 140–450)
PMV BLD AUTO: 9 FL (ref 6–12)
PO2 BLDA: 91.9 MM HG (ref 83–108)
PO2 TEMP ADJ BLD: 91.9 MM HG (ref 83–108)
POTASSIUM BLDA-SCNC: 3.8 MMOL/L (ref 3.5–5.2)
POTASSIUM SERPL-SCNC: 4 MMOL/L (ref 3.5–5.2)
PROPOXYPH UR QL: NEGATIVE
PROT SERPL-MCNC: 8 G/DL (ref 6–8.5)
PROT UR QL STRIP: ABNORMAL
RBC # BLD AUTO: 5.53 10*6/MM3 (ref 4.14–5.8)
SAO2 % BLDCOA: 98.7 % (ref 94–99)
SARS-COV-2 RNA RESP QL NAA+PROBE: NOT DETECTED
SODIUM BLDA-SCNC: 135 MMOL/L (ref 136–145)
SODIUM SERPL-SCNC: 134 MMOL/L (ref 136–145)
SP GR UR STRIP: 1.03 (ref 1–1.03)
T4 FREE SERPL-MCNC: 1.02 NG/DL (ref 0.93–1.7)
TRICYCLICS UR QL SCN: NEGATIVE
TSH SERPL DL<=0.05 MIU/L-ACNC: 1.06 UIU/ML (ref 0.27–4.2)
UROBILINOGEN UR QL STRIP: ABNORMAL
VENTILATOR MODE: ABNORMAL
WBC NRBC COR # BLD: 4.68 10*3/MM3 (ref 3.4–10.8)

## 2022-11-03 PROCEDURE — 87636 SARSCOV2 & INF A&B AMP PRB: CPT | Performed by: NURSE PRACTITIONER

## 2022-11-03 PROCEDURE — 82077 ASSAY SPEC XCP UR&BREATH IA: CPT | Performed by: NURSE PRACTITIONER

## 2022-11-03 PROCEDURE — 81003 URINALYSIS AUTO W/O SCOPE: CPT | Performed by: NURSE PRACTITIONER

## 2022-11-03 PROCEDURE — 82805 BLOOD GASES W/O2 SATURATION: CPT

## 2022-11-03 PROCEDURE — 83735 ASSAY OF MAGNESIUM: CPT | Performed by: NURSE PRACTITIONER

## 2022-11-03 PROCEDURE — 84443 ASSAY THYROID STIM HORMONE: CPT | Performed by: NURSE PRACTITIONER

## 2022-11-03 PROCEDURE — 83690 ASSAY OF LIPASE: CPT | Performed by: NURSE PRACTITIONER

## 2022-11-03 PROCEDURE — 82375 ASSAY CARBOXYHB QUANT: CPT

## 2022-11-03 PROCEDURE — 80053 COMPREHEN METABOLIC PANEL: CPT | Performed by: NURSE PRACTITIONER

## 2022-11-03 PROCEDURE — 83050 HGB METHEMOGLOBIN QUAN: CPT

## 2022-11-03 PROCEDURE — 36600 WITHDRAWAL OF ARTERIAL BLOOD: CPT

## 2022-11-03 PROCEDURE — 99284 EMERGENCY DEPT VISIT MOD MDM: CPT

## 2022-11-03 PROCEDURE — 84439 ASSAY OF FREE THYROXINE: CPT | Performed by: NURSE PRACTITIONER

## 2022-11-03 PROCEDURE — 80306 DRUG TEST PRSMV INSTRMNT: CPT | Performed by: NURSE PRACTITIONER

## 2022-11-03 PROCEDURE — C9803 HOPD COVID-19 SPEC COLLECT: HCPCS

## 2022-11-03 PROCEDURE — 85025 COMPLETE CBC W/AUTO DIFF WBC: CPT | Performed by: NURSE PRACTITIONER

## 2022-11-03 PROCEDURE — 82009 KETONE BODYS QUAL: CPT | Performed by: NURSE PRACTITIONER

## 2022-11-03 PROCEDURE — 84481 FREE ASSAY (FT-3): CPT | Performed by: NURSE PRACTITIONER

## 2022-11-03 RX ORDER — SODIUM CHLORIDE 0.9 % (FLUSH) 0.9 %
10 SYRINGE (ML) INJECTION AS NEEDED
Status: DISCONTINUED | OUTPATIENT
Start: 2022-11-03 | End: 2022-11-03 | Stop reason: HOSPADM

## 2022-11-03 RX ADMIN — SODIUM CHLORIDE 1000 ML: 9 INJECTION, SOLUTION INTRAVENOUS at 19:06
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
## 2022-11-04 LAB — T3FREE SERPL-MCNC: 2.31 PG/ML (ref 2–4.4)

## 2022-11-04 NOTE — DISCHARGE INSTRUCTIONS
Discontinue methamphetamine use which is likely causing your paranoia and fear; f/u with pcp for management of your medications; consider [facility] ministries, [facility], [facility]    Follow up with one of the [facility] physician groups below to setup primary care. If you have trouble making an appointment, please call the [facility] Nurse Line at [phone]    [facility] - [place]  [address] [place] [address]  [id number] [phone]    [facility] Internal Medicine - [address] [address], [address], [place] [address] [phone]    [facility] Family & Internal Medicine - [address] [address], [address], [place], [place] [address] [phone]     [facility] Primary Care (Hospitals in [place]) - [place]  [address], [address], [place], [place] [id number] [phone]    [facility] Primary Care - [address], [address] [phone]    [facility] Family Medicine - [address], [place] [address] [phone]    [facility] - [place]  [address] [place], [place], [address] [phone]    [facility] - [place]  [address], [address]  [phone]    [facility] Primary Care - [place]  [address] [address] [phone]    [facility] - [place]  [address] [place] [address], [facility], [place], [place], [address] [phone]        PEDIATRIC:    [facility] Pediatrics - [address] [address], [address], [place], [place] [id number] [phone]

## 2023-01-24 ENCOUNTER — OFFICE VISIT (OUTPATIENT)
Dept: FAMILY MEDICINE CLINIC | Facility: CLINIC | Age: 48
End: 2023-01-24
Payer: MEDICARE

## 2023-01-24 VITALS
SYSTOLIC BLOOD PRESSURE: 140 MMHG | WEIGHT: 149 LBS | DIASTOLIC BLOOD PRESSURE: 90 MMHG | HEART RATE: 107 BPM | BODY MASS INDEX: 24.83 KG/M2 | TEMPERATURE: 98.5 F | HEIGHT: 65 IN | OXYGEN SATURATION: 96 %

## 2023-01-24 DIAGNOSIS — E11.40 TYPE 2 DIABETES MELLITUS WITH DIABETIC NEUROPATHY, WITHOUT LONG-TERM CURRENT USE OF INSULIN: Primary | ICD-10-CM

## 2023-01-24 DIAGNOSIS — F31.9 BIPOLAR 1 DISORDER: ICD-10-CM

## 2023-01-24 DIAGNOSIS — F19.90 SUBSTANCE USE DISORDER: ICD-10-CM

## 2023-01-24 LAB
EXPIRATION DATE: ABNORMAL
HBA1C MFR BLD: 10.3 %
Lab: ABNORMAL

## 2023-01-24 PROCEDURE — 83036 HEMOGLOBIN GLYCOSYLATED A1C: CPT | Performed by: FAMILY MEDICINE

## 2023-01-24 PROCEDURE — 99214 OFFICE O/P EST MOD 30 MIN: CPT | Performed by: FAMILY MEDICINE

## 2023-01-24 PROCEDURE — 3046F HEMOGLOBIN A1C LEVEL >9.0%: CPT | Performed by: FAMILY MEDICINE

## 2023-01-25 ENCOUNTER — TELEPHONE (OUTPATIENT)
Dept: FAMILY MEDICINE CLINIC | Facility: CLINIC | Age: 48
End: 2023-01-25

## 2023-01-25 NOTE — TELEPHONE ENCOUNTER
Caller: Rah Shin    Relationship to patient: Self    Best call back number: [phone]    Patient is needing:   PATIENT CALLED AND ADVISED THAT HE WOULD NEED TO HAVE AN ALTERNATIVE MEDICATION CALLED IN FOR THE empagliflozin (JARDIANCE) 25 MG tablet tablet. PATIENT ADVISED WOULDN'T BE ABLE TO TAKE FARXIGA.     PLEASE CONTACT PATIENT AND ADVISE IF MEDICATION CAN BE CHANGED.

## 2023-01-29 ENCOUNTER — HOSPITAL ENCOUNTER (EMERGENCY)
Facility: HOSPITAL | Age: 48
Discharge: LEFT BEFORE DISCHARGE | End: 2023-01-29
Payer: MEDICARE

## 2023-01-29 ENCOUNTER — HOSPITAL ENCOUNTER (EMERGENCY)
Facility: HOSPITAL | Age: 48
Discharge: HOME | End: 2023-01-29
Admitting: EMERGENCY MEDICINE
Payer: MEDICARE

## 2023-01-29 VITALS
HEART RATE: 89 BPM | HEIGHT: 65 IN | OXYGEN SATURATION: 100 % | BODY MASS INDEX: 24.99 KG/M2 | SYSTOLIC BLOOD PRESSURE: 134 MMHG | WEIGHT: 150 LBS | DIASTOLIC BLOOD PRESSURE: 82 MMHG | TEMPERATURE: 99.8 F | RESPIRATION RATE: 16 BRPM

## 2023-01-29 VITALS
SYSTOLIC BLOOD PRESSURE: 105 MMHG | OXYGEN SATURATION: 100 % | RESPIRATION RATE: 18 BRPM | BODY MASS INDEX: 24.99 KG/M2 | HEART RATE: 96 BPM | TEMPERATURE: 99.4 F | DIASTOLIC BLOOD PRESSURE: 74 MMHG | WEIGHT: 150 LBS | HEIGHT: 65 IN

## 2023-01-29 DIAGNOSIS — Z20.822 ENCOUNTER FOR SCREENING LABORATORY TESTING FOR COVID-19 VIRUS: Primary | ICD-10-CM

## 2023-01-29 LAB
FLUAV RNA RESP QL NAA+PROBE: NOT DETECTED
FLUBV RNA RESP QL NAA+PROBE: NOT DETECTED
GLUCOSE BLDC GLUCOMTR-MCNC: 205 MG/DL (ref 70–130)
SARS-COV-2 RNA RESP QL NAA+PROBE: NOT DETECTED

## 2023-01-29 PROCEDURE — 82962 GLUCOSE BLOOD TEST: CPT

## 2023-01-29 PROCEDURE — 87636 SARSCOV2 & INF A&B AMP PRB: CPT | Performed by: STUDENT IN AN ORGANIZED HEALTH CARE EDUCATION/TRAINING PROGRAM

## 2023-01-29 PROCEDURE — 99211 OFF/OP EST MAY X REQ PHY/QHP: CPT

## 2023-01-29 PROCEDURE — 99283 EMERGENCY DEPT VISIT LOW MDM: CPT

## 2023-01-29 PROCEDURE — C9803 HOPD COVID-19 SPEC COLLECT: HCPCS

## 2023-01-30 NOTE — TELEPHONE ENCOUNTER
Tried to reach patient and message number is registration number here at [phone]. Will call [phone] informed that patient is no longer at the facility. I have no way of contacting patient to verify medication or forwarding address. Please advise

## 2023-03-22 ENCOUNTER — TELEPHONE (OUTPATIENT)
Dept: NEUROLOGY | Age: 48
End: 2023-03-22

## 2023-03-22 NOTE — TELEPHONE ENCOUNTER
Patient is requesting a return call from the nurse in regards to symptoms he is having and possible getting a sooner appt than 4/27. Patient c/o memory loss, pain in head and right eye watering, leg and feet tightness, 10 nights of night sweats. Patient states he is a Diabetic and he has a MRI scheduled on Tuesday that the Ophthalmology group scheduled for his optic nerve in his eye. He said he was told he was at risk for a stroke. Please return his call to discuss. The best time to reach him is as soon as  possible. Thank you!

## 2023-03-22 NOTE — TELEPHONE ENCOUNTER
Received a referral for this patient. Called and left a VM for patient to call our office back to where we can get him scheduled an appointment with Dr. Jose L Lantigua.

## 2023-03-23 NOTE — TELEPHONE ENCOUNTER
Called the patient back and left him a voicemail letting him know that I am adding him to the schedule tomorrow at 10am to see Dr. Josh Miller and to call us back if that time or day does not work for him.

## 2023-03-24 ENCOUNTER — OFFICE VISIT (OUTPATIENT)
Dept: NEUROLOGY | Age: 48
End: 2023-03-24
Payer: MEDICARE

## 2023-03-24 VITALS
HEART RATE: 91 BPM | DIASTOLIC BLOOD PRESSURE: 82 MMHG | SYSTOLIC BLOOD PRESSURE: 130 MMHG | WEIGHT: 147 LBS | BODY MASS INDEX: 24.49 KG/M2 | HEIGHT: 65 IN

## 2023-03-24 DIAGNOSIS — R51.9 HEADACHE DISORDER: ICD-10-CM

## 2023-03-24 DIAGNOSIS — R20.0 NUMBNESS: ICD-10-CM

## 2023-03-24 DIAGNOSIS — R41.3 MEMORY LOSS: ICD-10-CM

## 2023-03-24 DIAGNOSIS — R41.3 MEMORY LOSS: Primary | ICD-10-CM

## 2023-03-24 DIAGNOSIS — D89.89 LIGHT CHAIN DISEASE (HCC): Primary | ICD-10-CM

## 2023-03-24 DIAGNOSIS — E11.65 UNCONTROLLED TYPE 2 DIABETES MELLITUS WITH HYPERGLYCEMIA (HCC): ICD-10-CM

## 2023-03-24 DIAGNOSIS — F32.A DEPRESSION, UNSPECIFIED DEPRESSION TYPE: ICD-10-CM

## 2023-03-24 LAB
AMMONIA PLAS-SCNC: 12 UMOL/L (ref 16–60)
CK SERPL-CCNC: 109 U/L (ref 39–308)
CORTIS AM PEAK SERPL-MCNC: 3.6 UG/DL (ref 6.2–19.4)
ERYTHROCYTE [SEDIMENTATION RATE] IN BLOOD BY WESTERGREN METHOD: 10 MM/HR (ref 0–10)
HIV-1 P24 AG: NORMAL
HIV1+2 AB SERPLBLD QL IA.RAPID: NORMAL
RHEUMATOID FACT SER NEPH-ACNC: <10 IU/ML
SPECIMEN SOURCE: NORMAL
T4 FREE SERPL-MCNC: 1.24 NG/DL (ref 0.93–1.7)
TSH SERPL DL<=0.005 MIU/L-ACNC: 1.09 UIU/ML (ref 0.27–4.2)
VIT B12 SERPL-MCNC: 679 PG/ML (ref 211–946)

## 2023-03-24 PROCEDURE — 99204 OFFICE O/P NEW MOD 45 MIN: CPT | Performed by: PSYCHIATRY & NEUROLOGY

## 2023-03-24 PROCEDURE — 3075F SYST BP GE 130 - 139MM HG: CPT | Performed by: PSYCHIATRY & NEUROLOGY

## 2023-03-24 PROCEDURE — 3079F DIAST BP 80-89 MM HG: CPT | Performed by: PSYCHIATRY & NEUROLOGY

## 2023-03-24 RX ORDER — DULOXETIN HYDROCHLORIDE 30 MG/1
30 CAPSULE, DELAYED RELEASE ORAL DAILY
Status: ON HOLD | COMMUNITY

## 2023-03-24 RX ORDER — LAMOTRIGINE 100 MG/1
100 TABLET ORAL DAILY
Status: ON HOLD | COMMUNITY
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
## 2023-03-24 NOTE — PROGRESS NOTES
Review of Systems    Constitutional - No fever yes chills. yes diaphoresis or significant fatigue. HENT -  yes tinnitus or significant hearing loss. Eyes - yes sudden vision change or eye pain  Respiratory - yes significant shortness of breath or cough  Cardiovascular - yes chest pain yes palpitations or significant leg swelling  Gastrointestinal - yes abdominal swelling or pain. Genitourinary - yes difficulty urinating, dysuria  Musculoskeletal - yes back pain or myalgia. Skin - no color change or rash  Neurologic - No seizures. No lateralizing weakness. Hematologic - yes easy bruising or excessive bleeding. Psychiatric - yes severe anxiety or nervousness. All other review of systems are negative.

## 2023-03-25 LAB
DEPRECATED KAPPA LC FREE/LAMBDA SER: 1.61 {RATIO} (ref 0.26–1.65)
KAPPA LC FREE SER-MCNC: 32.9 MG/L (ref 3.3–19.4)
LAMBDA LC FREE SERPL-MCNC: 20.48 MG/L (ref 5.71–26.3)

## 2023-03-26 ENCOUNTER — APPOINTMENT (OUTPATIENT)
Dept: GENERAL RADIOLOGY | Age: 48
End: 2023-03-26
Payer: MEDICARE

## 2023-03-26 ENCOUNTER — APPOINTMENT (OUTPATIENT)
Dept: CT IMAGING | Age: 48
End: 2023-03-26
Payer: MEDICARE

## 2023-03-26 ENCOUNTER — HOSPITAL ENCOUNTER (OUTPATIENT)
Age: 48
Setting detail: OBSERVATION
Discharge: HOME | End: 2023-03-27
Attending: INTERNAL MEDICINE | Admitting: INTERNAL MEDICINE
Payer: MEDICARE

## 2023-03-26 DIAGNOSIS — H53.9 VISUAL DISTURBANCE: ICD-10-CM

## 2023-03-26 DIAGNOSIS — R07.89 ATYPICAL CHEST PAIN: Primary | ICD-10-CM

## 2023-03-26 PROBLEM — R07.9 CHEST PAIN, UNSPECIFIED: Status: ACTIVE | Noted: 2023-03-26

## 2023-03-26 LAB
ALBUMIN SERPL-MCNC: 4.7 G/DL (ref 3.5–5.2)
ALP SERPL-CCNC: 83 U/L (ref 40–130)
ALT SERPL-CCNC: 13 U/L (ref 5–41)
AMPHET UR QL SCN: NEGATIVE
ANION GAP SERPL CALCULATED.3IONS-SCNC: 12 MMOL/L (ref 7–19)
AST SERPL-CCNC: 13 U/L (ref 5–40)
B PARAP IS1001 DNA NPH QL NAA+NON-PROBE: NOT DETECTED
B PERT.PT PRMT NPH QL NAA+NON-PROBE: NOT DETECTED
BARBITURATES UR QL SCN: NEGATIVE
BASOPHILS # BLD: 0.1 K/UL (ref 0–0.2)
BASOPHILS NFR BLD: 0.9 % (ref 0–1)
BENZODIAZ UR QL SCN: NEGATIVE
BILIRUB SERPL-MCNC: 0.4 MG/DL (ref 0.2–1.2)
BILIRUB UR QL STRIP: NEGATIVE
BNP BLD-MCNC: 51 PG/ML (ref 0–450)
BUN SERPL-MCNC: 16 MG/DL (ref 6–20)
BUPRENORPHINE URINE: NEGATIVE
C PNEUM DNA NPH QL NAA+NON-PROBE: NOT DETECTED
C3 SERPL-MCNC: 155 MG/DL (ref 90–180)
C4 SERPL-MCNC: 29 MG/DL (ref 10–40)
CALCIUM SERPL-MCNC: 9.4 MG/DL (ref 8.6–10)
CANNABINOIDS UR QL SCN: NEGATIVE
CHLORIDE SERPL-SCNC: 99 MMOL/L (ref 98–111)
CLARITY UR: CLEAR
CO2 SERPL-SCNC: 27 MMOL/L (ref 22–29)
COCAINE UR QL SCN: NEGATIVE
COLOR UR: YELLOW
CREAT SERPL-MCNC: 0.7 MG/DL (ref 0.5–1.2)
DRUG SCREEN COMMENT UR-IMP: NORMAL
EOSINOPHIL # BLD: 0.3 K/UL (ref 0–0.6)
EOSINOPHIL NFR BLD: 5.6 % (ref 0–5)
ERYTHROCYTE [DISTWIDTH] IN BLOOD BY AUTOMATED COUNT: 12.6 % (ref 11.5–14.5)
FLUAV RNA NPH QL NAA+NON-PROBE: NOT DETECTED
FLUBV RNA NPH QL NAA+NON-PROBE: NOT DETECTED
GLUCOSE BLD-MCNC: 135 MG/DL (ref 70–99)
GLUCOSE SERPL-MCNC: 228 MG/DL (ref 74–109)
GLUCOSE UR STRIP.AUTO-MCNC: 500 MG/DL
HADV DNA NPH QL NAA+NON-PROBE: NOT DETECTED
HBA1C MFR BLD: 10 % (ref 4–6)
HCOV 229E RNA NPH QL NAA+NON-PROBE: NOT DETECTED
HCOV HKU1 RNA NPH QL NAA+NON-PROBE: NOT DETECTED
HCOV NL63 RNA NPH QL NAA+NON-PROBE: NOT DETECTED
HCOV OC43 RNA NPH QL NAA+NON-PROBE: NOT DETECTED
HCT VFR BLD AUTO: 44.3 % (ref 42–52)
HGB BLD-MCNC: 15.6 G/DL (ref 14–18)
HGB UR STRIP.AUTO-MCNC: NEGATIVE MG/L
HMPV RNA NPH QL NAA+NON-PROBE: NOT DETECTED
HPIV1 RNA NPH QL NAA+NON-PROBE: NOT DETECTED
HPIV2 RNA NPH QL NAA+NON-PROBE: NOT DETECTED
HPIV3 RNA NPH QL NAA+NON-PROBE: NOT DETECTED
HPIV4 RNA NPH QL NAA+NON-PROBE: NOT DETECTED
IMM GRANULOCYTES # BLD: 0 K/UL
KETONES UR STRIP.AUTO-MCNC: ABNORMAL MG/DL
LEUKOCYTE ESTERASE UR QL STRIP.AUTO: NEGATIVE
LYMPHOCYTES # BLD: 2.4 K/UL (ref 1.1–4.5)
LYMPHOCYTES NFR BLD: 42.8 % (ref 20–40)
M PNEUMO DNA NPH QL NAA+NON-PROBE: NOT DETECTED
MCH RBC QN AUTO: 30.8 PG (ref 27–31)
MCHC RBC AUTO-ENTMCNC: 35.2 G/DL (ref 33–37)
MCV RBC AUTO: 87.4 FL (ref 80–94)
METHADONE UR QL SCN: NEGATIVE
METHAMPHETAMINE, URINE: NEGATIVE
MONOCYTES # BLD: 0.6 K/UL (ref 0–0.9)
MONOCYTES NFR BLD: 10 % (ref 0–10)
NEUTROPHILS # BLD: 2.3 K/UL (ref 1.5–7.5)
NEUTS SEG NFR BLD: 40.5 % (ref 50–65)
NITRITE UR QL STRIP.AUTO: NEGATIVE
NUCLEAR IGG SER QL IA: DETECTED
OPIATES UR QL SCN: NEGATIVE
OXYCODONE UR QL SCN: NEGATIVE
PCP UR QL SCN: NEGATIVE
PERFORMED ON: ABNORMAL
PH UR STRIP.AUTO: 8 [PH] (ref 5–8)
PLATELET # BLD AUTO: 221 K/UL (ref 130–400)
PMV BLD AUTO: 8.8 FL (ref 9.4–12.4)
POTASSIUM SERPL-SCNC: 4 MMOL/L (ref 3.5–5)
PROPOXYPH UR QL SCN: NEGATIVE
PROT SERPL-MCNC: 8.1 G/DL (ref 6.6–8.7)
PROT UR STRIP.AUTO-MCNC: NEGATIVE MG/DL
RBC # BLD AUTO: 5.07 M/UL (ref 4.7–6.1)
RHEUMATOID FACT SER NEPH-ACNC: <10 IU/ML (ref 0–14)
RSV RNA NPH QL NAA+NON-PROBE: NOT DETECTED
RV+EV RNA NPH QL NAA+NON-PROBE: NOT DETECTED
SARS-COV-2 RNA NPH QL NAA+NON-PROBE: NOT DETECTED
SODIUM SERPL-SCNC: 138 MMOL/L (ref 136–145)
SP GR UR STRIP.AUTO: 1.02 (ref 1–1.03)
TRICYCLIC, URINE: NEGATIVE
TROPONIN T SERPL-MCNC: <0.01 NG/ML (ref 0–0.03)
TROPONIN T SERPL-MCNC: <0.01 NG/ML (ref 0–0.03)
UROBILINOGEN UR STRIP.AUTO-MCNC: 1 E.U./DL
WBC # BLD AUTO: 5.7 K/UL (ref 4.8–10.8)
ZINC SERPL-MCNC: 97.8 UG/DL (ref 60–120)

## 2023-03-26 PROCEDURE — 2580000003 HC RX 258

## 2023-03-26 PROCEDURE — 0202U NFCT DS 22 TRGT SARS-COV-2: CPT

## 2023-03-26 PROCEDURE — G0378 HOSPITAL OBSERVATION PER HR: HCPCS

## 2023-03-26 PROCEDURE — 83036 HEMOGLOBIN GLYCOSYLATED A1C: CPT

## 2023-03-26 PROCEDURE — 80306 DRUG TEST PRSMV INSTRMNT: CPT

## 2023-03-26 PROCEDURE — 82962 GLUCOSE BLOOD TEST: CPT

## 2023-03-26 PROCEDURE — 6370000000 HC RX 637 (ALT 250 FOR IP)

## 2023-03-26 PROCEDURE — 96372 THER/PROPH/DIAG INJ SC/IM: CPT

## 2023-03-26 PROCEDURE — 80053 COMPREHEN METABOLIC PANEL: CPT

## 2023-03-26 PROCEDURE — 81003 URINALYSIS AUTO W/O SCOPE: CPT

## 2023-03-26 PROCEDURE — 84484 ASSAY OF TROPONIN QUANT: CPT

## 2023-03-26 PROCEDURE — 6370000000 HC RX 637 (ALT 250 FOR IP): Performed by: PHYSICIAN ASSISTANT

## 2023-03-26 PROCEDURE — 83880 ASSAY OF NATRIURETIC PEPTIDE: CPT

## 2023-03-26 PROCEDURE — 36415 COLL VENOUS BLD VENIPUNCTURE: CPT

## 2023-03-26 PROCEDURE — 71045 X-RAY EXAM CHEST 1 VIEW: CPT

## 2023-03-26 PROCEDURE — 99285 EMERGENCY DEPT VISIT HI MDM: CPT

## 2023-03-26 PROCEDURE — 85025 COMPLETE CBC W/AUTO DIFF WBC: CPT

## 2023-03-26 PROCEDURE — 6360000002 HC RX W HCPCS

## 2023-03-26 PROCEDURE — 70450 CT HEAD/BRAIN W/O DYE: CPT

## 2023-03-26 RX ORDER — DEXTROSE MONOHYDRATE 100 MG/ML
INJECTION, SOLUTION INTRAVENOUS CONTINUOUS PRN
Status: DISCONTINUED | OUTPATIENT
Start: 2023-03-26 | End: 2023-03-27 | Stop reason: HOSPADM

## 2023-03-26 RX ORDER — ONDANSETRON 4 MG/1
4 TABLET, ORALLY DISINTEGRATING ORAL EVERY 8 HOURS PRN
Status: DISCONTINUED | OUTPATIENT
Start: 2023-03-26 | End: 2023-03-27 | Stop reason: HOSPADM

## 2023-03-26 RX ORDER — ACETAMINOPHEN 325 MG/1
650 TABLET ORAL EVERY 6 HOURS PRN
Status: DISCONTINUED | OUTPATIENT
Start: 2023-03-26 | End: 2023-03-27 | Stop reason: HOSPADM

## 2023-03-26 RX ORDER — ONDANSETRON 2 MG/ML
4 INJECTION INTRAMUSCULAR; INTRAVENOUS EVERY 6 HOURS PRN
Status: DISCONTINUED | OUTPATIENT
Start: 2023-03-26 | End: 2023-03-27 | Stop reason: HOSPADM

## 2023-03-26 RX ORDER — SODIUM CHLORIDE 0.9 % (FLUSH) 0.9 %
5-40 SYRINGE (ML) INJECTION EVERY 12 HOURS SCHEDULED
Status: DISCONTINUED | OUTPATIENT
Start: 2023-03-26 | End: 2023-03-27 | Stop reason: HOSPADM

## 2023-03-26 RX ORDER — ATROPINE SULFATE 0.1 MG/ML
0.5 INJECTION INTRAVENOUS EVERY 5 MIN PRN
Status: ACTIVE | OUTPATIENT
Start: 2023-03-27 | End: 2023-03-27

## 2023-03-26 RX ORDER — SODIUM CHLORIDE 0.9 % (FLUSH) 0.9 %
10 SYRINGE (ML) INJECTION PRN
Status: ACTIVE | OUTPATIENT
Start: 2023-03-27 | End: 2023-03-27

## 2023-03-26 RX ORDER — ASPIRIN 81 MG/1
81 TABLET, CHEWABLE ORAL DAILY
Status: DISCONTINUED | OUTPATIENT
Start: 2023-03-27 | End: 2023-03-27 | Stop reason: HOSPADM

## 2023-03-26 RX ORDER — ENOXAPARIN SODIUM 100 MG/ML
40 INJECTION SUBCUTANEOUS DAILY
Status: DISCONTINUED | OUTPATIENT
Start: 2023-03-26 | End: 2023-03-27 | Stop reason: HOSPADM

## 2023-03-26 RX ORDER — METOPROLOL TARTRATE 5 MG/5ML
5 INJECTION INTRAVENOUS EVERY 5 MIN PRN
Status: ACTIVE | OUTPATIENT
Start: 2023-03-27 | End: 2023-03-27

## 2023-03-26 RX ORDER — POLYETHYLENE GLYCOL 3350 17 G/17G
17 POWDER, FOR SOLUTION ORAL DAILY PRN
Status: DISCONTINUED | OUTPATIENT
Start: 2023-03-26 | End: 2023-03-27 | Stop reason: HOSPADM

## 2023-03-26 RX ORDER — SODIUM CHLORIDE 9 MG/ML
500 INJECTION, SOLUTION INTRAVENOUS CONTINUOUS PRN
Status: ACTIVE | OUTPATIENT
Start: 2023-03-27 | End: 2023-03-27

## 2023-03-26 RX ORDER — ATORVASTATIN CALCIUM 40 MG/1
40 TABLET, FILM COATED ORAL NIGHTLY
Status: DISCONTINUED | OUTPATIENT
Start: 2023-03-26 | End: 2023-03-27 | Stop reason: HOSPADM

## 2023-03-26 RX ORDER — SODIUM CHLORIDE 0.9 % (FLUSH) 0.9 %
5-40 SYRINGE (ML) INJECTION PRN
Status: DISCONTINUED | OUTPATIENT
Start: 2023-03-26 | End: 2023-03-27 | Stop reason: HOSPADM

## 2023-03-26 RX ORDER — HYDROXYZINE HYDROCHLORIDE 10 MG/1
10 TABLET, FILM COATED ORAL 3 TIMES DAILY PRN
Status: DISCONTINUED | OUTPATIENT
Start: 2023-03-26 | End: 2023-03-27 | Stop reason: HOSPADM

## 2023-03-26 RX ORDER — INSULIN LISPRO 100 [IU]/ML
0-4 INJECTION, SOLUTION INTRAVENOUS; SUBCUTANEOUS
Status: DISCONTINUED | OUTPATIENT
Start: 2023-03-26 | End: 2023-03-27 | Stop reason: HOSPADM

## 2023-03-26 RX ORDER — ALBUTEROL SULFATE 90 UG/1
2 AEROSOL, METERED RESPIRATORY (INHALATION) PRN
Status: ACTIVE | OUTPATIENT
Start: 2023-03-27 | End: 2023-03-27

## 2023-03-26 RX ORDER — NITROGLYCERIN 0.4 MG/1
0.4 TABLET SUBLINGUAL EVERY 5 MIN PRN
Status: DISCONTINUED | OUTPATIENT
Start: 2023-03-26 | End: 2023-03-27 | Stop reason: HOSPADM

## 2023-03-26 RX ORDER — SODIUM CHLORIDE 9 MG/ML
INJECTION, SOLUTION INTRAVENOUS PRN
Status: DISCONTINUED | OUTPATIENT
Start: 2023-03-26 | End: 2023-03-27 | Stop reason: HOSPADM

## 2023-03-26 RX ORDER — INSULIN LISPRO 100 [IU]/ML
0-4 INJECTION, SOLUTION INTRAVENOUS; SUBCUTANEOUS NIGHTLY
Status: DISCONTINUED | OUTPATIENT
Start: 2023-03-26 | End: 2023-03-27 | Stop reason: HOSPADM

## 2023-03-26 RX ORDER — NITROGLYCERIN 0.4 MG/1
0.4 TABLET SUBLINGUAL EVERY 5 MIN PRN
Status: ACTIVE | OUTPATIENT
Start: 2023-03-27 | End: 2023-03-27

## 2023-03-26 RX ORDER — ACETAMINOPHEN 650 MG/1
650 SUPPOSITORY RECTAL EVERY 6 HOURS PRN
Status: DISCONTINUED | OUTPATIENT
Start: 2023-03-26 | End: 2023-03-27 | Stop reason: HOSPADM

## 2023-03-26 RX ADMIN — ASPIRIN 325 MG: 325 TABLET, COATED ORAL at 15:48

## 2023-03-26 RX ADMIN — SODIUM CHLORIDE, PRESERVATIVE FREE 10 ML: 5 INJECTION INTRAVENOUS at 20:04

## 2023-03-26 RX ADMIN — ENOXAPARIN SODIUM 40 MG: 100 INJECTION SUBCUTANEOUS at 19:58

## 2023-03-26 RX ADMIN — NITROGLYCERIN 0.4 MG: 0.4 TABLET, ORALLY DISINTEGRATING SUBLINGUAL at 15:50

## 2023-03-26 RX ADMIN — ATORVASTATIN CALCIUM 40 MG: 80 TABLET, FILM COATED ORAL at 19:58

## 2023-03-26 RX ADMIN — NITROGLYCERIN 0.4 MG: 0.4 TABLET, ORALLY DISINTEGRATING SUBLINGUAL at 17:01

## 2023-03-26 ASSESSMENT — PAIN - FUNCTIONAL ASSESSMENT: PAIN_FUNCTIONAL_ASSESSMENT: 0-10

## 2023-03-26 ASSESSMENT — ENCOUNTER SYMPTOMS
CONSTIPATION: 0
SHORTNESS OF BREATH: 0
EYE DISCHARGE: 0
WHEEZING: 0
PHOTOPHOBIA: 0
NAUSEA: 0
COLOR CHANGE: 0
EYE ITCHING: 0
CHEST TIGHTNESS: 1
SHORTNESS OF BREATH: 1
ABDOMINAL DISTENTION: 0
CHEST TIGHTNESS: 0
VOMITING: 0
APNEA: 0
COUGH: 0
PHOTOPHOBIA: 1
BACK PAIN: 0
ABDOMINAL PAIN: 0

## 2023-03-26 ASSESSMENT — PAIN SCALES - GENERAL
PAINLEVEL_OUTOF10: 2
PAINLEVEL_OUTOF10: 0

## 2023-03-26 ASSESSMENT — PAIN DESCRIPTION - DESCRIPTORS: DESCRIPTORS: PATIENT UNABLE TO DESCRIBE

## 2023-03-26 ASSESSMENT — PAIN DESCRIPTION - LOCATION: LOCATION: CHEST

## 2023-03-26 NOTE — ED PROVIDER NOTES
BASIC METABOLIC PANEL W/ REFLEX TO MG FOR LOW K   LIPID PANEL       All other labs were within normal range or notreturned as of this dictation. RE-ASSESSMENT          EMERGENCY DEPARTMENT COURSE and DIFFERENTIAL DIAGNOSIS/MDM:   Vitals:    Vitals:    03/26/23 1531   BP: (!) 135/92   Pulse: 90   Resp: 15   Temp: 98.5 °F (36.9 °C)   TempSrc: Oral   SpO2: 98%   Weight: 145 lb (65.8 kg)   Height: 5' 5\" (1.651 m)     Ekg - normal sinus rhythm rate no st changes similar to prior    MDM  Plan for admission based on risk factors. Nitro helps his pain prior echo over 6 mo ago with regurge hx would give him moderate heart score. Dr Elizabeth Rosenbaum agreeable to see in consult will evaluate and decide from there potential for blood work and MRI based on hx. Stable at this time. I spoke with Jamir Crockett and Dr Christi Mckay. PROCEDURES:    Procedures      FINAL IMPRESSION      1. Atypical chest pain    2. Visual disturbance          DISPOSITION/PLAN   DISPOSITION Admitted 03/26/2023 06:21:46 PM      PATIENT REFERRED TO:  No follow-up provider specified.     DISCHARGE MEDICATIONS:  New Prescriptions    No medications on file       (Please note that portions of this note were completed with a voice recognition program.  Efforts were made to edit the dictations but occasionallywords are mis-transcribed.)    Alivia Duckworth, [address] [place] [address]  03/26/23 [place] Melvin

## 2023-03-26 NOTE — H&P
MD   DULoxetine (CYMBALTA) 30 MG extended release capsule Take 30 mg by mouth daily    Historical Provider, MD   metFORMIN (GLUCOPHAGE) 500 MG tablet Take 1 tablet by mouth in the morning and 1 tablet in the evening. Take with meals. Patient taking differently: Take 500 mg by mouth as needed 8/12/22   sE Newell MD       Allergies:    Depakote [divalproex sodium], Seroquel [quetiapine fumarate], Wellbutrin [bupropion], and Contrast [iodides]    Social History:    The patient currently lives home  Tobacco:   reports that he has never smoked. He has never used smokeless tobacco.  Alcohol:   reports that he does not currently use alcohol. Illicit Drugs: Past methamphetamines quit 3 months ago    Family History:      Problem Relation Age of Onset    Heart Disease Mother         cause of death    Diabetes Mother     Depression Mother     High Blood Pressure Mother     Kidney Disease Mother     Hearing Loss Mother     Heart Failure Mother     Other Father         COVID-19    Depression Half-Brother     Substance Abuse Half-Brother         overdosed fatally    COPD Half-Brother         smoker    No Known Problems Half-Sister        Review of Systems:   Review of Systems   Constitutional:  Positive for activity change and fatigue. Negative for chills, diaphoresis and fever. HENT:  Positive for congestion. Eyes:  Positive for photophobia and visual disturbance. Respiratory:  Positive for shortness of breath. Negative for cough, chest tightness and wheezing. Cardiovascular:  Positive for palpitations. Negative for chest pain. Gastrointestinal:  Negative for abdominal distention, abdominal pain, constipation, nausea and vomiting. Genitourinary: Negative. Skin:  Negative for color change, pallor and rash. Neurological:  Positive for weakness. Negative for tremors, syncope, light-headedness, numbness and headaches. Psychiatric/Behavioral:  Positive for confusion.  Negative for agitation and behavioral

## 2023-03-27 ENCOUNTER — APPOINTMENT (OUTPATIENT)
Dept: MRI IMAGING | Age: 48
End: 2023-03-27
Payer: MEDICARE

## 2023-03-27 VITALS
RESPIRATION RATE: 20 BRPM | HEIGHT: 65 IN | SYSTOLIC BLOOD PRESSURE: 124 MMHG | DIASTOLIC BLOOD PRESSURE: 93 MMHG | BODY MASS INDEX: 25.12 KG/M2 | TEMPERATURE: 98.2 F | HEART RATE: 95 BPM | OXYGEN SATURATION: 100 % | WEIGHT: 150.8 LBS

## 2023-03-27 LAB
ALBUMIN SERPL-MCNC: 4.82 G/DL (ref 3.75–5.01)
ALPHA1 GLOB SERPL ELPH-MCNC: 0.26 G/DL (ref 0.19–0.46)
ALPHA2 GLOB SERPL ELPH-MCNC: 0.67 G/DL (ref 0.48–1.05)
ANION GAP SERPL CALCULATED.3IONS-SCNC: 9 MMOL/L (ref 7–19)
ANTINUCLEAR AB INTERPRETIVE COMMENT: NORMAL
ARSENIC BLD-MCNC: <10 UG/L
B BURGDOR AB SER IA-ACNC: 0.55 LIV (ref 0–1.2)
B-GLOBULIN SERPL ELPH-MCNC: 1.01 G/DL (ref 0.48–1.1)
BUN SERPL-MCNC: 15 MG/DL (ref 6–20)
CADMIUM BLD-MCNC: <1 UG/L
CALCIUM SERPL-MCNC: 8.9 MG/DL (ref 8.6–10)
CHLORIDE SERPL-SCNC: 102 MMOL/L (ref 98–111)
CHOLEST SERPL-MCNC: 169 MG/DL (ref 160–199)
CO2 SERPL-SCNC: 25 MMOL/L (ref 22–29)
CREAT SERPL-MCNC: 0.8 MG/DL (ref 0.5–1.2)
EKG P AXIS: 55 DEGREES
EKG P-R INTERVAL: 174 MS
EKG Q-T INTERVAL: 404 MS
EKG QRS DURATION: 90 MS
EKG QTC CALCULATION (BAZETT): 421 MS
EKG T AXIS: 37 DEGREES
ERYTHROCYTE [DISTWIDTH] IN BLOOD BY AUTOMATED COUNT: 12.3 % (ref 11.5–14.5)
GAMMA GLOB SERPL ELPH-MCNC: 1.44 G/DL (ref 0.62–1.51)
GLIADIN PEPTIDE+TTG IGA+IGG SER QL IA: 29 UNITS (ref 0–19)
GLUCOSE BLD-MCNC: 179 MG/DL (ref 70–99)
GLUCOSE BLD-MCNC: 192 MG/DL (ref 70–99)
GLUCOSE BLD-MCNC: 194 MG/DL (ref 70–99)
GLUCOSE SERPL-MCNC: 236 MG/DL (ref 74–109)
HCT VFR BLD AUTO: 38 % (ref 42–52)
HDLC SERPL-MCNC: 41 MG/DL (ref 55–121)
HGB BLD-MCNC: 13.2 G/DL (ref 14–18)
INTERPRETATION SERPL IFE-IMP: NORMAL
LDLC SERPL CALC-MCNC: 104 MG/DL
LEAD BLD-MCNC: <2 UG/DL
MCH RBC QN AUTO: 30.3 PG (ref 27–31)
MCHC RBC AUTO-ENTMCNC: 34.7 G/DL (ref 33–37)
MCV RBC AUTO: 87.4 FL (ref 80–94)
MERCURY BLD-MCNC: <2.5 UG/L
MONOCLON BAND OBS SERPL: NORMAL
NUCLEAR IGG SER QL IF: NORMAL
PERFORMED ON: ABNORMAL
PLATELET # BLD AUTO: 199 K/UL (ref 130–400)
PMV BLD AUTO: 9 FL (ref 9.4–12.4)
POTASSIUM SERPL-SCNC: 3.9 MMOL/L (ref 3.5–5)
PROT SERPL-MCNC: 8.2 G/DL (ref 6.3–8.2)
RBC # BLD AUTO: 4.35 M/UL (ref 4.7–6.1)
RPR SER QL: NORMAL
SODIUM SERPL-SCNC: 136 MMOL/L (ref 136–145)
TRIGL SERPL-MCNC: 119 MG/DL (ref 0–149)
TROPONIN T SERPL-MCNC: <0.01 NG/ML (ref 0–0.03)
TROPONIN T SERPL-MCNC: <0.01 NG/ML (ref 0–0.03)
WBC # BLD AUTO: 5.2 K/UL (ref 4.8–10.8)

## 2023-03-27 PROCEDURE — G0378 HOSPITAL OBSERVATION PER HR: HCPCS

## 2023-03-27 PROCEDURE — 84484 ASSAY OF TROPONIN QUANT: CPT

## 2023-03-27 PROCEDURE — 94760 N-INVAS EAR/PLS OXIMETRY 1: CPT

## 2023-03-27 PROCEDURE — 80061 LIPID PANEL: CPT

## 2023-03-27 PROCEDURE — 6370000000 HC RX 637 (ALT 250 FOR IP)

## 2023-03-27 PROCEDURE — 99223 1ST HOSP IP/OBS HIGH 75: CPT | Performed by: PSYCHIATRY & NEUROLOGY

## 2023-03-27 PROCEDURE — 85027 COMPLETE CBC AUTOMATED: CPT

## 2023-03-27 PROCEDURE — 80048 BASIC METABOLIC PNL TOTAL CA: CPT

## 2023-03-27 PROCEDURE — 6360000004 HC RX CONTRAST MEDICATION

## 2023-03-27 PROCEDURE — 2580000003 HC RX 258

## 2023-03-27 PROCEDURE — 36415 COLL VENOUS BLD VENIPUNCTURE: CPT

## 2023-03-27 PROCEDURE — 70551 MRI BRAIN STEM W/O DYE: CPT

## 2023-03-27 PROCEDURE — C8928 TTE W OR W/O FOL W/CON,STRES: HCPCS

## 2023-03-27 PROCEDURE — 82962 GLUCOSE BLOOD TEST: CPT

## 2023-03-27 PROCEDURE — 93350 STRESS TTE ONLY: CPT

## 2023-03-27 RX ORDER — ASPIRIN 81 MG/1
81 TABLET, CHEWABLE ORAL DAILY
Qty: 30 TABLET | Refills: 3 | Status: SHIPPED | OUTPATIENT
Start: 2023-03-28

## 2023-03-27 RX ORDER — DULOXETIN HYDROCHLORIDE 30 MG/1
30 CAPSULE, DELAYED RELEASE ORAL DAILY
Status: DISCONTINUED | OUTPATIENT
Start: 2023-03-27 | End: 2023-03-27 | Stop reason: HOSPADM

## 2023-03-27 RX ORDER — ATORVASTATIN CALCIUM 40 MG/1
40 TABLET, FILM COATED ORAL NIGHTLY
Qty: 30 TABLET | Refills: 3 | Status: SHIPPED | OUTPATIENT
Start: 2023-03-27

## 2023-03-27 RX ORDER — LAMOTRIGINE 100 MG/1
100 TABLET ORAL DAILY
Status: DISCONTINUED | OUTPATIENT
Start: 2023-03-27 | End: 2023-03-27 | Stop reason: HOSPADM

## 2023-03-27 RX ADMIN — PERFLUTREN 1.5 ML: 6.52 INJECTION, SUSPENSION INTRAVENOUS at 08:38

## 2023-03-27 RX ADMIN — SODIUM CHLORIDE, PRESERVATIVE FREE 10 ML: 5 INJECTION INTRAVENOUS at 10:05

## 2023-03-27 RX ADMIN — LAMOTRIGINE 100 MG: 100 TABLET ORAL at 11:45

## 2023-03-27 RX ADMIN — DULOXETINE HYDROCHLORIDE 30 MG: 30 CAPSULE, DELAYED RELEASE ORAL at 11:45

## 2023-03-27 RX ADMIN — ASPIRIN 81 MG 81 MG: 81 TABLET ORAL at 10:01

## 2023-03-27 RX ADMIN — ACETAMINOPHEN 650 MG: 325 TABLET ORAL at 14:01

## 2023-03-27 ASSESSMENT — PAIN - FUNCTIONAL ASSESSMENT: PAIN_FUNCTIONAL_ASSESSMENT: ACTIVITIES ARE NOT PREVENTED

## 2023-03-27 ASSESSMENT — PAIN SCALES - GENERAL
PAINLEVEL_OUTOF10: 7
PAINLEVEL_OUTOF10: 0

## 2023-03-27 ASSESSMENT — PAIN DESCRIPTION - LOCATION: LOCATION: BACK;LEG

## 2023-03-27 ASSESSMENT — PAIN DESCRIPTION - ORIENTATION: ORIENTATION: MID

## 2023-03-27 ASSESSMENT — PAIN DESCRIPTION - DESCRIPTORS: DESCRIPTORS: ACHING

## 2023-03-27 NOTE — ACP (ADVANCE CARE PLANNING)
Advance Care Planning     Advance Care Planning Activator (Inpatient)  Conversation Note      Date of ACP Conversation: 3/27/2023     Conversation Conducted with: Juan Francisco Crow was about 20 minutes. ACP Activator: German Tapia Decision Maker:     No  Care Preferences    Ventilation: \"If you were in your present state of health and suddenly became very ill and were unable to breathe on your own, what would your preference be about the use of a ventilator (breathing machine) if it were available to you? \"      Would the patient desire the use of ventilator (breathing machine)?: no    \"If your health worsens and it becomes clear that your chance of recovery is unlikely, what would your preference be about the use of a ventilator (breathing machine) if it were available to you? \"     Would the patient desire the use of ventilator (breathing machine)?: No      Resuscitation  \"CPR works best to restart the heart when there is a sudden event, like a heart attack, in someone who is otherwise healthy. Unfortunately, CPR does not typically restart the heart for people who have serious health conditions or who are very sick. \"    \"In the event your heart stopped as a result of an underlying serious health condition, would you want attempts to be made to restart your heart (answer \"yes\" for attempt to resuscitate) or would you prefer a natural death (answer \"no\" for do not attempt to resuscitate)? \" no       [x] Yes   [] No   Educated Patient / Cesar Rodriguez regarding differences between Advance Directives and portable DNR orders.     Length of ACP Conversation in minutes:      Conversation Outcomes:  ACP

## 2023-03-27 NOTE — PROGRESS NOTES
4 Eyes Skin Assessment    Blayne Arellano is being assessed upon: Admission    I agree that I, Gretel Greer RN, along with Anjel Ray (either 2 RN's or 1 LPN and 1 RN) have performed a thorough Head to Toe Skin Assessment on the patient. ALL assessment sites listed below have been assessed. Areas assessed by both nurses:     [x]   Head, Face, and Ears   [x]   Shoulders, Back, and Chest  [x]   Arms, Elbows, and Hands   [x]   Coccyx, Sacrum, and Ischium  [x]   Legs, Feet, and Heels    Does the Patient have Skin Breakdown?  No    John Prevention initiated: No  Wound Care Orders initiated: No    WO nurse consulted for Pressure Injury (Stage 3,4, Unstageable, DTI, NWPT, and Complex wounds) and New or Established Ostomies: No        Primary Nurse eSignature: Gretel Greer RN on 3/26/2023 at 7:38 PM      Co-Signer eSignature: Electronically signed by Gretel Greer RN on 3/26/23 at 7:59 PM CDT

## 2023-03-27 NOTE — PLAN OF CARE
Problem: Discharge Planning  Goal: Discharge to home or other facility with appropriate resources  3/27/2023 1039 by Jennifer Guerra RN  Outcome: Completed  Flowsheets (Taken 3/27/2023 0253 by Clark Newell, JUANITA)  Discharge to home or other facility with appropriate resources: Identify barriers to discharge with patient and caregiver  3/27/2023 0047 by Clark Newell RN  Outcome: Progressing     Problem: Pain  Goal: Verbalizes/displays adequate comfort level or baseline comfort level  3/27/2023 1039 by Jennifer Guerra RN  Outcome: Completed  3/27/2023 0047 by Clark Newell RN  Outcome: Progressing     Problem: Safety - Adult  Goal: Free from fall injury  Outcome: Completed

## 2023-03-27 NOTE — PROGRESS NOTES
Donny Oviedo arrived to room # 719. Presented with: chest pain  Mental Status: Patient is oriented, alert, coherent, logical, thought processes intact, and able to concentrate and follow conversation. Vitals:    03/27/23 0037   BP: 114/75   Pulse: 82   Resp: 14   Temp: 98.3 °F (36.8 °C)   SpO2: 98%     Patient safety contract and falls prevention contract reviewed with patient No.  Oriented Patient to room. Call light within reach. Yes.   Needs, issues or concerns expressed at this time: no.      Electronically signed by Adam Kendall RN on 3/27/2023 at 4:25 AM

## 2023-03-27 NOTE — CONSULTS
Pulse 71   Temp 98.1 °F (36.7 °C)   Resp 14   Ht 5' 5\" (1.651 m)   Wt 150 lb 12.8 oz (68.4 kg)   SpO2 98%   BMI 25.09 kg/m²     Constitutional - well developed, well nourished. Eyes - conjunctiva normal.  Pupils not tested  Ear, nose, throat -hearing  intact to finger rub No scars, masses, or lesions over external nose or ears, no atrophy of tongue  Neck-symmetric, no masses noted, no jugular vein distension  Respiration- chest wall appears symmetric, good expansion,   normal effort without use of accessory muscles  Musculoskeletal - no significant wasting of muscles noted, no bony deformities  Extremities-no clubbing, cyanosis or edema  Skin - warm, dry, and intact. No rash, erythema, or pallor.   Psychiatric - mood, affect, and behavior appear normal.      Neurological exam  Awake, alert, fluent oriented x 3 appropriate affect  Attention and concentration appear appropriate  Recent and remote memory appears unremarkable  Speech normal without dysarthria  No clear issues with language of fund of knowledge    Cranial Nerve Exam   CN II- Visual fields grossly unremarkable  CN III, IV,VI-EOMI, No nystagmus, conjugate eye movements, no ptosis  CN V-sensation intact to LT over face  CN VII-no facial assymetry  CN VIII-Hearing  intact to finger rub  CN IX and X- Palate not tested  CN XI-not test shoulder shrug  CN XII-Tongue midline with no fasciculations or fibrillations    Motor Exam  Antigravity with resistance throughout upper and lower extremities bilaterally, no cogwheeling, normal tone    Sensory Exam  Sensation reduced over hands and feet    Reflexes   Not tested    Tremors- no tremors in hands or head noted    Gait  Not tested    Coordination  Finger to nose-unremarkable        CBC:   Recent Labs     03/26/23  1530 03/27/23  0213   WBC 5.7 5.2   HGB 15.6 13.2*    199       BMP:    Recent Labs     03/26/23  1530 03/27/23  0213    136   K 4.0 3.9   CL 99 102   CO2 27 25   BUN 16 15

## 2023-03-27 NOTE — PROGRESS NOTES
Patient without any complaints of chest pain during shift. At admit, did voice wanting to see Dr. Kevin Collins and wanting to get his MRI done. VSS, neuro's intact, sinus rhythm on telemetry.

## 2023-03-28 LAB
TTG IGA SER IA-ACNC: 2 U/ML (ref 0–3)
WNV IGG SER-ACNC: 2.08 IV
WNV IGM SER-ACNC: 0.06 IV

## 2023-03-29 LAB
B BURGDOR DNA SPEC QL NAA+PROBE: NOT DETECTED
METHYLMALONATE SERPL-SCNC: 0.13 UMOL/L (ref 0–0.4)
SPECIMEN SOURCE: NORMAL

## 2023-03-30 LAB
DSDNA IGG SER QL IA: 13 IU/ML
GLIADIN IGG SER IA-ACNC: 0.8 U/ML
IGA SERPL-MCNC: 274 MG/DL (ref 70–400)
IGA SERPL-MCNC: 4.1 U/ML
TTG IGA SER IA-ACNC: 2.4 U/ML

## 2023-04-03 LAB
EKG P AXIS: 18 DEGREES
EKG P AXIS: 55 DEGREES
EKG P-R INTERVAL: 174 MS
EKG P-R INTERVAL: 174 MS
EKG Q-T INTERVAL: 352 MS
EKG Q-T INTERVAL: 404 MS
EKG QRS DURATION: 82 MS
EKG QRS DURATION: 90 MS
EKG QTC CALCULATION (BAZETT): 401 MS
EKG QTC CALCULATION (BAZETT): 421 MS
EKG T AXIS: 37 DEGREES
EKG T AXIS: 53 DEGREES

## 2023-04-06 ENCOUNTER — APPOINTMENT (OUTPATIENT)
Dept: GENERAL RADIOLOGY | Age: 48
End: 2023-04-06
Payer: MEDICARE

## 2023-04-06 ENCOUNTER — HOSPITAL ENCOUNTER (EMERGENCY)
Age: 48
Discharge: HOME | End: 2023-04-06
Attending: EMERGENCY MEDICINE
Payer: MEDICARE

## 2023-04-06 VITALS
OXYGEN SATURATION: 96 % | TEMPERATURE: 98.5 F | DIASTOLIC BLOOD PRESSURE: 92 MMHG | BODY MASS INDEX: 24.99 KG/M2 | WEIGHT: 150 LBS | HEIGHT: 65 IN | RESPIRATION RATE: 14 BRPM | HEART RATE: 83 BPM | SYSTOLIC BLOOD PRESSURE: 126 MMHG

## 2023-04-06 DIAGNOSIS — R53.83 OTHER FATIGUE: ICD-10-CM

## 2023-04-06 DIAGNOSIS — M79.601 RIGHT ARM PAIN: Primary | ICD-10-CM

## 2023-04-06 LAB
ALBUMIN SERPL-MCNC: 4.2 G/DL (ref 3.5–5.2)
ALP SERPL-CCNC: 83 U/L (ref 40–130)
ALT SERPL-CCNC: 29 U/L (ref 5–41)
ANION GAP SERPL CALCULATED.3IONS-SCNC: 12 MMOL/L (ref 7–19)
AST SERPL-CCNC: 26 U/L (ref 5–40)
BASOPHILS # BLD: 0.1 K/UL (ref 0–0.2)
BASOPHILS NFR BLD: 1.7 % (ref 0–1)
BILIRUB SERPL-MCNC: 0.4 MG/DL (ref 0.2–1.2)
BUN SERPL-MCNC: 10 MG/DL (ref 6–20)
CALCIUM SERPL-MCNC: 9.9 MG/DL (ref 8.6–10)
CHLORIDE SERPL-SCNC: 98 MMOL/L (ref 98–111)
CO2 SERPL-SCNC: 27 MMOL/L (ref 22–29)
CREAT SERPL-MCNC: 0.9 MG/DL (ref 0.5–1.2)
EOSINOPHIL # BLD: 0.4 K/UL (ref 0–0.6)
EOSINOPHIL NFR BLD: 8.3 % (ref 0–5)
ERYTHROCYTE [DISTWIDTH] IN BLOOD BY AUTOMATED COUNT: 12.5 % (ref 11.5–14.5)
GLUCOSE SERPL-MCNC: 207 MG/DL (ref 74–109)
HCT VFR BLD AUTO: 38.6 % (ref 42–52)
HGB BLD-MCNC: 13.5 G/DL (ref 14–18)
IMM GRANULOCYTES # BLD: 0 K/UL
LYMPHOCYTES # BLD: 2.6 K/UL (ref 1.1–4.5)
LYMPHOCYTES NFR BLD: 50 % (ref 20–40)
MAGNESIUM SERPL-MCNC: 1.9 MG/DL (ref 1.6–2.6)
MCH RBC QN AUTO: 31 PG (ref 27–31)
MCHC RBC AUTO-ENTMCNC: 35 G/DL (ref 33–37)
MCV RBC AUTO: 88.7 FL (ref 80–94)
MONOCYTES # BLD: 0.6 K/UL (ref 0–0.9)
MONOCYTES NFR BLD: 11.2 % (ref 0–10)
NEUTROPHILS # BLD: 1.5 K/UL (ref 1.5–7.5)
NEUTS SEG NFR BLD: 28.6 % (ref 50–65)
PLATELET # BLD AUTO: 225 K/UL (ref 130–400)
PMV BLD AUTO: 9 FL (ref 9.4–12.4)
POTASSIUM SERPL-SCNC: 3.8 MMOL/L (ref 3.5–5)
PROT SERPL-MCNC: 7.3 G/DL (ref 6.6–8.7)
RBC # BLD AUTO: 4.35 M/UL (ref 4.7–6.1)
SODIUM SERPL-SCNC: 137 MMOL/L (ref 136–145)
TROPONIN T SERPL-MCNC: <0.01 NG/ML (ref 0–0.03)
WBC # BLD AUTO: 5.3 K/UL (ref 4.8–10.8)

## 2023-04-06 PROCEDURE — 36415 COLL VENOUS BLD VENIPUNCTURE: CPT

## 2023-04-06 PROCEDURE — 99285 EMERGENCY DEPT VISIT HI MDM: CPT

## 2023-04-06 PROCEDURE — 83735 ASSAY OF MAGNESIUM: CPT

## 2023-04-06 PROCEDURE — 84484 ASSAY OF TROPONIN QUANT: CPT

## 2023-04-06 PROCEDURE — 80053 COMPREHEN METABOLIC PANEL: CPT

## 2023-04-06 PROCEDURE — 71045 X-RAY EXAM CHEST 1 VIEW: CPT

## 2023-04-06 PROCEDURE — 85025 COMPLETE CBC W/AUTO DIFF WBC: CPT

## 2023-04-06 PROCEDURE — 6370000000 HC RX 637 (ALT 250 FOR IP): Performed by: EMERGENCY MEDICINE

## 2023-04-06 PROCEDURE — 93005 ELECTROCARDIOGRAM TRACING: CPT | Performed by: EMERGENCY MEDICINE

## 2023-04-06 RX ORDER — HYDROXYZINE HYDROCHLORIDE 25 MG/1
25 TABLET, FILM COATED ORAL 2 TIMES DAILY
COMMUNITY

## 2023-04-06 RX ORDER — ASPIRIN 81 MG/1
324 TABLET, CHEWABLE ORAL ONCE
Status: COMPLETED | OUTPATIENT
Start: 2023-04-06 | End: 2023-04-06

## 2023-04-06 RX ORDER — GABAPENTIN 300 MG/1
300 CAPSULE ORAL 2 TIMES DAILY
COMMUNITY

## 2023-04-06 RX ADMIN — ASPIRIN 324 MG: 81 TABLET, CHEWABLE ORAL at 19:52

## 2023-04-06 ASSESSMENT — PAIN DESCRIPTION - LOCATION: LOCATION: ARM

## 2023-04-06 ASSESSMENT — PAIN DESCRIPTION - ORIENTATION: ORIENTATION: RIGHT

## 2023-04-06 ASSESSMENT — PAIN DESCRIPTION - DESCRIPTORS: DESCRIPTORS: DISCOMFORT;ACHING

## 2023-04-06 ASSESSMENT — PAIN - FUNCTIONAL ASSESSMENT: PAIN_FUNCTIONAL_ASSESSMENT: 0-10

## 2023-04-06 ASSESSMENT — PAIN DESCRIPTION - FREQUENCY: FREQUENCY: CONTINUOUS

## 2023-04-06 ASSESSMENT — PAIN SCALES - GENERAL: PAINLEVEL_OUTOF10: 7

## 2023-04-07 LAB
EKG P AXIS: 39 DEGREES
EKG P-R INTERVAL: 180 MS
EKG Q-T INTERVAL: 364 MS
EKG QRS DURATION: 84 MS
EKG QTC CALCULATION (BAZETT): 409 MS
EKG T AXIS: 47 DEGREES

## 2023-04-07 NOTE — ED NOTES
Patient placed on cardiac monitor, continuous pulse oximeter, and NIBP monitor. Monitor alarms on.         Dahlia Carmichael RN  04/06/23 3059

## 2023-04-07 NOTE — ED PROVIDER NOTES
SKIN BIOPSY  2006    MRSA groin area R    TONSILLECTOMY      at age 1    TYMPANOSTOMY TUBE PLACEMENT Bilateral     when he was a small child    WISDOM TOOTH EXTRACTION      [id number]         CURRENT MEDICATIONS       Previous Medications    ASPIRIN 81 MG CHEWABLE TABLET    Take 1 tablet by mouth daily    ATORVASTATIN (LIPITOR) 40 MG TABLET    Take 1 tablet by mouth nightly    GABAPENTIN (NEURONTIN) 300 MG CAPSULE    Take 1 capsule by mouth in the morning and at bedtime. Max Daily Amount: 600 mg    HYDROXYZINE HCL (ATARAX) 25 MG TABLET    Take 1 tablet by mouth in the morning and at bedtime    LAMOTRIGINE (LAMICTAL) 100 MG TABLET    Take 2 tablets by mouth daily    METFORMIN (GLUCOPHAGE) 500 MG TABLET    Take 1 tablet by mouth in the morning and 1 tablet in the evening. Take with meals.        ALLERGIES     Depakote [divalproex sodium], Seroquel [quetiapine fumarate], Wellbutrin [bupropion], and Contrast [iodides]    FAMILY HISTORY       Family History   Problem Relation Age of Onset    Heart Disease Mother         cause of death    Diabetes Mother     Depression Mother     High Blood Pressure Mother     Kidney Disease Mother     Hearing Loss Mother     Heart Failure Mother     Other Father         COVID-19    Depression Half-Brother     Substance Abuse Half-Brother         overdosed fatally    COPD Half-Brother         smoker    No Known Problems Half-Sister           SOCIAL HISTORY       Social History     Socioeconomic History    Marital status: Single     Spouse name: None    Number of children: 0    Years of education: 15    Highest education level: None   Occupational History    Occupation: Sales     Comment: disabled   Tobacco Use    Smoking status: Never    Smokeless tobacco: Never   Vaping Use    Vaping Use: Never used   Substance and Sexual Activity    Alcohol use: Not Currently    Drug use: Not Currently     Types: Methamphetamines (Crystal Meth)     Comment: quit 2022    Sexual activity: Not Currently

## 2023-04-12 ENCOUNTER — HOSPITAL ENCOUNTER (OUTPATIENT)
Dept: INFUSION THERAPY | Age: 48
Discharge: HOME | End: 2023-04-12
Payer: MEDICARE

## 2023-04-12 DIAGNOSIS — D89.89 LIGHT CHAIN DISEASE (HCC): ICD-10-CM

## 2023-04-12 LAB
BASOPHILS # BLD: 0.1 K/UL (ref 0.01–0.08)
BASOPHILS NFR BLD: 1.8 % (ref 0.1–1.2)
EOSINOPHIL # BLD: 0.67 K/UL (ref 0.04–0.54)
EOSINOPHIL NFR BLD: 12.4 % (ref 0.7–7)
ERYTHROCYTE [DISTWIDTH] IN BLOOD BY AUTOMATED COUNT: 12.5 % (ref 11.6–14.4)
HCT VFR BLD AUTO: 43.9 % (ref 40.1–51)
HGB BLD-MCNC: 15 G/DL (ref 13.7–17.5)
LYMPHOCYTES # BLD: 2.61 K/UL (ref 1.18–3.74)
LYMPHOCYTES NFR BLD: 48.2 % (ref 19.3–53.1)
MCH RBC QN AUTO: 30.9 PG (ref 25.7–32.2)
MCHC RBC AUTO-ENTMCNC: 34.2 G/DL (ref 32.3–36.5)
MCV RBC AUTO: 90.5 FL (ref 79–92.2)
MONOCYTES # BLD: 0.52 K/UL (ref 0.24–0.82)
MONOCYTES NFR BLD: 9.6 % (ref 4.7–12.5)
NEUTROPHILS # BLD: 1.51 K/UL (ref 1.56–6.13)
NEUTS SEG NFR BLD: 27.8 % (ref 34–71.1)
PLATELET # BLD AUTO: 218 K/UL (ref 163–337)
PMV BLD AUTO: 9.1 FL (ref 7.4–10.4)
RBC # BLD AUTO: 4.85 M/UL (ref 4.63–6.08)
WBC # BLD AUTO: 5.42 K/UL (ref 4.23–9.07)

## 2023-04-12 PROCEDURE — 36415 COLL VENOUS BLD VENIPUNCTURE: CPT

## 2023-04-12 PROCEDURE — 99212 OFFICE O/P EST SF 10 MIN: CPT

## 2023-04-12 PROCEDURE — 85025 COMPLETE CBC W/AUTO DIFF WBC: CPT

## 2023-05-02 ENCOUNTER — OFFICE VISIT (OUTPATIENT)
Dept: NEUROLOGY | Age: 48
End: 2023-05-02
Payer: MEDICARE

## 2023-05-02 VITALS
BODY MASS INDEX: 25.83 KG/M2 | RESPIRATION RATE: 18 BRPM | SYSTOLIC BLOOD PRESSURE: 129 MMHG | HEIGHT: 65 IN | WEIGHT: 155 LBS | HEART RATE: 106 BPM | DIASTOLIC BLOOD PRESSURE: 81 MMHG

## 2023-05-02 DIAGNOSIS — R41.3 MEMORY LOSS: Primary | ICD-10-CM

## 2023-05-02 DIAGNOSIS — F32.A DEPRESSION, UNSPECIFIED DEPRESSION TYPE: ICD-10-CM

## 2023-05-02 DIAGNOSIS — R51.9 HEADACHE DISORDER: ICD-10-CM

## 2023-05-02 DIAGNOSIS — R76.8 DS DNA ANTIBODY POSITIVE: ICD-10-CM

## 2023-05-02 DIAGNOSIS — E11.65 UNCONTROLLED TYPE 2 DIABETES MELLITUS WITH HYPERGLYCEMIA (HCC): ICD-10-CM

## 2023-05-02 DIAGNOSIS — R20.0 NUMBNESS: ICD-10-CM

## 2023-05-02 DIAGNOSIS — R76.8 ANA POSITIVE: ICD-10-CM

## 2023-05-02 PROCEDURE — 3079F DIAST BP 80-89 MM HG: CPT | Performed by: PSYCHIATRY & NEUROLOGY

## 2023-05-02 PROCEDURE — 3046F HEMOGLOBIN A1C LEVEL >9.0%: CPT | Performed by: PSYCHIATRY & NEUROLOGY

## 2023-05-02 PROCEDURE — 99214 OFFICE O/P EST MOD 30 MIN: CPT | Performed by: PSYCHIATRY & NEUROLOGY

## 2023-05-02 PROCEDURE — 3074F SYST BP LT 130 MM HG: CPT | Performed by: PSYCHIATRY & NEUROLOGY

## 2023-05-02 NOTE — PATIENT INSTRUCTIONS
His MRI of the brain had no acute abnormalities with some mild to moderate parenchymal volume loss and mild chronic microvascular ischemic changes. Old lacunar infarct noted medial right thalamus.   His MRI of the brain in March 2023
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
## 2023-05-03 ENCOUNTER — TELEPHONE (OUTPATIENT)
Dept: HEMATOLOGY | Age: 48
End: 2023-05-03

## 2023-05-03 NOTE — TELEPHONE ENCOUNTER
LEISA Louis called patient to assess needs and offer support. Patient did not answer this call and this SW left a voice message.

## 2023-07-01 ENCOUNTER — APPOINTMENT (OUTPATIENT)
Dept: CT IMAGING | Age: 48
End: 2023-07-01
Payer: MEDICARE

## 2023-07-01 ENCOUNTER — HOSPITAL ENCOUNTER (EMERGENCY)
Age: 48
Discharge: HOME | End: 2023-07-01
Payer: MEDICARE

## 2023-07-01 ENCOUNTER — APPOINTMENT (OUTPATIENT)
Dept: GENERAL RADIOLOGY | Age: 48
End: 2023-07-01
Payer: MEDICARE

## 2023-07-01 VITALS
BODY MASS INDEX: 25.83 KG/M2 | OXYGEN SATURATION: 99 % | RESPIRATION RATE: 19 BRPM | SYSTOLIC BLOOD PRESSURE: 123 MMHG | WEIGHT: 155 LBS | DIASTOLIC BLOOD PRESSURE: 82 MMHG | HEART RATE: 93 BPM | HEIGHT: 65 IN | TEMPERATURE: 98.1 F

## 2023-07-01 DIAGNOSIS — R51.9 NONINTRACTABLE EPISODIC HEADACHE, UNSPECIFIED HEADACHE TYPE: ICD-10-CM

## 2023-07-01 DIAGNOSIS — R73.9 HYPERGLYCEMIA: Primary | ICD-10-CM

## 2023-07-01 LAB
ALBUMIN SERPL-MCNC: 4.4 G/DL (ref 3.5–5.2)
ALP SERPL-CCNC: 79 U/L (ref 40–130)
ALT SERPL-CCNC: 37 U/L (ref 5–41)
ANION GAP SERPL CALCULATED.3IONS-SCNC: 11 MMOL/L (ref 7–19)
AST SERPL-CCNC: 20 U/L (ref 5–40)
BASOPHILS # BLD: 0.1 K/UL (ref 0–0.2)
BASOPHILS NFR BLD: 1.1 % (ref 0–1)
BILIRUB SERPL-MCNC: 0.4 MG/DL (ref 0.2–1.2)
BILIRUB UR QL STRIP: NEGATIVE
BNP BLD-MCNC: 52 PG/ML (ref 0–124)
BUN SERPL-MCNC: 10 MG/DL (ref 6–20)
CALCIUM SERPL-MCNC: 9.3 MG/DL (ref 8.6–10)
CHLORIDE SERPL-SCNC: 96 MMOL/L (ref 98–111)
CK SERPL-CCNC: 99 U/L (ref 39–308)
CLARITY UR: CLEAR
CO2 SERPL-SCNC: 26 MMOL/L (ref 22–29)
COLOR UR: YELLOW
CREAT SERPL-MCNC: 0.7 MG/DL (ref 0.5–1.2)
EOSINOPHIL # BLD: 0.5 K/UL (ref 0–0.6)
EOSINOPHIL NFR BLD: 9.1 % (ref 0–5)
ERYTHROCYTE [DISTWIDTH] IN BLOOD BY AUTOMATED COUNT: 11.7 % (ref 11.5–14.5)
GLUCOSE BLD-MCNC: 450 MG/DL (ref 70–99)
GLUCOSE SERPL-MCNC: 439 MG/DL (ref 74–109)
GLUCOSE UR STRIP.AUTO-MCNC: =>1000 MG/DL
HCT VFR BLD AUTO: 40.6 % (ref 42–52)
HGB BLD-MCNC: 14.4 G/DL (ref 14–18)
HGB UR STRIP.AUTO-MCNC: NEGATIVE MG/L
IMM GRANULOCYTES # BLD: 0 K/UL
KETONES UR STRIP.AUTO-MCNC: NEGATIVE MG/DL
LEUKOCYTE ESTERASE UR QL STRIP.AUTO: NEGATIVE
LYMPHOCYTES # BLD: 2.2 K/UL (ref 1.1–4.5)
LYMPHOCYTES NFR BLD: 39.2 % (ref 20–40)
MCH RBC QN AUTO: 31.1 PG (ref 27–31)
MCHC RBC AUTO-ENTMCNC: 35.5 G/DL (ref 33–37)
MCV RBC AUTO: 87.7 FL (ref 80–94)
MONOCYTES # BLD: 0.6 K/UL (ref 0–0.9)
MONOCYTES NFR BLD: 9.8 % (ref 0–10)
NEUTROPHILS # BLD: 2.3 K/UL (ref 1.5–7.5)
NEUTS SEG NFR BLD: 40.4 % (ref 50–65)
NITRITE UR QL STRIP.AUTO: NEGATIVE
PERFORMED ON: ABNORMAL
PH UR STRIP.AUTO: 5.5 [PH] (ref 5–8)
PLATELET # BLD AUTO: 215 K/UL (ref 130–400)
PMV BLD AUTO: 9.1 FL (ref 9.4–12.4)
POTASSIUM SERPL-SCNC: 4.2 MMOL/L (ref 3.5–5)
PROT SERPL-MCNC: 7.4 G/DL (ref 6.6–8.7)
PROT UR STRIP.AUTO-MCNC: NEGATIVE MG/DL
RBC # BLD AUTO: 4.63 M/UL (ref 4.7–6.1)
SODIUM SERPL-SCNC: 133 MMOL/L (ref 136–145)
SP GR UR STRIP.AUTO: 1.03 (ref 1–1.03)
TROPONIN T SERPL-MCNC: <0.01 NG/ML (ref 0–0.03)
UROBILINOGEN UR STRIP.AUTO-MCNC: 0.2 E.U./DL
WBC # BLD AUTO: 5.7 K/UL (ref 4.8–10.8)

## 2023-07-01 PROCEDURE — 82550 ASSAY OF CK (CPK): CPT

## 2023-07-01 PROCEDURE — 85025 COMPLETE CBC W/AUTO DIFF WBC: CPT

## 2023-07-01 PROCEDURE — 2580000003 HC RX 258: Performed by: NURSE PRACTITIONER

## 2023-07-01 PROCEDURE — 99285 EMERGENCY DEPT VISIT HI MDM: CPT

## 2023-07-01 PROCEDURE — 80053 COMPREHEN METABOLIC PANEL: CPT

## 2023-07-01 PROCEDURE — 71045 X-RAY EXAM CHEST 1 VIEW: CPT

## 2023-07-01 PROCEDURE — 6360000002 HC RX W HCPCS: Performed by: NURSE PRACTITIONER

## 2023-07-01 PROCEDURE — 96361 HYDRATE IV INFUSION ADD-ON: CPT

## 2023-07-01 PROCEDURE — 82962 GLUCOSE BLOOD TEST: CPT

## 2023-07-01 PROCEDURE — 36415 COLL VENOUS BLD VENIPUNCTURE: CPT

## 2023-07-01 PROCEDURE — 83880 ASSAY OF NATRIURETIC PEPTIDE: CPT

## 2023-07-01 PROCEDURE — 81003 URINALYSIS AUTO W/O SCOPE: CPT

## 2023-07-01 PROCEDURE — 96374 THER/PROPH/DIAG INJ IV PUSH: CPT

## 2023-07-01 PROCEDURE — 70450 CT HEAD/BRAIN W/O DYE: CPT

## 2023-07-01 PROCEDURE — 6370000000 HC RX 637 (ALT 250 FOR IP): Performed by: NURSE PRACTITIONER

## 2023-07-01 PROCEDURE — 84484 ASSAY OF TROPONIN QUANT: CPT

## 2023-07-01 PROCEDURE — 93005 ELECTROCARDIOGRAM TRACING: CPT | Performed by: NURSE PRACTITIONER

## 2023-07-01 PROCEDURE — 96375 TX/PRO/DX INJ NEW DRUG ADDON: CPT

## 2023-07-01 RX ORDER — SODIUM CHLORIDE, SODIUM LACTATE, POTASSIUM CHLORIDE, AND CALCIUM CHLORIDE .6; .31; .03; .02 G/100ML; G/100ML; G/100ML; G/100ML
1000 INJECTION, SOLUTION INTRAVENOUS ONCE
Status: COMPLETED | OUTPATIENT
Start: 2023-07-01 | End: 2023-07-01

## 2023-07-01 RX ORDER — ONDANSETRON 2 MG/ML
4 INJECTION INTRAMUSCULAR; INTRAVENOUS ONCE
Status: COMPLETED | OUTPATIENT
Start: 2023-07-01 | End: 2023-07-01

## 2023-07-01 RX ORDER — KETOROLAC TROMETHAMINE 30 MG/ML
15 INJECTION, SOLUTION INTRAMUSCULAR; INTRAVENOUS ONCE
Status: COMPLETED | OUTPATIENT
Start: 2023-07-01 | End: 2023-07-01

## 2023-07-01 RX ADMIN — ONDANSETRON 4 MG: 2 INJECTION INTRAMUSCULAR; INTRAVENOUS at 18:55

## 2023-07-01 RX ADMIN — INSULIN HUMAN 5 UNITS: 100 INJECTION, SOLUTION PARENTERAL at 19:38

## 2023-07-01 RX ADMIN — SODIUM CHLORIDE, POTASSIUM CHLORIDE, SODIUM LACTATE AND CALCIUM CHLORIDE 1000 ML: 600; 310; 30; 20 INJECTION, SOLUTION INTRAVENOUS at 18:58

## 2023-07-01 RX ADMIN — KETOROLAC TROMETHAMINE 15 MG: 30 INJECTION, SOLUTION INTRAMUSCULAR; INTRAVENOUS at 21:21

## 2023-07-01 ASSESSMENT — ENCOUNTER SYMPTOMS
PHOTOPHOBIA: 0
DIARRHEA: 0
VOMITING: 0
SINUS PAIN: 0
ABDOMINAL PAIN: 0
SHORTNESS OF BREATH: 0
NAUSEA: 0
SINUS PRESSURE: 0
COUGH: 0
FACIAL SWELLING: 0
RHINORRHEA: 0
BACK PAIN: 0
SORE THROAT: 0

## 2023-07-01 ASSESSMENT — PAIN DESCRIPTION - FREQUENCY: FREQUENCY: INTERMITTENT

## 2023-07-01 ASSESSMENT — PAIN DESCRIPTION - ORIENTATION: ORIENTATION: RIGHT

## 2023-07-01 ASSESSMENT — PAIN DESCRIPTION - DESCRIPTORS: DESCRIPTORS: ACHING

## 2023-07-01 ASSESSMENT — PAIN DESCRIPTION - LOCATION: LOCATION: HEAD

## 2023-07-01 ASSESSMENT — PAIN SCALES - GENERAL: PAINLEVEL_OUTOF10: 6

## 2023-07-01 ASSESSMENT — PAIN - FUNCTIONAL ASSESSMENT: PAIN_FUNCTIONAL_ASSESSMENT: 0-10

## 2023-07-03 LAB
EKG P AXIS: 47 DEGREES
EKG P-R INTERVAL: 174 MS
EKG Q-T INTERVAL: 346 MS
EKG QRS DURATION: 88 MS
EKG QTC CALCULATION (BAZETT): 407 MS
EKG T AXIS: 34 DEGREES

## 2023-07-03 PROCEDURE — 93010 ELECTROCARDIOGRAM REPORT: CPT | Performed by: INTERNAL MEDICINE

## 2023-10-19 ENCOUNTER — HOSPITAL ENCOUNTER (OUTPATIENT)
Dept: ULTRASOUND IMAGING | Age: 48
Discharge: HOME | End: 2023-10-19
Payer: MEDICARE

## 2023-10-19 ENCOUNTER — HOSPITAL ENCOUNTER (OUTPATIENT)
Dept: GENERAL RADIOLOGY | Age: 48
Discharge: HOME | End: 2023-10-19
Payer: MEDICARE

## 2023-10-19 DIAGNOSIS — R10.13 EPIGASTRIC PAIN: ICD-10-CM

## 2023-10-19 DIAGNOSIS — R76.8 FALSE POSITIVE WASSERMANN REACTION: ICD-10-CM

## 2023-10-19 DIAGNOSIS — R76.8 POSITIVE ANA (ANTINUCLEAR ANTIBODY): ICD-10-CM

## 2023-10-19 PROCEDURE — 76705 ECHO EXAM OF ABDOMEN: CPT

## 2023-10-19 PROCEDURE — 73130 X-RAY EXAM OF HAND: CPT

## 2023-10-19 PROCEDURE — 73030 X-RAY EXAM OF SHOULDER: CPT

## 2023-10-19 PROCEDURE — 73630 X-RAY EXAM OF FOOT: CPT

## 2023-12-13 ENCOUNTER — HOSPITAL ENCOUNTER (EMERGENCY)
Age: 48
Discharge: HOME | End: 2023-12-13
Attending: PEDIATRICS
Payer: MEDICARE

## 2023-12-13 VITALS
OXYGEN SATURATION: 96 % | BODY MASS INDEX: 24.96 KG/M2 | HEART RATE: 88 BPM | DIASTOLIC BLOOD PRESSURE: 92 MMHG | SYSTOLIC BLOOD PRESSURE: 144 MMHG | TEMPERATURE: 98.4 F | WEIGHT: 150 LBS | RESPIRATION RATE: 17 BRPM

## 2023-12-13 DIAGNOSIS — L03.116 CELLULITIS OF LEFT LOWER EXTREMITY: Primary | ICD-10-CM

## 2023-12-13 PROCEDURE — 6360000002 HC RX W HCPCS: Performed by: PEDIATRICS

## 2023-12-13 PROCEDURE — 2500000003 HC RX 250 WO HCPCS: Performed by: PEDIATRICS

## 2023-12-13 PROCEDURE — 96372 THER/PROPH/DIAG INJ SC/IM: CPT

## 2023-12-13 PROCEDURE — 99284 EMERGENCY DEPT VISIT MOD MDM: CPT

## 2023-12-13 RX ORDER — LAMOTRIGINE 200 MG/1
200 TABLET ORAL DAILY
COMMUNITY

## 2023-12-13 RX ADMIN — LIDOCAINE HYDROCHLORIDE 1000 MG: 10 INJECTION, SOLUTION EPIDURAL; INFILTRATION; INTRACAUDAL; PERINEURAL at 03:07

## 2023-12-13 ASSESSMENT — PAIN DESCRIPTION - DESCRIPTORS: DESCRIPTORS: DISCOMFORT

## 2023-12-13 ASSESSMENT — PAIN DESCRIPTION - LOCATION: LOCATION: LEG

## 2023-12-13 ASSESSMENT — PAIN - FUNCTIONAL ASSESSMENT: PAIN_FUNCTIONAL_ASSESSMENT: 0-10

## 2023-12-13 ASSESSMENT — PAIN SCALES - GENERAL: PAINLEVEL_OUTOF10: 2

## 2023-12-13 ASSESSMENT — PAIN DESCRIPTION - ORIENTATION: ORIENTATION: LEFT;LOWER

## 2023-12-13 NOTE — DISCHARGE INSTRUCTIONS
Return or seek medical attention with increasing redness, fever greater than 100.5, persistent vomiting, or other concerns

## 2023-12-28 ENCOUNTER — HOSPITAL ENCOUNTER (EMERGENCY)
Age: 48
Discharge: HOME | End: 2023-12-28
Payer: MEDICARE

## 2023-12-28 VITALS
TEMPERATURE: 97.5 F | DIASTOLIC BLOOD PRESSURE: 88 MMHG | HEART RATE: 88 BPM | OXYGEN SATURATION: 93 % | SYSTOLIC BLOOD PRESSURE: 154 MMHG | RESPIRATION RATE: 16 BRPM

## 2023-12-28 DIAGNOSIS — R04.0 EPISTAXIS: Primary | ICD-10-CM

## 2023-12-28 LAB
ALBUMIN SERPL-MCNC: 4.9 G/DL (ref 3.5–5.2)
ALP SERPL-CCNC: 75 U/L (ref 40–130)
ALT SERPL-CCNC: 30 U/L (ref 5–41)
ANION GAP SERPL CALCULATED.3IONS-SCNC: 13 MMOL/L (ref 7–19)
AST SERPL-CCNC: 24 U/L (ref 5–40)
BASOPHILS # BLD: 0.1 K/UL (ref 0–0.2)
BASOPHILS NFR BLD: 0.7 % (ref 0–1)
BILIRUB SERPL-MCNC: 0.4 MG/DL (ref 0.2–1.2)
BUN SERPL-MCNC: 10 MG/DL (ref 6–20)
CALCIUM SERPL-MCNC: 9.9 MG/DL (ref 8.6–10)
CHLORIDE SERPL-SCNC: 102 MMOL/L (ref 98–111)
CO2 SERPL-SCNC: 26 MMOL/L (ref 22–29)
CREAT SERPL-MCNC: 0.7 MG/DL (ref 0.5–1.2)
EOSINOPHIL # BLD: 0.5 K/UL (ref 0–0.6)
EOSINOPHIL NFR BLD: 6.2 % (ref 0–5)
ERYTHROCYTE [DISTWIDTH] IN BLOOD BY AUTOMATED COUNT: 11.9 % (ref 11.5–14.5)
GLUCOSE SERPL-MCNC: 105 MG/DL (ref 74–109)
HCT VFR BLD AUTO: 44.8 % (ref 42–52)
HGB BLD-MCNC: 16 G/DL (ref 14–18)
IMM GRANULOCYTES # BLD: 0 K/UL
LYMPHOCYTES # BLD: 2.2 K/UL (ref 1.1–4.5)
LYMPHOCYTES NFR BLD: 26.8 % (ref 20–40)
MCH RBC QN AUTO: 32.5 PG (ref 27–31)
MCHC RBC AUTO-ENTMCNC: 35.7 G/DL (ref 33–37)
MCV RBC AUTO: 90.9 FL (ref 80–94)
MONOCYTES # BLD: 0.6 K/UL (ref 0–0.9)
MONOCYTES NFR BLD: 7.8 % (ref 0–10)
NEUTROPHILS # BLD: 4.8 K/UL (ref 1.5–7.5)
NEUTS SEG NFR BLD: 58.3 % (ref 50–65)
PLATELET # BLD AUTO: 235 K/UL (ref 130–400)
PMV BLD AUTO: 8.6 FL (ref 9.4–12.4)
POTASSIUM SERPL-SCNC: 4.2 MMOL/L (ref 3.5–5)
PROT SERPL-MCNC: 8.3 G/DL (ref 6.6–8.7)
RBC # BLD AUTO: 4.93 M/UL (ref 4.7–6.1)
SODIUM SERPL-SCNC: 141 MMOL/L (ref 136–145)
WBC # BLD AUTO: 8.2 K/UL (ref 4.8–10.8)

## 2023-12-28 PROCEDURE — 85025 COMPLETE CBC W/AUTO DIFF WBC: CPT

## 2023-12-28 PROCEDURE — 6370000000 HC RX 637 (ALT 250 FOR IP): Performed by: PHYSICIAN ASSISTANT

## 2023-12-28 PROCEDURE — 80053 COMPREHEN METABOLIC PANEL: CPT

## 2023-12-28 PROCEDURE — 99284 EMERGENCY DEPT VISIT MOD MDM: CPT

## 2023-12-28 PROCEDURE — 2580000003 HC RX 258: Performed by: PHYSICIAN ASSISTANT

## 2023-12-28 PROCEDURE — 36415 COLL VENOUS BLD VENIPUNCTURE: CPT

## 2023-12-28 RX ORDER — OXYMETAZOLINE HYDROCHLORIDE 0.05 G/100ML
2 SPRAY NASAL ONCE
Status: COMPLETED | OUTPATIENT
Start: 2023-12-28 | End: 2023-12-28

## 2023-12-28 RX ORDER — 0.9 % SODIUM CHLORIDE 0.9 %
500 INTRAVENOUS SOLUTION INTRAVENOUS ONCE
Status: COMPLETED | OUTPATIENT
Start: 2023-12-28 | End: 2023-12-28

## 2023-12-28 RX ADMIN — OXYMETAZOLINE HYDROCHLORIDE 2 SPRAY: 5 SPRAY NASAL at 16:15

## 2023-12-28 RX ADMIN — SODIUM CHLORIDE 500 ML: 9 INJECTION, SOLUTION INTRAVENOUS at 16:16

## 2023-12-28 NOTE — ED PROVIDER NOTES
[address] [facility] EMERGENCY DEPT  eMERGENCYdEPARTMENT eNCOUnter      Pt Name: Eugene Ramesh  MRN: [id number]  [address] [facility] 1975  Date of evaluation: 12/28/2023  Provider:KRYSTAL Obrien    CHIEF COMPLAINT       Chief Complaint   Patient presents with    Epistaxis     X2 today         HISTORY OF PRESENT ILLNESS  (Location/Symptom, Timing/Onset, Context/Setting, Quality, Duration, Modifying Factors, Severity.)   Eugene Ramesh is a 46 y.o. male who presents to the emergency department with complaints of epistaxis acute onset x 2 today. Has been able to get to stop both times. Denies fever chills; some mild congestion here hx of blood sugar issues with some fatigue. Goal to make sure no recurring bleeding plan on sugar check with hgb check fluids. Ambulating no provoking. Likely a discharge. Denies HA no vision changes. No facial swelling. He takes baby aspirin. HPI    Nursing Notes were reviewed and I agree. REVIEW OF SYSTEMS    (2-9 systems for level 4, 10 or more for level 5)     Review of Systems   Constitutional:  Negative for activity change, appetite change, chills and fever. HENT:  Positive for nosebleeds. Negative for congestion and dental problem. Eyes:  Negative for photophobia, discharge and itching. Respiratory:  Negative for apnea, cough and shortness of breath. Cardiovascular:  Negative for chest pain. Musculoskeletal:  Negative for arthralgias, back pain, gait problem, myalgias and neck pain. Skin:  Negative for color change, pallor and rash. Neurological:  Negative for dizziness, seizures and syncope. Psychiatric/Behavioral:  Negative for agitation. The patient is not nervous/anxious. Except as noted above the remainder of the review of systems was reviewed and negative.        PAST MEDICAL HISTORY     Past Medical History:   Diagnosis Date    ADHD (attention deficit hyperactivity disorder)     Bicuspid cardiac valve     Bipolar disorder (HCC)     Chronic kidney disease

## 2023-12-28 NOTE — DISCHARGE INSTRUCTIONS
Saline spray to keep nasal passage from getting dry or cracked.  Humidifier when sleeping  Return if cannot control at home  Normal HGB and blood sugar

## 2024-02-20 ENCOUNTER — OFFICE VISIT (OUTPATIENT)
Dept: GASTROENTEROLOGY | Age: 49
End: 2024-02-20
Payer: MEDICARE

## 2024-02-20 VITALS
BODY MASS INDEX: 27.49 KG/M2 | DIASTOLIC BLOOD PRESSURE: 80 MMHG | HEIGHT: 65 IN | HEART RATE: 95 BPM | SYSTOLIC BLOOD PRESSURE: 138 MMHG | WEIGHT: 165 LBS | OXYGEN SATURATION: 96 %

## 2024-02-20 DIAGNOSIS — R11.2 NAUSEA AND VOMITING, UNSPECIFIED VOMITING TYPE: Primary | ICD-10-CM

## 2024-02-20 DIAGNOSIS — Z12.11 COLON CANCER SCREENING: ICD-10-CM

## 2024-02-20 PROCEDURE — 3079F DIAST BP 80-89 MM HG: CPT | Performed by: NURSE PRACTITIONER

## 2024-02-20 PROCEDURE — 3075F SYST BP GE 130 - 139MM HG: CPT | Performed by: NURSE PRACTITIONER

## 2024-02-20 PROCEDURE — 99204 OFFICE O/P NEW MOD 45 MIN: CPT | Performed by: NURSE PRACTITIONER

## 2024-02-20 NOTE — PROGRESS NOTES
(hypertension) 08/18/2022    Memory loss     Methamphetamine abuse (HCC)     Moderate mixed hyperlipidemia not requiring statin therapy     Retinopathy     Stroke (HCC)     per patient was told he had one not sure when per MRI       Past Surgical History:   Procedure Laterality Date    CARDIAC CATHETERIZATION  2008    SKIN BIOPSY  2006    MRSA groin area R    TONSILLECTOMY      at age 1    TYMPANOSTOMY TUBE PLACEMENT Bilateral     when he was a small child    WISDOM TOOTH EXTRACTION      [id number]       Family History   Problem Relation Age of Onset    Heart Disease Mother         cause of death    Diabetes Mother     Depression Mother     High Blood Pressure Mother     Kidney Disease Mother     Hearing Loss Mother     Heart Failure Mother     Cirrhosis Mother     Other Father         COVID-19    Atrial Fibrillation Father     Depression Half-Brother     Substance Abuse Half-Brother         overdosed fatally    COPD Half-Brother         smoker    No Known Problems Half-Sister     Colon Cancer Neg Hx     Colon Polyps Neg Hx     Liver Cancer Neg Hx        Social History     Socioeconomic History    Marital status: Single    Number of children: 0    Years of education: 15   Occupational History    Occupation: Sales     Comment: disabled   Tobacco Use    Smoking status: Never    Smokeless tobacco: Never   Vaping Use    Vaping Use: Never used   Substance and Sexual Activity    Alcohol use: Not Currently    Drug use: Not Currently     Types: Methamphetamines (Crystal Meth)     Comment: quit 2022   Social History Narrative    CODE STATUS: Full Code    HEALTH CARE PROXY: \"nobody\"    AMBULATES: independently nut has balance problems    DOMICILED: states he has a hoarding disorder, lives alone, has stairs in the home, has no pets       Current Outpatient Medications   Medication Sig Dispense Refill    lamoTRIgine (LAMICTAL) 200 MG tablet Take 1 tablet by mouth daily      metFORMIN (GLUCOPHAGE) 1000 MG tablet Take 1 tablet by

## 2024-02-26 ASSESSMENT — ENCOUNTER SYMPTOMS
ABDOMINAL DISTENTION: 0
NAUSEA: 1
BLOOD IN STOOL: 0
DIARRHEA: 0
ABDOMINAL PAIN: 0
ANAL BLEEDING: 0
TROUBLE SWALLOWING: 0
CHOKING: 0
VOMITING: 1
CONSTIPATION: 0
COUGH: 0
RECTAL PAIN: 0
SHORTNESS OF BREATH: 0

## 2024-03-13 ENCOUNTER — TELEPHONE (OUTPATIENT)
Dept: GASTROENTEROLOGY | Age: 49
End: 2024-03-13

## 2024-03-18 ENCOUNTER — TELEPHONE (OUTPATIENT)
Dept: GASTROENTEROLOGY | Age: 49
End: 2024-03-18

## 2024-03-18 NOTE — TELEPHONE ENCOUNTER
Called patient to remind them of [facility]  to arrive at 1000     RESPONSE: ILSA

## 2024-03-19 ENCOUNTER — APPOINTMENT (OUTPATIENT)
Dept: OPERATING ROOM | Age: 49
End: 2024-03-19
Attending: INTERNAL MEDICINE

## 2024-03-19 ENCOUNTER — ANESTHESIA (OUTPATIENT)
Dept: OPERATING ROOM | Age: 49
End: 2024-03-19

## 2024-03-19 ENCOUNTER — HOSPITAL ENCOUNTER (OUTPATIENT)
Age: 49
Setting detail: OUTPATIENT SURGERY
Discharge: HOME | End: 2024-03-19
Attending: INTERNAL MEDICINE | Admitting: INTERNAL MEDICINE
Payer: MEDICARE

## 2024-03-19 ENCOUNTER — ANESTHESIA EVENT (OUTPATIENT)
Dept: OPERATING ROOM | Age: 49
End: 2024-03-19

## 2024-03-19 ENCOUNTER — HOSPITAL ENCOUNTER (OUTPATIENT)
Age: 49
Setting detail: SPECIMEN
Discharge: HOME | End: 2024-03-19
Payer: MEDICARE

## 2024-03-19 VITALS
HEART RATE: 82 BPM | OXYGEN SATURATION: 96 % | BODY MASS INDEX: 26.66 KG/M2 | TEMPERATURE: 98 F | WEIGHT: 160 LBS | SYSTOLIC BLOOD PRESSURE: 105 MMHG | DIASTOLIC BLOOD PRESSURE: 71 MMHG | RESPIRATION RATE: 16 BRPM | HEIGHT: 65 IN

## 2024-03-19 PROCEDURE — G0121 COLON CA SCRN NOT HI RSK IND: HCPCS

## 2024-03-19 PROCEDURE — 43239 EGD BIOPSY SINGLE/MULTIPLE: CPT

## 2024-03-19 PROCEDURE — 43239 EGD BIOPSY SINGLE/MULTIPLE: CPT | Performed by: INTERNAL MEDICINE

## 2024-03-19 PROCEDURE — G0121 COLON CA SCRN NOT HI RSK IND: HCPCS | Performed by: INTERNAL MEDICINE

## 2024-03-19 PROCEDURE — 88305 TISSUE EXAM BY PATHOLOGIST: CPT

## 2024-03-19 RX ORDER — LIDOCAINE HYDROCHLORIDE 20 MG/ML
INJECTION, SOLUTION EPIDURAL; INFILTRATION; INTRACAUDAL; PERINEURAL PRN
Status: DISCONTINUED | OUTPATIENT
Start: 2024-03-19 | End: 2024-03-19 | Stop reason: SDUPTHER

## 2024-03-19 RX ORDER — FENTANYL CITRATE 50 UG/ML
INJECTION, SOLUTION INTRAMUSCULAR; INTRAVENOUS PRN
Status: DISCONTINUED | OUTPATIENT
Start: 2024-03-19 | End: 2024-03-19 | Stop reason: SDUPTHER

## 2024-03-19 RX ORDER — DEXMEDETOMIDINE HYDROCHLORIDE 100 UG/ML
INJECTION, SOLUTION INTRAVENOUS PRN
Status: DISCONTINUED | OUTPATIENT
Start: 2024-03-19 | End: 2024-03-19 | Stop reason: SDUPTHER

## 2024-03-19 RX ORDER — PROPOFOL 10 MG/ML
INJECTION, EMULSION INTRAVENOUS PRN
Status: DISCONTINUED | OUTPATIENT
Start: 2024-03-19 | End: 2024-03-19 | Stop reason: SDUPTHER

## 2024-03-19 RX ORDER — SODIUM CHLORIDE, SODIUM LACTATE, POTASSIUM CHLORIDE, CALCIUM CHLORIDE 600; 310; 30; 20 MG/100ML; MG/100ML; MG/100ML; MG/100ML
INJECTION, SOLUTION INTRAVENOUS CONTINUOUS
Status: DISCONTINUED | OUTPATIENT
Start: 2024-03-19 | End: 2024-03-19 | Stop reason: HOSPADM

## 2024-03-19 RX ORDER — SUCRALFATE 1 G/1
TABLET ORAL
Qty: 56 TABLET | Refills: 1 | Status: SHIPPED | OUTPATIENT
Start: 2024-03-19

## 2024-03-19 RX ADMIN — DEXMEDETOMIDINE HYDROCHLORIDE 12 MCG: 100 INJECTION, SOLUTION INTRAVENOUS at 12:22

## 2024-03-19 RX ADMIN — DEXMEDETOMIDINE HYDROCHLORIDE 8 MCG: 100 INJECTION, SOLUTION INTRAVENOUS at 12:32

## 2024-03-19 RX ADMIN — FENTANYL CITRATE 50 MCG: 50 INJECTION, SOLUTION INTRAMUSCULAR; INTRAVENOUS at 12:10

## 2024-03-19 RX ADMIN — PROPOFOL 30 MG: 10 INJECTION, EMULSION INTRAVENOUS at 12:35

## 2024-03-19 RX ADMIN — PROPOFOL 20 MG: 10 INJECTION, EMULSION INTRAVENOUS at 12:37

## 2024-03-19 RX ADMIN — SODIUM CHLORIDE, SODIUM LACTATE, POTASSIUM CHLORIDE, CALCIUM CHLORIDE: 600; 310; 30; 20 INJECTION, SOLUTION INTRAVENOUS at 11:19

## 2024-03-19 RX ADMIN — PROPOFOL 20 MG: 10 INJECTION, EMULSION INTRAVENOUS at 12:43

## 2024-03-19 RX ADMIN — PROPOFOL 100 MG: 10 INJECTION, EMULSION INTRAVENOUS at 12:22

## 2024-03-19 RX ADMIN — PROPOFOL 80 MG: 10 INJECTION, EMULSION INTRAVENOUS at 12:32

## 2024-03-19 RX ADMIN — FENTANYL CITRATE 50 MCG: 50 INJECTION, SOLUTION INTRAMUSCULAR; INTRAVENOUS at 12:13

## 2024-03-19 RX ADMIN — LIDOCAINE HYDROCHLORIDE 100 MG: 20 INJECTION, SOLUTION EPIDURAL; INFILTRATION; INTRACAUDAL; PERINEURAL at 12:22

## 2024-03-19 RX ADMIN — PROPOFOL 30 MG: 10 INJECTION, EMULSION INTRAVENOUS at 12:46

## 2024-03-19 RX ADMIN — PROPOFOL 30 MG: 10 INJECTION, EMULSION INTRAVENOUS at 12:38

## 2024-03-19 RX ADMIN — PROPOFOL 20 MG: 10 INJECTION, EMULSION INTRAVENOUS at 12:27

## 2024-03-19 ASSESSMENT — PAIN - FUNCTIONAL ASSESSMENT: PAIN_FUNCTIONAL_ASSESSMENT: NONE - DENIES PAIN

## 2024-03-19 NOTE — H&P
Patient Name: Rubens Pulido  : 1975  MRN: [id number]  DATE: 24    Allergies:   Allergies   Allergen Reactions    Depakote [Divalproex Sodium]     Seroquel [Quetiapine Fumarate] Other (See Comments)     hallucinations    Wellbutrin [Bupropion]     Contrast [Iodides] Hives, Itching and Rash     Had this when he had a heart cath in         ENDOSCOPY  History and Physical    Procedure:    [] Diagnostic Colonoscopy       [x] Screening Colonoscopy  [x] EGD      [] ERCP      [] EUS       [] Other    [x] Previous office notes/History and Physical reviewed from the patients chart. Please see EMR for further details of HPI. I have examined the patient's status immediately prior to the procedure and:      Indications/HPI:     For both EGD and colonoscopy exams today:    1. Nausea and vomiting, unspecified vomiting type  2. Colon cancer screening    []Abdominal Pain   []Cancer- GI/Lung     []Fhx of colon CA/polyps  []History of Polyps  []Barretts            []Melena  []Abnormal Imaging              []Dysphagia              []Persistent Pneumonia   []Anemia                            []Food Impaction        []History of Polyps  [] GI Bleed             []Pulmonary nodule/Mass   []Change in bowel habits []Heartburn/Reflux  []Rectal Bleed (BRBPR)  []Chest Pain - Non Cardiac []Heme (+) Stool []Ulcers  []Constipation  []Hemoptysis  []Varices  []Diarrhea  []Hypoxemia    []Nausea/Vomiting   []Screening   []Crohns/Colitis  []Other:     Anesthesia:   [x] MAC [] Moderate Sedation   [] General   [] None     ROS: 12 pt Review of Symptoms was negative unless mentioned above    Medications:   Prior to Admission medications    Medication Sig Start Date End Date Taking? Authorizing Provider   lamoTRIgine (LAMICTAL) 200 MG tablet Take 1 tablet by mouth daily    Cliff Sloan MD   metFORMIN (GLUCOPHAGE) 1000 MG tablet Take 1 tablet by mouth 2 times daily (with meals)    Cliff Sloan MD   SITagliptin  (JANUVIA) 50 MG tablet Take 1 tablet by mouth daily    Provider, MD Cliff   Cholecalciferol (VITAMIN D) 50 MCG (2000 UT) CAPS capsule Take 1 capsule by mouth daily    ProviderCliff MD   aspirin 81 MG chewable tablet Take 1 tablet by mouth daily 3/28/23   Day, PAM Geiger - CNP       Past Medical History:  Past Medical History:   Diagnosis Date    ADHD (attention deficit hyperactivity disorder)     Bicuspid cardiac valve     Bipolar disorder (HCC)     Chronic kidney disease     Depression     Diabetes (HCC) 2004    Hoarding disorder     HTN (hypertension)     HTN (hypertension) 08/18/2022    Memory loss     Methamphetamine abuse (HCC)     Moderate mixed hyperlipidemia not requiring statin therapy     Retinopathy     Stroke (HCC)     per patient was told he had one not sure when per MRI       Past Surgical History:  Past Surgical History:   Procedure Laterality Date    CARDIAC CATHETERIZATION  2008    SKIN BIOPSY  2006    MRSA groin area R    TONSILLECTOMY      at age 1    TYMPANOSTOMY TUBE PLACEMENT Bilateral     when he was a small child    WISDOM TOOTH EXTRACTION      [id number]       Social History:  Social History     Tobacco Use    Smoking status: Never    Smokeless tobacco: Never   Vaping Use    Vaping Use: Never used   Substance Use Topics    Alcohol use: Not Currently    Drug use: Not Currently     Types: Methamphetamines (Crystal Meth)     Comment: quit 2022       Vital Signs:   Vitals:    03/19/24 1114   BP: 109/77   Pulse: 89   Resp: 16   Temp: 98.1 °F (36.7 °C)   SpO2: 98%        Physical Exam:  Cardiac:  [x]WNL  []Comments:  Pulmonary:  [x]WNL   []Comments:  Neuro/Mental Status:  [x]WNL  []Comments:  Abdominal:  [x]WNL    []Comments:  Other:   []WNL  []Comments:    Informed Consent:  The risks and benefits of the procedure have been discussed with either the patient or if they cannot consent, their representative.    Assessment:  Patient examined and appropriate for planned sedation and

## 2024-03-19 NOTE — DISCHARGE INSTRUCTIONS
1.  Await path results, the patient will be contacted in 7-10 days with biopsy results.   2.  Avoid NSAIDs  3.  Continue antiemetics and  anti-GERD measures  4.  Start lansoprazole 30 mg p.o. twice daily x 4 weeks and thereafter once daily before dinner along with anti-GERD measures  5.  Carafate slurry or suspension 1 g p.o. before every meal and at bedtime x 2 weeks; 1 refill  6.  If no evidence of dysplasia on the distal esophageal biopsies, repeat EGD in 3 years with biopsies for follow-up of López's esophagus like changes seen on endoscopy today.    7. Repeat colonoscopy: In 10 years for colon cancer screening; sooner if his personal or family history as pertaining to colorectal cancer risk changes requiring an earlier exam or if the patient were to develop lower GI symptoms such as bleeding, abdominal pain, change in bowel habits or stool caliber or if the patient has anemia or unexplained weight loss in the future.     - Resume previous meds and diet  - GI clinic f/u 8 weeks with Ms. Sharif   - Keep scheduled f/u appts with other MDs     - NO ASA/NSAIDs x 2 weeks     POST-OP ORDERS: ENDOSCOPY & COLONOSCOPY:    1. Rest today.    2. DO NOT eat or drink until wide awake; eat your usual diet today in moderate amount only.    3. DO NOT drive today.    4. Call physician if you have severe pain, vomiting, fever, rectal bleeding or black bowel movements.    5.  If a biopsy was taken or a polyp removed, you should expect to hear results in about 7-10 days.  If you have heard nothing from your physician by then, call the office for results.    6.  Discharge home when patient awake, vitals signs stable and tolerating liquids.    7. Call with questions or concerns [phone].    NSAIDS (Non-steroidal Anti-Inflammatory)    You have been directed by your physician to avoid any NSAID's; the following medications are a list of those to avoid. If you think that you are taking any NSAID's notify your physician.

## 2024-03-19 NOTE — ANESTHESIA POSTPROCEDURE EVALUATION
Department of Anesthesiology  Postprocedure Note    Patient: Rubens Pulido  MRN: [id number]  YOB: 1975  Date of evaluation: 3/19/2024    Procedure Summary       Date: 03/19/24 Room / Location: [place] [id number] / [facility]    Anesthesia Start: 1209 Anesthesia Stop: 1255    Procedures:       ESOPHAGOGASTRODUODENOSCOPY BIOPSY (Esophagus)      COLORECTAL CANCER SCREENING, NOT HIGH RISK (Abdomen) Diagnosis:       Nausea and vomiting, unspecified vomiting type      Screen for colon cancer      (Nausea and vomiting, unspecified vomiting type [R11.2])      (Screen for colon cancer [Z12.11])    Surgeons: Rubén Davalos MD Responsible Provider: Robina Lu APRN - CRNA    Anesthesia Type: TIVA, general ASA Status: 3            Anesthesia Type: No value filed.    John Phase I:      John Phase II:      Anesthesia Post Evaluation    Patient location during evaluation: bedside  Patient participation: complete - patient participated  Level of consciousness: sleepy but conscious  Pain score: 0  Airway patency: patent  Nausea & Vomiting: no vomiting and no nausea  Cardiovascular status: blood pressure returned to baseline  Respiratory status: acceptable  Hydration status: stable  Pain management: adequate    No notable events documented.

## 2024-03-19 NOTE — ANESTHESIA PRE PROCEDURE
Department of Anesthesiology  Preprocedure Note       Name:  Rubens Pulido   Age:  48 y.o.  :  1975                                          MRN:  [id number]         Date:  3/19/2024      Surgeon: Surgeon(s):  Rubén Davalos MD    Procedure: Procedure(s):  ESOPHAGOGASTRODUODENOSCOPY BIOPSY  COLORECTAL CANCER SCREENING, NOT HIGH RISK    Medications prior to admission:   Prior to Admission medications    Medication Sig Start Date End Date Taking? Authorizing Provider   lamoTRIgine (LAMICTAL) 200 MG tablet Take 1 tablet by mouth daily    Cliff Sloan MD   metFORMIN (GLUCOPHAGE) 1000 MG tablet Take 1 tablet by mouth 2 times daily (with meals)    Cliff Sloan MD   SITagliptin (JANUVIA) 50 MG tablet Take 1 tablet by mouth daily    Cliff Sloan MD   Cholecalciferol (VITAMIN D) 50 MCG (2000) CAPS capsule Take 1 capsule by mouth daily    Cliff Sloan MD   aspirin 81 MG chewable tablet Take 1 tablet by mouth daily 3/28/23   Day, PAM Geiger - CNP       Current medications:    Current Facility-Administered Medications   Medication Dose Route Frequency Provider Last Rate Last Admin   • lactated ringers IV soln infusion   IntraVENous Continuous Rubén Davalos MD           Allergies:    Allergies   Allergen Reactions   • Depakote [Divalproex Sodium]    • Seroquel [Quetiapine Fumarate] Other (See Comments)     hallucinations   • Wellbutrin [Bupropion]    • Contrast [Iodides] Hives, Itching and Rash     Had this when he had a heart cath in        Problem List:    Patient Active Problem List   Diagnosis Code   • ADHD (attention deficit hyperactivity disorder), inattentive type F90.0   • Bipolar I disorder (HCC) F31.9   • Hoarding behavior F42.3   • Chest pain R07.9   • Non-cardiac chest pain R07.89   • Bicuspid cardiac valve Q23.1   • Chronic kidney disease N18.9   • Depression F32.A   • Diabetes (HCC) E11.9   • HTN (hypertension) I10   • Methamphetamine abuse

## 2024-03-19 NOTE — OP NOTE
Patient: Rubens Pulido : 1975  Med Rec#: [id number] Acc#: [id number]   Primary Care Provider Mayra Robertson APRN - NP    Date of Procedure:  3/19/2024    Endoscopist: Rubén Davalos MD, MD    Referring Provider: Mayra Robertson APRN - NP,     Operation Performed: Colonoscopy up to the distal terminal ileum    Indications: Colon cancer screening    Anesthesia:  Sedation was administered by anesthesia who monitored the patient during the procedure.    I met with Rubens Pulido prior to procedure. We discussed the procedure itself, and I have discussed the risks of endoscopy (including-- but not limited to-- pain, discomfort, bleeding potentially requiring second endoscopic procedure and/or blood transfusion, organ perforation requiring operative repair, damage to organs near the colon, infection, aspiration, cardiopulmonary/allergic reaction), benefits, indications to endoscopy. Additionally, we discussed options other than colonoscopy. The patient expressed understanding. All questions answered. The patient decided to proceed with the procedure.  Signed informed consent was placed on the chart.    Blood Loss: minimal    Withdrawal time: More than 9 minutes  Bowel Prep: adequate     Complications: no immediate complications    DESCRIPTION OF PROCEDURE:     A time out was performed. After written informed consent was obtained, the patient was placed in the left lateral position.     The perianal area was inspected, and a digital rectal exam was performed. A rectal exam was performed: normal tone, no palpable lesions. At this point, a forward viewing Olympus colonoscope was inserted into the anus and carefully advanced to the distal terminal ileum.  The cecum was identified by the ileocecal valve and the appendiceal orifice. The colonoscope was then slowly withdrawn with careful inspection of the mucosa in a linear and circumferential fashion. The scope was retroflexed in the rectum. 
retroflexed exam, likely emesis induced mucosal injury.  No stigmata of bleeding.  Otherwise remainder of the examined gastric mucosa appeared essentially normal.      NO ulcers or masses or gastric outlet obstruction or retained food or fluid. Rugae were normal and lumen distended well with insufflation. Retroflexed views otherwise revealed a normal GE junction, fundus and cardia as well.       Duodenum: abnormal: Few small 1 to 2 mm erosions with surrounding patchy erythema in the posterior duodenal bulb-likely chemical/NSAIDs duodenitis; second portion was normal including the major duodenal papilla.  Cold biopsies were taken for pathology.       RECOMMENDATIONS:    1.  Await path results, the patient will be contacted in 7-10 days with biopsy results.   2.  Avoid NSAIDs  3.  Continue antiemetics and  anti-GERD measures  4.  Start lansoprazole 30 mg p.o. twice daily x 4 weeks and thereafter once daily before dinner along with anti-GERD measures  5.  Carafate slurry or suspension 1 g p.o. before every meal and at bedtime x 2 weeks; 1 refill  6.  If no evidence of dysplasia on the distal esophageal biopsies, repeat EGD in 3 years with biopsies for follow-up of López's esophagus like changes seen on endoscopy today.    - Resume previous meds and diet  - GI clinic f/u 8 weeks with Ms. Sharif   - Keep scheduled f/u appts with other MDs     - NO ASA/NSAIDs x 2 weeks     The results were discussed with the patient and family.  A copy of the images obtained were given to the patient.     (Please note that portions of this note were completed with a voice recognition program. Efforts were made to edit the dictations but occasionally words may be mis-transcribed.)     Rubén Davalos MD, MD  3/19/2024  12:19 PM

## 2024-03-20 ENCOUNTER — TELEPHONE (OUTPATIENT)
Dept: GASTROENTEROLOGY | Age: 49
End: 2024-03-20

## 2024-03-20 RX ORDER — LANSOPRAZOLE 30 MG/1
CAPSULE, DELAYED RELEASE ORAL
Qty: 56 CAPSULE | Refills: 0 | Status: SHIPPED | OUTPATIENT
Start: 2024-03-20

## 2024-03-20 RX ORDER — LANSOPRAZOLE 30 MG/1
CAPSULE, DELAYED RELEASE ORAL
Qty: 30 CAPSULE | Refills: 3 | Status: SHIPPED | OUTPATIENT
Start: 2024-03-20

## 2024-03-20 NOTE — TELEPHONE ENCOUNTER
03- Pr Dr Davalos op note   RECOMMENDATIONS:    4.  Start lansoprazole 30 mg p.o. twice daily x 4 weeks and thereafter once daily before dinner along with anti-GERD measures    5.  Carafate slurry or suspension 1 g p.o. before every meal and at bedtime x 2 weeks; 1 refill      03- Called patient lvm that the rx has been sent to [facility].& directions of medications     If any problems or questions to contact the office          03- Patient and wanted to review the medications and directions.

## 2024-04-12 ENCOUNTER — APPOINTMENT (OUTPATIENT)
Dept: CT IMAGING | Age: 49
End: 2024-04-12
Payer: MEDICARE

## 2024-04-12 ENCOUNTER — HOSPITAL ENCOUNTER (EMERGENCY)
Age: 49
Discharge: HOME | End: 2024-04-13
Attending: STUDENT IN AN ORGANIZED HEALTH CARE EDUCATION/TRAINING PROGRAM
Payer: MEDICARE

## 2024-04-12 DIAGNOSIS — G43.109 OCULAR MIGRAINE: Primary | ICD-10-CM

## 2024-04-12 LAB
BASOPHILS # BLD: 0.1 K/UL (ref 0–0.2)
BASOPHILS NFR BLD: 1.4 % (ref 0–1)
EOSINOPHIL # BLD: 1.1 K/UL (ref 0–0.6)
EOSINOPHIL NFR BLD: 12 % (ref 0–5)
ERYTHROCYTE [DISTWIDTH] IN BLOOD BY AUTOMATED COUNT: 12 % (ref 11.5–14.5)
HCT VFR BLD AUTO: 42.2 % (ref 42–52)
HGB BLD-MCNC: 15.1 G/DL (ref 14–18)
IMM GRANULOCYTES # BLD: 0 K/UL
LYMPHOCYTES # BLD: 3.1 K/UL (ref 1.1–4.5)
LYMPHOCYTES NFR BLD: 35.4 % (ref 20–40)
MCH RBC QN AUTO: 31.2 PG (ref 27–31)
MCHC RBC AUTO-ENTMCNC: 35.8 G/DL (ref 33–37)
MCV RBC AUTO: 87.2 FL (ref 80–94)
MONOCYTES # BLD: 0.9 K/UL (ref 0–0.9)
MONOCYTES NFR BLD: 10.3 % (ref 0–10)
NEUTROPHILS # BLD: 3.6 K/UL (ref 1.5–7.5)
NEUTS SEG NFR BLD: 40.8 % (ref 50–65)
PLATELET # BLD AUTO: 244 K/UL (ref 130–400)
PMV BLD AUTO: 9 FL (ref 9.4–12.4)
RBC # BLD AUTO: 4.84 M/UL (ref 4.7–6.1)
WBC # BLD AUTO: 8.8 K/UL (ref 4.8–10.8)

## 2024-04-12 PROCEDURE — 99284 EMERGENCY DEPT VISIT MOD MDM: CPT

## 2024-04-12 PROCEDURE — 93005 ELECTROCARDIOGRAM TRACING: CPT | Performed by: STUDENT IN AN ORGANIZED HEALTH CARE EDUCATION/TRAINING PROGRAM

## 2024-04-12 PROCEDURE — 96360 HYDRATION IV INFUSION INIT: CPT

## 2024-04-12 PROCEDURE — 70450 CT HEAD/BRAIN W/O DYE: CPT

## 2024-04-12 RX ORDER — 0.9 % SODIUM CHLORIDE 0.9 %
1000 INTRAVENOUS SOLUTION INTRAVENOUS ONCE
Status: COMPLETED | OUTPATIENT
Start: 2024-04-12 | End: 2024-04-13

## 2024-04-13 VITALS
HEART RATE: 91 BPM | OXYGEN SATURATION: 99 % | RESPIRATION RATE: 17 BRPM | WEIGHT: 160 LBS | HEIGHT: 65 IN | TEMPERATURE: 98.1 F | BODY MASS INDEX: 26.66 KG/M2 | SYSTOLIC BLOOD PRESSURE: 136 MMHG | DIASTOLIC BLOOD PRESSURE: 89 MMHG

## 2024-04-13 LAB
ALBUMIN SERPL-MCNC: 5 G/DL (ref 3.5–5.2)
ALP SERPL-CCNC: 82 U/L (ref 40–130)
ALT SERPL-CCNC: 28 U/L (ref 5–41)
ANION GAP SERPL CALCULATED.3IONS-SCNC: 12 MMOL/L (ref 7–19)
AST SERPL-CCNC: 20 U/L (ref 5–40)
BILIRUB SERPL-MCNC: <0.2 MG/DL (ref 0.2–1.2)
BUN SERPL-MCNC: 13 MG/DL (ref 6–20)
CALCIUM SERPL-MCNC: 10 MG/DL (ref 8.6–10)
CHLORIDE SERPL-SCNC: 103 MMOL/L (ref 98–111)
CO2 SERPL-SCNC: 30 MMOL/L (ref 22–29)
CREAT SERPL-MCNC: 0.9 MG/DL (ref 0.5–1.2)
GLUCOSE SERPL-MCNC: 88 MG/DL (ref 74–109)
POTASSIUM SERPL-SCNC: 3.8 MMOL/L (ref 3.5–5)
PROT SERPL-MCNC: 8.5 G/DL (ref 6.6–8.7)
SODIUM SERPL-SCNC: 145 MMOL/L (ref 136–145)

## 2024-04-13 PROCEDURE — 85025 COMPLETE CBC W/AUTO DIFF WBC: CPT

## 2024-04-13 PROCEDURE — 36415 COLL VENOUS BLD VENIPUNCTURE: CPT

## 2024-04-13 PROCEDURE — 80053 COMPREHEN METABOLIC PANEL: CPT

## 2024-04-13 PROCEDURE — 2580000003 HC RX 258: Performed by: STUDENT IN AN ORGANIZED HEALTH CARE EDUCATION/TRAINING PROGRAM

## 2024-04-13 RX ADMIN — SODIUM CHLORIDE 1000 ML: 9 INJECTION, SOLUTION INTRAVENOUS at 00:13

## 2024-04-13 ASSESSMENT — ENCOUNTER SYMPTOMS
COUGH: 0
VOMITING: 0
ABDOMINAL PAIN: 0
EYE REDNESS: 0
CHEST TIGHTNESS: 0
DIARRHEA: 0
BLOOD IN STOOL: 0
EYE PAIN: 0
SORE THROAT: 0
SHORTNESS OF BREATH: 0
NAUSEA: 0

## 2024-04-13 NOTE — ED PROVIDER NOTES
[facility] EMERGENCY DEPT  eMERGENCY dEPARTMENT eNCOUnter      Pt Name: Rubens Pulido  MRN: [id number]  Birthdate 1975  Date of evaluation: 4/12/2024  Provider: Jessica Munoz MD    CHIEF COMPLAINT       Chief Complaint   Patient presents with    Dizziness     Pt says he's having double vision and dizziness, says he has a Hx of TIA's, Pt took benadyll 1 hour ago    Anxiety         HISTORY OF PRESENT ILLNESS   (Location/Symptom, Timing/Onset,Context/Setting, Quality, Duration, Modifying Factors, Severity)  Note limiting factors.     HPI    Rubens Pulido is a 48 y.o. male with PMH of ADHD, hypertension, depression, TIA, methamphetamine use, type 2 diabetes, bipolar 1 disorder who presents to the emergency department with CC of anxiety, dizziness, and vision changes.  Patient reports that about an hour ago he had onset of some vision changes which were described as wavy lines across his vision.  It lasted for about 30 minutes.  He took some Benadryl at home and it completely resolved.  He also had some dizziness or lightheadedness.  He denies any chest pain or shortness of breath.  He denies any aphasia, dysarthria, arm or leg weakness or numbness.  Symptoms are now completely resolved.  He is not on blood thinners.  He denies any alcohol or drug use.  He states that he googled his symptoms and was concern for ocular migraine.        NursingNotes were reviewed.    REVIEW OF SYSTEMS    (2-9 systems for level 4, 10 or more for level 5)     Review of Systems   Constitutional:  Negative for appetite change, chills, fatigue and fever.   HENT:  Negative for congestion and sore throat.    Eyes:  Positive for visual disturbance. Negative for pain and redness.   Respiratory:  Negative for cough, chest tightness and shortness of breath.    Cardiovascular:  Negative for chest pain and leg swelling.   Gastrointestinal:  Negative for abdominal pain, blood in stool, diarrhea, nausea and vomiting.   Genitourinary:  Negative for  decreased urine volume, dysuria and frequency.   Musculoskeletal:  Negative for neck pain and neck stiffness.   Skin:  Negative for rash and wound.   Neurological:  Positive for dizziness. Negative for seizures, light-headedness and headaches.   Psychiatric/Behavioral:  Negative for behavioral problems and confusion.             PAST MEDICALHISTORY     Past Medical History:   Diagnosis Date    ADHD (attention deficit hyperactivity disorder)     Bicuspid cardiac valve     Bipolar disorder (HCC)     Chronic kidney disease     Depression     Diabetes (HCC) 2004    Hoarding disorder     HTN (hypertension)     HTN (hypertension) 08/18/2022    Memory loss     Methamphetamine abuse (HCC)     Moderate mixed hyperlipidemia not requiring statin therapy     Retinopathy     Stroke (HCC)     per patient was told he had one not sure when per MRI         SURGICAL HISTORY       Past Surgical History:   Procedure Laterality Date    CARDIAC CATHETERIZATION  2008    COLONOSCOPY N/A 03/19/2024    Dr Davalos int hem, 10 year recall    SKIN BIOPSY  2006    MRSA groin area R    TONSILLECTOMY      at age 1    TYMPANOSTOMY TUBE PLACEMENT Bilateral     when he was a small child    UPPER GASTROINTESTINAL ENDOSCOPY N/A 03/19/2024    Dr Davalos, Benign findings including esoph erosions, (-) int metaplasia (-) dysplasia (-) sprue, Gr 2 erosive esophagitis w erosions, healing erosions-likely related to N/V & GERD, SSBE like changes, few sm 1-2 mm erosions w erythema in post duodenal bulb likely chemical NSAID duodenitis, 3 year recall    WISDOM TOOTH EXTRACTION      [id number]         CURRENT MEDICATIONS     Previous Medications    ASPIRIN 81 MG CHEWABLE TABLET    Take 1 tablet by mouth daily    CHOLECALCIFEROL (VITAMIN D) 50 MCG (2000 UT) CAPS CAPSULE    Take 1 capsule by mouth daily    LAMOTRIGINE (LAMICTAL) 200 MG TABLET    Take 1 tablet by mouth daily    LANSOPRAZOLE (PREVACID) 30 MG DELAYED RELEASE CAPSULE    Take one tablet per

## 2024-04-13 NOTE — ED NOTES
Patient connected to cardiac monitor, NiBP, and continuous pulse oximetry. Patient's call light within reach. Family at bedside.

## 2024-04-15 LAB
EKG P AXIS: 38 DEGREES
EKG P-R INTERVAL: 180 MS
EKG Q-T INTERVAL: 348 MS
EKG QRS DURATION: 84 MS
EKG QTC CALCULATION (BAZETT): 421 MS
EKG T AXIS: 26 DEGREES

## 2024-04-15 PROCEDURE — 93010 ELECTROCARDIOGRAM REPORT: CPT | Performed by: INTERNAL MEDICINE

## 2024-04-16 NOTE — PROGRESS NOTES
"    [facility] - PODIATRY    Today's Date: 04/18/2024     Patient Name: Rah Shin  MRN: [id number]  [facility]: [id number]  PCP: Vanessa Schmidt APRN  Referring Provider: Vanessa Schmidt APRN    SUBJECTIVE     Chief Complaint   Patient presents with    [facility]     Vanessa Schmidt APRN 03/29/2024 -NP-Enlarged and hypertrophic nails-pt states he is here today for diabetic foot exam/has neuropathy-pt reports pain 10/10 at the worst    Diabetes     165 mg/dl BG     HPI: Rah Shin, a 48 y.o.male, comes to clinic as a(n) new patient presenting for diabetic foot exam, complaining of foot pain, and complaining of toenail/callus issues. Patient has h/o ADD, Anemia, Anxiety, Bipolar 1, Depression, DM Type 2, GERD, HTN, Stroke, Neuropathy, . Patient is NIDDM with last stated BG level of 165mg/dl. Patient does report a fair amount of numbness and cold sensations to his bilateral feet. He also reports he has tingling and shooting sensations that sometimes start around his right hip and travel all the way down to his foot. Today the patient presents for a diabetic foot exam. He states his toenails are elongated and thickened. He has difficulty caring for them and would like them trimmed today. He does report he had a stroke a few years ago that has left him with minimal residual effects. He states he does not believe he has any issues with his lower back but he is unsure. He states his upper thighs often feel as if they are being \"squeezed.\"  Reports that he has an upcoming appointment with Dr. Farah Vascular Surgeon for further evaluation and to rule out poor circulation to his BLEs. Admits pain at 10/10 level and described as shooting, sharp, numbness, and tingling. Denies previous treatment. Denies any constitutional symptoms. No other pedal complaints at this time.      Past Medical History:   Diagnosis Date    ADD (attention deficit disorder)     Anemia     Anxiety     Bipolar " 1 disorder     Depression     major depressive disorder    Diabetes mellitus     Difficulty walking 2021    Loss of balance/stroke    GERD (gastroesophageal reflux disease)     Hypertension     Kidney stones     Neuropathy in diabetes 2010    Stroke     TIA (transient ischemic attack)     [place] 2020    One on toe on left foot, has not reappeared in over a year     Past Surgical History:   Procedure Laterality Date    CARDIAC CATHETERIZATION      INFECTED SKIN DEBRIDEMENT      MRSA    TONSILLECTOMY       Family History   Problem Relation Age of Onset    Hyperlipidemia Mother     Hypertension Mother     Kidney disease Mother     Heart disease Mother     Clotting disorder Mother     Diabetes Mother     Hyperlipidemia Father      Social History     Socioeconomic History    Marital status: Single   Tobacco Use    Smoking status: Never     Passive exposure: Never    Smokeless tobacco: Never   Vaping Use    Vaping status: Never Used   Substance and Sexual Activity    Alcohol use: No    Drug use: Not Currently     Types: Methamphetamines     Comment: last used meth about one week ago    Sexual activity: Defer     Allergies   Allergen Reactions    Contrast Dye (Echo Or Unknown Ct/Mr) Hives     Current Outpatient Medications   Medication Sig Dispense Refill    Januvia 50 MG tablet       metFORMIN (Glucophage) 1000 MG tablet Take 1 tablet by mouth 2 (Two) Times a Day With Meals. 180 tablet 3    OneTouch Ultra test strip 2 (Two) Times a Day. use for testing      Vitamin D, Cholecalciferol, (CHOLECALCIFEROL) 10 MCG (400 UNIT) tablet Take 1 tablet by mouth Daily.       No current facility-administered medications for this visit.     Review of Systems   Constitutional:  Negative for activity change and fever.   HENT:  Negative for congestion.    Respiratory:  Negative for shortness of breath.    Cardiovascular:  Negative for chest pain and leg swelling.   Gastrointestinal:  Negative for abdominal pain, constipation, diarrhea,  nausea and vomiting.   Musculoskeletal:  Positive for arthralgias. Negative for gait problem.   Skin:  Negative for color change and wound.        Elongated thickened toenails   Neurological:  Positive for numbness. Negative for dizziness and weakness.   Hematological:  Does not bruise/bleed easily.   Psychiatric/Behavioral:  Negative for agitation, behavioral problems and confusion.        OBJECTIVE     Vitals:    04/18/24 1104   BP: 124/78   Pulse: 77   SpO2: 98%       PHYSICAL EXAM  GEN:   Accompanied by none.     Foot/Ankle Exam    GENERAL  Diabetic foot exam performed    Appearance:  appears stated age  Orientation:  AAOx3  Affect:  appropriate  Gait:  unimpaired  Assistance:  independent  Right shoe gear: casual shoe  Left shoe gear: casual shoe    VASCULAR     Right Foot Vascularity   Dorsalis pedis:  2+  Posterior tibial:  2+  Skin temperature:  cool  Edema grading:  None  CFT:  3  Pedal hair growth:  Absent     Left Foot Vascularity   Dorsalis pedis:  2+  Posterior tibial:  2+  Skin temperature:  cool  Edema grading:  None  CFT:  3  Pedal hair growth:  Absent     NEUROLOGIC     Right Foot Neurologic   Light touch sensation: diminished  Vibratory sensation: diminished  Hot/Cold sensation: diminished  Protective Sensation using [place]-Mendoza Monofilament:   Sites intact: 10  Sites tested: 10     Left Foot Neurologic   Light touch sensation: diminished  Vibratory sensation: diminished  Hot/Cold sensation:  diminished  Protective Sensation using [place]-Mendoza Monofilament:   Sites intact: 9  Sites tested: 10    MUSCULOSKELETAL     Right Foot Musculoskeletal   Ecchymosis:  none  Tenderness:  toenail problem    Arch:  Normal     Left Foot Musculoskeletal   Ecchymosis:  none  Tenderness:  toenail problem  Arch:  Normal    MUSCLE STRENGTH     Right Foot Muscle Strength   Foot dorsiflexion:  5  Foot plantar flexion:  5  Foot inversion:  5  Foot eversion:  5     Left Foot Muscle Strength   Foot dorsiflexion:   5  Foot plantar flexion:  5  Foot inversion:  5  Foot eversion:  5    RANGE OF MOTION     Right Foot Range of Motion   Foot and ankle ROM within normal limits       Left Foot Range of Motion   Foot and ankle ROM within normal limits      DERMATOLOGIC      Right Foot Dermatologic   Skin  Right foot skin is intact.   Nails  1.  Positive for elongated and abnormal thickness.  2.  Positive for elongated and abnormal thickness.  3.  Positive for elongated and abnormal thickness.  4.  Positive for elongated and abnormal thickness.  5.  Positive for elongated and abnormal thickness.     Left Foot Dermatologic   Skin  Left foot skin is intact.   Nails  1.  Positive for elongated and abnormal thickness.  2.  Positive for elongated and abnormal thickness.  3.  Positive for elongated and abnormal thickness.  4.  Positive for elongated and abnormally thick.  5.  Positive for elongated and abnormally thick.      RADIOLOGY/NUCLEAR:  XR Spine Lumbar Complete With Flex & Ext    Result Date: 4/18/2024  Narrative: EXAM: XR SPINE LUMBAR COMPLETE W FLEX EXT-  DATE: 4/18/2024 11:02 AM  HISTORY: Pain; numbness; M54.16-Radiculopathy, lumbar region   COMPARISON: 10/21/2015.  TECHNIQUE: 6 views. AP, RIGHT oblique, LEFT oblique, lateral, flexion, extension. 6 images.  FINDINGS:  There is transitional anatomy of the lumbosacral spine. Careful correlation of anatomic levels with symptoms and clinical findings is required prior to any future spinal intervention.  No convincing pars defect. Normal alignment. No evidence of abnormal motion on flexion or extension, which demonstrate limited excursion.  Normal vertebral body heights with spurring and similar mild to moderate degenerative endplate changes. Mild diffuse disc height loss. Facet hypertrophy in the lower lumbar spine.  Sacroiliac joints appear symmetric and patent with degenerative change.  Pelvic phleboliths. Calcifications of the vas deferens or seminal vesicles, which can be seen  "with diabetes or hyperparathyroidism.       Impression: 1. There is transitional anatomy of the lumbosacral spine. Careful correlation of anatomic levels with symptoms and clinical findings is required prior to any future spinal intervention. 2. Moderate degenerative changes of the spine. 3. Calcifications of the vas deferens or seminal vesicles, which can be seen with diabetes or hyperparathyroidism. Correlate with patient history.  This report was signed and finalized on 4/18/2024 12:38 PM by Dr Dariela Hilton MD.       LABORATORY/CULTURE RESULTS:      PATHOLOGY RESULTS:       ASSESSMENT/PLAN     Diagnoses and all orders for this visit:    1. Thickened nails (Primary)    2. Type 2 diabetes mellitus with diabetic polyneuropathy, without long-term current use of insulin    3. Encounter for diabetic foot exam    4. Bilateral foot pain    5. Lumbar radiculopathy  -     XR Spine Lumbar Complete With Flex & Ext; Future  -     Ambulatory Referral to Neurosurgery      Comprehensive lower extremity examination and evaluation was performed.  Discussed findings and treatment plan including risks, benefits, and treatment options with patient in detail. Patient agreed with treatment plan.  After verbal consent obtained, nail(s) x10 debrided of length and thickness with nail nipper without incidence  Patient may maintain nails and calluses at home utilizing emery board or pumice stone between visits as needed  Reviewed at home diabetic foot care including daily foot checks   DFE performed today.  Control and maintenance of type II DM to be continued per PCP.  Patient findings consistent with lumbar radiculopathy.  Patient reports he does not have any known back injuries or trauma and does not believe he has issues with his back.  I reviewed an x-ray of his lumbar spine that he had performed 5 years ago that showed \"lumbar -type vertebrae with mild degenerative changes noted.\"   X-ray of lumbar spine ordered today as well as " ambulatory referral to neurosurgery for further evaluation.    An After Visit Summary was printed and given to the patient at discharge, including (if requested) any available informative/educational handouts regarding diagnosis, treatment, or medications. All questions were answered to patient/family satisfaction. Should symptoms fail to improve or worsen they agree to call or return to clinic or to go to the Emergency Department. Discussed the importance of following up with any needed screening tests/labs/specialist appointments and any requested follow-up recommended by me today. Importance of maintaining follow-up discussed and patient accepts that missed appointments can delay diagnosis and potentially lead to worsening of conditions.  Return in about 3 months (around 7/18/2024) for Follow-up with APRN, Follow-up in [facility] or sooner if acute issues arise.      This document has been electronically signed by CARINA Jasso on April 18, 2024 16:45 CDT

## 2024-04-17 ENCOUNTER — TELEPHONE (OUTPATIENT)
Dept: PODIATRY | Facility: CLINIC | Age: 49
End: 2024-04-17
Payer: MEDICARE

## 2024-04-18 ENCOUNTER — OFFICE VISIT (OUTPATIENT)
Dept: PODIATRY | Facility: CLINIC | Age: 49
End: 2024-04-18
Payer: MEDICARE

## 2024-04-18 ENCOUNTER — HOSPITAL ENCOUNTER (OUTPATIENT)
Dept: GENERAL RADIOLOGY | Facility: HOSPITAL | Age: 49
Discharge: HOME | End: 2024-04-18
Payer: MEDICARE

## 2024-04-18 VITALS
HEART RATE: 77 BPM | HEIGHT: 65 IN | BODY MASS INDEX: 24.99 KG/M2 | SYSTOLIC BLOOD PRESSURE: 124 MMHG | OXYGEN SATURATION: 98 % | WEIGHT: 150 LBS | DIASTOLIC BLOOD PRESSURE: 78 MMHG

## 2024-04-18 DIAGNOSIS — E11.42 TYPE 2 DIABETES MELLITUS WITH DIABETIC POLYNEUROPATHY, WITHOUT LONG-TERM CURRENT USE OF INSULIN: ICD-10-CM

## 2024-04-18 DIAGNOSIS — E11.9 ENCOUNTER FOR DIABETIC FOOT EXAM: ICD-10-CM

## 2024-04-18 DIAGNOSIS — M79.672 BILATERAL FOOT PAIN: ICD-10-CM

## 2024-04-18 DIAGNOSIS — M54.16 LUMBAR RADICULOPATHY: ICD-10-CM

## 2024-04-18 DIAGNOSIS — L60.2 THICKENED NAILS: Primary | ICD-10-CM

## 2024-04-18 DIAGNOSIS — M79.671 BILATERAL FOOT PAIN: ICD-10-CM

## 2024-04-18 PROBLEM — F42.3 HOARDING BEHAVIOR: Status: ACTIVE | Noted: 2024-04-18

## 2024-04-18 PROCEDURE — 72114 X-RAY EXAM L-S SPINE BENDING: CPT

## 2024-04-18 RX ORDER — SITAGLIPTIN 50 MG/1
TABLET, FILM COATED ORAL
COMMUNITY
Start: 2023-09-29

## 2024-04-18 RX ORDER — ERGOCALCIFEROL (VITAMIN D2) 10 MCG
400 TABLET ORAL DAILY
COMMUNITY

## 2024-04-18 RX ORDER — BLOOD SUGAR DIAGNOSTIC
STRIP MISCELLANEOUS 2 TIMES DAILY
COMMUNITY
Start: 2024-03-05

## 2024-05-28 ENCOUNTER — TELEPHONE (OUTPATIENT)
Dept: HEMATOLOGY | Age: 49
End: 2024-05-28

## 2024-05-28 NOTE — TELEPHONE ENCOUNTER
Called pt. to remind them of appointment on 06/3/2024 and had to leave a detailed voicemail with appointment date and time.

## 2024-05-31 ENCOUNTER — TELEPHONE (OUTPATIENT)
Dept: NEUROSURGERY | Facility: CLINIC | Age: 49
End: 2024-05-31
Payer: MEDICARE

## 2024-05-31 ENCOUNTER — TELEPHONE (OUTPATIENT)
Dept: NEUROLOGY | Facility: CLINIC | Age: 49
End: 2024-05-31
Payer: MEDICARE

## 2024-05-31 NOTE — TELEPHONE ENCOUNTER
lvm to r/s patient due to cancelled appt     It is okay for the hub to relay the message to the patient and schedule if possible

## 2024-05-31 NOTE — TELEPHONE ENCOUNTER
Caller: Rah Shin    Relationship: Self    Best call back number: [phone]    Who are you requesting to speak with (clinical staff, provider,  specific staff member): STAFF    What was the call regarding: PATIENT IS NEEDING TO RESCHED. NEW PT APPT W/ MITESH. HE IS WANTING TO SEE PROVIDER ON 6/18/24 AS HE HAS AN APPT THE SAME DAY.     PLEASE CALL & ADVISE-THANK YOU

## 2024-06-03 ENCOUNTER — HOSPITAL ENCOUNTER (OUTPATIENT)
Dept: INFUSION THERAPY | Age: 49
Discharge: HOME | End: 2024-06-03
Payer: MEDICARE

## 2024-06-03 ENCOUNTER — OFFICE VISIT (OUTPATIENT)
Dept: HEMATOLOGY | Age: 49
End: 2024-06-03
Payer: MEDICARE

## 2024-06-03 VITALS
BODY MASS INDEX: 26.36 KG/M2 | WEIGHT: 158.2 LBS | HEART RATE: 85 BPM | HEIGHT: 65 IN | SYSTOLIC BLOOD PRESSURE: 130 MMHG | DIASTOLIC BLOOD PRESSURE: 78 MMHG | OXYGEN SATURATION: 97 % | TEMPERATURE: 98.4 F

## 2024-06-03 DIAGNOSIS — R20.0 NUMBNESS IN RIGHT LEG: ICD-10-CM

## 2024-06-03 DIAGNOSIS — R76.8 ELEVATED SERUM IMMUNOGLOBULIN FREE LIGHT CHAIN LEVEL: Primary | ICD-10-CM

## 2024-06-03 DIAGNOSIS — R76.8 ELEVATED SERUM IMMUNOGLOBULIN FREE LIGHT CHAIN LEVEL: ICD-10-CM

## 2024-06-03 DIAGNOSIS — Z71.89 CARE PLAN DISCUSSED WITH PATIENT: ICD-10-CM

## 2024-06-03 LAB
ALBUMIN SERPL-MCNC: 5.1 G/DL (ref 3.5–5.2)
ALP SERPL-CCNC: 60 U/L (ref 40–130)
ALT SERPL-CCNC: 32 U/L (ref 21–72)
ANION GAP SERPL CALCULATED.3IONS-SCNC: 10 MMOL/L (ref 7–19)
AST SERPL-CCNC: 28 U/L (ref 17–59)
BASOPHILS # BLD: 0.05 K/UL (ref 0.01–0.08)
BASOPHILS NFR BLD: 0.9 % (ref 0.1–1.2)
BILIRUB SERPL-MCNC: 0.7 MG/DL (ref 0.2–1.3)
BUN SERPL-MCNC: 11 MG/DL (ref 9–20)
CALCIUM SERPL-MCNC: 9.7 MG/DL (ref 8.4–10.2)
CHLORIDE SERPL-SCNC: 95 MMOL/L (ref 98–111)
CO2 SERPL-SCNC: 30 MMOL/L (ref 22–29)
CREAT SERPL-MCNC: 0.8 MG/DL (ref 0.6–1.2)
EOSINOPHIL # BLD: 0.37 K/UL (ref 0.04–0.54)
EOSINOPHIL NFR BLD: 6.9 % (ref 0.7–7)
ERYTHROCYTE [DISTWIDTH] IN BLOOD BY AUTOMATED COUNT: 12.2 % (ref 11.6–14.4)
GLOBULIN: 2.8 G/DL
GLUCOSE SERPL-MCNC: 117 MG/DL (ref 74–106)
HCT VFR BLD AUTO: 36.8 % (ref 40.1–51)
HGB BLD-MCNC: 13.3 G/DL (ref 13.7–17.5)
LYMPHOCYTES # BLD: 2.44 K/UL (ref 1.18–3.74)
LYMPHOCYTES NFR BLD: 45.6 % (ref 19.3–53.1)
MCH RBC QN AUTO: 31.9 PG (ref 25.7–32.2)
MCHC RBC AUTO-ENTMCNC: 36.1 G/DL (ref 32.3–36.5)
MCV RBC AUTO: 88.2 FL (ref 79–92.2)
MONOCYTES # BLD: 0.61 K/UL (ref 0.24–0.82)
MONOCYTES NFR BLD: 11.4 % (ref 4.7–12.5)
NEUTROPHILS # BLD: 1.87 K/UL (ref 1.56–6.13)
NEUTS SEG NFR BLD: 35 % (ref 34–71.1)
PLATELET # BLD AUTO: 216 K/UL (ref 163–337)
PMV BLD AUTO: 8.7 FL (ref 7.4–10.4)
POTASSIUM SERPL-SCNC: 3.8 MMOL/L (ref 3.5–5.1)
PROT SERPL-MCNC: 7.8 G/DL (ref 6.3–8.2)
RBC # BLD AUTO: 4.17 M/UL (ref 4.63–6.08)
SODIUM SERPL-SCNC: 135 MMOL/L (ref 137–145)
WBC # BLD AUTO: 5.35 K/UL (ref 4.23–9.07)

## 2024-06-03 PROCEDURE — 85025 COMPLETE CBC W/AUTO DIFF WBC: CPT

## 2024-06-03 PROCEDURE — 36415 COLL VENOUS BLD VENIPUNCTURE: CPT

## 2024-06-03 PROCEDURE — 3075F SYST BP GE 130 - 139MM HG: CPT | Performed by: NURSE PRACTITIONER

## 2024-06-03 PROCEDURE — 99213 OFFICE O/P EST LOW 20 MIN: CPT | Performed by: NURSE PRACTITIONER

## 2024-06-03 PROCEDURE — 99212 OFFICE O/P EST SF 10 MIN: CPT

## 2024-06-03 PROCEDURE — 80053 COMPREHEN METABOLIC PANEL: CPT

## 2024-06-03 PROCEDURE — 3078F DIAST BP <80 MM HG: CPT | Performed by: NURSE PRACTITIONER

## 2024-06-03 NOTE — TELEPHONE ENCOUNTER
CALLED PATIENT BACK TO LET HIM KNOW THAT I DID NOT HAVE ANYTHING ON THE 18TH I TOLD HIM THAT I DIDN'T HAVE ANYTHING AFTER THE 18TH AS REQUESTED BY HIM. HE ASKED IF HE COULD SEE SOMEONE ELSE AS HE DID NOT MIND SINCE HE WAS NEW. I TOLD HIM I COULD PUT HIM ON [facility] SCHEDULED. HE AGREED. MADE HIM AN APPOINTMENT FOR 7/10 @ 2:30, APPROVED BY ELISE AND EVE. PATIENT IS AWARE OF NEW DATE AND TIME APPOINTMENT.

## 2024-06-03 NOTE — PROGRESS NOTES
dyscrasia.  Repeat ARTI and quantitative immunoglobulins today  Recommend conservative monitoring with repeat serology and follow-up in 12 months  Do not feel elevated kappa light chain is associated with numbness of the right leg  Care plan discussed with patient.  All questions answered    Orders Placed This Encounter   Procedures    Comprehensive Metabolic Panel    MONOCLONAL PROTEIN AND FLC, SERUM    Beta 2 Microglobulin, Serum    CBC with Auto Differential     Health Maintenance:  3/19/2024 EGD Dr. Rubén Davalos at [facility]: Small intestine, duodenum, endoscopic biopsy: Fragments of benign small intestinal mucosa exhibiting mild active inflammation, nonspecific. Comment: Histologic changes of celiac sprue are not identified. Distal esophagus, endoscopic biopsy: Fragments of benign squamous mucosa and benign glandular mucosa exhibiting chronic active inflammation, moderate. No intestinal metaplasia identified. No dysplasia identified. Esophageal erosions, endoscopic biopsy: Fragment of benign squamous mucosa exhibiting chronic active inflammation, moderate. Fragment of fibrinopurulent debris consistent with ulcer bed origin. No intestinal metaplasia identified. No dysplasia identified.   3/19/2024 Colonoscopy Dr. Rubén Davalos at [facility]: Internal hemorrhoids-Grade 1. No specimens collected. Recall 10 yrs     Return in about 1 year (around 6/3/2025) for follow up with PAM Oglesby.    I have seen, examined and reviewed this patient medication list, appropriate labs and imaging studies. I reviewed relevant medical records and others physician’s notes. I discussed the plan of care with the patient. I answered all questions to the patient’s satisfaction. I have also reviewed the chief complaint (CC) and part of the history (History of Present Illness (HPI), Past Family Social History (PFSH), or Review of Systems (ROS) and made changes when appropriate.  Endoscopy/colonoscopy reviewed    Dictated

## 2024-06-05 LAB — B2 MICROGLOB SERPL-MCNC: 1.9 MG/L (ref 0.8–2.4)

## 2024-06-07 LAB
ALBUMIN SERPL-MCNC: 4.76 G/DL (ref 3.75–5.01)
ALPHA1 GLOB SERPL ELPH-MCNC: 0.27 G/DL (ref 0.19–0.46)
ALPHA2 GLOB SERPL ELPH-MCNC: 0.54 G/DL (ref 0.48–1.05)
B-GLOBULIN SERPL ELPH-MCNC: 0.9 G/DL (ref 0.48–1.1)
DEPRECATED KAPPA LC FREE/LAMBDA SER: 1.5 {RATIO} (ref 0.26–1.65)
EER MONOCLONAL PROTEIN AND FLC, SERUM: ABNORMAL
GAMMA GLOB SERPL ELPH-MCNC: 1.33 G/DL (ref 0.62–1.51)
IGA SERPL-MCNC: 224 MG/DL (ref 68–408)
IGG SERPL-MCNC: 1366 MG/DL (ref 768–1632)
IGM SERPL-MCNC: 156 MG/DL (ref 35–263)
INTERPRETATION SERPL IFE-IMP: ABNORMAL
INTERPRETATION SERPL IFE-IMP: ABNORMAL
KAPPA LC FREE SER-MCNC: 33.02 MG/L (ref 3.3–19.4)
LAMBDA LC FREE SERPL-MCNC: 21.96 MG/L (ref 5.71–26.3)
MONOCLONAL PROTEIN, SERUM: ABNORMAL G/DL
PROT SERPL-MCNC: 7.8 G/DL (ref 6.3–8.2)

## 2024-06-08 ASSESSMENT — ENCOUNTER SYMPTOMS
TROUBLE SWALLOWING: 0
CONSTIPATION: 1
EYE DISCHARGE: 0
BACK PAIN: 1
VOMITING: 0
DIARRHEA: 1
NAUSEA: 1
COUGH: 0
EYE ITCHING: 0
WHEEZING: 0
SHORTNESS OF BREATH: 0
SORE THROAT: 0

## 2024-06-10 ENCOUNTER — TELEPHONE (OUTPATIENT)
Dept: PODIATRY | Facility: CLINIC | Age: 49
End: 2024-06-10
Payer: MEDICARE

## 2024-06-10 NOTE — TELEPHONE ENCOUNTER
----- Message from [facility] Wearhausisabela sent at 6/8/2024 11:32 PM CDT -----  Regarding: Appointment Request  Contact: [phone]  Appointment Request From: Rah Shin    With Provider: Melita Monroe [[facility]]    Preferred Date Range: 6/11/2024 – 6/21/2024    Preferred Times: Monday Afternoon, Tuesday Afternoon, Wednesday Afternoon, Thursday Afternoon, Friday Afternoon    Reason for visit: Follow-up    Comments:  I wanted to up my July appointment and have my nails cut and toes looked at.  I walk a lot and have had some pain and blisters.

## 2024-06-18 ENCOUNTER — OFFICE VISIT (OUTPATIENT)
Dept: NEUROSURGERY | Facility: CLINIC | Age: 49
End: 2024-06-18
Payer: MEDICARE

## 2024-06-18 ENCOUNTER — HOSPITAL ENCOUNTER (OUTPATIENT)
Dept: GENERAL RADIOLOGY | Facility: HOSPITAL | Age: 49
Discharge: HOME | End: 2024-06-18
Admitting: PHYSICIAN ASSISTANT
Payer: MEDICARE

## 2024-06-18 VITALS — WEIGHT: 160.6 LBS | BODY MASS INDEX: 26.76 KG/M2 | HEIGHT: 65 IN

## 2024-06-18 DIAGNOSIS — R20.2 PARESTHESIAS: Primary | ICD-10-CM

## 2024-06-18 DIAGNOSIS — M54.12 CERVICAL RADICULOPATHY: ICD-10-CM

## 2024-06-18 DIAGNOSIS — R26.81 GAIT INSTABILITY: ICD-10-CM

## 2024-06-18 DIAGNOSIS — E66.3 OVERWEIGHT WITH BODY MASS INDEX (BMI) OF 26 TO 26.9 IN ADULT: ICD-10-CM

## 2024-06-18 DIAGNOSIS — Z78.9 NONSMOKER: ICD-10-CM

## 2024-06-18 DIAGNOSIS — M54.16 LUMBAR RADICULOPATHY: ICD-10-CM

## 2024-06-18 PROCEDURE — 72050 X-RAY EXAM NECK SPINE 4/5VWS: CPT

## 2024-06-18 PROCEDURE — 99204 OFFICE O/P NEW MOD 45 MIN: CPT | Performed by: PHYSICIAN ASSISTANT

## 2024-06-18 PROCEDURE — 1160F RVW MEDS BY RX/DR IN RCRD: CPT | Performed by: PHYSICIAN ASSISTANT

## 2024-06-18 PROCEDURE — 1159F MED LIST DOCD IN RCRD: CPT | Performed by: PHYSICIAN ASSISTANT

## 2024-06-18 RX ORDER — FLUTICASONE PROPIONATE 50 MCG
SPRAY, SUSPENSION (ML) NASAL
COMMUNITY
Start: 2024-05-13

## 2024-06-18 RX ORDER — LAMOTRIGINE 200 MG/1
TABLET ORAL
COMMUNITY
Start: 2003-01-01

## 2024-06-18 NOTE — PATIENT INSTRUCTIONS
You have been referred to physical therapy for cervical and lumbar treatment as well as gait stabilization  Keep appointments with neurology as well as vascular.  You have been referred for nerve conduction studies bilateral lower extremities.  Follow-up here after physical therapy is completed.  If symptoms persist, we will obtain MRIs of the cervical and lumbar spine.

## 2024-06-18 NOTE — PROGRESS NOTES
[facility] - PODIATRY    Today's Date: 06/25/2024     Patient Name: Rah Shin  MRN: [id number]  CSN: [id number]  PCP: Vanessa Schmidt APRN  Referring Provider: No ref. provider found    SUBJECTIVE     Chief Complaint   Patient presents with    Follow-up     Vanessa Schmidt APRN 03/29/2024 3 MTH FU DIABETIC- pt states feet doing pretty good- pt denies pain     Diabetes     164mg/dl BG this am last A1C a month ago was 6.8     HPI: Rah Shin, a 48 y.o.male, comes to clinic as a(n) established patient presenting for diabetic foot exam, complaining of foot pain, and complaining of toenail/callus issues. Patient has h/o ADD, Anemia, Anxiety, Bipolar 1, Depression, DM Type 2, GERD, HTN, Stroke, Neuropathy, . Patient is NIDDM with last stated BG level of 164mg/dl.  Last A1c from 6.8%.  Patient reports numbness and tingling that extends down the leg. He states his toenails are elongated and thickened. He has difficulty caring for his nails due to thickness and neuropathy.  Patient reports he has seen neurosurgery and vascular surgery recently for back and leg issues.  He reports he will begin physical therapy for back pain.  Denies pain. Denies previous treatment. Denies any constitutional symptoms. No other pedal complaints at this time.      Past Medical History:   Diagnosis Date    ADD (attention deficit disorder)     Anemia     Anxiety     Arthritis     Bipolar 1 disorder     Depression     major depressive disorder    Diabetes mellitus     Difficulty walking 2021    Loss of balance/stroke    GERD (gastroesophageal reflux disease)     Headache     Hyperlipidemia 5/2024    My HDL was 127    Hypertension     Kidney stones     MRSA (methicillin resistant Staphylococcus aureus)     Neuropathy in diabetes 2010    Peripheral neuropathy     Stroke     TIA (transient ischemic attack)     Verruca 2020    One on toe on left foot, has not reappeared in over a year     Past Surgical  History:   Procedure Laterality Date    CARDIAC CATHETERIZATION      COLONOSCOPY      ENDOSCOPY      INFECTED SKIN DEBRIDEMENT      MRSA in abdomen    TONSILLECTOMY      WISDOM TOOTH EXTRACTION       Family History   Problem Relation Age of Onset    Hyperlipidemia Mother     Hypertension Mother     Kidney disease Mother     Heart disease Mother     Clotting disorder Mother     Diabetes Mother     Liver disease Mother     Mental illness Mother     Hyperlipidemia Father      Social History     Socioeconomic History    Marital status: Single   Tobacco Use    Smoking status: Never     Passive exposure: Never    Smokeless tobacco: Never   Vaping Use    Vaping status: Never Used   Substance and Sexual Activity    Alcohol use: No    Drug use: Not Currently     Types: Methamphetamines     Comment: last used meth about one week ago    Sexual activity: Not Currently     Partners: Male     Allergies   Allergen Reactions    Contrast Dye (Echo Or Unknown Ct/Mr) Hives     Current Outpatient Medications   Medication Sig Dispense Refill    fluticasone (FLONASE) 50 MCG/ACT nasal spray instill ONE SPRAY in each nostril ONCE daily      Januvia 50 MG tablet       lamoTRIgine (LaMICtal) 200 MG tablet       metFORMIN (Glucophage) 1000 MG tablet Take 1 tablet by mouth 2 (Two) Times a Day With Meals. 180 tablet 3    OneTouch Ultra test strip 2 (Two) Times a Day. use for testing      Vitamin D, Cholecalciferol, (CHOLECALCIFEROL) 10 MCG (400 UNIT) tablet Take 1 tablet by mouth Daily.       No current facility-administered medications for this visit.     Review of Systems   Constitutional:  Negative for activity change and fever.   HENT:  Negative for congestion.    Respiratory:  Negative for shortness of breath.    Cardiovascular:  Negative for chest pain and leg swelling.   Gastrointestinal:  Negative for abdominal pain, constipation, diarrhea, nausea and vomiting.   Musculoskeletal:  Positive for arthralgias. Negative for gait problem.    Skin:  Negative for color change and wound.        Elongated thickened toenails   Neurological:  Positive for numbness. Negative for dizziness and weakness.   Hematological:  Does not bruise/bleed easily.   Psychiatric/Behavioral:  Negative for agitation, behavioral problems and confusion.        OBJECTIVE     Vitals:    06/25/24 1457   BP: 146/80   Pulse: 88   SpO2: 98%       PHYSICAL EXAM  GEN:   Accompanied by none.     Foot/Ankle Exam    GENERAL  Appearance:  appears stated age  Orientation:  AAOx3  Affect:  appropriate  Gait:  unimpaired  Assistance:  independent  Right shoe gear: casual shoe  Left shoe gear: casual shoe    VASCULAR     Right Foot Vascularity   Dorsalis pedis:  2+  Posterior tibial:  2+  Skin temperature:  cool  Edema grading:  None  CFT:  3  Pedal hair growth:  Absent     Left Foot Vascularity   Dorsalis pedis:  2+  Posterior tibial:  2+  Skin temperature:  cool  Edema grading:  None  CFT:  3  Pedal hair growth:  Absent     NEUROLOGIC     Right Foot Neurologic   Light touch sensation: diminished  Vibratory sensation: diminished  Hot/Cold sensation: diminished  Protective Sensation using Gates-Mendoza Monofilament:   Sites intact: 10  Sites tested: 10     Left Foot Neurologic   Light touch sensation: diminished  Vibratory sensation: diminished  Hot/Cold sensation:  diminished  Protective Sensation using Gates-Mendoza Monofilament:   Sites intact: 9  Sites tested: 10    MUSCULOSKELETAL     Right Foot Musculoskeletal   Ecchymosis:  none  Tenderness:  toenail problem    Arch:  Normal     Left Foot Musculoskeletal   Ecchymosis:  none  Tenderness:  toenail problem  Arch:  Normal    MUSCLE STRENGTH     Right Foot Muscle Strength   Foot dorsiflexion:  5  Foot plantar flexion:  5  Foot inversion:  5  Foot eversion:  5     Left Foot Muscle Strength   Foot dorsiflexion:  5  Foot plantar flexion:  5  Foot inversion:  5  Foot eversion:  5    RANGE OF MOTION     Right Foot Range of Motion   Foot and  ankle ROM within normal limits       Left Foot Range of Motion   Foot and ankle ROM within normal limits      DERMATOLOGIC      Right Foot Dermatologic   Skin  Right foot skin is intact.   Nails  1.  Positive for elongated and abnormal thickness.  2.  Positive for elongated and abnormal thickness.  3.  Positive for elongated and abnormal thickness.  4.  Positive for elongated and abnormal thickness.  5.  Positive for elongated and abnormal thickness.     Left Foot Dermatologic   Skin  Left foot skin is intact.   Nails  1.  Positive for elongated and abnormal thickness.  2.  Positive for elongated and abnormal thickness.  3.  Positive for elongated and abnormal thickness.  4.  Positive for elongated and abnormally thick.  5.  Positive for elongated and abnormally thick.    Image:       RADIOLOGY/NUCLEAR:  XR spine cervical ap and lat w flex and ext    Result Date: 6/18/2024  Narrative: EXAM: XR SPINE CERVICAL AP AND LAT W FLEX AND EXT-  DATE: 6/18/2024 11:29 AM  HISTORY: cervical radiculopathy; M54.12-Radiculopathy, cervical region   COMPARISON: No existing relevant imaging studies available.  TECHNIQUE: 4 views. AP, lateral, flexion, extension. 4 images.  FINDINGS:  C1-C7 visualized. Normal alignment. No evidence of abnormal motion on flexion or extension, which demonstrate moderate excursion.  Normal vertebral body heights with mild anterior vertebral body spurring. Mild disc height loss at C5-6 and C6-7. Prevertebral soft tissues appear within normal limits.  Small calcification at the LEFT neck of uncertain etiology. Lung apices grossly clear, not optimally evaluated on this exam.       Impression: 1. Degenerative changes at C5-6 and C6-7.  This report was signed and finalized on 6/18/2024 12:56 PM by Dr aDriela Hilton MD.       LABORATORY/CULTURE RESULTS:      PATHOLOGY RESULTS:       ASSESSMENT/PLAN     Diagnoses and all orders for this visit:    1. Thickened nails (Primary)    2. Type 2 diabetes mellitus with  diabetic polyneuropathy, without long-term current use of insulin    3. Bilateral foot pain    4. Lumbar radiculopathy    5. History of CVA (cerebrovascular accident)    6. Foot callus [L84]      Comprehensive lower extremity examination and evaluation was performed.  Discussed findings and treatment plan including risks, benefits, and treatment options with patient in detail. Patient agreed with treatment plan.  Discussed with patient that neuropathy can be due to low back issues and/or uncontrolled diabetes from years ago.   After verbal consent obtained, nail(s) x10 debrided of length and thickness with nail nipper without incidence  After verbal consent obtained, calluses x2 pared utilizing dermal curette and/or scalpel without incidence  Patient may maintain nails and calluses at home utilizing emery board or pumice stone between visits as needed  Reviewed at home diabetic foot care including daily foot checks   Continue to work with PCP to control blood glucose.  Discussed with patient that prescription for diabetic shoes must be done through primary care physician.  Patient verbalized understanding.  An After Visit Summary was printed and given to the patient at discharge, including (if requested) any available informative/educational handouts regarding diagnosis, treatment, or medications. All questions were answered to patient/family satisfaction. Should symptoms fail to improve or worsen they agree to call or return to clinic or to go to the Emergency Department. Discussed the importance of following up with any needed screening tests/labs/specialist appointments and any requested follow-up recommended by me today. Importance of maintaining follow-up discussed and patient accepts that missed appointments can delay diagnosis and potentially lead to worsening of conditions.  Return in about 9 weeks (around 8/27/2024) for [facility], Follow-up with Podiatry APRN., or sooner if acute issues  arise.      This document has been electronically signed by CARINA Mccall on June 25, 2024 15:45 CDT

## 2024-06-18 NOTE — PROGRESS NOTES
Chief complaint:   Chief Complaint   Patient presents with    Back Pain     LUMBAR RADICULOPATHY- pt states having pain in legs and feet feels like feet draw up. Pt is also diabetic. Stated he said he gets numbness and tingling down right side of neck and arm as well. Stated he had a stroke. Trouble lifting legs.        Subjective     HPI: Rah comes in as a referral request from CARINA Iglesias.  His chief complaint is squeezing sensation as well as paresthesias affecting both of his legs, right greater than left.  It primarily affects anterior thighs, posterior calves as well as dorsal and plantar aspects of both feet.  He reports spasms in his feet and the pain intensifies at night.  Walking does not aggravate his pain in fact he feels like walking maybe helps it some.  He does not have any low back pain to speak of.  He also complains of some intermittent numbness and tingling the right side of his face extending down the right anterior arm which has been present for 2 years.  He has some right-sided neck pain that radiates into the trapezius and parascapular region that he notices with activity and when he lays down at night.  He does admit to some balance issues but denies hand dexterity issues.  He does have a an issue with the right long finger locking up.  He also states the finger is painful.  He had been x-rayed which did not show anything acute.    Past medical history is significant for stroke.  Patient states that this most likely occurred around 2021.  He was unaware that he had a stroke.  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  Stroke was diagnosed after an abnormal eye exam by an ophthalmologist.  Most recent MRI of the brain was July 2023 which describes an old lacunar type infarct in the medial right thalamus and chronic microvascular ischemic changes.  No reported seizure history.    Past medical history is also significant for type 2 diabetes which was poorly controlled until  "recently, bipolar disorder, GERD, hypertension, peripheral neuropathy and MRSA.    He has used drugs in the past, methamphetamine but currently denies drug use, tobacco use as well as alcohol.    Review of Systems     Past Medical History:   Diagnosis Date    ADD (attention deficit disorder)     Anemia     Anxiety     Arthritis     Bipolar 1 disorder     Depression     major depressive disorder    Diabetes mellitus     Difficulty walking 2021    Loss of balance/stroke    GERD (gastroesophageal reflux disease)     Headache     Hyperlipidemia 5/2024    My HDL was 127    Hypertension     Kidney stones     MRSA (methicillin resistant Staphylococcus aureus)     Neuropathy in diabetes 2010    Peripheral neuropathy     Stroke     TIA (transient ischemic attack)     Verruca 2020    One on toe on left foot, has not reappeared in over a year     Past Surgical History:   Procedure Laterality Date    CARDIAC CATHETERIZATION      INFECTED SKIN DEBRIDEMENT      MRSA    TONSILLECTOMY       Family History   Problem Relation Age of Onset    Hyperlipidemia Mother     Hypertension Mother     Kidney disease Mother     Heart disease Mother     Clotting disorder Mother     Diabetes Mother     Liver disease Mother     Mental illness Mother     Hyperlipidemia Father      Social History     Tobacco Use    Smoking status: Never     Passive exposure: Never    Smokeless tobacco: Never   Vaping Use    Vaping status: Never Used   Substance Use Topics    Alcohol use: No    Drug use: Not Currently     Types: Methamphetamines     Comment: last used meth about one week ago     (Not in a hospital admission)    Allergies:  Contrast dye (echo or unknown ct/mr)    Objective      Vital Signs  Ht 165.1 cm (65\")   Wt 72.8 kg (160 lb 9.6 oz)   BMI 26.73 kg/m²     Physical Exam  Constitutional:       Appearance: Normal appearance. He is well-developed.   HENT:      Head: Normocephalic.   Eyes:      General: Lids are normal.      Extraocular Movements: EOM " normal.      Conjunctiva/sclera: Conjunctivae normal.      Pupils: Pupils are equal, round, and reactive to light.   Cardiovascular:      Rate and Rhythm: Normal rate.   Pulmonary:      Effort: Pulmonary effort is normal.      Breath sounds: Normal breath sounds.   Musculoskeletal:         General: Normal range of motion.      Cervical back: Normal range of motion.   Skin:     General: Skin is warm and dry.   Neurological:      Mental Status: He is alert and oriented to person, place, and time.      GCS: GCS eye subscore is 4. GCS verbal subscore is 5. GCS motor subscore is 6.      Cranial Nerves: Cranial nerves 2-12 are intact. No cranial nerve deficit.      Sensory: Sensory deficit present.      Motor: Motor strength is normal.No weakness.      Coordination: Coordination normal.      Gait: Gait is intact. Gait abnormal and tandem walk abnormal.      Deep Tendon Reflexes: Reflexes are normal and symmetric. Reflexes normal.      Reflex Scores:       Tricep reflexes are 2+ on the right side and 2+ on the left side.       Bicep reflexes are 2+ on the right side and 2+ on the left side.       Brachioradialis reflexes are 2+ on the right side and 2+ on the left side.       Patellar reflexes are 2+ on the right side and 2+ on the left side.       Achilles reflexes are 2+ on the right side and 2+ on the left side.  Psychiatric:         Speech: Speech normal.         Behavior: Behavior normal.         Thought Content: Thought content normal.         Neurologic Exam     Mental Status   Oriented to person, place, and time.   Follows 3 step commands.   Attention: normal. Concentration: normal.   Speech: speech is normal   Level of consciousness: alert  Knowledge: good.     Cranial Nerves   Cranial nerves II through XII intact.     CN III, IV, VI   Pupils are equal, round, and reactive to light.  Extraocular motions are normal.     CN V   Facial sensation intact.     CN VII   Right facial weakness: none  Left facial weakness:  none    CN VIII   CN VIII normal.     CN IX, X   CN IX normal.   CN X normal.     CN XI   CN XI normal.     CN XII   CN XII normal.     Motor Exam   Muscle bulk: decreased  Overall muscle tone: normal    Strength   Strength 5/5 throughout.     Sensory Exam   Sensory deficit distribution on right: C6  Decreased sensation to light touch right greater than left anterior thighs, posterior calf and dorsal and plantar surfaces of both feet     Gait, Coordination, and Reflexes     Gait  Gait: normal    Coordination   Tandem walking coordination: abnormal    Reflexes   Right brachioradialis: 2+  Left brachioradialis: 2+  Right biceps: 2+  Left biceps: 2+  Right triceps: 2+  Left triceps: 2+  Right patellar: 2+  Left patellar: 2+  Right achilles: 2+  Left achilles: 2+  Right : 2+  Left : 2+  Right Verdugo: absent  Left Verdugo: absent      Imaging review: MRI of the brain report from 7/2023 was reviewed.  Images are not available.    AP, lateral, flexion-extension and oblique views of the lumbar spine were reviewed and demonstrate moderate disc degeneration lower thoracic and upper lumbar levels.  There is mild disc degeneration L3-S1.  No listhesis or acute findings.        Assessment/Plan: Rah has paresthesias affecting the right side of his face and neck and right arm as well as some right-sided neck and parascapular pain.  He also has some balance issues and paresthesias affecting bilateral lower extremities.  He has sensory deficits is noted above as well as unsteady tandem gait.  He is not hyperreflexic and has a negative Xin's.    For first line conservative care of cervical and lumbar issues and gait stabilization,  I would like to send Mr. Shin for a dedicated course of physician directed physical therapy consisting of 2-3 times a week for 4-6 weeks.    I would also like to get cervical flexion-extension views today.    He has an upcoming appointment neurology as well as vascular.  I recommend he  keep those appointments.    I would like to get nerve conduction studies bilateral lower extremities to evaluate for peripheral neuropathy.    Return for reassessment with me after 6-8 weeks after physical therapy.     Should Mr. Shin not have any improvement from physical therapy, I think it would be prudent to send the patient for an MRI of the cervical spine and lumbar spine to see if there is anything from a surgical standpoint that needs to be addressed.     I advised the patient to call and return sooner for new or worsening complaints of weakness, paresthesias, gait disturbances, or any additional concerns.  Treatment options discussed in detail with Rah and the patient voiced understanding.  Mr. Shin agrees with this plan of care.     Patient is a nonsmoker    The patient's Body mass index is 26.73 kg/m².. BMI is above normal parameters. Recommendations include: educational material    Diagnoses and all orders for this visit:    1. Paresthesias (Primary)  -     EMG & Nerve Conduction Test; Future    2. Cervical radiculopathy  -     Ambulatory Referral to Physical Therapy  -     XR spine cervical ap and lat w flex and ext; Future    3. Gait instability  -     Ambulatory Referral to Physical Therapy    4. Lumbar radiculopathy  -     Ambulatory Referral to Physical Therapy    5. Nonsmoker    6. Overweight with body mass index (BMI) of 26 to 26.9 in adult      I spent 51 minutes caring for Rah on this date of service. This time includes time spent by me in the following activities: preparing for the visit, reviewing tests, obtaining and/or reviewing a separately obtained history, performing a medically appropriate examination and/or evaluation, counseling and educating the patient/family/caregiver, ordering medications, tests, or procedures, referring and communicating with other health care professionals, documenting information in the medical record, independently interpreting results and  communicating that information with the patient/family/caregiver, and care coordination.       I discussed the patients findings and my recommendations with patient    Ramírez Leon PA-C  06/18/24  15:42 CDT

## 2024-06-24 ENCOUNTER — TELEPHONE (OUTPATIENT)
Age: 49
End: 2024-06-24
Payer: MEDICARE

## 2024-06-24 ENCOUNTER — TELEPHONE (OUTPATIENT)
Dept: VASCULAR SURGERY | Facility: CLINIC | Age: 49
End: 2024-06-24
Payer: MEDICARE

## 2024-06-24 NOTE — TELEPHONE ENCOUNTER
Call placed to patient with no answer, voicemail left reminding of appointment at [phone]. Requested call back.

## 2024-06-25 ENCOUNTER — OFFICE VISIT (OUTPATIENT)
Dept: VASCULAR SURGERY | Facility: CLINIC | Age: 49
End: 2024-06-25
Payer: MEDICARE

## 2024-06-25 ENCOUNTER — OFFICE VISIT (OUTPATIENT)
Age: 49
End: 2024-06-25
Payer: MEDICARE

## 2024-06-25 VITALS
OXYGEN SATURATION: 96 % | DIASTOLIC BLOOD PRESSURE: 76 MMHG | HEIGHT: 65 IN | HEART RATE: 93 BPM | SYSTOLIC BLOOD PRESSURE: 136 MMHG | BODY MASS INDEX: 26.66 KG/M2 | WEIGHT: 160 LBS

## 2024-06-25 VITALS
WEIGHT: 161 LBS | DIASTOLIC BLOOD PRESSURE: 80 MMHG | BODY MASS INDEX: 26.82 KG/M2 | SYSTOLIC BLOOD PRESSURE: 146 MMHG | HEIGHT: 65 IN | HEART RATE: 88 BPM | OXYGEN SATURATION: 98 %

## 2024-06-25 DIAGNOSIS — R29.818 NEUROGENIC CLAUDICATION: Primary | ICD-10-CM

## 2024-06-25 DIAGNOSIS — Z86.73 HISTORY OF CVA (CEREBROVASCULAR ACCIDENT): ICD-10-CM

## 2024-06-25 DIAGNOSIS — M54.16 LUMBAR RADICULOPATHY: ICD-10-CM

## 2024-06-25 DIAGNOSIS — E11.42 TYPE 2 DIABETES MELLITUS WITH DIABETIC POLYNEUROPATHY, WITHOUT LONG-TERM CURRENT USE OF INSULIN: ICD-10-CM

## 2024-06-25 DIAGNOSIS — L84 FOOT CALLUS: ICD-10-CM

## 2024-06-25 DIAGNOSIS — M79.671 BILATERAL FOOT PAIN: ICD-10-CM

## 2024-06-25 DIAGNOSIS — M79.672 BILATERAL FOOT PAIN: ICD-10-CM

## 2024-06-25 DIAGNOSIS — E11.9 TYPE 2 DIABETES MELLITUS WITHOUT COMPLICATION, WITHOUT LONG-TERM CURRENT USE OF INSULIN: ICD-10-CM

## 2024-06-25 DIAGNOSIS — L60.2 THICKENED NAILS: Primary | ICD-10-CM

## 2024-06-25 DIAGNOSIS — I10 PRIMARY HYPERTENSION: ICD-10-CM

## 2024-06-25 DIAGNOSIS — E78.2 MIXED HYPERLIPIDEMIA: ICD-10-CM

## 2024-06-25 PROCEDURE — 11056 PARNG/CUTG B9 HYPRKR LES 2-4: CPT | Performed by: NURSE PRACTITIONER

## 2024-06-25 PROCEDURE — 1159F MED LIST DOCD IN RCRD: CPT | Performed by: SURGERY

## 2024-06-25 PROCEDURE — 99204 OFFICE O/P NEW MOD 45 MIN: CPT | Performed by: SURGERY

## 2024-06-25 PROCEDURE — 3075F SYST BP GE 130 - 139MM HG: CPT | Performed by: SURGERY

## 2024-06-25 PROCEDURE — 1160F RVW MEDS BY RX/DR IN RCRD: CPT | Performed by: NURSE PRACTITIONER

## 2024-06-25 PROCEDURE — 3077F SYST BP >= 140 MM HG: CPT | Performed by: NURSE PRACTITIONER

## 2024-06-25 PROCEDURE — 3079F DIAST BP 80-89 MM HG: CPT | Performed by: NURSE PRACTITIONER

## 2024-06-25 PROCEDURE — 1160F RVW MEDS BY RX/DR IN RCRD: CPT | Performed by: SURGERY

## 2024-06-25 PROCEDURE — 3078F DIAST BP <80 MM HG: CPT | Performed by: SURGERY

## 2024-06-25 PROCEDURE — 1159F MED LIST DOCD IN RCRD: CPT | Performed by: NURSE PRACTITIONER

## 2024-06-25 PROCEDURE — 11721 DEBRIDE NAIL 6 OR MORE: CPT | Performed by: NURSE PRACTITIONER

## 2024-06-25 NOTE — LETTER
"June 25, 2024     CARINA Brunner  [address] [facility] [address]    Patient: Rah Shin   YOB: 1975   Date of Visit: 6/25/2024     Dear CARINA Brunner:       Thank you for referring Rah Shin to me for evaluation. Below are the relevant portions of my assessment and plan of care.    If you have questions, please do not hesitate to call me. I look forward to following Rah along with you.         Sincerely,        Zack Farah DO        CC: No Recipients    Zack Farah DO  06/25/24 1609  Sign when Signing Visit  06/25/2024      Vanessa Schmidt APRN  [address] [place] [address]    Rah Shin  1975    Chief Complaint   Patient presents with   • NEW PATIENT     Referred from Vanessa Schmidt for pain in right leg. Describes a constant 'squeezing\" from thigh to toes for about 3 years. Patient does wear compression. Also has swelling/pain after walking. Never been a smoker. No testing done. Stroke in 2021.        Dear CARINA Brunner    HPI  I had the pleasure of seeing your patient Rah Shin in the office today.  Thank you kindly for this consultation.  As you recall, Rah Shin is a 48 y.o.  male who you are currently following for routine health maintenance.  He has complaints of right lower extremity pain.  He is following with neurosurgery and will be having upcoming MRI lumbar spine.  He has been referred for dedicated course of physical therapy. He did have xray of lumbar and cervical spine which I did review.       Past Medical History:   Diagnosis Date   • ADD (attention deficit disorder)    • Anemia    • Anxiety    • Arthritis    • Bipolar 1 disorder    • Depression     major depressive disorder   • Diabetes mellitus    • Difficulty walking 2021    Loss of balance/stroke   • GERD (gastroesophageal reflux disease)    • Headache    • Hyperlipidemia 5/2024    My HDL was 127   • Hypertension    • " Kidney stones    • MRSA (methicillin resistant Staphylococcus aureus)    • Neuropathy in diabetes 2010   • Peripheral neuropathy    • Stroke    • TIA (transient ischemic attack)    • Verruca 2020    One on toe on left foot, has not reappeared in over a year       Past Surgical History:   Procedure Laterality Date   • CARDIAC CATHETERIZATION     • COLONOSCOPY     • ENDOSCOPY     • INFECTED SKIN DEBRIDEMENT      MRSA in abdomen   • TONSILLECTOMY     • WISDOM TOOTH EXTRACTION         Family History   Problem Relation Age of Onset   • Hyperlipidemia Mother    • Hypertension Mother    • Kidney disease Mother    • Heart disease Mother    • Clotting disorder Mother    • Diabetes Mother    • Liver disease Mother    • Mental illness Mother    • Hyperlipidemia Father        Social History     Socioeconomic History   • Marital status: Single   Tobacco Use   • Smoking status: Never     Passive exposure: Never   • Smokeless tobacco: Never   Vaping Use   • Vaping status: Never Used   Substance and Sexual Activity   • Alcohol use: No   • Drug use: Not Currently     Types: Methamphetamines     Comment: last used meth about one week ago   • Sexual activity: Not Currently     Partners: Male       Allergies   Allergen Reactions   • Contrast Dye (Echo Or Unknown Ct/Mr) Hives         Current Outpatient Medications:   •  fluticasone (FLONASE) 50 MCG/ACT nasal spray, instill ONE SPRAY in each nostril ONCE daily, Disp: , Rfl:   •  Januvia 50 MG tablet, , Disp: , Rfl:   •  lamoTRIgine (LaMICtal) 200 MG tablet, , Disp: , Rfl:   •  metFORMIN (Glucophage) 1000 MG tablet, Take 1 tablet by mouth 2 (Two) Times a Day With Meals., Disp: 180 tablet, Rfl: 3  •  OneTouch Ultra test strip, 2 (Two) Times a Day. use for testing, Disp: , Rfl:   •  Vitamin D, Cholecalciferol, (CHOLECALCIFEROL) 10 MCG (400 UNIT) tablet, Take 1 tablet by mouth Daily., Disp: , Rfl:     Review of Systems   Constitutional: Negative.    HENT: Negative.     Eyes: Negative.   "  Respiratory: Negative.     Cardiovascular: Negative.         Right leg pain, squeezing pain to right lower extremity   Gastrointestinal: Negative.    Endocrine: Negative.    Genitourinary: Negative.    Musculoskeletal: Negative.    Skin: Negative.    Allergic/Immunologic: Negative.    Neurological: Negative.    Hematological: Negative.    Psychiatric/Behavioral: Negative.     All other systems reviewed and are negative.    /76   Pulse 93   Ht 165.1 cm (65\")   Wt 72.6 kg (160 lb)   SpO2 96%   BMI 26.63 kg/m²     Physical Exam  Vitals and nursing note reviewed.   Constitutional:       Appearance: Normal appearance. He is well-developed.   HENT:      Head: Normocephalic and atraumatic.   Eyes:      General: No scleral icterus.     Pupils: Pupils are equal, round, and reactive to light.   Neck:      Thyroid: No thyromegaly.      Vascular: No carotid bruit or JVD.   Cardiovascular:      Rate and Rhythm: Normal rate and regular rhythm.      Pulses: Normal pulses.           Carotid pulses are 2+ on the right side and 2+ on the left side.       Femoral pulses are 2+ on the right side and 2+ on the left side.       Popliteal pulses are 2+ on the right side and 2+ on the left side.        Dorsalis pedis pulses are 2+ on the right side and 2+ on the left side.        Posterior tibial pulses are 2+ on the right side and 2+ on the left side.      Heart sounds: Normal heart sounds.   Pulmonary:      Effort: Pulmonary effort is normal.      Breath sounds: Normal breath sounds.   Abdominal:      General: Bowel sounds are normal. There is no distension or abdominal bruit.      Palpations: Abdomen is soft. There is no mass.      Tenderness: There is no abdominal tenderness.   Musculoskeletal:         General: Normal range of motion.      Cervical back: Neck supple.   Lymphadenopathy:      Cervical: No cervical adenopathy.   Skin:     General: Skin is warm and dry.   Neurological:      Mental Status: He is alert and " oriented to person, place, and time.      Cranial Nerves: No cranial nerve deficit.      Sensory: No sensory deficit.   Psychiatric:         Mood and Affect: Mood normal.         Behavior: Behavior normal.         Thought Content: Thought content normal.         Judgment: Judgment normal.         Patient Active Problem List   Diagnosis   • Bipolar 1 disorder   • Hypertension   • Diabetes mellitus   • ADD (attention deficit disorder)   • Anxiety   • GILL (dyspnea on exertion)   • Family history of early CAD   • Nonrheumatic aortic valve insufficiency   • Vitamin D deficiency   • Mixed hyperlipidemia   • Hoarding behavior   • Paresthesias   • Cervical radiculopathy   • Gait instability   • Nonsmoker   • Overweight with body mass index (BMI) of 26 to 26.9 in adult   • Lumbar radiculopathy        Diagnosis Plan   1. Neurogenic claudication        2. Mixed hyperlipidemia        3. Primary hypertension        4. Type 2 diabetes mellitus without complication, without long-term current use of insulin            Plan: After thoroughly evaluating Rah Shin, I believe the best course of action is to remain conservative from a vascular surgery standpoint.  He describes neurogenic claudication.  Some of his symptoms improve with ambulation.  Upon exam, he has palpable pulses to his lower extremities.  He can continue to follow with neurosurgery which is going to give him the most answers.  I did discuss vascular risk factors as they pertain to the progression of vascular disease including controlling his hypertension, hyperlipidemia, and diabetes.  His blood pressure stable on his current medication.  His Hgb A1c is uncontrolled with most recent 10.3.  He can follow-up in our office as needed going forward.  The patient is to continue taking their medications as previously discussed.   This was all discussed in full with complete understanding.  Thank you for allowing me to participate in the care of your patient.   Please do not hesitate to call with any questions or concerns.  We will keep you aware of any further encounters with Rah Shin.        Sincerely yours,         Zack Farah, Vanessa Rivero APRN

## 2024-06-25 NOTE — PROGRESS NOTES
"06/25/2024      Vanessa Schmidt APRN  [address] [place] [address] [place] [address]    Rah Shin  1975    Chief Complaint   Patient presents with    NEW PATIENT     Referred from Vanessa Schmidt for pain in right leg. Describes a constant 'squeezing\" from thigh to toes for about 3 years. Patient does wear compression. Also has swelling/pain after walking. Never been a smoker. No testing done. Stroke in 2021.        Dear CARINA Brunner    HPI  I had the pleasure of seeing your patient Rha Shin in the office today.  Thank you kindly for this consultation.  As you recall, Rah Shin is a 48 y.o.  male who you are currently following for routine health maintenance.  He has complaints of right lower extremity pain.  He is following with neurosurgery and will be having upcoming MRI lumbar spine.  He has been referred for dedicated course of physical therapy. He did have xray of lumbar and cervical spine which I did review.       Past Medical History:   Diagnosis Date    ADD (attention deficit disorder)     Anemia     Anxiety     Arthritis     Bipolar 1 disorder     Depression     major depressive disorder    Diabetes mellitus     Difficulty walking 2021    Loss of balance/stroke    GERD (gastroesophageal reflux disease)     Headache     Hyperlipidemia 5/2024    My HDL was 127    Hypertension     Kidney stones     MRSA (methicillin resistant Staphylococcus aureus)     Neuropathy in diabetes 2010    Peripheral neuropathy     Stroke     TIA (transient ischemic attack)     Verruca 2020    One on toe on left foot, has not reappeared in over a year       Past Surgical History:   Procedure Laterality Date    CARDIAC CATHETERIZATION      COLONOSCOPY      ENDOSCOPY      INFECTED SKIN DEBRIDEMENT      MRSA in abdomen    TONSILLECTOMY      WISDOM TOOTH EXTRACTION         Family History   Problem Relation Age of Onset    Hyperlipidemia Mother     Hypertension Mother     Kidney disease Mother  " "   Heart disease Mother     Clotting disorder Mother     Diabetes Mother     Liver disease Mother     Mental illness Mother     Hyperlipidemia Father        Social History     Socioeconomic History    Marital status: Single   Tobacco Use    Smoking status: Never     Passive exposure: Never    Smokeless tobacco: Never   Vaping Use    Vaping status: Never Used   Substance and Sexual Activity    Alcohol use: No    Drug use: Not Currently     Types: Methamphetamines     Comment: last used meth about one week ago    Sexual activity: Not Currently     Partners: Male       Allergies   Allergen Reactions    Contrast Dye (Echo Or Unknown Ct/Mr) Hives         Current Outpatient Medications:     fluticasone (FLONASE) 50 MCG/ACT nasal spray, instill ONE SPRAY in each nostril ONCE daily, Disp: , Rfl:     Januvia 50 MG tablet, , Disp: , Rfl:     lamoTRIgine (LaMICtal) 200 MG tablet, , Disp: , Rfl:     metFORMIN (Glucophage) 1000 MG tablet, Take 1 tablet by mouth 2 (Two) Times a Day With Meals., Disp: 180 tablet, Rfl: 3    OneTouch Ultra test strip, 2 (Two) Times a Day. use for testing, Disp: , Rfl:     Vitamin D, Cholecalciferol, (CHOLECALCIFEROL) 10 MCG (400 UNIT) tablet, Take 1 tablet by mouth Daily., Disp: , Rfl:     Review of Systems   Constitutional: Negative.    HENT: Negative.     Eyes: Negative.    Respiratory: Negative.     Cardiovascular: Negative.         Right leg pain, squeezing pain to right lower extremity   Gastrointestinal: Negative.    Endocrine: Negative.    Genitourinary: Negative.    Musculoskeletal: Negative.    Skin: Negative.    Allergic/Immunologic: Negative.    Neurological: Negative.    Hematological: Negative.    Psychiatric/Behavioral: Negative.     All other systems reviewed and are negative.    /76   Pulse 93   Ht 165.1 cm (65\")   Wt 72.6 kg (160 lb)   SpO2 96%   BMI 26.63 kg/m²     Physical Exam  Vitals and nursing note reviewed.   Constitutional:       Appearance: Normal appearance. He " is well-developed.   HENT:      Head: Normocephalic and atraumatic.   Eyes:      General: No scleral icterus.     Pupils: Pupils are equal, round, and reactive to light.   Neck:      Thyroid: No thyromegaly.      Vascular: No carotid bruit or JVD.   Cardiovascular:      Rate and Rhythm: Normal rate and regular rhythm.      Pulses: Normal pulses.           Carotid pulses are 2+ on the right side and 2+ on the left side.       Femoral pulses are 2+ on the right side and 2+ on the left side.       Popliteal pulses are 2+ on the right side and 2+ on the left side.        Dorsalis pedis pulses are 2+ on the right side and 2+ on the left side.        Posterior tibial pulses are 2+ on the right side and 2+ on the left side.      Heart sounds: Normal heart sounds.   Pulmonary:      Effort: Pulmonary effort is normal.      Breath sounds: Normal breath sounds.   Abdominal:      General: Bowel sounds are normal. There is no distension or abdominal bruit.      Palpations: Abdomen is soft. There is no mass.      Tenderness: There is no abdominal tenderness.   Musculoskeletal:         General: Normal range of motion.      Cervical back: Neck supple.   Lymphadenopathy:      Cervical: No cervical adenopathy.   Skin:     General: Skin is warm and dry.   Neurological:      Mental Status: He is alert and oriented to person, place, and time.      Cranial Nerves: No cranial nerve deficit.      Sensory: No sensory deficit.   Psychiatric:         Mood and Affect: Mood normal.         Behavior: Behavior normal.         Thought Content: Thought content normal.         Judgment: Judgment normal.         Patient Active Problem List   Diagnosis    Bipolar 1 disorder    Hypertension    Diabetes mellitus    ADD (attention deficit disorder)    Anxiety    GILL (dyspnea on exertion)    Family history of early CAD    Nonrheumatic aortic valve insufficiency    Vitamin D deficiency    Mixed hyperlipidemia    Hoarding behavior    Paresthesias     Cervical radiculopathy    Gait instability    Nonsmoker    Overweight with body mass index (BMI) of 26 to 26.9 in adult    Lumbar radiculopathy        Diagnosis Plan   1. Neurogenic claudication        2. Mixed hyperlipidemia        3. Primary hypertension        4. Type 2 diabetes mellitus without complication, without long-term current use of insulin            Plan: After thoroughly evaluating Rah Shin, I believe the best course of action is to remain conservative from a vascular surgery standpoint.  He describes neurogenic claudication.  Some of his symptoms improve with ambulation.  Upon exam, he has palpable pulses to his lower extremities.  He can continue to follow with neurosurgery which is going to give him the most answers.  I did discuss vascular risk factors as they pertain to the progression of vascular disease including controlling his hypertension, hyperlipidemia, and diabetes.  His blood pressure stable on his current medication.  His Hgb A1c is uncontrolled with most recent 10.3.  He can follow-up in our office as needed going forward.  The patient is to continue taking their medications as previously discussed.   This was all discussed in full with complete understanding.  Thank you for allowing me to participate in the care of your patient.  Please do not hesitate to call with any questions or concerns.  We will keep you aware of any further encounters with Rah Shin.        Sincerely yours,         Zack Farah, Vanessa Rivero APRN

## 2024-07-10 ENCOUNTER — OFFICE VISIT (OUTPATIENT)
Dept: NEUROLOGY | Facility: CLINIC | Age: 49
End: 2024-07-10
Payer: MEDICARE

## 2024-07-10 ENCOUNTER — PATIENT ROUNDING (BHMG ONLY) (OUTPATIENT)
Dept: NEUROLOGY | Facility: CLINIC | Age: 49
End: 2024-07-10
Payer: MEDICARE

## 2024-07-10 VITALS
RESPIRATION RATE: 17 BRPM | HEART RATE: 80 BPM | SYSTOLIC BLOOD PRESSURE: 120 MMHG | WEIGHT: 161 LBS | BODY MASS INDEX: 26.82 KG/M2 | DIASTOLIC BLOOD PRESSURE: 80 MMHG | HEIGHT: 65 IN

## 2024-07-10 DIAGNOSIS — E78.5 HYPERLIPIDEMIA LDL GOAL <70: ICD-10-CM

## 2024-07-10 DIAGNOSIS — F19.91 HISTORY OF ILLICIT DRUG USE: ICD-10-CM

## 2024-07-10 DIAGNOSIS — E11.65 TYPE 2 DIABETES MELLITUS WITH HYPERGLYCEMIA, WITHOUT LONG-TERM CURRENT USE OF INSULIN: ICD-10-CM

## 2024-07-10 DIAGNOSIS — Z86.73 HISTORY OF STROKE: Primary | ICD-10-CM

## 2024-07-10 DIAGNOSIS — R41.3 MEMORY IMPAIRMENT: ICD-10-CM

## 2024-07-10 DIAGNOSIS — Z86.59 HISTORY OF BEHAVIORAL AND MENTAL HEALTH PROBLEMS: ICD-10-CM

## 2024-07-10 RX ORDER — ASPIRIN 81 MG/1
81 TABLET, CHEWABLE ORAL DAILY
COMMUNITY

## 2024-07-10 NOTE — PROGRESS NOTES
Neurology Consult Note    [facility]  [address], [address]  [place] [address]  Phone: [phone]  Fax: [phone]    Referring Provider:   No ref. provider found  Primary Care Provider:  Vanessa Schmidt APRN    Reason for Consult:  Stroke  Subjective      Rah Brandon Shin presents to [facility]    History of Present Illness  48-year-old male referred to our clinic by CARINA Brunner for the evaluation of cerebral infarction.  Patient last saw his PCP on 4/5/2024.  Review that record shows patient requested a referral to [facility] neurology.  He was a prior patient of [facility] neurology however was dissatisfied.  Patient was complaining of intermittent memory loss.  His stroke was found incidentally in March 2023.  Patient last saw Dr. Hayes of [facility] neurology in May 2023.  Review that shows patient was seen for memory loss.  Patient noted cognitive issues over the last year.  Patient did note significant mood issues.  He also had poorly controlled diabetes with numbness in his hands and feet.  Adjust plan of generalized pain.  Patient did have an MRI of his brain due to visual changes.  This was ordered by ophthalmology.    Today patient presents by himself.  Reports me no new stroke symptoms since last being seen.  He does note his memory issues are improving.  He has recently got his diabetes under control.  Approximately a year ago his A1c was over 11.  He notes his last 1 was 6.8.  He does have a prior history of methamphetamine abuse as well as cocaine use.  He does note abstinence from this currently.  He feels he may have experienced a stroke in 2022.  During that time started having memory troubles as well as trouble lifting his left leg.  He was not seen anywhere for this.    Patient was seen by neurosurgery on 6/18/2024.  Reportedly patient was having a squeezing sensation as well as paresthesias affecting both of his legs.  He also  complained of intermittent numbness and tingling the right side of his face down the right anterior arm.  They also proceeded with a nerve conduction study to assess for peripheral neuropathy.  He is also sent to physical therapy.    Patient also notes he is now starting to become dyslexic.  He notes he reads words wrong.  He was recently seen by psychiatrist who restarted medications for ADHD.  He has not started this medicine yet.  Patient Active Problem List   Diagnosis    Bipolar 1 disorder    Hypertension    Diabetes mellitus    ADD (attention deficit disorder)    Anxiety    GILL (dyspnea on exertion)    Family history of early CAD    Nonrheumatic aortic valve insufficiency    Vitamin D deficiency    Mixed hyperlipidemia    Hoarding behavior    Paresthesias    Cervical radiculopathy    Gait instability    Nonsmoker    Overweight with body mass index (BMI) of 26 to 26.9 in adult    Lumbar radiculopathy    History of CVA (cerebrovascular accident)        Past Medical History:   Diagnosis Date    ADD (attention deficit disorder)     Anemia     Anxiety     Arthritis     Bipolar 1 disorder     Depression     major depressive disorder    Diabetes mellitus     Difficulty walking 2021    Loss of balance/stroke    GERD (gastroesophageal reflux disease)     Headache     Hyperlipidemia 5/2024    My HDL was 127    Hypertension     Kidney stones     MRSA (methicillin resistant Staphylococcus aureus)     Neuropathy in diabetes 2010    Peripheral neuropathy     Stroke     TIA (transient ischemic attack)     Verruca 2020    One on toe on left foot, has not reappeared in over a year        Social History     Socioeconomic History    Marital status: Single   Tobacco Use    Smoking status: Never     Passive exposure: Never    Smokeless tobacco: Never   Vaping Use    Vaping status: Never Used   Substance and Sexual Activity    Alcohol use: No    Drug use: Not Currently     Types: Methamphetamines     Comment: last used meth about  "one week ago    Sexual activity: Not Currently     Partners: Male        Allergies   Allergen Reactions    Contrast Dye (Echo Or Unknown Ct/Mr) Hives          Current Outpatient Medications:     aspirin 81 MG chewable tablet, Chew 1 tablet Daily., Disp: , Rfl:     fluticasone (FLONASE) 50 MCG/ACT nasal spray, instill ONE SPRAY in each nostril ONCE daily, Disp: , Rfl:     Januvia 50 MG tablet, , Disp: , Rfl:     lamoTRIgine (LaMICtal) 200 MG tablet, , Disp: , Rfl:     metFORMIN (Glucophage) 1000 MG tablet, Take 1 tablet by mouth 2 (Two) Times a Day With Meals., Disp: 180 tablet, Rfl: 3    OneTouch Ultra test strip, 2 (Two) Times a Day. use for testing, Disp: , Rfl:     Vitamin D, Cholecalciferol, (CHOLECALCIFEROL) 10 MCG (400 UNIT) tablet, Take 1 tablet by mouth Daily., Disp: , Rfl:        Objective   Vital Signs:   /80 (BP Location: Left arm, Patient Position: Sitting)   Pulse 80   Resp 17   Ht 165.1 cm (65\")   Wt 73 kg (161 lb)   BMI 26.79 kg/m²       Physical Exam  Vitals and nursing note reviewed.   Constitutional:       Appearance: Normal appearance.   HENT:      Head: Normocephalic.   Eyes:      General: Lids are normal.      Extraocular Movements: Extraocular movements intact.      Pupils: Pupils are equal, round, and reactive to light.   Pulmonary:      Effort: Pulmonary effort is normal. No respiratory distress.   Skin:     General: Skin is warm and dry.   Neurological:      Mental Status: He is alert.      Motor: Motor strength is normal.     Deep Tendon Reflexes: Reflexes are normal and symmetric.   Psychiatric:         Speech: Speech normal.        Neurological Exam  Mental Status  Alert. Oriented to person, place, time and situation. Speech is normal. Language is fluent with no aphasia.    Cranial Nerves  CN II: Visual fields full to confrontation.  CN III, IV, VI: Extraocular movements intact bilaterally. Normal lids and orbits bilaterally. Pupils equal round and reactive to light " bilaterally.  CN V: Facial sensation is normal.  CN VII: Full and symmetric facial movement.  CN IX, X: Palate elevates symmetrically. Normal gag reflex.  CN XI: Shoulder shrug strength is normal.  CN XII: Tongue midline without atrophy or fasciculations.    Motor  Normal muscle bulk throughout. No fasciculations present. Normal muscle tone. No abnormal involuntary movements. Strength is 5/5 throughout all four extremities.    Sensory  Light touch is normal in upper and lower extremities.     Reflexes  Deep tendon reflexes are 2+ and symmetric in all four extremities.    Coordination  Right: Finger-to-nose normal.Left: Finger-to-nose normal.    Gait  Casual gait is normal including stance, stride, and arm swing.      Result Review :   The following data was reviewed by: CARINA Murphy on 07/10/2024:  CMP          6/3/2024    15:05   CMP   Glucose 117       BUN 11       Creatinine 0.8       Sodium 135       Potassium 3.8       Chloride 95       Calcium 9.7       Total Protein 7.8       Albumin 5.1       Globulin 2.8       Total Bilirubin 0.7       Alkaline Phosphatase 60       AST (SGOT) 28       ALT (SGPT) 32       Anion Gap 10          Details          This result is from an external source.             CBC w/diff          12/28/2023    16:12 4/12/2024    23:47 6/3/2024    14:22   CBC w/Diff   WBC 8.2     8.8     5.35       RBC 4.93     4.84     4.17       Hemoglobin 16.0     15.1     13.3       Hematocrit 44.8     42.2     36.8       MCV 90.9     87.2     88.2       MCH 32.5     31.2     31.9       MCHC 35.7     35.8     36.1       RDW 11.9     12.0     12.2       Platelets 235     244     216       Neutrophil Rel % 58.3     40.8     35.0       Lymphocyte Rel % 26.8     35.4     45.6       Monocyte Rel % 7.8     10.3     11.4       Eosinophil Rel % 6.2     12.0     6.9       Basophil Rel % 0.7     1.4     0.9          Details          This result is from an external source.                                    Impression:  Rah Shin is a 48 y.o. male who presents for evaluation of stroke.  Etiology felt to be small vessel disease related due to his medical history.  However he was not evaluate the time of his stroke nor do we know the exact age of this.  I do not see where he has had a full stroke evaluation.  I would recommend this.  Additionally regards to his neuropathy symptoms, these are being worked up by neurosurgery.  We will follow along peripherally at this time.  Regards to his memory, I feel this could be exacerbated by his known mental health disorders and unlikely to be related to a neurocognitive disorder.  He notes his memory is stable and actually improving.  We will not pursue further testing at this time.  Regards to his complaints of dyslexia, feel less likely this is caused by his stroke.  I would recommend to continue treatment per psychiatry.    Diagnoses and all orders for this visit:    1. History of stroke (Primary)    2. History of illicit drug use    3. Hyperlipidemia LDL goal <70    4. Type 2 diabetes mellitus with hyperglycemia, without long-term current use of insulin    5. History of behavioral and mental health problems    6. Memory impairment        Plan:  Continue aspirin 81 mg daily  Deferred initiation of statin therapy at this time per patient's request.  He would like to try herbal supplements and diet.  Carotid ultrasound  Cardiac echo with bubble study  LDL goal less than 70  A1c goal less than 6.5  BP goal less than 130/80  Recommend Mediterranean-style diet  Reviewed be-fast and need to report to the emergency room immediately for any new stroke symptoms  Continue with illicit drug use abstinence  Defer further evaluation of memory impairment due to patient reports of improvement.  Follow-up with PCP as scheduled  Follow-up with neurosurgery as scheduled  Follow-up in our clinic in 6 months or sooner if needed    The patient and I have discussed the plan of care  and he is in full agreement at this time.     Follow Up   Return in about 6 months (around 1/10/2025) for Stroke.            Danyelle Coles, CARINA  07/10/24  16:49 CDT

## 2024-07-10 NOTE — PROGRESS NOTES
July 10, 2024        My name is Fidelina Catalan LPN    I am  with [facility]  [address]  [address]  [place] [address] [phone].    Before we get started may I verify your date of birth? 1975    I am officially welcoming you to our practice and asking about your  visit. Is this a good time to talk? yes    Tell me about your visit with us. What things went well?  Patient states the visit went very well. States he has no complaints.       We're always looking for ways to make our patients' experiences even better. Do you have recommendations on ways we may improve?  no    Overall were you satisfied with your first visit to our practice? yes       I appreciate you taking the time to speak with me today. Is there anything else I can do for you? no      Thank you, and have a great day.

## 2024-07-11 ENCOUNTER — TELEPHONE (OUTPATIENT)
Dept: UROLOGY | Facility: CLINIC | Age: 49
End: 2024-07-11
Payer: MEDICARE

## 2024-07-11 NOTE — TELEPHONE ENCOUNTER
----- Message from Malathi CONTRERAS sent at 7/11/2024  4:01 PM CDT -----  Pt needs US prior to appt on 7-24-24

## 2024-07-11 NOTE — TELEPHONE ENCOUNTER
Called patient and transferred him to central scheduling to get his ultrasound appt scheduled before his NEW PATIENT appt with Galindo on 7/24.

## 2024-07-11 NOTE — PROGRESS NOTES
Subjective    Mr. Shin is 48 y.o. male    Chief Complaint: Groin Pain    History of Present Illness  Patient is a 48-year-old gentleman with no previous urologic complaints here to establish as a new patient patient wanted to be checked due to findings that were seen on lumbar x-ray that showed calcifications of the vas deferens versus seminal vesicles.  Has had some right inguinal discomfort.  So has decreased or difficulty ejaculating and erectile dysfunction.  States erectile dysfunction occurred fairly suddenly although he has risk factors such as history of diabetes.  He has taken Viagra and Cialis in the past which were effective.  Ultrasound was essentially normal except for small hydroceles.    Also has decreased libido and desire and has never had his testosterone checked he feels this could be low.  He does have risk factors also that would make him prone for decreased testosterone.      The following portions of the patient's history were reviewed and updated as appropriate: allergies, current medications, past family history, past medical history, past social history, past surgical history and problem list.    Review of Systems   Gastrointestinal:  Positive for abdominal pain.        Right inguinal discomfort   Genitourinary:         Erectile dysfunction         Current Outpatient Medications:     aspirin 81 MG chewable tablet, Chew 1 tablet Daily., Disp: , Rfl:     fluticasone (FLONASE) 50 MCG/ACT nasal spray, instill ONE SPRAY in each nostril ONCE daily, Disp: , Rfl:     Januvia 50 MG tablet, , Disp: , Rfl:     lamoTRIgine (LaMICtal) 200 MG tablet, , Disp: , Rfl:     metFORMIN (Glucophage) 1000 MG tablet, Take 1 tablet by mouth 2 (Two) Times a Day With Meals., Disp: 180 tablet, Rfl: 3    OneTouch Ultra test strip, 2 (Two) Times a Day. use for testing, Disp: , Rfl:     Vitamin D, Cholecalciferol, (CHOLECALCIFEROL) 10 MCG (400 UNIT) tablet, Take 1 tablet by mouth Daily., Disp: , Rfl:     Past Medical  "History:   Diagnosis Date    ADD (attention deficit disorder)     Anemia     Anxiety     Arthritis     Bipolar 1 disorder     Depression     major depressive disorder    Diabetes mellitus     Difficulty walking 2021    Loss of balance/stroke    GERD (gastroesophageal reflux disease)     Headache     Hyperlipidemia 5/2024    My HDL was 127    Hypertension     Kidney stones     MRSA (methicillin resistant Staphylococcus aureus)     Neuropathy in diabetes 2010    Peripheral neuropathy     Stroke     TIA (transient ischemic attack)     Verruca 2020    One on toe on left foot, has not reappeared in over a year       Past Surgical History:   Procedure Laterality Date    CARDIAC CATHETERIZATION      COLONOSCOPY      ENDOSCOPY      INFECTED SKIN DEBRIDEMENT      MRSA in abdomen    TONSILLECTOMY      WISDOM TOOTH EXTRACTION         Social History     Socioeconomic History    Marital status: Single   Tobacco Use    Smoking status: Never     Passive exposure: Never    Smokeless tobacco: Never   Vaping Use    Vaping status: Never Used   Substance and Sexual Activity    Alcohol use: No    Drug use: Not Currently     Types: Methamphetamines     Comment: last used meth about one week ago    Sexual activity: Not Currently     Partners: Male       Family History   Problem Relation Age of Onset    Hyperlipidemia Mother     Hypertension Mother     Kidney disease Mother     Heart disease Mother     Clotting disorder Mother     Diabetes Mother     Liver disease Mother     Mental illness Mother     Hyperlipidemia Father        Objective    Temp 97.7 °F (36.5 °C)   Ht 165.1 cm (65\")   Wt 73 kg (161 lb)   BMI 26.79 kg/m²     Physical Exam  Vitals reviewed.   Constitutional:       Appearance: Normal appearance.   HENT:      Head: Normocephalic and atraumatic.   Pulmonary:      Effort: Pulmonary effort is normal.   Genitourinary:     Penis: Normal.       Comments: Scrotal examination was normal.  No external genitalia " abnormalities.  Skin:     Coloration: Skin is not pale.   Neurological:      Mental Status: He is alert.   Psychiatric:         Mood and Affect: Mood normal.         Behavior: Behavior normal.             Results for orders placed or performed in visit on 07/24/24   POC Urinalysis Dipstick, Multipro    Specimen: Urine   Result Value Ref Range    Color Yellow Yellow, Straw, Dark Yellow, Lizbeth    Clarity, UA Clear Clear    Glucose, UA Negative Negative mg/dL    Bilirubin Negative Negative    Ketones, UA Negative Negative    Specific Gravity  1.020 1.005 - 1.030    Blood, UA Trace (A) Negative    pH, Urine 5.5 5.0 - 8.0    Protein, POC Negative Negative mg/dL    Urobilinogen, UA 0.2 E.U./dL Normal, 0.2 E.U./dL    Nitrite, UA Negative Negative    Leukocytes Negative Negative     Assessment and Plan    Diagnoses and all orders for this visit:    1. Inguinal pain, unspecified laterality (Primary)  -     POC Urinalysis Dipstick, Multipro  -     US Scrotum & Testicles; Future    2. Erectile dysfunction, unspecified erectile dysfunction type  -     Testosterone, Free, Total; Future    3. Decreased libido      Will check testosterone free and total to be random test if abnormal and told patient we would get repeat labs including prolactin FSH TSH and repeat testosterone free and total.    Also his ultrasound showed no acute findings essentially normal except for small varicoceles which is show patient is essentially normal finding.    Lumbar x-ray showed calcifications possibly of the vas deferens or seminal vesicle I explained there is nothing that needs to be done from a surgical standpoint it is seen in patients with diabetes.      Next week to discuss testosterone labs

## 2024-07-17 ENCOUNTER — HOSPITAL ENCOUNTER (OUTPATIENT)
Dept: ULTRASOUND IMAGING | Facility: HOSPITAL | Age: 49
Discharge: HOME | End: 2024-07-17
Admitting: PHYSICIAN ASSISTANT
Payer: MEDICARE

## 2024-07-17 DIAGNOSIS — R10.30 INGUINAL PAIN, UNSPECIFIED LATERALITY: ICD-10-CM

## 2024-07-17 PROCEDURE — 76870 US EXAM SCROTUM: CPT

## 2024-07-23 ENCOUNTER — TREATMENT (OUTPATIENT)
Dept: PHYSICAL THERAPY | Facility: CLINIC | Age: 49
End: 2024-07-23
Payer: MEDICARE

## 2024-07-23 DIAGNOSIS — Z74.09 IMPAIRED FUNCTIONAL MOBILITY, BALANCE, GAIT, AND ENDURANCE: Primary | ICD-10-CM

## 2024-07-23 DIAGNOSIS — M54.12 CERVICAL RADICULOPATHY: ICD-10-CM

## 2024-07-23 DIAGNOSIS — M54.16 LUMBAR RADICULOPATHY: ICD-10-CM

## 2024-07-23 NOTE — PROGRESS NOTES
"                                                          Physical Therapy Initial Evaluation and Plan of Care  [address] [place] [address]    Patient: Rah Shin   : 1975  Referring practitioner: Ramírez eLon PA-C  Date of Initial Visit: 2024  Today's Date: 2024  Patient seen for 1 sessions    Visit Diagnoses:    ICD-10-CM ICD-9-CM   1. Impaired functional mobility, balance, gait, and endurance  Z74.09 V49.89   2. Cervical radiculopathy  M54.12 723.4   3. Lumbar radiculopathy  M54.16 724.4     Past Medical History:   Diagnosis Date    ADD (attention deficit disorder)     Anemia     Anxiety     Arthritis     Bipolar 1 disorder     Depression     major depressive disorder    Diabetes mellitus     Difficulty walking     Loss of balance/stroke    GERD (gastroesophageal reflux disease)     Headache     Hyperlipidemia 2024    My HDL was 127    Hypertension     Kidney stones     MRSA (methicillin resistant Staphylococcus aureus)     Neuropathy in diabetes     Peripheral neuropathy     Stroke     TIA (transient ischemic attack)     Verruca     One on toe on left foot, has not reappeared in over a year     Past Surgical History:   Procedure Laterality Date    CARDIAC CATHETERIZATION      COLONOSCOPY      ENDOSCOPY      INFECTED SKIN DEBRIDEMENT      MRSA in abdomen    TONSILLECTOMY      WISDOM TOOTH EXTRACTION           SUBJECTIVE     Subjective Evaluation    History of Present Illness  Mechanism of injury: Per documentation on 2024: \"Rah comes in as a referral request from [facility].  His chief complaint is squeezing sensation as well as paresthesias affecting both of his legs, right greater than left.  It primarily affects anterior thighs, posterior calves as well as dorsal and plantar aspects of both feet.  He reports spasms in his feet and the pain intensifies at night.  Walking does not aggravate his pain in fact he feels like walking maybe helps " "it some.  He does not have any low back pain to speak of.  He also complains of some intermittent numbness and tingling the right side of his face extending down the right anterior arm which has been present for 2 years.  He has some right-sided neck pain that radiates into the trapezius and parascapular region that he notices with activity and when he lays down at night.  He does admit to some balance issues but denies hand dexterity issues.  He does have a an issue with the right long finger locking up.  He also states the finger is painful.  He had been x-rayed which did not show anything acute.     Past medical history is significant for stroke.  Patient states that this most likely occurred around 2021.  He was unaware that he had a stroke.  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  Stroke was diagnosed after an abnormal eye exam by an ophthalmologist.  Most recent MRI of the brain was July 2023 which describes an old lacunar type infarct in the medial right thalamus and chronic microvascular ischemic changes.  No reported seizure history.     Past medical history is also significant for type 2 diabetes which was poorly controlled until recently, bipolar disorder, GERD, hypertension, peripheral neuropathy and MRSA.     He has used drugs in the past, methamphetamine but currently denies drug use, tobacco use as well as alcohol.\"    He has a squeezing sensation in the RLE and a pins and needles sensation in the RUE. He has middle and pointer finger pain and notes swelling. He reports having a TIA in the past that affected the L side. He doesn't report pain but does report decreased flexibility and trouble w/ squatting and bending over. No falls in the last year but does report balance issues. He has pain in his feet. He has anxiety and reports when he holds his breath his foot pain is worse. He reports being active seems to help the feet and the legs. He has difficulty opening things and nothing seems to help the " MICHAEL. He lives on the 19th floor of his building and can go down but can't go up because he is worn out by the 2-3rd floor.     Pain  Current pain ratin  At best pain ratin  At worst pain rating: 10 (feet)    Patient Goals  Patient goals for therapy: improved balance (improve flexibility)             PT G-Codes  Outcome Measure Options: FGA (Functional Gait Assessment)  FGA Total Score: 29    OBJECTIVE     Objective          Active Range of Motion   Cervical/Thoracic Spine   Cervical    Flexion: 44 degrees   Extension: 46 degrees   Left lateral flexion: 30 degrees   Right lateral flexion: 24 degrees   Left rotation: 56 degrees with pain  Right rotation: 59 degrees     Lumbar   Flexion: Active lumbar flexion: 90%   Extension: Active lumbar extension: 25%   Left lateral flexion: WFL  Right lateral flexion: WFL  Left rotation: Active left lumbar rotation: 75%   Right rotation: Active right lumbar rotation: 75%     Tests   Cervical     Left   Negative Spurling's sign.     Right   Positive Spurling's sign.           BALANCE TESTING  FGA: 30  Mini-BEST test:   Anticipatory:  SIT TO STAND: (2) Normal: : Comes to stand without use of hands and stabilizes independently.  RISE TO TOES: (2) Normal: Stable for 3 s with maximum height.   LEFT STAND ON ONE LEG: (1) Moderate: < 20 s.  RIGHT STAND ON ONE LEG: (1) Moderate: < 20 s.   Reactive Postural Control:    Forward - COMPENSATORY STEPPING CORRECTION: (2) Normal: Recovers independently with a single, large step (second realignment step is allowed).    Backward - COMPENSATORY STEPPING CORRECTION: (1) Moderate: More than one step used to recover equilibrium    Lateral - Compensatory Stepping Correction (0) Severe: Falls, or cannot step.    Sensory Orientation:    Feet together, EO, Firm surface: STANCE:  (2) Normal: 30 s.      Feet together, EC, Foam surface: STANCE:  (1) Moderate: < 30 s.      Incline, EC:  (1) Moderate: Stands independently <30 s OR aligns with  surface.    Dynamic Gait:     CHANGE IN GAIT SPEED: (2) Normal: Significantly changes walking speed without imbalance.      WALK WITH HEAD TURNS - HORIZONTAL: (2) Normal: performs head turns with no change in gait speed and good balance.      WALK WITH PIVOT TURNS: (2) Normal: Turns with feet close FAST (< 3 steps) with good balance.     STEP OVER OBSTACLES: (2) Normal: Able to step over box with minimal change of gait speed and with good balance.     TU.68 sec; Dual Task TU.08 sec         (2) Normal: No noticeable change in sitting, standing or walking while backward counting when compared to TUG without  Dual Task.       LE MMT   HIP Strength L Strength R   flexion 4+  5   extension 4  4+   ABD 5  5        KNEE     extension 5  5        ANKLE     dorsiflexion 4+  5       Therapy Education/Self Care [id number]   Education offered today Implication of functional test results; POC; HEP   Medbride Code [id number]   Ongoing HEP     Date: 2024  Prepared by: Kira Patel    Exercises  - Romberg Stance with Head Nods  - 2-3 x daily - 4 x weekly - 2 sets - 2 reps - 60 seconds  - Tandem Stance  - 2-3 x daily - 4 x weekly - 2 sets - 2 reps - 60 seconds  - Standing Single Leg Stance with Counter Support  - 2-3 x daily - 4 x weekly - 2 sets - 2 reps - 30 seconds  - Single Leg Balance with Clock Reach  - 1 x daily - 7 x weekly - 3 sets - 10 reps  - Braided Sidestepping  - 1 x daily - 7 x weekly - 3 sets - 10 reps  - Walking with High Knee Taps  - 1 x daily - 7 x weekly - 3 sets - 10 reps  - Tandem Walking  - 1 x daily - 7 x weekly - 3 sets - 10 reps  - Heel Walking  - 1 x daily - 7 x weekly - 3 sets - 10 reps  - Toe Walking  - 1 x daily - 7 x weekly - 3 sets - 10 reps  - Alternating Step Taps with Counter Support  - 1 x daily - 7 x weekly - 3 sets - 10 reps  - Step-Over  - 1 x daily - 7 x weekly - 3 sets - 10 reps   Timed Minutes 0       Total Timed Treatment:     0   mins  Total Time of Visit:            38    mins    ASSESSMENT/PLAN     GOALS:  Goals                                                    Progress Note due by 8/22/2024                                                                Recert due by 10/21/2024   LTG by: 12 weeks Comments Date Status   Patient will be I with final HEP.        Patient to improve mini-BEST test balance score to >/= 27/28 to decrease patient's risk of falls.              Assessment & Plan       Assessment  Impairments: abnormal coordination, abnormal gait, abnormal muscle firing, abnormal muscle tone, abnormal or restricted ROM, activity intolerance, impaired balance, impaired physical strength, lacks appropriate home exercise program, pain with function and safety issue   Functional limitations: carrying objects, lifting, sleeping, walking, pulling, pushing, uncomfortable because of pain, moving in bed, sitting, standing, stooping, reaching behind back, reaching overhead and unable to perform repetitive tasks   Assessment details: Rah Shin is a 48 year old male who presents w/ static/dynamic balance impairment and RUE/RLE paresthesias. Time constraints and pt tardiness limited assessment of cervical and lumbar spine though his R Spurling's was positive. He has a NCV/EMG scheduled for sometime in August which should give helpful insight into his RUE/RLE s/s. He exhibits increased reliance on vision and difficulty w/ compliant and uneven surfaces for static balance as well as impaired stepping strategy for more dynamic balance tasks. The above deficits are causing difficulty w/ performance of ADL/IADLs and functional mobility. Due to the aforementioned anatomical and functional limitations, the pt will require skilled OP PT services to optimize functional recovery, safety, and independence.  Prognosis: good    Plan  Therapy options: will be seen for skilled therapy services  Planned modality interventions: thermotherapy (hydrocollator packs), cryotherapy, low level laser therapy,  TENS, dry needling, electrical stimulation/Mauritian stimulation and ultrasound  Planned therapy interventions: abdominal trunk stabilization, manual therapy, ADL retraining, motor coordination training, balance/weight-bearing training, neuromuscular re-education, body mechanics training, postural training, soft tissue mobilization, spinal/joint mobilization, fine motor coordination training, flexibility, functional ROM exercises, strengthening, gait training, stretching, home exercise program, therapeutic activities, IADL retraining, joint mobilization and transfer training  Frequency: 1x week  Duration in weeks: 12  Treatment plan discussed with: patient  Plan details: Patient will be seen 1x/wk x 12wks with treatment to include strengthening, stretching, manual therapy, neuromuscular re-education, balance, gait and endurance training.      Likely add more goals pending further assessment of cervical and lumbar.        SIGNATURE: Kira Patel PT DPT, [place] License #: [id number]  Electronically Signed on 7/23/2024    Initial Certification  Certification Period: 7/23/2024 through 10/20/2024  I certify that the therapy services are furnished while this patient is under my care.  The services outlined above are required by this patient, and will be reviewed every 90 days.     PHYSICIAN: Ramírez Leon PA-C (NPI: [id number])    Signature: _______________________________________DATE: _________    Please sign and return via fax to [phone].   Thank you so much for letting us work with Rah. I appreciate your letting us work with your patients. If you have any questions or concerns, please don't hesitate to contact me.          [address] [address]  [phone]

## 2024-07-24 ENCOUNTER — OFFICE VISIT (OUTPATIENT)
Dept: UROLOGY | Facility: CLINIC | Age: 49
End: 2024-07-24
Payer: MEDICARE

## 2024-07-24 VITALS — HEIGHT: 65 IN | WEIGHT: 161 LBS | BODY MASS INDEX: 26.82 KG/M2 | TEMPERATURE: 97.7 F

## 2024-07-24 DIAGNOSIS — N52.9 ERECTILE DYSFUNCTION, UNSPECIFIED ERECTILE DYSFUNCTION TYPE: ICD-10-CM

## 2024-07-24 DIAGNOSIS — R68.82 DECREASED LIBIDO: ICD-10-CM

## 2024-07-24 DIAGNOSIS — R10.30 INGUINAL PAIN, UNSPECIFIED LATERALITY: Primary | ICD-10-CM

## 2024-07-24 LAB
BILIRUB BLD-MCNC: NEGATIVE MG/DL
CLARITY, POC: CLEAR
COLOR UR: YELLOW
GLUCOSE UR STRIP-MCNC: NEGATIVE MG/DL
KETONES UR QL: NEGATIVE
LEUKOCYTE EST, POC: NEGATIVE
NITRITE UR-MCNC: NEGATIVE MG/ML
PH UR: 5.5 [PH] (ref 5–8)
PROT UR STRIP-MCNC: NEGATIVE MG/DL
RBC # UR STRIP: ABNORMAL /UL
SP GR UR: 1.02 (ref 1–1.03)
UROBILINOGEN UR QL: ABNORMAL

## 2024-07-26 ENCOUNTER — LAB (OUTPATIENT)
Dept: LAB | Facility: HOSPITAL | Age: 49
End: 2024-07-26
Payer: MEDICARE

## 2024-07-26 DIAGNOSIS — N52.9 ERECTILE DYSFUNCTION, UNSPECIFIED ERECTILE DYSFUNCTION TYPE: ICD-10-CM

## 2024-07-26 PROCEDURE — 84403 ASSAY OF TOTAL TESTOSTERONE: CPT

## 2024-07-26 PROCEDURE — 84402 ASSAY OF FREE TESTOSTERONE: CPT

## 2024-07-26 PROCEDURE — 36415 COLL VENOUS BLD VENIPUNCTURE: CPT

## 2024-07-30 ENCOUNTER — TELEPHONE (OUTPATIENT)
Dept: UROLOGY | Facility: CLINIC | Age: 49
End: 2024-07-30
Payer: MEDICARE

## 2024-07-30 LAB
TESTOST FREE SERPL-MCNC: 10.1 PG/ML (ref 7.2–24)
TESTOST SERPL-MCNC: 420 NG/DL (ref 264–916)

## 2024-07-30 NOTE — TELEPHONE ENCOUNTER
----- Message from Galindo Blum sent at 7/30/2024 12:41 PM CDT -----  Regarding: Labs  Testosterone was 420 reassess was 10.1 B within normal range based on those levels I would not recommend testosterone replacement or any further labs at this time.  He had appointment tomorrow to discuss if he wants to return he can but I would not recommend treating so he does not need to follow-up tomorrow as the main purpose of visit was to discuss labs.  ----- Message -----  From: Lab, Background User  Sent: 7/30/2024  11:12 AM CDT  To: VERONIQUE Martinez

## 2024-07-31 ENCOUNTER — HOSPITAL ENCOUNTER (OUTPATIENT)
Dept: CARDIOLOGY | Facility: HOSPITAL | Age: 49
Discharge: HOME | End: 2024-07-31
Payer: MEDICARE

## 2024-07-31 ENCOUNTER — HOSPITAL ENCOUNTER (OUTPATIENT)
Dept: ULTRASOUND IMAGING | Facility: HOSPITAL | Age: 49
Discharge: HOME | End: 2024-07-31
Payer: MEDICARE

## 2024-07-31 VITALS
WEIGHT: 160 LBS | DIASTOLIC BLOOD PRESSURE: 79 MMHG | SYSTOLIC BLOOD PRESSURE: 124 MMHG | HEIGHT: 71 IN | BODY MASS INDEX: 22.4 KG/M2

## 2024-07-31 DIAGNOSIS — Z86.73 HISTORY OF STROKE: ICD-10-CM

## 2024-07-31 DIAGNOSIS — E78.5 HYPERLIPIDEMIA LDL GOAL <70: ICD-10-CM

## 2024-07-31 DIAGNOSIS — E11.65 TYPE 2 DIABETES MELLITUS WITH HYPERGLYCEMIA, WITHOUT LONG-TERM CURRENT USE OF INSULIN: ICD-10-CM

## 2024-07-31 LAB
BH CV ECHO LEFT VENTRICLE GLOBAL LONGITUDINAL STRAIN: -20 %
BH CV ECHO MEAS - LA DIMENSION: 3.2 CM
BH CV ECHO MEAS - LAT PEAK E' VEL: 13 CM/SEC
BH CV ECHO MEAS - MED PEAK E' VEL: 8 CM/SEC
BH CV ECHO MEAS - TAPSE (>1.6): 2.4 CM
BH CV ECHO SHUNT ASSESSMENT PERFORMED (HIDDEN SCRIPTING): 1
BH CV XLRA - RV BASE: 3.1 CM
BH CV XLRA - RV MID: 2.2 CM
BH CV XLRA - TDI S': 13 CM/SEC
LEFT ATRIUM VOLUME INDEX: 27 ML/M2
LEFT ATRIUM VOLUME: 49 ML

## 2024-07-31 PROCEDURE — 93880 EXTRACRANIAL BILAT STUDY: CPT

## 2024-07-31 PROCEDURE — 93356 MYOCRD STRAIN IMG SPCKL TRCK: CPT

## 2024-07-31 PROCEDURE — 93306 TTE W/DOPPLER COMPLETE: CPT

## 2024-08-01 ENCOUNTER — PATIENT MESSAGE (OUTPATIENT)
Dept: NEUROSURGERY | Facility: CLINIC | Age: 49
End: 2024-08-01
Payer: MEDICARE

## 2024-08-01 NOTE — PROGRESS NOTES
Let patient know carotid ultrasound unremarkable.  Bilateral less than 50% stenosis of the internal carotid arteries.  Normal flow in the bilateral vertebral arteries.  Continue current plan of care.

## 2024-08-06 ENCOUNTER — HOSPITAL ENCOUNTER (OUTPATIENT)
Dept: NEUROLOGY | Facility: HOSPITAL | Age: 49
Discharge: HOME | End: 2024-08-06
Admitting: PHYSICIAN ASSISTANT
Payer: MEDICARE

## 2024-08-06 DIAGNOSIS — R20.2 PARESTHESIAS: ICD-10-CM

## 2024-08-06 PROCEDURE — 95912 NRV CNDJ TEST 11-12 STUDIES: CPT

## 2024-08-06 PROCEDURE — 95886 MUSC TEST DONE W/N TEST COMP: CPT

## 2024-08-09 DIAGNOSIS — Q21.12 PFO (PATENT FORAMEN OVALE): ICD-10-CM

## 2024-08-09 DIAGNOSIS — I35.9 AORTIC VALVE DISEASE: ICD-10-CM

## 2024-08-09 DIAGNOSIS — Z86.73 HISTORY OF STROKE: ICD-10-CM

## 2024-08-09 DIAGNOSIS — R93.1 ABNORMAL ECHOCARDIOGRAM: Primary | ICD-10-CM

## 2024-08-09 LAB
BH CV ECHO LEFT VENTRICLE GLOBAL LONGITUDINAL STRAIN: -20 %
BH CV ECHO MEAS - LA DIMENSION: 3.2 CM
BH CV ECHO MEAS - LAT PEAK E' VEL: 13 CM/SEC
BH CV ECHO MEAS - MED PEAK E' VEL: 8 CM/SEC
BH CV ECHO MEAS - MV A MAX VEL: 81 CM/SEC
BH CV ECHO MEAS - MV E MAX VEL: 70 CM/SEC
BH CV ECHO MEAS - TAPSE (>1.6): 2.4 CM
BH CV ECHO MEAS - TR MAX VEL: 2.23 CM/SEC
BH CV ECHO MEASUREMENTS AVERAGE E/E' RATIO: 6.67
BH CV ECHO SHUNT ASSESSMENT PERFORMED (HIDDEN SCRIPTING): 1
BH CV XLRA - RV BASE: 3.1 CM
BH CV XLRA - RV MID: 2.2 CM
BH CV XLRA - TDI S': 13 CM/SEC
LEFT ATRIUM VOLUME INDEX: 27 ML/M2
LEFT ATRIUM VOLUME: 49 ML

## 2024-08-09 NOTE — PROGRESS NOTES
Reviewed echocardiogram results, would recommend referral to structural cardiology to evaluate aortic valve and PFO.  Patient did experience a right thalamus stroke 2 to 3 years ago.  Did contact patient via [employer] to discuss results.  He will have a referral to cardiology for further evaluation.  This is very reasonable.  We will place this today.  Etiology behind stroke felt to be more small vessel disease related to obesity, hypertension, hyperlipidemia and diabetes Than embolic.        Adult Transthoracic Echo Complete W/ Cont if Necessary Per Protocol (07/31/2024 13:32)     Interpretation Summary         Left ventricular ejection fraction appears to be 56 - 60%.    Left ventricular diastolic function was normal.    Small patent foramen ovale present with right to left shunting.    Saline test results are positive.    Estimated right ventricular systolic pressure from tricuspid regurgitation is normal (<35 mmHg).    Suspected bicuspid aortic valve without aortic regurgitation or aortic stenosis    Normal global longitudinal LV strain (GLS) = -20.0%.    Mild aortic arch dilatation    Recommend CTA or MRA of the thoracic aorta. May consider cardiac MRI for better visualization of the aortic valve     Cardiac History    Diagnosis Date Comment Source   Diabetes mellitus      Hyperlipidemia 5/2024 My HDL was 127    Hypertension      Neuropathy in diabetes 2010       Social History    Tobacco Use    Never smoked or used smokeless tobacco.   Passive Exposure: Never     Vaping Use    Never used     Family Cardiac History as of 8/9/2024  Pedigree Problem Relation Age of Onset   Diabetes Mother    Heart disease Mother    Hyperlipidemia Father    Hyperlipidemia Mother    Hypertension Mother      Additional Study Details    Study Quality The study is technically good for diagnosis. Normal sinus was the predominant rhythm observed during the procedure.   Shunt Assessment Verbal consent was obtained for use of agitated  saline to assess for shunting.     Echocardiogram Findings    Left Ventricle Left ventricular ejection fraction appears to be 56 - 60%.   Normal global longitudinal LV strain (GLS) = -20.0%. Left ventricle strain data was reviewed by the physician and found to be accurate. Normal left ventricular cavity size and wall thickness noted. All left ventricular wall segments contract normally. Left ventricular diastolic function was normal. Normal left atrial pressure.   Right Ventricle Normal right ventricular cavity size, wall thickness, systolic function and septal motion noted.   Left Atrium Normal left atrial size and volume noted. Small patent foramen ovale present with right to left shunting. No evidence of an atrial septal defect present.  Saline test results are positive.   Right Atrium Normal right atrial cavity size noted. Right atrial volume is 27 ml.   Aortic Valve No aortic valve regurgitation or stenosis is present. The aortic valve is abnormal in structure. The aortic valve exhibits sclerosis. The aortic valve does not appear trileaflet.   Mitral Valve The mitral valve is structurally normal with no regurgitation or significant stenosis present.   Tricuspid Valve The tricuspid valve is grossly normal in structure. Physiologic tricuspid valve regurgitation is present. Estimated right ventricular systolic pressure from tricuspid regurgitation is normal (<35 mmHg). No evidence of pulmonary hypertension is present. No evidence of significant tricuspid valve stenosis is present.   Pulmonic Valve The pulmonic valve is structurally normal with no regurgitation or significant stenosis present.   Greater Vessels No dilation of the aortic root is present.   Pericardium The pericardium is normal. There is no evidence of pericardial effusion. .     Plan:  Refer to structural cardiology  Continue aspirin 81 mg daily  Further plan of care as previously discussed with patient.

## 2024-08-13 ENCOUNTER — TELEPHONE (OUTPATIENT)
Dept: CARDIOLOGY | Facility: CLINIC | Age: 49
End: 2024-08-13
Payer: MEDICARE

## 2024-08-13 ENCOUNTER — TREATMENT (OUTPATIENT)
Dept: PHYSICAL THERAPY | Facility: CLINIC | Age: 49
End: 2024-08-13
Payer: MEDICARE

## 2024-08-13 DIAGNOSIS — Z74.09 IMPAIRED FUNCTIONAL MOBILITY, BALANCE, GAIT, AND ENDURANCE: Primary | ICD-10-CM

## 2024-08-13 DIAGNOSIS — M54.12 CERVICAL RADICULOPATHY: ICD-10-CM

## 2024-08-13 DIAGNOSIS — M54.16 LUMBAR RADICULOPATHY: ICD-10-CM

## 2024-08-13 PROCEDURE — 97110 THERAPEUTIC EXERCISES: CPT | Performed by: PHYSICAL THERAPIST

## 2024-08-13 PROCEDURE — 97112 NEUROMUSCULAR REEDUCATION: CPT | Performed by: PHYSICAL THERAPIST

## 2024-08-13 NOTE — PROGRESS NOTES
Physical Therapy Treatment Note  [address] [place] [address]    Patient: Rah Shin                                                 Visit Date: 2024  :     1975    Referring practitioner:    Ramírez Leon PA-C  Date of Initial Visit:          No linked episodes    Patient seen for Visit count could not be calculated. Make sure you are using a visit which is associated with an episode. sessions    Visit Diagnoses:  No diagnosis found.  SUBJECTIVE     Subjective:    PAIN: ***/10       OBJECTIVE     Objective       Therapeutic Exercises    86499 Units Comments                            Timed Minutes ***     Neuromuscular Reeducation     94514 Comments                       Timed Minutes ***       Therapy Education/Self Care [id number]   Education offered today Implication of functional test results; POC; HEP   Medbride Code [id number]   Ongoing HEP     Date: 2024  Prepared by: Kira Patel    Exercises  - Romberg Stance with Head Nods  - 2-3 x daily - 4 x weekly - 2 sets - 2 reps - 60 seconds  - Tandem Stance  - 2-3 x daily - 4 x weekly - 2 sets - 2 reps - 60 seconds  - Standing Single Leg Stance with Counter Support  - 2-3 x daily - 4 x weekly - 2 sets - 2 reps - 30 seconds  - Single Leg Balance with Clock Reach  - 1 x daily - 7 x weekly - 3 sets - 10 reps  - Braided Sidestepping  - 1 x daily - 7 x weekly - 3 sets - 10 reps  - Walking with High Knee Taps  - 1 x daily - 7 x weekly - 3 sets - 10 reps  - Tandem Walking  - 1 x daily - 7 x weekly - 3 sets - 10 reps  - Heel Walking  - 1 x daily - 7 x weekly - 3 sets - 10 reps  - Toe Walking  - 1 x daily - 7 x weekly - 3 sets - 10 reps  - Alternating Step Taps with Counter Support  - 1 x daily - 7 x weekly - 3 sets - 10 reps  - Step-Over  - 1 x daily - 7 x weekly - 3 sets - 10 reps   Timed Minutes 0       Total Timed Treatment:        mins  Total Time of Visit:                mins    ASSESSMENT/PLAN     GOALS:  Goals                                                    Progress Note due by 8/22/2024                                                                Recert due by 10/21/2024   LTG by: 12 weeks Comments Date Status   Patient will be I with final HEP.        Patient to improve mini-BEST test balance score to >/= 27/28 to decrease patient's risk of falls.             Assessment/Plan     ASSESSMENT: ***    PLAN: ***    SIGNATURE: Kira Patel PT DPT, [place] License #: [id number]  Electronically Signed on 8/13/2024        [place] [address]  [phone]

## 2024-08-13 NOTE — PROGRESS NOTES
Physical Therapy Treatment Note  [address] [place] [address]    Patient: aRh Shin                                                 Visit Date: 2024  :     1975    Referring practitioner:    Ramírez Leon PA-C  Date of Initial Visit:          Type: THERAPY  Noted: 2024    Patient seen for 2 sessions    Visit Diagnoses:    ICD-10-CM ICD-9-CM   1. Impaired functional mobility, balance, gait, and endurance  Z74.09 V49.89   2. Cervical radiculopathy  M54.12 723.4   3. Lumbar radiculopathy  M54.16 724.4     SUBJECTIVE     Subjective:  He says he's feeling OK. He had a EMG with polyneuropathy but otherwise, he feels normal. His hands hurt a little.     PAIN: 2/10       OBJECTIVE     Objective     Therapeutic Exercises    82008 Units Comments   Passive stretch chris hams/piriformis                         Timed Minutes 10     Neuromuscular Reeducation     67590 Comments   Tandem stance in // bars    Fitter AP/lateral wobble board Holding level   Flat BOSU balance In // bars; cued to not look down, added head turns and EC, struggled with EC   Round BOSU sustained lunge In // bars       Timed Minutes 30       Therapy Education/Self Care [id number]   Education offered today Emphasis on SLS and tandem throughout the day.   Correct alignment in mirror.   Medbride Code [id number]   Ongoing HEP     Date: 2024  Prepared by: Kira Patel    Exercises  - Romberg Stance with Head Nods  - 2-3 x daily - 4 x weekly - 2 sets - 2 reps - 60 seconds  - Tandem Stance  - 2-3 x daily - 4 x weekly - 2 sets - 2 reps - 60 seconds  - Standing Single Leg Stance with Counter Support  - 2-3 x daily - 4 x weekly - 2 sets - 2 reps - 30 seconds  - Single Leg Balance with Clock Reach  - 1 x daily - 7 x weekly - 3 sets - 10 reps  - Braided Sidestepping  - 1 x daily - 7 x weekly - 3 sets - 10 reps  - Walking with High Knee Taps  - 1 x daily - 7 x weekly -  3 sets - 10 reps  - Tandem Walking  - 1 x daily - 7 x weekly - 3 sets - 10 reps  - Heel Walking  - 1 x daily - 7 x weekly - 3 sets - 10 reps  - Toe Walking  - 1 x daily - 7 x weekly - 3 sets - 10 reps  - Alternating Step Taps with Counter Support  - 1 x daily - 7 x weekly - 3 sets - 10 reps  - Step-Over  - 1 x daily - 7 x weekly - 3 sets - 10 reps   Timed Minutes 0       Total Timed Treatment:     40   mins  Total Time of Visit:            40   mins    ASSESSMENT/PLAN     GOALS:  Goals                                                    Progress Note due by 8/22/2024                                                                Recert due by 10/21/2024   LTG by: 12 weeks Comments Date Status   Patient will be I with final HEP.   emphasis on SLS/tandem stance 8/13 ongoing   Patient to improve mini-BEST test balance score to >/= 27/28 to decrease patient's risk of falls.             Assessment/Plan     ASSESSMENT:   Today was his first visit after his eval. He walks well but struggles on higher level balance activities as he tends to be visually dependent. He tends to standing in a right SB posture. The tone on his right may be higher and pulling him over.     PLAN:   Continue with higher level balance and LE strengthening.     SIGNATURE: Loi Mejia, PT, [place] License #: [id number]  Electronically Signed on 8/13/2024        [address] [address]  [phone]

## 2024-08-13 NOTE — TELEPHONE ENCOUNTER
Caller: Rah Shin    Relationship to patient: Self    Best call back number: [phone]    Chief complaint: NEW PATIENT      Type of visit: NEW PATIENT    If rescheduling, when is the original appointment: 9-9-24     Additional notes:FIRST AVAILABLE IS 10-7-24.  TRANSPORTATION DOES NOT RUN ON MONDAYS

## 2024-08-19 ENCOUNTER — TREATMENT (OUTPATIENT)
Dept: PHYSICAL THERAPY | Facility: CLINIC | Age: 49
End: 2024-08-19
Payer: MEDICARE

## 2024-08-19 DIAGNOSIS — Z74.09 IMPAIRED FUNCTIONAL MOBILITY, BALANCE, GAIT, AND ENDURANCE: Primary | ICD-10-CM

## 2024-08-19 DIAGNOSIS — M54.12 CERVICAL RADICULOPATHY: ICD-10-CM

## 2024-08-19 DIAGNOSIS — M54.16 LUMBAR RADICULOPATHY: ICD-10-CM

## 2024-08-19 PROCEDURE — 97112 NEUROMUSCULAR REEDUCATION: CPT

## 2024-08-19 PROCEDURE — 97110 THERAPEUTIC EXERCISES: CPT

## 2024-08-19 NOTE — PROGRESS NOTES
Physical Therapy Treatment Note  [address] [place] [address]    Patient: Rah Shin                                                 Visit Date: 2024  :     1975    Referring practitioner:    Ramírez Leon PA-C  Date of Initial Visit:          Type: THERAPY  Noted: 2024    Patient seen for 3 sessions    Visit Diagnoses:    ICD-10-CM ICD-9-CM   1. Impaired functional mobility, balance, gait, and endurance  Z74.09 V49.89   2. Cervical radiculopathy  M54.12 723.4   3. Lumbar radiculopathy  M54.16 724.4     SUBJECTIVE     Subjective:He reports being more aware of his walking and balance his balance isn't any better, reports pain in some of his fingers in his L hand.       PAIN: 2/10         OBJECTIVE     Objective     Neuromuscular Reeducation     56354 Comments   Limits of Stability and Rhythmic WSing testin on Neurocom                     Timed Minutes 30      Therapeutic Exercises    00830 Units Comments   B pec and thoracic stretches with bolster in sitting     B unilateral DF stretches with pink bolster                    Timed Minutes 9        Therapy Education/Self Care [id number]   Education offered today    Medbride Code [id number]    Ongoing HEP   Date: 2024  Prepared by: Kira Patel     Exercises  - Romberg Stance with Head Nods  - 2-3 x daily - 4 x weekly - 2 sets - 2 reps - 60 seconds  - Tandem Stance  - 2-3 x daily - 4 x weekly - 2 sets - 2 reps - 60 seconds  - Standing Single Leg Stance with Counter Support  - 2-3 x daily - 4 x weekly - 2 sets - 2 reps - 30 seconds  - Single Leg Balance with Clock Reach  - 1 x daily - 7 x weekly - 3 sets - 10 reps  - Braided Sidestepping  - 1 x daily - 7 x weekly - 3 sets - 10 reps  - Walking with High Knee Taps  - 1 x daily - 7 x weekly - 3 sets - 10 reps  - Tandem Walking  - 1 x daily - 7 x weekly - 3 sets - 10 reps  - Heel Walking  - 1 x daily - 7 x weekly - 3 sets -  10 reps  - Toe Walking  - 1 x daily - 7 x weekly - 3 sets - 10 reps  - Alternating Step Taps with Counter Support  - 1 x daily - 7 x weekly - 3 sets - 10 reps  - Step-Over  - 1 x daily - 7 x weekly - 3 sets - 10 reps   Timed Minutes        Total Timed Treatment:     39   mins  Total Time of Visit:             39   mins         ASSESSMENT/PLAN     GOALS  Goals                                                    Progress Note due by 8/22/2024                                                                Recert due by 10/21/2024   LTG by: 12 weeks Comments Date Status   Patient will be I with final HEP.   emphasis on SLS/tandem stance 8/13 ongoing   Patient to improve mini-BEST test balance score to >/= 27/28 to decrease patient's risk of falls.              Assessment/Plan     ASSESSMENT:   Neurocom testing revealed an issue with anterior WS'ing along with a need to improve A<>P WSing.     PLAN:   Review all goals for a progress note    SIGNATURE: Pavel Bee PTA, [place] License #: [id number]   Electronically Signed on 8/19/2024        [place] [address]  [phone]

## 2024-08-23 ENCOUNTER — TELEPHONE (OUTPATIENT)
Dept: NEUROSURGERY | Facility: CLINIC | Age: 49
End: 2024-08-23
Payer: MEDICARE

## 2024-08-23 NOTE — TELEPHONE ENCOUNTER
patient has only had 2 sessions of pt informed patient he will need to complete PT and then come in for a follow up after. patient verbalized understanding

## 2024-08-26 ENCOUNTER — HOSPITAL ENCOUNTER (OUTPATIENT)
Dept: PHYSICAL THERAPY | Facility: HOSPITAL | Age: 49
Setting detail: THERAPIES SERIES
Discharge: HOME | End: 2024-08-26
Payer: MEDICARE

## 2024-08-26 ENCOUNTER — TREATMENT (OUTPATIENT)
Dept: PHYSICAL THERAPY | Facility: HOSPITAL | Age: 49
End: 2024-08-26
Payer: MEDICARE

## 2024-08-26 DIAGNOSIS — M54.16 LUMBAR RADICULOPATHY: ICD-10-CM

## 2024-08-26 DIAGNOSIS — Z74.09 IMPAIRED FUNCTIONAL MOBILITY, BALANCE, GAIT, AND ENDURANCE: Primary | ICD-10-CM

## 2024-08-26 DIAGNOSIS — M54.12 CERVICAL RADICULOPATHY: ICD-10-CM

## 2024-08-26 PROCEDURE — 97110 THERAPEUTIC EXERCISES: CPT

## 2024-08-26 PROCEDURE — 97112 NEUROMUSCULAR REEDUCATION: CPT

## 2024-08-26 NOTE — PROGRESS NOTES
Physical Therapy Treatment Note and 30 Day Progress Note  [address] [place] [address]    Patient: Rah Shin                                                 Visit Date: 2024  :     1975    Referring practitioner:    Ramírez Leon PA-C  Date of Initial Visit:          Type: THERAPY  Noted: 2024    Patient seen for 4 sessions    Visit Diagnoses:    ICD-10-CM ICD-9-CM   1. Impaired functional mobility, balance, gait, and endurance  Z74.09 V49.89   2. Cervical radiculopathy  M54.12 723.4   3. Lumbar radiculopathy  M54.16 724.4     SUBJECTIVE     Subjective: He has pins/needles in his R arm, pain in his R pointer finger, and the squeezing sensation in his RLE. His calf feels tight like he's been working out.    PAIN: 3/10         OBJECTIVE     Objective   Mini-BEST test:   Anticipatory: 4  SIT TO STAND: (2) Normal: : Comes to stand without use of hands and stabilizes independently.  RISE TO TOES: (1) Moderate: Heels up, but not full range (smaller than when holding hands), OR noticeable instability for 3 s.    LEFT STAND ON ONE LEG: (1) Moderate: < 20 s.  RIGHT STAND ON ONE LEG: (1) Moderate: < 20 s.   Reactive Postural Control: 4    Forward - COMPENSATORY STEPPING CORRECTION: (2) Normal: Recovers independently with a single, large step (second realignment step is allowed).    Backward - COMPENSATORY STEPPING CORRECTION: (1) Moderate: More than one step used to recover equilibrium    Lateral - Compensatory Stepping Correction (1) Moderate: Several steps to recover equilibrium.    Sensory Orientation: 5    Feet together, EO, Firm surface: STANCE:  (2) Normal: 30 s.      Feet together, EC, Foam surface: STANCE:  (2) Normal: 30 s.      Incline, EC:  (1) Moderate: Stands independently <30 s OR aligns with surface.    Dynamic Gait: 9     CHANGE IN GAIT SPEED: (2) Normal: Significantly changes walking speed without  imbalance.      WALK WITH HEAD TURNS - HORIZONTAL: (2) Normal: performs head turns with no change in gait speed and good balance.      WALK WITH PIVOT TURNS: (1) Moderate: Turns with feet close SLOW (>4 steps) with good balance.     STEP OVER OBSTACLES: (2) Normal: Able to step over box with minimal change of gait speed and with good balance.     TU.11 sec; Dual Task TU.6 sec         (2) Normal: No noticeable change in sitting, standing or walking while backward counting when compared to TUG without  Dual Task.   Total:     Neuromuscular Reeducation     10503 Comments   PN    Timed Minutes 25      Therapeutic Exercises    88151 Units Comments   Low level median/ulnar n glide     Prone over SB lumbar opening     Supine sciatic n glide     Attempted cerv ret, D/C'd d/t pain and difficulty w/ technique     Timed Minutes 20        Therapy Education/Self Care 79642   Education offered today    [place] Code [id number]    Ongoing HEP   Date: 2024  Prepared by: Kira Patel     Exercises  - Romberg Stance with Head Nods  - 2-3 x daily - 4 x weekly - 2 sets - 2 reps - 60 seconds  - Tandem Stance  - 2-3 x daily - 4 x weekly - 2 sets - 2 reps - 60 seconds  - Standing Single Leg Stance with Counter Support  - 2-3 x daily - 4 x weekly - 2 sets - 2 reps - 30 seconds  - Single Leg Balance with Clock Reach  - 1 x daily - 7 x weekly - 3 sets - 10 reps  - Braided Sidestepping  - 1 x daily - 7 x weekly - 3 sets - 10 reps  - Walking with High Knee Taps  - 1 x daily - 7 x weekly - 3 sets - 10 reps  - Tandem Walking  - 1 x daily - 7 x weekly - 3 sets - 10 reps  - Heel Walking  - 1 x daily - 7 x weekly - 3 sets - 10 reps  - Toe Walking  - 1 x daily - 7 x weekly - 3 sets - 10 reps  - Alternating Step Taps with Counter Support  - 1 x daily - 7 x weekly - 3 sets - 10 reps  - Step-Over  - 1 x daily - 7 x weekly - 3 sets - 10 reps   Timed Minutes        Total Timed Treatment:     45   mins  Total Time of Visit:              45   mins         ASSESSMENT/PLAN     GOALS  Goals                                                    Progress Note due by 9/25/2024                                                                Recert due by 10/21/2024   LTG by: 12 weeks Comments Date Status   Patient will be I with final HEP.   8/26 ongoing   Patient to improve mini-BEST test balance score to >/= 27/28 to decrease patient's risk of falls.   today was only pt's   3rd tx session; no change  8/26  ongoing       Assessment & Plan       Assessment  Impairments: abnormal coordination, abnormal gait, abnormal muscle firing, abnormal muscle tone, abnormal or restricted ROM, activity intolerance, impaired balance, impaired physical strength, lacks appropriate home exercise program, pain with function and safety issue   Functional limitations: carrying objects, lifting, sleeping, walking, pulling, pushing, uncomfortable because of pain, moving in bed, sitting, standing, stooping, reaching behind back, reaching overhead and unable to perform repetitive tasks   Prognosis: good    Plan  Therapy options: will be seen for skilled therapy services  Planned modality interventions: thermotherapy (hydrocollator packs), cryotherapy, low level laser therapy, TENS, dry needling, electrical stimulation/Fijian stimulation and ultrasound  Planned therapy interventions: abdominal trunk stabilization, manual therapy, ADL retraining, motor coordination training, balance/weight-bearing training, neuromuscular re-education, body mechanics training, postural training, soft tissue mobilization, spinal/joint mobilization, fine motor coordination training, flexibility, functional ROM exercises, strengthening, gait training, stretching, home exercise program, therapeutic activities, IADL retraining, joint mobilization and transfer training  Frequency: 1x week  Duration in weeks: 12  Treatment plan discussed with: patient         ASSESSMENT: Per chart review, his NCV was abnormal  and demonstrative of UE and LE polyneuropathy. This study did not evidence a lumbosacral radiculopathic process and did not comment on a cervical radiculopathic process. Thus, it is difficult to discern if his UE/LE pain and paresthesias can be impacted by PT intervention. The best POC will likely emphasize sensory re-integration to enhance static/dynamic balance vs focus on reducing peripheral s/s.    PLAN: progress POC per pt tolerance    SIGNATURE: Kiar Patel PT [place], [place] License #: [id number]   Electronically Signed on 8/26/2024        [place] [address]  [phone]

## 2024-08-26 NOTE — PROGRESS NOTES
[facility] - PODIATRY    Today's Date: 08/28/2024     Patient Name: Rah Shin  MRN: [id number]  CSN: [id number]  PCP: Vanessa Schmidt APRN  Referring Provider: No ref. provider found    SUBJECTIVE     Chief Complaint   Patient presents with    Follow-up     Vanessa Schmidt APRN 03/29/2024 [facility] Pt states he is just here for his diabetic foot cleaning. No pain     HPI: Rah Shin, a 48 y.o.male, comes to clinic as a(n) established patient presenting for diabetic foot exam, complaining of foot pain, and complaining of toenail/callus issues. Patient has h/o ADD, Anemia, Anxiety, Bipolar 1, Depression, DM Type 2, GERD, HTN, Stroke, Neuropathy, . Patient is NIDDM and unsure of last BG level.  Last A1c was 6.8%.  Patient reports numbness and tingling that extends down the leg. He states his toenails are elongated and thickened. He has difficulty caring for his nails due to thickness and neuropathy.  Patient reports he has seen neurosurgery and vascular surgery recently for back and leg issues.  Denies pain. Denies previous treatment. Denies any constitutional symptoms. No other pedal complaints at this time.      Past Medical History:   Diagnosis Date    ADD (attention deficit disorder)     Anemia     Anxiety     Arthritis     Bipolar 1 disorder     Depression     major depressive disorder    Diabetes mellitus     Difficulty walking 2021    Loss of balance/stroke    GERD (gastroesophageal reflux disease)     Headache     Hyperlipidemia 5/2024    My HDL was 127    Hypertension     Kidney stones     MRSA (methicillin resistant Staphylococcus aureus)     Neuropathy in diabetes 2010    Peripheral neuropathy     Stroke     TIA (transient ischemic attack)     [place] 2020    One on toe on left foot, has not reappeared in over a year     Past Surgical History:   Procedure Laterality Date    CARDIAC CATHETERIZATION      COLONOSCOPY      ENDOSCOPY      INFECTED  SKIN DEBRIDEMENT      MRSA in abdomen    TONSILLECTOMY      WISDOM TOOTH EXTRACTION       Family History   Problem Relation Age of Onset    Hyperlipidemia Mother     Hypertension Mother     Kidney disease Mother     Heart disease Mother     Clotting disorder Mother     Diabetes Mother     Liver disease Mother     Mental illness Mother     Hyperlipidemia Father      Social History     Socioeconomic History    Marital status: Single   Tobacco Use    Smoking status: Never     Passive exposure: Never    Smokeless tobacco: Never   Vaping Use    Vaping status: Never Used   Substance and Sexual Activity    Alcohol use: No    Drug use: Not Currently     Types: Methamphetamines     Comment: last used meth about one week ago    Sexual activity: Not Currently     Partners: Male     Allergies   Allergen Reactions    Contrast Dye (Echo Or Unknown Ct/Mr) Hives     Current Outpatient Medications   Medication Sig Dispense Refill    aspirin 81 MG chewable tablet Chew 1 tablet Daily.      fluticasone (FLONASE) 50 MCG/ACT nasal spray instill ONE SPRAY in each nostril ONCE daily      Januvia 50 MG tablet       lamoTRIgine (LaMICtal) 200 MG tablet       metFORMIN (Glucophage) 1000 MG tablet Take 1 tablet by mouth 2 (Two) Times a Day With Meals. 180 tablet 3    OneTouch Ultra test strip 2 (Two) Times a Day. use for testing      Vitamin D, Cholecalciferol, (CHOLECALCIFEROL) 10 MCG (400 UNIT) tablet Take 1 tablet by mouth Daily.       No current facility-administered medications for this visit.     Review of Systems   Constitutional:  Negative for activity change and fever.   HENT:  Negative for congestion.    Respiratory:  Negative for shortness of breath.    Cardiovascular:  Negative for chest pain and leg swelling.   Gastrointestinal:  Negative for abdominal pain, constipation, diarrhea, nausea and vomiting.   Musculoskeletal:  Positive for arthralgias. Negative for gait problem.   Skin:  Negative for color change and wound.         Elongated thickened toenails   Neurological:  Positive for numbness. Negative for dizziness and weakness.   Hematological:  Does not bruise/bleed easily.   Psychiatric/Behavioral:  Negative for agitation, behavioral problems and confusion.        OBJECTIVE     Vitals:    08/28/24 1503   BP: 152/100   Pulse:    Resp:    Temp:    SpO2:        PHYSICAL EXAM  GEN:   Accompanied by none.     Foot/Ankle Exam    GENERAL  Appearance:  appears stated age  Orientation:  AAOx3  Affect:  appropriate  Gait:  unimpaired  Assistance:  independent  Right shoe gear: casual shoe  Left shoe gear: casual shoe    VASCULAR     Right Foot Vascularity   Dorsalis pedis:  2+  Posterior tibial:  2+  Skin temperature:  cool  Edema grading:  None  CFT:  3  Pedal hair growth:  Absent     Left Foot Vascularity   Dorsalis pedis:  2+  Posterior tibial:  2+  Skin temperature:  cool  Edema grading:  None  CFT:  3  Pedal hair growth:  Absent     NEUROLOGIC     Right Foot Neurologic   Light touch sensation: diminished  Vibratory sensation: diminished  Hot/Cold sensation: diminished  Protective Sensation using [place]-Mendoza Monofilament:   Sites intact: 10  Sites tested: 10     Left Foot Neurologic   Light touch sensation: diminished  Vibratory sensation: diminished  Hot/Cold sensation:  diminished  Protective Sensation using [place]-Mendoza Monofilament:   Sites intact: 9  Sites tested: 10    MUSCULOSKELETAL     Right Foot Musculoskeletal   Ecchymosis:  none  Tenderness:  toenail problem    Arch:  Normal     Left Foot Musculoskeletal   Ecchymosis:  none  Tenderness:  toenail problem  Arch:  Normal    MUSCLE STRENGTH     Right Foot Muscle Strength   Foot dorsiflexion:  5  Foot plantar flexion:  5  Foot inversion:  5  Foot eversion:  5     Left Foot Muscle Strength   Foot dorsiflexion:  5  Foot plantar flexion:  5  Foot inversion:  5  Foot eversion:  5    RANGE OF MOTION     Right Foot Range of Motion   Foot and ankle ROM within normal limits        Left Foot Range of Motion   Foot and ankle ROM within normal limits      DERMATOLOGIC      Right Foot Dermatologic   Skin  Right foot skin is intact.   Nails  1.  Positive for elongated and abnormal thickness.  2.  Positive for elongated and abnormal thickness.  3.  Positive for elongated and abnormal thickness.  4.  Positive for elongated and abnormal thickness.  5.  Positive for elongated and abnormal thickness.     Left Foot Dermatologic   Skin  Left foot skin is intact.   Nails  1.  Positive for elongated and abnormal thickness.  2.  Positive for elongated and abnormal thickness.  3.  Positive for elongated and abnormal thickness.  4.  Positive for elongated and abnormally thick.  5.  Positive for elongated and abnormally thick.    Image:       RADIOLOGY/NUCLEAR:  Adult Transthoracic Echo Complete W/ Cont if Necessary Per Protocol    Result Date: 8/9/2024  Narrative:   Left ventricular ejection fraction appears to be 56 - 60%.   Left ventricular diastolic function was normal.   Small patent foramen ovale present with right to left shunting.   Saline test results are positive.   Estimated right ventricular systolic pressure from tricuspid regurgitation is normal (<35 mmHg).   Suspected bicuspid aortic valve without aortic regurgitation or aortic stenosis   Normal global longitudinal LV strain (GLS) = -20.0%.   Mild aortic arch dilatation   Recommend CTA or MRA of the thoracic aorta. May consider cardiac MRI for better visualization of the aortic valve     US Carotid Bilateral    Result Date: 7/31/2024  Narrative: History: Carotid occlusive disease      Impression: Impression: 1. There is less than 50% stenosis of the right internal carotid artery. 2. There is less than 50% stenosis of the left internal carotid artery. 3. Antegrade flow is demonstrated in bilateral vertebral arteries.  Comments: Bilateral carotid vertebral arterial duplex scan was performed.  Grayscale imaging shows intimal thickening and  calcified elements at the carotid bifurcation. The right internal carotid artery peak systolic velocity is 63.5 cm/sec. The end-diastolic velocity is 28.5 cm/sec. The right ICA/CCA ratio is approximately 0.7. These findings correlate with less than 50% stenosis of the right internal carotid artery.  Grayscale imaging shows intimal thickening and calcified elements at the carotid bifurcation. The left internal carotid artery peak systolic velocity is 75.6 cm/sec. The end-diastolic velocity is 33.9 cm/sec. The left ICA/CCA ratio is approximately 0.9. These findings correlate with less than 50% stenosis of the left internal carotid artery.  Antegrade flow is demonstrated in bilateral vertebral arteries.   This report was signed and finalized on 7/31/2024 5:58 PM by Dr. Zack Farah MD.       LABORATORY/CULTURE RESULTS:      PATHOLOGY RESULTS:       ASSESSMENT/PLAN     Diagnoses and all orders for this visit:    1. Thickened nails (Primary)    2. Type 2 diabetes mellitus with diabetic polyneuropathy, without long-term current use of insulin    3. Bilateral foot pain    4. History of CVA (cerebrovascular accident)    5. Foot callus [L84]        Comprehensive lower extremity examination and evaluation was performed.  Discussed findings and treatment plan including risks, benefits, and treatment options with patient in detail. Patient agreed with treatment plan.  Discussed with patient that neuropathy can be due to low back issues and/or uncontrolled diabetes from years ago.   After verbal consent obtained, nail(s) x10 debrided of length and thickness with nail nipper without incidence  After verbal consent obtained, calluses x2 pared utilizing dermal curette and/or scalpel without incidence  Patient may maintain nails and calluses at home utilizing emery board or pumice stone between visits as needed  Reviewed at home diabetic foot care including daily foot checks   Continue to work with PCP to control blood glucose.   Discussed with patient that prescription for diabetic shoes must be done through primary care physician.  Patient verbalized understanding.  Patient had elevated BP at today's visit.  He denies chest pain, headache, or vision changes.  Patient is diaphoretic and reports he has felt short of breath in the last few days.  Discussed with patient recommendation that he go to the ED once he leaves his appointment. Patient verbalized understanding and is agreeable.   An After Visit Summary was printed and given to the patient at discharge, including (if requested) any available informative/educational handouts regarding diagnosis, treatment, or medications. All questions were answered to patient/family satisfaction. Should symptoms fail to improve or worsen they agree to call or return to clinic or to go to the Emergency Department. Discussed the importance of following up with any needed screening tests/labs/specialist appointments and any requested follow-up recommended by me today. Importance of maintaining follow-up discussed and patient accepts that missed appointments can delay diagnosis and potentially lead to worsening of conditions.  No follow-ups on file., or sooner if acute issues arise.      This document has been electronically signed by CARINA Mccall on August 28, 2024 15:07 CDT

## 2024-08-27 ENCOUNTER — TELEPHONE (OUTPATIENT)
Age: 49
End: 2024-08-27
Payer: MEDICARE

## 2024-08-28 ENCOUNTER — OFFICE VISIT (OUTPATIENT)
Age: 49
End: 2024-08-28
Payer: MEDICARE

## 2024-08-28 ENCOUNTER — APPOINTMENT (OUTPATIENT)
Dept: GENERAL RADIOLOGY | Facility: HOSPITAL | Age: 49
End: 2024-08-28
Payer: MEDICARE

## 2024-08-28 ENCOUNTER — HOSPITAL ENCOUNTER (EMERGENCY)
Facility: HOSPITAL | Age: 49
Discharge: HOME | End: 2024-08-28
Attending: EMERGENCY MEDICINE
Payer: MEDICARE

## 2024-08-28 VITALS
BODY MASS INDEX: 22.4 KG/M2 | HEART RATE: 98 BPM | HEIGHT: 71 IN | TEMPERATURE: 98.6 F | DIASTOLIC BLOOD PRESSURE: 100 MMHG | RESPIRATION RATE: 19 BRPM | SYSTOLIC BLOOD PRESSURE: 152 MMHG | WEIGHT: 160 LBS | OXYGEN SATURATION: 98 %

## 2024-08-28 VITALS
RESPIRATION RATE: 14 BRPM | BODY MASS INDEX: 22.26 KG/M2 | HEIGHT: 71 IN | SYSTOLIC BLOOD PRESSURE: 127 MMHG | DIASTOLIC BLOOD PRESSURE: 85 MMHG | TEMPERATURE: 98.6 F | HEART RATE: 70 BPM | OXYGEN SATURATION: 99 % | WEIGHT: 159 LBS

## 2024-08-28 DIAGNOSIS — I10 HYPERTENSION, UNSPECIFIED TYPE: Primary | ICD-10-CM

## 2024-08-28 DIAGNOSIS — M79.672 BILATERAL FOOT PAIN: ICD-10-CM

## 2024-08-28 DIAGNOSIS — L84 FOOT CALLUS: ICD-10-CM

## 2024-08-28 DIAGNOSIS — M79.672 LEFT FOOT PAIN: ICD-10-CM

## 2024-08-28 DIAGNOSIS — M54.16 LUMBAR RADICULOPATHY: ICD-10-CM

## 2024-08-28 DIAGNOSIS — L60.2 THICKENED NAILS: Primary | ICD-10-CM

## 2024-08-28 DIAGNOSIS — Z86.73 HISTORY OF CVA (CEREBROVASCULAR ACCIDENT): ICD-10-CM

## 2024-08-28 DIAGNOSIS — M79.671 BILATERAL FOOT PAIN: ICD-10-CM

## 2024-08-28 DIAGNOSIS — E11.42 TYPE 2 DIABETES MELLITUS WITH DIABETIC POLYNEUROPATHY, WITHOUT LONG-TERM CURRENT USE OF INSULIN: ICD-10-CM

## 2024-08-28 LAB
ALBUMIN SERPL-MCNC: 4.4 G/DL (ref 3.5–5.2)
ALBUMIN/GLOB SERPL: 1.5 G/DL
ALP SERPL-CCNC: 64 U/L (ref 39–117)
ALT SERPL W P-5'-P-CCNC: 24 U/L (ref 1–41)
ANION GAP SERPL CALCULATED.3IONS-SCNC: 12 MMOL/L (ref 5–15)
AST SERPL-CCNC: 21 U/L (ref 1–40)
BASOPHILS # BLD AUTO: 0.06 10*3/MM3 (ref 0–0.2)
BASOPHILS NFR BLD AUTO: 1 % (ref 0–1.5)
BILIRUB SERPL-MCNC: 0.2 MG/DL (ref 0–1.2)
BUN SERPL-MCNC: 11 MG/DL (ref 6–20)
BUN/CREAT SERPL: 13.9 (ref 7–25)
CALCIUM SPEC-SCNC: 9.6 MG/DL (ref 8.6–10.5)
CHLORIDE SERPL-SCNC: 100 MMOL/L (ref 98–107)
CO2 SERPL-SCNC: 26 MMOL/L (ref 22–29)
CREAT SERPL-MCNC: 0.79 MG/DL (ref 0.76–1.27)
DEPRECATED RDW RBC AUTO: 39.5 FL (ref 37–54)
EGFRCR SERPLBLD CKD-EPI 2021: 108.2 ML/MIN/1.73
EOSINOPHIL # BLD AUTO: 0.64 10*3/MM3 (ref 0–0.4)
EOSINOPHIL NFR BLD AUTO: 10.4 % (ref 0.3–6.2)
ERYTHROCYTE [DISTWIDTH] IN BLOOD BY AUTOMATED COUNT: 12.3 % (ref 12.3–15.4)
GLOBULIN UR ELPH-MCNC: 2.9 GM/DL
GLUCOSE BLDC GLUCOMTR-MCNC: 94 MG/DL (ref 70–130)
GLUCOSE SERPL-MCNC: 105 MG/DL (ref 65–99)
HCT VFR BLD AUTO: 36.6 % (ref 37.5–51)
HGB BLD-MCNC: 13 G/DL (ref 13–17.7)
IMM GRANULOCYTES # BLD AUTO: 0.01 10*3/MM3 (ref 0–0.05)
IMM GRANULOCYTES NFR BLD AUTO: 0.2 % (ref 0–0.5)
INR PPP: 0.95 (ref 0.91–1.09)
LYMPHOCYTES # BLD AUTO: 2.66 10*3/MM3 (ref 0.7–3.1)
LYMPHOCYTES NFR BLD AUTO: 43.3 % (ref 19.6–45.3)
MAGNESIUM SERPL-MCNC: 1.8 MG/DL (ref 1.6–2.6)
MCH RBC QN AUTO: 31.3 PG (ref 26.6–33)
MCHC RBC AUTO-ENTMCNC: 35.5 G/DL (ref 31.5–35.7)
MCV RBC AUTO: 88.2 FL (ref 79–97)
MONOCYTES # BLD AUTO: 0.63 10*3/MM3 (ref 0.1–0.9)
MONOCYTES NFR BLD AUTO: 10.3 % (ref 5–12)
NEUTROPHILS NFR BLD AUTO: 2.14 10*3/MM3 (ref 1.7–7)
NEUTROPHILS NFR BLD AUTO: 34.8 % (ref 42.7–76)
NRBC BLD AUTO-RTO: 0 /100 WBC (ref 0–0.2)
NT-PROBNP SERPL-MCNC: 86.9 PG/ML (ref 0–900)
PLATELET # BLD AUTO: 221 10*3/MM3 (ref 140–450)
PMV BLD AUTO: 9 FL (ref 6–12)
POTASSIUM SERPL-SCNC: 4 MMOL/L (ref 3.5–5.2)
PROT SERPL-MCNC: 7.3 G/DL (ref 6–8.5)
PROTHROMBIN TIME: 13.1 SECONDS (ref 11.8–14.8)
RBC # BLD AUTO: 4.15 10*6/MM3 (ref 4.14–5.8)
SODIUM SERPL-SCNC: 138 MMOL/L (ref 136–145)
TROPONIN T SERPL HS-MCNC: 25 NG/L
TROPONIN T SERPL HS-MCNC: 26 NG/L
WBC NRBC COR # BLD AUTO: 6.14 10*3/MM3 (ref 3.4–10.8)

## 2024-08-28 PROCEDURE — 83735 ASSAY OF MAGNESIUM: CPT | Performed by: EMERGENCY MEDICINE

## 2024-08-28 PROCEDURE — 83880 ASSAY OF NATRIURETIC PEPTIDE: CPT | Performed by: EMERGENCY MEDICINE

## 2024-08-28 PROCEDURE — 80053 COMPREHEN METABOLIC PANEL: CPT | Performed by: EMERGENCY MEDICINE

## 2024-08-28 PROCEDURE — 85610 PROTHROMBIN TIME: CPT | Performed by: EMERGENCY MEDICINE

## 2024-08-28 PROCEDURE — 93010 ELECTROCARDIOGRAM REPORT: CPT | Performed by: INTERNAL MEDICINE

## 2024-08-28 PROCEDURE — 93005 ELECTROCARDIOGRAM TRACING: CPT | Performed by: EMERGENCY MEDICINE

## 2024-08-28 PROCEDURE — 71046 X-RAY EXAM CHEST 2 VIEWS: CPT

## 2024-08-28 PROCEDURE — 99284 EMERGENCY DEPT VISIT MOD MDM: CPT

## 2024-08-28 PROCEDURE — 36415 COLL VENOUS BLD VENIPUNCTURE: CPT

## 2024-08-28 PROCEDURE — 82948 REAGENT STRIP/BLOOD GLUCOSE: CPT

## 2024-08-28 PROCEDURE — 84484 ASSAY OF TROPONIN QUANT: CPT | Performed by: EMERGENCY MEDICINE

## 2024-08-28 PROCEDURE — 85025 COMPLETE CBC W/AUTO DIFF WBC: CPT | Performed by: EMERGENCY MEDICINE

## 2024-08-28 RX ORDER — SODIUM CHLORIDE 0.9 % (FLUSH) 0.9 %
10 SYRINGE (ML) INJECTION AS NEEDED
Status: DISCONTINUED | OUTPATIENT
Start: 2024-08-28 | End: 2024-08-28 | Stop reason: HOSPADM

## 2024-08-28 RX ORDER — DEXTROAMPHETAMINE SACCHARATE, AMPHETAMINE ASPARTATE MONOHYDRATE, DEXTROAMPHETAMINE SULFATE AND AMPHETAMINE SULFATE 2.5; 2.5; 2.5; 2.5 MG/1; MG/1; MG/1; MG/1
10 CAPSULE, EXTENDED RELEASE ORAL EVERY MORNING
COMMUNITY

## 2024-08-28 RX ORDER — TADALAFIL 20 MG/1
20 TABLET ORAL DAILY PRN
COMMUNITY

## 2024-08-28 NOTE — ED PROVIDER NOTES
"Subjective   History of Present Illness  48-year-old male presents to the emergency department with complaint of elevated blood pressure.  He has a history of hypertension, hyperlipidemia, diabetes, stroke.  Patient was at podiatry appointment for routine foot maintenance, blood pressure was elevated to 150s/100s.  After his appointment, blood pressure was rechecked and remained elevated so staff sent him to the emergency department for evaluation.  He complains of having some nonspecific generalized weakness.  Also endorses mild shortness of breath which she describes as \"hard time catching my breath\".  Symptoms are mild in severity with no aggravating or leaving factors.        Review of Systems   All other systems reviewed and are negative.      Past Medical History:   Diagnosis Date    ADD (attention deficit disorder)     Anemia     Anxiety     Arthritis     Bipolar 1 disorder     Depression     major depressive disorder    Diabetes mellitus     Difficulty walking 2021    Loss of balance/stroke    GERD (gastroesophageal reflux disease)     Headache     Hyperlipidemia 5/2024    My HDL was 127    Hypertension     Kidney stones     MRSA (methicillin resistant Staphylococcus aureus)     Neuropathy in diabetes 2010    Peripheral neuropathy     Stroke     TIA (transient ischemic attack)     Verruca 2020    One on toe on left foot, has not reappeared in over a year       Allergies   Allergen Reactions    Contrast Dye (Echo Or Unknown Ct/Mr) Hives       Past Surgical History:   Procedure Laterality Date    CARDIAC CATHETERIZATION      COLONOSCOPY      ENDOSCOPY      INFECTED SKIN DEBRIDEMENT      MRSA in abdomen    TONSILLECTOMY      WISDOM TOOTH EXTRACTION         Family History   Problem Relation Age of Onset    Hyperlipidemia Mother     Hypertension Mother     Kidney disease Mother     Heart disease Mother     Clotting disorder Mother     Diabetes Mother     Liver disease Mother     Mental illness Mother     " Hyperlipidemia Father        Social History     Socioeconomic History    Marital status: Single   Tobacco Use    Smoking status: Never     Passive exposure: Never    Smokeless tobacco: Never   Vaping Use    Vaping status: Never Used   Substance and Sexual Activity    Alcohol use: No    Drug use: Not Currently     Types: Methamphetamines     Comment: last used meth about one week ago    Sexual activity: Not Currently     Partners: Male           Objective   Physical Exam  Vitals and nursing note reviewed.   Constitutional:       Appearance: He is well-developed and normal weight.   HENT:      Head: Normocephalic and atraumatic.      Mouth/Throat:      Mouth: Mucous membranes are moist.   Eyes:      Extraocular Movements: Extraocular movements intact.      Pupils: Pupils are equal, round, and reactive to light.   Cardiovascular:      Rate and Rhythm: Regular rhythm.      Pulses: Normal pulses.      Heart sounds: Normal heart sounds.   Pulmonary:      Breath sounds: Normal breath sounds. No wheezing, rhonchi or rales.   Abdominal:      General: Abdomen is flat. Bowel sounds are normal.      Palpations: Abdomen is soft.   Musculoskeletal:      Cervical back: Normal range of motion and neck supple.      Right lower leg: No edema.      Left lower leg: No edema.   Skin:     General: Skin is warm and dry.      Capillary Refill: Capillary refill takes less than 2 seconds.   Neurological:      Mental Status: He is alert.         Procedures       Lab Results (last 24 hours)       Procedure Component Value Units Date/Time    CBC & Differential [[id number]]  (Abnormal) Collected: 08/28/24 1623    Specimen: Blood Updated: 08/28/24 1635    Narrative:      The following orders were created for panel order CBC & Differential.  Procedure                               Abnormality         Status                     ---------                               -----------         ------                     CBC Auto Differential[[id number]]         Abnormal            Final result                 Please view results for these tests on the individual orders.    Comprehensive Metabolic Panel [[id number]]  (Abnormal) Collected: 08/28/24 1623    Specimen: Blood Updated: 08/28/24 1657     Glucose 105 mg/dL      BUN 11 mg/dL      Creatinine 0.79 mg/dL      Sodium 138 mmol/L      Potassium 4.0 mmol/L      Comment: Slight hemolysis detected by analyzer. Result may be falsely elevated.        Chloride 100 mmol/L      CO2 26.0 mmol/L      Calcium 9.6 mg/dL      Total Protein 7.3 g/dL      Albumin 4.4 g/dL      ALT (SGPT) 24 U/L      AST (SGOT) 21 U/L      Alkaline Phosphatase 64 U/L      Total Bilirubin 0.2 mg/dL      Globulin 2.9 gm/dL      A/G Ratio 1.5 g/dL      BUN/Creatinine Ratio 13.9     Anion Gap 12.0 mmol/L      eGFR 108.2 mL/min/1.73     Narrative:      GFR Normal >60  Chronic Kidney Disease <60  Kidney Failure <15      Protime-INR [[id number]]  (Normal) Collected: 08/28/24 1623    Specimen: Blood Updated: 08/28/24 1642     Protime 13.1 Seconds      INR 0.95    Magnesium [[id number]]  (Normal) Collected: 08/28/24 1623    Specimen: Blood Updated: 08/28/24 1657     Magnesium 1.8 mg/dL     BNP [[id number]]  (Normal) Collected: 08/28/24 1623    Specimen: Blood Updated: 08/28/24 1654     proBNP 86.9 pg/mL     Narrative:      This assay is used as an aid in the diagnosis of individuals suspected of having heart failure. It can be used as an aid in the diagnosis of acute decompensated heart failure (ADHF) in patients presenting with signs and symptoms of ADHF to the emergency department (ED). In addition, NT-proBNP of <300 pg/mL indicates ADHF is not likely.    Age Range Result Interpretation  NT-proBNP Concentration (pg/mL:      <50             Positive            >450                   Gray                 300-450                    Negative             <300    50-75           Positive            >900                  Gray                300-900                   Negative            <300      >75             Positive            >1800                  Gray                300-1800                  Negative            <300    Single High Sensitivity Troponin T [[id number]]  (Abnormal) Collected: 08/28/24 1623    Specimen: Blood Updated: 08/28/24 1653     HS Troponin T 25 ng/L     Narrative:      High Sensitive Troponin T Reference Range:  <14.0 ng/L- Negative Female for AMI  <22.0 ng/L- Negative Male for AMI  >=14 - Abnormal Female indicating possible myocardial injury.  >=22 - Abnormal Male indicating possible myocardial injury.   Clinicians would have to utilize clinical acumen, EKG, Troponin, and serial changes to determine if it is an Acute Myocardial Infarction or myocardial injury due to an underlying chronic condition.         CBC Auto Differential [[id number]]  (Abnormal) Collected: 08/28/24 1623    Specimen: Blood Updated: 08/28/24 1635     WBC 6.14 10*3/mm3      RBC 4.15 10*6/mm3      Hemoglobin 13.0 g/dL      Hematocrit 36.6 %      MCV 88.2 fL      MCH 31.3 pg      MCHC 35.5 g/dL      RDW 12.3 %      RDW-SD 39.5 fl      MPV 9.0 fL      Platelets 221 10*3/mm3      Neutrophil % 34.8 %      Lymphocyte % 43.3 %      Monocyte % 10.3 %      Eosinophil % 10.4 %      Basophil % 1.0 %      Immature Grans % 0.2 %      Neutrophils, Absolute 2.14 10*3/mm3      Lymphocytes, Absolute 2.66 10*3/mm3      Monocytes, Absolute 0.63 10*3/mm3      Eosinophils, Absolute 0.64 10*3/mm3      Basophils, Absolute 0.06 10*3/mm3      Immature Grans, Absolute 0.01 10*3/mm3      nRBC 0.0 /100 WBC          XR Chest 2 View    Result Date: 8/28/2024  EXAM: XR CHEST 2 VW-  DATE: 8/28/2024 2:55 PM  HISTORY: dyspnea   COMPARISON: 1/14/2022.  TECHNIQUE:  Frontal and lateral views of the chest submitted.  FINDINGS:  Lungs are grossly clear. No effusion or pneumothorax is seen. Heart and mediastinum are unremarkable.       1. No active disease in the chest.  This report was signed and finalized on 8/28/2024  3:57 PM by Duncan Moreno.        ED Course  ED Course as of 08/28/24 1735   Wed Aug 28, 2024   1733 78-year-old male with history of hypertension, hyperlipidemia, diabetes, previous drug presents to the ED with complaint of elevated blood pressures podiatry clinic, endorses dyspnea.  No chest pain.  Blood pressure moderately elevated in the ED, initial /100.  ER workup for hypertensive crisis was ordered.  BNP normal, renal function normal.  Initial high-sensitivity troponin 25, repeat pending.  Chest x-ray negative for acute disease.  If repeat troponin within normal limits, patient candidate for discharge home, will DC with instruction to keep log of his blood pressures and follow-up with his primary doctor within 2 to 3 days. [AW]      ED Course User Index  [AW] Eleazar Ponce MD                                             Medical Decision Making  Amount and/or Complexity of Data Reviewed  Labs: ordered.  Radiology: ordered.  ECG/medicine tests: ordered.    Risk  Prescription drug management.        Final diagnoses:   Hypertension, unspecified type       ED Disposition  ED Disposition       None            No follow-up provider specified.       Medication List      No changes were made to your prescriptions during this visit.            Eleazar Ponce MD  08/28/24 5773

## 2024-08-29 NOTE — ED PROVIDER NOTES
Subjective   History of Present Illness    Please see Dr. Eleazar Ponce note for complete HPI.    Review of Systems    Past Medical History:   Diagnosis Date    ADD (attention deficit disorder)     Anemia     Anxiety     Arthritis     Bipolar 1 disorder     Depression     major depressive disorder    Diabetes mellitus     Difficulty walking 2021    Loss of balance/stroke    GERD (gastroesophageal reflux disease)     Headache     Hyperlipidemia 5/2024    My HDL was 127    Hypertension     Kidney stones     MRSA (methicillin resistant Staphylococcus aureus)     Neuropathy in diabetes 2010    Peripheral neuropathy     Stroke     TIA (transient ischemic attack)     Verruca 2020    One on toe on left foot, has not reappeared in over a year       Allergies   Allergen Reactions    Contrast Dye (Echo Or Unknown Ct/Mr) Hives       Past Surgical History:   Procedure Laterality Date    CARDIAC CATHETERIZATION      COLONOSCOPY      ENDOSCOPY      INFECTED SKIN DEBRIDEMENT      MRSA in abdomen    TONSILLECTOMY      WISDOM TOOTH EXTRACTION         Family History   Problem Relation Age of Onset    Hyperlipidemia Mother     Hypertension Mother     Kidney disease Mother     Heart disease Mother     Clotting disorder Mother     Diabetes Mother     Liver disease Mother     Mental illness Mother     Hyperlipidemia Father        Social History     Socioeconomic History    Marital status: Single   Tobacco Use    Smoking status: Never     Passive exposure: Never    Smokeless tobacco: Never   Vaping Use    Vaping status: Never Used   Substance and Sexual Activity    Alcohol use: No    Drug use: Not Currently     Types: Methamphetamines     Comment: last used meth about one week ago    Sexual activity: Not Currently     Partners: Male           Objective   Physical Exam    Procedures           ED Course  ED Course as of 08/28/24 2028   Wed Aug 28, 2024   1733 78-year-old male with history of hypertension, hyperlipidemia, diabetes, previous  drug presents to the ED with complaint of elevated blood pressures podiatry clinic, endorses dyspnea.  No chest pain.  Blood pressure moderately elevated in the ED, initial /100.  ER workup for hypertensive crisis was ordered.  BNP normal, renal function normal.  Initial high-sensitivity troponin 25, repeat pending.  Chest x-ray negative for acute disease.  If repeat troponin within normal limits, patient candidate for discharge home, will DC with instruction to keep log of his blood pressures and follow-up with his primary doctor within 2 to 3 days. [AW]      ED Course User Index  [AW] Eleazar Pnoce MD                                           Lab Results (last 24 hours)       Procedure Component Value Units Date/Time    CBC & Differential [[id number]]  (Abnormal) Collected: 08/28/24 1623    Specimen: Blood Updated: 08/28/24 1635    Narrative:      The following orders were created for panel order CBC & Differential.  Procedure                               Abnormality         Status                     ---------                               -----------         ------                     CBC Auto Differential[[id number]]        Abnormal            Final result                 Please view results for these tests on the individual orders.    Comprehensive Metabolic Panel [[id number]]  (Abnormal) Collected: 08/28/24 1623    Specimen: Blood Updated: 08/28/24 1657     Glucose 105 mg/dL      BUN 11 mg/dL      Creatinine 0.79 mg/dL      Sodium 138 mmol/L      Potassium 4.0 mmol/L      Comment: Slight hemolysis detected by analyzer. Result may be falsely elevated.        Chloride 100 mmol/L      CO2 26.0 mmol/L      Calcium 9.6 mg/dL      Total Protein 7.3 g/dL      Albumin 4.4 g/dL      ALT (SGPT) 24 U/L      AST (SGOT) 21 U/L      Alkaline Phosphatase 64 U/L      Total Bilirubin 0.2 mg/dL      Globulin 2.9 gm/dL      A/G Ratio 1.5 g/dL      BUN/Creatinine Ratio 13.9     Anion Gap 12.0 mmol/L      eGFR 108.2  mL/min/1.73     Narrative:      GFR Normal >60  Chronic Kidney Disease <60  Kidney Failure <15      Protime-INR [[id number]]  (Normal) Collected: 08/28/24 1623    Specimen: Blood Updated: 08/28/24 1642     Protime 13.1 Seconds      INR 0.95    Magnesium [[id number]]  (Normal) Collected: 08/28/24 1623    Specimen: Blood Updated: 08/28/24 1657     Magnesium 1.8 mg/dL     BNP [[id number]]  (Normal) Collected: 08/28/24 1623    Specimen: Blood Updated: 08/28/24 1654     proBNP 86.9 pg/mL     Narrative:      This assay is used as an aid in the diagnosis of individuals suspected of having heart failure. It can be used as an aid in the diagnosis of acute decompensated heart failure (ADHF) in patients presenting with signs and symptoms of ADHF to the emergency department (ED). In addition, NT-proBNP of <300 pg/mL indicates ADHF is not likely.    Age Range Result Interpretation  NT-proBNP Concentration (pg/mL:      <50             Positive            >450                   Gray                 300-450                    Negative             <300    50-75           Positive            >900                  Gray                300-900                  Negative            <300      >75             Positive            >1800                  Gray                300-1800                  Negative            <300    Single High Sensitivity Troponin T [[id number]]  (Abnormal) Collected: 08/28/24 1623    Specimen: Blood Updated: 08/28/24 1653     HS Troponin T 25 ng/L     Narrative:      High Sensitive Troponin T Reference Range:  <14.0 ng/L- Negative Female for AMI  <22.0 ng/L- Negative Male for AMI  >=14 - Abnormal Female indicating possible myocardial injury.  >=22 - Abnormal Male indicating possible myocardial injury.   Clinicians would have to utilize clinical acumen, EKG, Troponin, and serial changes to determine if it is an Acute Myocardial Infarction or myocardial injury due to an underlying chronic condition.         CBC Auto  Differential [[id number]]  (Abnormal) Collected: 08/28/24 1623    Specimen: Blood Updated: 08/28/24 1635     WBC 6.14 10*3/mm3      RBC 4.15 10*6/mm3      Hemoglobin 13.0 g/dL      Hematocrit 36.6 %      MCV 88.2 fL      MCH 31.3 pg      MCHC 35.5 g/dL      RDW 12.3 %      RDW-SD 39.5 fl      MPV 9.0 fL      Platelets 221 10*3/mm3      Neutrophil % 34.8 %      Lymphocyte % 43.3 %      Monocyte % 10.3 %      Eosinophil % 10.4 %      Basophil % 1.0 %      Immature Grans % 0.2 %      Neutrophils, Absolute 2.14 10*3/mm3      Lymphocytes, Absolute 2.66 10*3/mm3      Monocytes, Absolute 0.63 10*3/mm3      Eosinophils, Absolute 0.64 10*3/mm3      Basophils, Absolute 0.06 10*3/mm3      Immature Grans, Absolute 0.01 10*3/mm3      nRBC 0.0 /100 WBC     Single High Sensitivity Troponin T [[id number]]  (Abnormal) Collected: 08/28/24 1830    Specimen: Blood Updated: 08/28/24 1900     HS Troponin T 26 ng/L     Narrative:      High Sensitive Troponin T Reference Range:  <14.0 ng/L- Negative Female for AMI  <22.0 ng/L- Negative Male for AMI  >=14 - Abnormal Female indicating possible myocardial injury.  >=22 - Abnormal Male indicating possible myocardial injury.   Clinicians would have to utilize clinical acumen, EKG, Troponin, and serial changes to determine if it is an Acute Myocardial Infarction or myocardial injury due to an underlying chronic condition.         POC Glucose Once [[id number]]  (Normal) Collected: 08/28/24 1837    Specimen: Blood Updated: 08/28/24 1849     Glucose 94 mg/dL      Comment: : [id number] [facility]: [id number]             XR Chest 2 View   Final Result   1. No active disease in the chest.       This report was signed and finalized on 8/28/2024 3:57 PM by Duncan Moreno.            Medications   sodium chloride 0.9 % flush 10 mL (has no administration in time range)       Medical Decision Making  48-year-old male was transitioned to me from the day physician.  Second troponin was  pending.  Initial troponin is 25.  Repeat troponin was 26.  Patient no active chest pain.  Patient will be discharged home in stable condition.  All questions were answered the patient prior to discharge.  Patient was vies follow primary care provider.  Patient was advised to return the emergency room with new or worsening symptoms.  Patient verbalized understanding was agreeable plan as discussed.     Problems Addressed:  Hypertension, unspecified type: complicated acute illness or injury    Amount and/or Complexity of Data Reviewed  Labs: ordered. Decision-making details documented in ED Course.  Radiology: ordered. Decision-making details documented in ED Course.  ECG/medicine tests: ordered. Decision-making details documented in ED Course.    Risk  Prescription drug management.        Final diagnoses:   Hypertension, unspecified type       ED Disposition  ED Disposition       ED Disposition   Discharge    Condition   Stable    Comment   --               Vanessa Schmidt, APRN  [address] [facility] [address]  [phone]    Schedule an appointment as soon as possible for a visit       [facility] EMERGENCY DEPARTMENT  [address] [place] [id number] [phone]    As needed, If symptoms worsen         Medication List      No changes were made to your prescriptions during this visit.            Salazar Aviles MD  08/28/24 2028

## 2024-08-31 LAB
QT INTERVAL: 386 MS
QTC INTERVAL: 431 MS

## 2024-09-04 ENCOUNTER — APPOINTMENT (OUTPATIENT)
Dept: NUCLEAR MEDICINE | Facility: HOSPITAL | Age: 49
End: 2024-09-04
Payer: MEDICARE

## 2024-09-04 ENCOUNTER — APPOINTMENT (OUTPATIENT)
Dept: GENERAL RADIOLOGY | Facility: HOSPITAL | Age: 49
End: 2024-09-04
Payer: MEDICARE

## 2024-09-04 ENCOUNTER — HOSPITAL ENCOUNTER (EMERGENCY)
Facility: HOSPITAL | Age: 49
Discharge: HOME | End: 2024-09-04
Attending: EMERGENCY MEDICINE
Payer: MEDICARE

## 2024-09-04 VITALS
HEART RATE: 66 BPM | OXYGEN SATURATION: 99 % | RESPIRATION RATE: 18 BRPM | HEIGHT: 65 IN | BODY MASS INDEX: 26.16 KG/M2 | SYSTOLIC BLOOD PRESSURE: 114 MMHG | WEIGHT: 157 LBS | DIASTOLIC BLOOD PRESSURE: 77 MMHG | TEMPERATURE: 98.4 F

## 2024-09-04 DIAGNOSIS — R06.02 SHORTNESS OF BREATH: Primary | ICD-10-CM

## 2024-09-04 LAB
ALBUMIN SERPL-MCNC: 4.9 G/DL (ref 3.5–5.2)
ALBUMIN/GLOB SERPL: 1.4 G/DL
ALP SERPL-CCNC: 78 U/L (ref 39–117)
ALT SERPL W P-5'-P-CCNC: 24 U/L (ref 1–41)
ANION GAP SERPL CALCULATED.3IONS-SCNC: 10 MMOL/L (ref 5–15)
AST SERPL-CCNC: 21 U/L (ref 1–40)
BASOPHILS # BLD AUTO: 0.06 10*3/MM3 (ref 0–0.2)
BASOPHILS NFR BLD AUTO: 1.1 % (ref 0–1.5)
BILIRUB SERPL-MCNC: 0.3 MG/DL (ref 0–1.2)
BUN SERPL-MCNC: 11 MG/DL (ref 6–20)
BUN/CREAT SERPL: 14.1 (ref 7–25)
CALCIUM SPEC-SCNC: 9.6 MG/DL (ref 8.6–10.5)
CHLORIDE SERPL-SCNC: 100 MMOL/L (ref 98–107)
CO2 SERPL-SCNC: 26 MMOL/L (ref 22–29)
CREAT SERPL-MCNC: 0.78 MG/DL (ref 0.76–1.27)
D DIMER PPP FEU-MCNC: 0.51 MCGFEU/ML (ref 0–0.5)
DEPRECATED RDW RBC AUTO: 41.1 FL (ref 37–54)
EGFRCR SERPLBLD CKD-EPI 2021: 108.6 ML/MIN/1.73
EOSINOPHIL # BLD AUTO: 0.62 10*3/MM3 (ref 0–0.4)
EOSINOPHIL NFR BLD AUTO: 11 % (ref 0.3–6.2)
ERYTHROCYTE [DISTWIDTH] IN BLOOD BY AUTOMATED COUNT: 12.6 % (ref 12.3–15.4)
GLOBULIN UR ELPH-MCNC: 3.6 GM/DL
GLUCOSE SERPL-MCNC: 192 MG/DL (ref 65–99)
HCT VFR BLD AUTO: 41.1 % (ref 37.5–51)
HGB BLD-MCNC: 14.6 G/DL (ref 13–17.7)
IMM GRANULOCYTES # BLD AUTO: 0.02 10*3/MM3 (ref 0–0.05)
IMM GRANULOCYTES NFR BLD AUTO: 0.4 % (ref 0–0.5)
LYMPHOCYTES # BLD AUTO: 1.9 10*3/MM3 (ref 0.7–3.1)
LYMPHOCYTES NFR BLD AUTO: 33.6 % (ref 19.6–45.3)
MCH RBC QN AUTO: 31.9 PG (ref 26.6–33)
MCHC RBC AUTO-ENTMCNC: 35.5 G/DL (ref 31.5–35.7)
MCV RBC AUTO: 89.7 FL (ref 79–97)
MONOCYTES # BLD AUTO: 0.54 10*3/MM3 (ref 0.1–0.9)
MONOCYTES NFR BLD AUTO: 9.6 % (ref 5–12)
NEUTROPHILS NFR BLD AUTO: 2.51 10*3/MM3 (ref 1.7–7)
NEUTROPHILS NFR BLD AUTO: 44.3 % (ref 42.7–76)
NRBC BLD AUTO-RTO: 0 /100 WBC (ref 0–0.2)
NT-PROBNP SERPL-MCNC: 44.2 PG/ML (ref 0–900)
PLATELET # BLD AUTO: 227 10*3/MM3 (ref 140–450)
PMV BLD AUTO: 9.1 FL (ref 6–12)
POTASSIUM SERPL-SCNC: 4.4 MMOL/L (ref 3.5–5.2)
PROT SERPL-MCNC: 8.5 G/DL (ref 6–8.5)
RBC # BLD AUTO: 4.58 10*6/MM3 (ref 4.14–5.8)
SARS-COV-2 RNA RESP QL NAA+PROBE: NOT DETECTED
SODIUM SERPL-SCNC: 136 MMOL/L (ref 136–145)
WBC NRBC COR # BLD AUTO: 5.65 10*3/MM3 (ref 3.4–10.8)

## 2024-09-04 PROCEDURE — 99284 EMERGENCY DEPT VISIT MOD MDM: CPT

## 2024-09-04 PROCEDURE — A9540 TC99M MAA: HCPCS | Performed by: EMERGENCY MEDICINE

## 2024-09-04 PROCEDURE — 87635 SARS-COV-2 COVID-19 AMP PRB: CPT | Performed by: EMERGENCY MEDICINE

## 2024-09-04 PROCEDURE — 83880 ASSAY OF NATRIURETIC PEPTIDE: CPT | Performed by: EMERGENCY MEDICINE

## 2024-09-04 PROCEDURE — 85379 FIBRIN DEGRADATION QUANT: CPT | Performed by: EMERGENCY MEDICINE

## 2024-09-04 PROCEDURE — 78580 LUNG PERFUSION IMAGING: CPT

## 2024-09-04 PROCEDURE — 85025 COMPLETE CBC W/AUTO DIFF WBC: CPT | Performed by: EMERGENCY MEDICINE

## 2024-09-04 PROCEDURE — 93005 ELECTROCARDIOGRAM TRACING: CPT | Performed by: EMERGENCY MEDICINE

## 2024-09-04 PROCEDURE — 71045 X-RAY EXAM CHEST 1 VIEW: CPT

## 2024-09-04 PROCEDURE — 80053 COMPREHEN METABOLIC PANEL: CPT | Performed by: EMERGENCY MEDICINE

## 2024-09-04 PROCEDURE — 93010 ELECTROCARDIOGRAM REPORT: CPT | Performed by: INTERNAL MEDICINE

## 2024-09-04 PROCEDURE — 0 TECHNETIUM ALBUMIN AGGREGATED: Performed by: EMERGENCY MEDICINE

## 2024-09-04 RX ORDER — SODIUM CHLORIDE 0.9 % (FLUSH) 0.9 %
10 SYRINGE (ML) INJECTION AS NEEDED
Status: DISCONTINUED | OUTPATIENT
Start: 2024-09-04 | End: 2024-09-04 | Stop reason: HOSPADM

## 2024-09-04 RX ADMIN — KIT FOR THE PREPARATION OF TECHNETIUM TC 99M ALBUMIN AGGREGATED 1 DOSE: 2.5 INJECTION, POWDER, FOR SOLUTION INTRAVENOUS at 09:20

## 2024-09-04 NOTE — ED PROVIDER NOTES
Subjective   History of Present Illness  Patient is a 48-year-old male with a history of diabetes and hypertension who presents to the ER with low blood pressure and shortness of air.  Patient states he was seen here last week after a routine office visit she showed him to be hypertensive.  Patient states he had labs performed and was observed and was discharged home.  Patient states that since that time he has been checking his blood pressure and it has actually been running low.  He is not on blood pressure medication.  Patient states he has had some shortness of air for the last 2 weeks along with mild nausea.  He denies any fever, chest pain, abdominal pain, vomiting, diarrhea, urinary changes, neurologic changes, cough, leg pain or swelling.      Review of Systems   Constitutional: Negative.    HENT: Negative.     Eyes: Negative.    Respiratory:  Positive for shortness of breath.    Cardiovascular: Negative.    Gastrointestinal:  Positive for nausea.   Endocrine: Negative.    Genitourinary: Negative.    Musculoskeletal: Negative.    Skin: Negative.    Allergic/Immunologic: Negative.    Neurological: Negative.    Hematological: Negative.    Psychiatric/Behavioral: Negative.     All other systems reviewed and are negative.      Past Medical History:   Diagnosis Date    ADD (attention deficit disorder)     Anemia     Anxiety     Arthritis     Bipolar 1 disorder     Depression     major depressive disorder    Diabetes mellitus     Difficulty walking 2021    Loss of balance/stroke    GERD (gastroesophageal reflux disease)     Headache     Hyperlipidemia 5/2024    My HDL was 127    Hypertension     Kidney stones     MRSA (methicillin resistant Staphylococcus aureus)     Neuropathy in diabetes 2010    Peripheral neuropathy     Stroke     TIA (transient ischemic attack)     Verruca 2020    One on toe on left foot, has not reappeared in over a year       Allergies   Allergen Reactions    Contrast Dye (Echo Or Unknown  Ct/Mr) Hives       Past Surgical History:   Procedure Laterality Date    CARDIAC CATHETERIZATION      COLONOSCOPY      ENDOSCOPY      INFECTED SKIN DEBRIDEMENT      MRSA in abdomen    TONSILLECTOMY      WISDOM TOOTH EXTRACTION         Family History   Problem Relation Age of Onset    Hyperlipidemia Mother     Hypertension Mother     Kidney disease Mother     Heart disease Mother     Clotting disorder Mother     Diabetes Mother     Liver disease Mother     Mental illness Mother     Hyperlipidemia Father        Social History     Socioeconomic History    Marital status: Single   Tobacco Use    Smoking status: Never     Passive exposure: Never    Smokeless tobacco: Never   Vaping Use    Vaping status: Never Used   Substance and Sexual Activity    Alcohol use: No    Drug use: Not Currently     Types: Methamphetamines     Comment: last used meth about one week ago    Sexual activity: Not Currently     Partners: Male           Objective   Physical Exam  Vitals and nursing note reviewed.   Constitutional:       Appearance: He is well-developed.   HENT:      Head: Normocephalic and atraumatic.   Eyes:      Conjunctiva/sclera: Conjunctivae normal.      Pupils: Pupils are equal, round, and reactive to light.   Cardiovascular:      Rate and Rhythm: Normal rate and regular rhythm.      Heart sounds: Normal heart sounds.   Pulmonary:      Effort: Pulmonary effort is normal.      Breath sounds: Normal breath sounds.   Abdominal:      Palpations: Abdomen is soft.      Tenderness: There is no abdominal tenderness.   Musculoskeletal:         General: No deformity. Normal range of motion.      Cervical back: Normal range of motion.   Skin:     General: Skin is warm.   Neurological:      Mental Status: He is alert and oriented to person, place, and time.   Psychiatric:         Behavior: Behavior normal.         Procedures           ED Course      ECG as interpreted by me: Normal sinus rhythm with a rate of 76, no acute ischemia or  infarction    Orthostatics: wnl                             Lab Results (last 24 hours)       Procedure Component Value Units Date/Time    CBC & Differential [[id number]]  (Abnormal) Collected: 09/04/24 0740    Specimen: Blood Updated: 09/04/24 0757    Narrative:      The following orders were created for panel order CBC & Differential.  Procedure                               Abnormality         Status                     ---------                               -----------         ------                     CBC Auto Differential[[id number]]        Abnormal            Final result                 Please view results for these tests on the individual orders.    Comprehensive Metabolic Panel [[id number]]  (Abnormal) Collected: 09/04/24 0740    Specimen: Blood Updated: 09/04/24 0816     Glucose 192 mg/dL      BUN 11 mg/dL      Creatinine 0.78 mg/dL      Sodium 136 mmol/L      Potassium 4.4 mmol/L      Comment: Slight hemolysis detected by analyzer. Result may be falsely elevated.        Chloride 100 mmol/L      CO2 26.0 mmol/L      Calcium 9.6 mg/dL      Total Protein 8.5 g/dL      Albumin 4.9 g/dL      ALT (SGPT) 24 U/L      AST (SGOT) 21 U/L      Comment: Slight hemolysis detected by analyzer. Result may be falsely elevated.        Alkaline Phosphatase 78 U/L      Total Bilirubin 0.3 mg/dL      Globulin 3.6 gm/dL      A/G Ratio 1.4 g/dL      BUN/Creatinine Ratio 14.1     Anion Gap 10.0 mmol/L      eGFR 108.6 mL/min/1.73     Narrative:      GFR Normal >60  Chronic Kidney Disease <60  Kidney Failure <15      D-dimer, Quantitative [[id number]]  (Abnormal) Collected: 09/04/24 0740    Specimen: Blood Updated: 09/04/24 0807     D-Dimer, Quantitative 0.51 MCGFEU/mL     Narrative:      According to the assay 's published package insert, a normal (<0.50 MCGFEU/mL) D-dimer result in conjunction with a non-high clinical probability assessment, excludes deep vein thrombosis (DVT) and pulmonary embolism (PE) with high  "sensitivity.    D-dimer values increase with age and this can make VTE exclusion of an older population difficult. To address this, the [employer], based on best available evidence and recent guidelines, recommends that clinicians use age-adjusted D-dimer thresholds in patients greater than 50 years of age with: a) a low probability of PE who do not meet all Pulmonary Embolism Rule Out Criteria, or b) in those with intermediate probability of PE.   The formula for an age-adjusted D-dimer cut-off is \"age/100\".  For example, a 60 year old patient would have an age-adjusted cut-off of 0.60 MCGFEU/mL and an 80 year old 0.80 MCGFEU/mL.    BNP [[id number]]  (Normal) Collected: 09/04/24 0740    Specimen: Blood Updated: 09/04/24 0813     proBNP 44.2 pg/mL     Narrative:      This assay is used as an aid in the diagnosis of individuals suspected of having heart failure. It can be used as an aid in the diagnosis of acute decompensated heart failure (ADHF) in patients presenting with signs and symptoms of ADHF to the emergency department (ED). In addition, NT-proBNP of <300 pg/mL indicates ADHF is not likely.    Age Range Result Interpretation  NT-proBNP Concentration (pg/mL:      <50             Positive            >450                   Gray                 300-450                    Negative             <300    50-75           Positive            >900                  Gray                300-900                  Negative            <300      >75             Positive            >1800                  Gray                300-1800                  Negative            <300    CBC Auto Differential [[id number]]  (Abnormal) Collected: 09/04/24 0740    Specimen: Blood Updated: 09/04/24 0757     WBC 5.65 10*3/mm3      RBC 4.58 10*6/mm3      Hemoglobin 14.6 g/dL      Hematocrit 41.1 %      MCV 89.7 fL      MCH 31.9 pg      MCHC 35.5 g/dL      RDW 12.6 %      RDW-SD 41.1 fl      MPV 9.1 fL      Platelets 227 " 10*3/mm3      Neutrophil % 44.3 %      Lymphocyte % 33.6 %      Monocyte % 9.6 %      Eosinophil % 11.0 %      Basophil % 1.1 %      Immature Grans % 0.4 %      Neutrophils, Absolute 2.51 10*3/mm3      Lymphocytes, Absolute 1.90 10*3/mm3      Monocytes, Absolute 0.54 10*3/mm3      Eosinophils, Absolute 0.62 10*3/mm3      Basophils, Absolute 0.06 10*3/mm3      Immature Grans, Absolute 0.02 10*3/mm3      nRBC 0.0 /100 WBC     COVID-19,CEPHEID/JOYCE,COR/REAL/PAD/SHAYY/LAG/SIDNEY IN-HOUSE,NP SWAB IN TRANSPORT MEDIA 1 HR TAT, RT-PCR - Swab, Nasopharynx [[id number]]  (Normal) Collected: 09/04/24 0747    Specimen: Swab from Nasopharynx Updated: 09/04/24 0847     COVID19 Not Detected    Narrative:      Fact sheet for providers: https://www.fda.gov/media/877733/download     Fact sheet for patients: https://www.fda.gov/media/633402/download  Fact sheet for providers: https://www.fda.gov/media/071076/download    Fact sheet for patients: https://www.fda.gov/media/810641/download    Test performed by PCR.           NM Lung Scan Perfusion Particulate   Final Result   1. Low probability of pulmonary embolism.           This report was signed and finalized on 9/4/2024 9:51 AM by Dr. Gareth Nunez MD.          XR Chest 1 View   Final Result   No active disease is seen.               This report was signed and finalized on 9/4/2024 7:58 AM by Dr. Guzmna Roberts MD.                       Medical Decision Making  Patient is a 48-year-old male with a history of diabetes and hypertension who presents to the ER with low blood pressure and shortness of air.  Patient states he was seen here last week after a routine office visit she showed him to be hypertensive.  Patient states he had labs performed and was observed and was discharged home.  Patient states that since that time he has been checking his blood pressure and it has actually been running low.  He is not on blood pressure medication.  Patient states he has had some shortness of  air for the last 2 weeks along with mild nausea.  He denies any fever, chest pain, abdominal pain, vomiting, diarrhea, urinary changes, neurologic changes, cough, leg pain or swelling.    Differential diagnosis: Sepsis, electrolyte abnormality, congestive heart failure, pulmonary embolus, pneumonia    Patient was normotensive upon arrival to the ER.  Labs showed hyperglycemia but no signs of DKA.  D-dimer was also mildly elevated.  Chest x-ray was negative.  VQ scan showed low probability of pulmonary embolus.  Patient had normal vital signs throughout his entire stay.  Patient was normotensive and had good sats.  No cause for symptoms found.  Patient was advised to get a new blood pressure machine.  He will be discharged home to follow-up with his PCP and is return to the ER for any worsening or new symptoms or other concerns.  Patient was agreeable to the plan and discharged home.      Problems Addressed:  Shortness of breath: complicated acute illness or injury    Amount and/or Complexity of Data Reviewed  Labs: ordered. Decision-making details documented in ED Course.  Radiology: ordered. Decision-making details documented in ED Course.  ECG/medicine tests: ordered. Decision-making details documented in ED Course.    Risk  Prescription drug management.        Final diagnoses:   Shortness of breath       ED Disposition  ED Disposition       ED Disposition   Discharge    Condition   Good    Comment   --               Vanessa Schmidt, APRN  [address] [facility] [address]  [phone]    Schedule an appointment as soon as possible for a visit            Medication List      No changes were made to your prescriptions during this visit.            Dariela Anthony MD  09/04/24 1544

## 2024-09-05 LAB
QT INTERVAL: 378 MS
QTC INTERVAL: 425 MS

## 2024-09-09 ENCOUNTER — OFFICE VISIT (OUTPATIENT)
Dept: CARDIOLOGY | Facility: CLINIC | Age: 49
End: 2024-09-09
Payer: MEDICARE

## 2024-09-09 VITALS
BODY MASS INDEX: 25.99 KG/M2 | WEIGHT: 156 LBS | SYSTOLIC BLOOD PRESSURE: 120 MMHG | DIASTOLIC BLOOD PRESSURE: 70 MMHG | HEART RATE: 81 BPM | OXYGEN SATURATION: 99 % | HEIGHT: 65 IN

## 2024-09-09 DIAGNOSIS — Q23.1 BICUSPID AORTIC VALVE: ICD-10-CM

## 2024-09-09 DIAGNOSIS — I25.3 PFO WITH ATRIAL SEPTAL ANEURYSM: Primary | ICD-10-CM

## 2024-09-09 DIAGNOSIS — Z86.73 HISTORY OF CVA (CEREBROVASCULAR ACCIDENT): ICD-10-CM

## 2024-09-09 DIAGNOSIS — Q21.12 PFO WITH ATRIAL SEPTAL ANEURYSM: Primary | ICD-10-CM

## 2024-09-09 DIAGNOSIS — E11.9 TYPE 2 DIABETES MELLITUS WITHOUT COMPLICATION, WITHOUT LONG-TERM CURRENT USE OF INSULIN: ICD-10-CM

## 2024-09-09 DIAGNOSIS — E78.2 MIXED HYPERLIPIDEMIA: ICD-10-CM

## 2024-09-09 DIAGNOSIS — R07.89 CHEST PAIN, ATYPICAL: ICD-10-CM

## 2024-09-09 PROBLEM — Q23.81 BICUSPID AORTIC VALVE: Status: ACTIVE | Noted: 2024-09-09

## 2024-09-09 RX ORDER — METOPROLOL TARTRATE 25 MG/1
100 TABLET, FILM COATED ORAL ONCE
Status: CANCELLED | OUTPATIENT
Start: 2024-09-09

## 2024-09-09 RX ORDER — SODIUM CHLORIDE 0.9 % (FLUSH) 0.9 %
10 SYRINGE (ML) INJECTION EVERY 12 HOURS SCHEDULED
Status: CANCELLED | OUTPATIENT
Start: 2024-09-09

## 2024-09-09 RX ORDER — SODIUM CHLORIDE 0.9 % (FLUSH) 0.9 %
10 SYRINGE (ML) INJECTION AS NEEDED
Status: CANCELLED | OUTPATIENT
Start: 2024-09-09

## 2024-09-09 RX ORDER — METOPROLOL TARTRATE 25 MG/1
200 TABLET, FILM COATED ORAL ONCE
Status: CANCELLED | OUTPATIENT
Start: 2024-09-09 | End: 2024-09-09

## 2024-09-09 RX ORDER — NITROGLYCERIN 0.4 MG/1
0.4 TABLET SUBLINGUAL
Status: CANCELLED | OUTPATIENT
Start: 2024-09-09 | End: 2024-09-09

## 2024-09-09 RX ORDER — DIPHENHYDRAMINE HYDROCHLORIDE 50 MG/ML
50 INJECTION INTRAMUSCULAR; INTRAVENOUS
Status: CANCELLED | OUTPATIENT
Start: 2024-09-09 | End: 2024-09-09

## 2024-09-09 RX ORDER — PREDNISONE 50 MG/1
50 TABLET ORAL TAKE AS DIRECTED
Qty: 3 TABLET | Refills: 0 | Status: SHIPPED | OUTPATIENT
Start: 2024-09-09

## 2024-09-09 RX ORDER — SODIUM CHLORIDE 9 MG/ML
40 INJECTION, SOLUTION INTRAVENOUS AS NEEDED
Status: CANCELLED | OUTPATIENT
Start: 2024-09-09

## 2024-09-09 RX ORDER — METOPROLOL TARTRATE 25 MG/1
50 TABLET, FILM COATED ORAL ONCE
Status: CANCELLED | OUTPATIENT
Start: 2024-09-09

## 2024-09-09 RX ORDER — METOPROLOL TARTRATE 25 MG/1
150 TABLET, FILM COATED ORAL ONCE
Status: CANCELLED | OUTPATIENT
Start: 2024-09-09

## 2024-09-09 RX ORDER — PREDNISONE 50 MG/1
50 TABLET ORAL TAKE AS DIRECTED
Qty: 3 TABLET | Refills: 0 | Status: SHIPPED | OUTPATIENT
Start: 2024-09-09 | End: 2024-09-09

## 2024-09-09 RX ORDER — METOPROLOL TARTRATE 50 MG
50 TABLET ORAL
Status: CANCELLED | OUTPATIENT
Start: 2024-09-09

## 2024-09-09 RX ORDER — ATORVASTATIN CALCIUM 20 MG/1
20 TABLET, FILM COATED ORAL DAILY
Qty: 30 TABLET | Refills: 11 | Status: SHIPPED | OUTPATIENT
Start: 2024-09-09 | End: 2024-09-09

## 2024-09-09 RX ORDER — ATORVASTATIN CALCIUM 20 MG/1
20 TABLET, FILM COATED ORAL DAILY
Qty: 30 TABLET | Refills: 11 | Status: SHIPPED | OUTPATIENT
Start: 2024-09-09

## 2024-09-09 RX ORDER — METOPROLOL TARTRATE 1 MG/ML
5 INJECTION, SOLUTION INTRAVENOUS
Status: CANCELLED | OUTPATIENT
Start: 2024-09-09

## 2024-09-09 RX ORDER — NITROGLYCERIN 0.4 MG/1
0.8 TABLET SUBLINGUAL
Status: CANCELLED | OUTPATIENT
Start: 2024-09-09

## 2024-09-09 RX ORDER — METOPROLOL TARTRATE 50 MG
TABLET ORAL
Qty: 2 TABLET | Refills: 0 | Status: SHIPPED | OUTPATIENT
Start: 2024-09-09

## 2024-09-09 RX ORDER — DIPHENHYDRAMINE HCL 25 MG
50 TABLET ORAL
Status: CANCELLED | OUTPATIENT
Start: 2024-09-09 | End: 2024-09-09

## 2024-09-09 RX ORDER — METOPROLOL TARTRATE 25 MG/1
25 TABLET, FILM COATED ORAL ONCE
Status: CANCELLED | OUTPATIENT
Start: 2024-09-09

## 2024-09-09 NOTE — PROGRESS NOTES
"    [facility] - CARDIOLOGY  New Patient Initial Outpatient Evaluation    Primary Care Physician: Vanessa Schmidt APRN    Subjective     Chief Complaint: PFO, bicuspid aortic valve    History of Present Illness  The patient is a 48-year-old male presenting for a new patient evaluation. The reason for referral is Patent Foramen Ovale (PFO) and an \"abnormal aortic valve\" identified on an echocardiogram.     He has a history of a Transient Ischemic Attack (TIA), which led to a neurology consultation and subsequent stroke workup, including an echocardiogram and bubble study. These tests revealed structural heart damage, prompting his referral here. He has a history of heart catheterization and was aware of his bicuspid valve but not the PFO. He has noticed some calcification in his carotid artery and wonders if he should have his heart checked. He has been experiencing mild chest pain on the left side and palpitations, which are more frequent than usual. The chest pain, described as tight, is not intense and does not worsen with exercise or improve with rest. He is unsure if the pain is muscular. He reports no tenderness to touch over the area. He has an allergy to contrast dye, which caused hives during a previous catheterization.    He has been diagnosed with diabetes, which was previously uncontrolled but is now managed with Januvia, dietary changes, and consultation with a dietitian. He has high blood pressure but is not currently on medication for it. He reports no significant weight loss in recent years.    He has been on Adderall for 20 years and recently switched from extended-release to instant-release due to severe ADHD. He has tried various supplements and magnesium without success. He reports no history of migraines or headaches.    He has been under the care of Dr. Morejon for several years following a move from [place]. He expresses concern about potential complications due to living alone. He is considering " joining a gym and is currently undergoing physical therapy. He has tried statins in the past but stopped due to concerns about side effects. He is taking fish oil and Januvia.    He had West Nile virus a couple of years ago.    SOCIAL HISTORY  He does not smoke.    FAMILY HISTORY  His father had COVID-19 at the beginning of  and toward the end, he had atrial fibrillation. He never had heart problems. His mother  before him and she had lots of heart issues. His siblings have passed away. He is unsure what his sister  from. His older brother was 55 when he passed away.    ALLERGIES  He has an allergy to CONTRAST DYE.      Review of Systems   Constitutional: Negative for malaise/fatigue.   Cardiovascular:  Positive for chest pain. Negative for claudication, dyspnea on exertion, leg swelling, near-syncope, orthopnea, palpitations, paroxysmal nocturnal dyspnea and syncope.   Respiratory:  Negative for shortness of breath.    Hematologic/Lymphatic: Does not bruise/bleed easily.        Otherwise complete ROS reviewed and negative except as mentioned in the HPI.      Past Medical History:   Past Medical History:   Diagnosis Date    ADD (attention deficit disorder)     Anemia     Anxiety     Arthritis     Asthma     Bipolar 1 disorder     Congenital heart disease     Depression     major depressive disorder    Diabetes mellitus     Difficulty walking     Loss of balance/stroke    GERD (gastroesophageal reflux disease)     Headache     Hyperlipidemia 2024    My HDL was 127    Hypertension     Kidney stones     Mitral valve prolapse     MRSA (methicillin resistant Staphylococcus aureus)     Neuropathy in diabetes     Peripheral neuropathy     Stroke     TIA (transient ischemic attack)     Verruca     One on toe on left foot, has not reappeared in over a year       Past Surgical History:  Past Surgical History:   Procedure Laterality Date    CARDIAC CATHETERIZATION      COLONOSCOPY      ENDOSCOPY       "INFECTED SKIN DEBRIDEMENT      MRSA in abdomen    TONSILLECTOMY      WISDOM TOOTH EXTRACTION         Family History: family history includes Clotting disorder in his mother; Diabetes in his mother; Heart disease in his mother; Hyperlipidemia in his father and mother; Hypertension in his mother; Kidney disease in his mother; Liver disease in his mother; Mental illness in his mother.    Social History:  reports that he has never smoked. He has never been exposed to tobacco smoke. He has never used smokeless tobacco. He reports that he does not currently use drugs after having used the following drugs: Methamphetamines. He reports that he does not drink alcohol.    Medications:  Prior to Admission medications    Medication Sig Start Date End Date Taking? Authorizing Provider   amphetamine-dextroamphetamine XR (ADDERALL XR) 10 MG 24 hr capsule Take 1 capsule by mouth Every Morning   Yes Cleve Caceres MD   aspirin 81 MG chewable tablet Chew 1 tablet Daily.   Yes Cleve Caceres MD   fluticasone (FLONASE) 50 MCG/ACT nasal spray instill ONE SPRAY in each nostril ONCE daily 5/13/24  Yes Cleve Caceres MD   Januvia 50 MG tablet  9/29/23  Yes Cleve Caceres MD   lamoTRIgine (LaMICtal) 200 MG tablet  1/1/03  Yes Cleve Caceres MD   metFORMIN (Glucophage) 1000 MG tablet Take 1 tablet by mouth 2 (Two) Times a Day With Meals. 1/24/23  Yes Henri Dillard, DO   OneTouch Ultra test strip 2 (Two) Times a Day. use for testing 3/5/24  Yes Cleve Caceres MD   tadalafil (Cialis) 20 MG tablet Take 1 tablet by mouth Daily As Needed for Erectile Dysfunction.   Yes Cleve Caceres MD   Vitamin D, Cholecalciferol, (CHOLECALCIFEROL) 10 MCG (400 UNIT) tablet Take 1 tablet by mouth Daily.   Yes Cleve Caceres MD     Allergies:  Allergies   Allergen Reactions    Contrast Dye (Echo Or Unknown Ct/Mr) Hives       Objective     Vital Signs: /70   Pulse 81   Ht 165.1 cm (65\")   Wt 70.8 " kg (156 lb)   SpO2 99%   BMI 25.96 kg/m²     Vitals and nursing note reviewed.   Constitutional:       General: Not in acute distress.     Appearance: Not in distress.   Neck:      Vascular: No JVD or JVR. JVD normal.   Pulmonary:      Effort: Pulmonary effort is normal.      Breath sounds: Normal breath sounds.   Cardiovascular:      Normal rate. Regular rhythm.      Murmurs: There is no murmur.      No gallop.  No rub.   Pulses:     Intact distal pulses.   Edema:     Peripheral edema absent.   Skin:     General: Skin is warm and dry.   Neurological:      Mental Status: Alert, oriented to person, place, and time and oriented to person, place and time.       Physical Exam      Results Reviewed:  Results  Laboratory Studies  LDL cholesterol was 127.    Imaging  Echocardiogram showed a bicuspid aortic valve and a PFO. Brain MRI showed evidence of a prior stroke on the right side.      ECG 12 Lead    Date/Time: 9/9/2024 2:08 PM  Performed by: Alo Anthony MD    Authorized by: Alo Anthony MD  Comparison: compared with previous ECG from 2/27/2022  Comparison to previous ECG: Ventricular rate has normalized  Rhythm: sinus rhythm  Rate: normal  BPM: 81  QRS axis: left  Other findings: left ventricular hypertrophy    Clinical impression: abnormal EKG            Lab Results   Component Value Date    CHOL 221 (H) 06/30/2022    TRIG 119 03/27/2023    HDL 41 (L) 03/27/2023    VLDL 28 06/30/2022    LDLHDL 2.59 06/30/2022     Lab Results   Component Value Date    HGBA1C 10.0 (H) 03/26/2023     Independent review of imaging: Since the echocardiogram was interpreted by another physician, I reviewed the images in the room with the patient.  I do agree with the findings of a PFO, with significant right-to-left shunting with bubble study.  I also agree with the interpretation of the bicuspid aortic valve, as with fusion of the left and right coronary cusp.    Results for orders placed during the hospital encounter of  07/31/24    Adult Transthoracic Echo Complete W/ Cont if Necessary Per Protocol    Interpretation Summary    Left ventricular ejection fraction appears to be 56 - 60%.    Left ventricular diastolic function was normal.    Small patent foramen ovale present with right to left shunting.    Saline test results are positive.    Estimated right ventricular systolic pressure from tricuspid regurgitation is normal (<35 mmHg).    Suspected bicuspid aortic valve without aortic regurgitation or aortic stenosis    Normal global longitudinal LV strain (GLS) = -20.0%.    Mild aortic arch dilatation    Recommend CTA or MRA of the thoracic aorta. May consider cardiac MRI for better visualization of the aortic valve      Assessment / Plan        Problem List Items Addressed This Visit          Cardiac and Vasculature    Mixed hyperlipidemia    Relevant Medications    atorvastatin (LIPITOR) 20 MG tablet    Bicuspid aortic valve    Relevant Medications    metoprolol tartrate (LOPRESSOR) 50 MG tablet    predniSONE (DELTASONE) 50 MG tablet    Other Relevant Orders    CT Angiogram Coronary    CT Angiogram Coronary-Cardiology Interpretation    No Caffeine or Nicotine 4 Hours Prior to CTA Appointment    Nothing to Eat or Drink 4 Hours Prior to CTA Appointment    PFO with atrial septal aneurysm - Primary    Relevant Medications    metoprolol tartrate (LOPRESSOR) 50 MG tablet       Endocrine and Metabolic    Diabetes mellitus       Neuro    History of CVA (cerebrovascular accident)     Other Visit Diagnoses       Chest pain, atypical        Relevant Medications    metoprolol tartrate (LOPRESSOR) 50 MG tablet    predniSONE (DELTASONE) 50 MG tablet    Other Relevant Orders    CT Angiogram Coronary    CT Angiogram Coronary-Cardiology Interpretation    No Caffeine or Nicotine 4 Hours Prior to CTA Appointment    Nothing to Eat or Drink 4 Hours Prior to CTA Appointment            Assessment & Plan  1. Patent Foramen Ovale (PFO).  The PFO was  identified on a recent echocardiogram, and cortical imaging showed evidence of a prior stroke. His RoPE score indicates a 62% likelihood that the stroke was related to the PFO, with an 8% chance of recurrence within 2 years. Transcatheter-based PFO closure was recommended and discussed in detail. He is interested in moving forward with the procedure, which will be scheduled after the coronary CT scan is completed.    2. Bicuspid Aortic Valve.  The recent transthoracic echo showed fusion of the right and left coronary cusps with excellent mobility and good opening of the valve. There is no appreciable stenosis or insufficiency at this time. A coronary CT angiogram will be performed to assess the size of the aorta and check for vascular disease due to his increased risk as a diabetic. He has a documented contrast allergy, so prescriptions for prednisone and Benadryl have been sent to his pharmacy to be taken ahead of time to prevent hives.      3. Diabetes Mellitus.  He reports that his diabetes is now under control after being poorly controlled for many years. He should continue his current management plan, including any prescribed medications and lifestyle modifications.    4. Lipid Management.  He has an LDL of 130 and has been hesitant to take statins. After discussing the primary preventative benefits of statins, he agreed to start atorvastatin 20 mg q.h.s. A prescription has been sent to [facility]. If the coronary CT scan shows obstructive coronary disease, the dosage may be increased to 40 mg daily.    5. ADHD.  He is currently on a low dose of Adderall for ADHD and inquired about its safety. Given his stable blood pressure and lack of increased heart thickness or abnormal function, continuing Adderall is deemed safe.    6. Chest Pain and Palpitations.  He reports new onset chest pain and palpitations. The pain is described as tight and occurs at any time without a clear trigger. Given his diabetes  and risk for vascular disease, a coronary CT angiogram will be performed to assess for any potential heart artery blockages.    Follow-up  The patient will follow up based on the CT scan results and/or scheduling of the PFO closure procedure.      Transcribed from ambient dictation for Alo Anthony MD by Alo Anthony MD.  09/09/24   14:54 CDT    Patient or patient representative verbalized consent to the visit recording.

## 2024-09-13 ENCOUNTER — HOSPITAL ENCOUNTER (OUTPATIENT)
Dept: PHYSICAL THERAPY | Facility: HOSPITAL | Age: 49
Setting detail: THERAPIES SERIES
Discharge: HOME | End: 2024-09-13
Payer: MEDICARE

## 2024-09-13 DIAGNOSIS — Z74.09 IMPAIRED FUNCTIONAL MOBILITY, BALANCE, GAIT, AND ENDURANCE: Primary | ICD-10-CM

## 2024-09-13 DIAGNOSIS — M54.12 CERVICAL RADICULOPATHY: ICD-10-CM

## 2024-09-13 DIAGNOSIS — M54.16 LUMBAR RADICULOPATHY: ICD-10-CM

## 2024-09-13 PROCEDURE — 97140 MANUAL THERAPY 1/> REGIONS: CPT

## 2024-09-13 PROCEDURE — 97535 SELF CARE MNGMENT TRAINING: CPT

## 2024-09-13 PROCEDURE — 97110 THERAPEUTIC EXERCISES: CPT

## 2024-09-13 NOTE — PROGRESS NOTES
Physical Therapy Treatment Note  [address] [place] [address]    Patient: Rah Shin                                                 Visit Date: 2024  :     1975    Referring practitioner:    Ramírez Leon PA-C  Date of Initial Visit:          Type: THERAPY  Noted: 2024    Patient seen for 4 sessions    Visit Diagnoses:    ICD-10-CM ICD-9-CM   1. Impaired functional mobility, balance, gait, and endurance  Z74.09 V49.89   2. Cervical radiculopathy  M54.12 723.4   3. Lumbar radiculopathy  M54.16 724.4       SUBJECTIVE     Subjective: States he has been doing good since last time. Notes he does not have any tingling in his arms or legs today, but has been getting some swelling in his R hand sometimes.     PAIN: 2/10 R hand     OBJECTIVE     Objective      Therapeutic Exercises    62722 Units Comments   Standing calf/soleus stretches on slantboard 5x10 sec ea    Supine BUE phasic w/ RTB 10x3 sec    Supine hor sh ABD w/ RTB 10x3 sec    Squats on wall w/ SB 2x10    Runners stretch on wall     Timed Minutes 15     Manual Therapy     [id number]  Comments   IASTM w/ Powerboost L1 curved to R UT/LS and R calf    STM to B UT/LS/Cx linda    B UT/LS stretches    Cx distraction        Timed Minutes 17       Therapy Education/Self Care [id number]   Education offered today Anatomy   Medbride Code [id number]    Ongoing HEP   Date: 2024  Prepared by: Kira Patel     Exercises  - Romberg Stance with Head Nods  - 2-3 x daily - 4 x weekly - 2 sets - 2 reps - 60 seconds  - Tandem Stance  - 2-3 x daily - 4 x weekly - 2 sets - 2 reps - 60 seconds  - Standing Single Leg Stance with Counter Support  - 2-3 x daily - 4 x weekly - 2 sets - 2 reps - 30 seconds  - Single Leg Balance with Clock Reach  - 1 x daily - 7 x weekly - 3 sets - 10 reps  - Braided Sidestepping  - 1 x daily - 7 x weekly - 3 sets - 10 reps  - Walking with High Knee Taps  - 1 x  daily - 7 x weekly - 3 sets - 10 reps  - Tandem Walking  - 1 x daily - 7 x weekly - 3 sets - 10 reps  - Heel Walking  - 1 x daily - 7 x weekly - 3 sets - 10 reps  - Toe Walking  - 1 x daily - 7 x weekly - 3 sets - 10 reps  - Alternating Step Taps with Counter Support  - 1 x daily - 7 x weekly - 3 sets - 10 reps  - Step-Over  - 1 x daily - 7 x weekly - 3 sets - 10 reps   Timed Minutes 8       Total Timed Treatment:     40   mins  Total Time of Visit:             40   mins         ASSESSMENT/PLAN     GOALS  Goals                                                    Progress Note due by 9/25/2024                                                                Recert due by 10/21/2024   LTG by: 12 weeks Comments Date Status   Patient will be I with final HEP.   8/26 ongoing   Patient to improve mini-BEST test balance score to >/= 27/28 to decrease patient's risk of falls.   today was only pt's   3rd tx session; no change  8/26  ongoing       Assessment/Plan     ASSESSMENT: Pt reported cont RUE/RLE rad s/s occasionally, but does not have any today. He reported slight L deltoid discomfort during UE exercises, but completed all reps. He presented with some BLE and Cx tightness, noting decreased tightness during Cx distraction and BLE LAD. He reported decreased tightness following treatment.     PLAN: progress POC per pt tolerance    SIGNATURE: Manjinder Roberts PTA, [place] License #: [id number]   Electronically Signed on 9/13/2024        [address] [place] [address]. [address]  [phone]

## 2024-09-16 ENCOUNTER — HOSPITAL ENCOUNTER (OUTPATIENT)
Dept: CT IMAGING | Facility: HOSPITAL | Age: 49
Discharge: HOME | End: 2024-09-16
Payer: MEDICARE

## 2024-09-16 ENCOUNTER — HOSPITAL ENCOUNTER (OUTPATIENT)
Dept: GENERAL RADIOLOGY | Facility: HOSPITAL | Age: 49
Discharge: HOME | End: 2024-09-16
Payer: MEDICARE

## 2024-09-16 VITALS
WEIGHT: 156.09 LBS | OXYGEN SATURATION: 99 % | BODY MASS INDEX: 25.09 KG/M2 | DIASTOLIC BLOOD PRESSURE: 69 MMHG | RESPIRATION RATE: 16 BRPM | SYSTOLIC BLOOD PRESSURE: 114 MMHG | HEART RATE: 68 BPM | HEIGHT: 66 IN | TEMPERATURE: 98.4 F

## 2024-09-16 DIAGNOSIS — R07.89 CHEST PAIN, ATYPICAL: ICD-10-CM

## 2024-09-16 DIAGNOSIS — M79.672 LEFT FOOT PAIN: ICD-10-CM

## 2024-09-16 DIAGNOSIS — Q23.1 BICUSPID AORTIC VALVE: ICD-10-CM

## 2024-09-16 LAB
CREAT SERPL-MCNC: 0.88 MG/DL (ref 0.76–1.27)
EGFRCR SERPLBLD CKD-EPI 2021: 104.8 ML/MIN/1.73

## 2024-09-16 PROCEDURE — 63710000001 DIPHENHYDRAMINE PER 50 MG: Performed by: INTERNAL MEDICINE

## 2024-09-16 PROCEDURE — 82565 ASSAY OF CREATININE: CPT | Performed by: INTERNAL MEDICINE

## 2024-09-16 PROCEDURE — 73630 X-RAY EXAM OF FOOT: CPT

## 2024-09-16 PROCEDURE — 75574 CT ANGIO HRT W/3D IMAGE: CPT

## 2024-09-16 PROCEDURE — 75574 CT ANGIO HRT W/3D IMAGE: CPT | Performed by: HOSPITALIST

## 2024-09-16 PROCEDURE — 25510000001 IOPAMIDOL PER 1 ML: Performed by: INTERNAL MEDICINE

## 2024-09-16 RX ORDER — METOPROLOL TARTRATE 50 MG
50 TABLET ORAL ONCE
Status: DISCONTINUED | OUTPATIENT
Start: 2024-09-16 | End: 2024-09-17 | Stop reason: HOSPADM

## 2024-09-16 RX ORDER — DIPHENHYDRAMINE HYDROCHLORIDE 50 MG/ML
50 INJECTION INTRAMUSCULAR; INTRAVENOUS
Status: COMPLETED | OUTPATIENT
Start: 2024-09-16 | End: 2024-09-16

## 2024-09-16 RX ORDER — NITROGLYCERIN 0.4 MG/1
0.4 TABLET SUBLINGUAL
Status: COMPLETED | OUTPATIENT
Start: 2024-09-16 | End: 2024-09-16

## 2024-09-16 RX ORDER — SODIUM CHLORIDE 9 MG/ML
40 INJECTION, SOLUTION INTRAVENOUS AS NEEDED
Status: DISCONTINUED | OUTPATIENT
Start: 2024-09-16 | End: 2024-09-17 | Stop reason: HOSPADM

## 2024-09-16 RX ORDER — SODIUM CHLORIDE 0.9 % (FLUSH) 0.9 %
10 SYRINGE (ML) INJECTION AS NEEDED
Status: DISCONTINUED | OUTPATIENT
Start: 2024-09-16 | End: 2024-09-17 | Stop reason: HOSPADM

## 2024-09-16 RX ORDER — METOPROLOL TARTRATE 1 MG/ML
5 INJECTION, SOLUTION INTRAVENOUS
Status: DISCONTINUED | OUTPATIENT
Start: 2024-09-16 | End: 2024-09-17 | Stop reason: HOSPADM

## 2024-09-16 RX ORDER — NITROGLYCERIN 0.4 MG/1
0.8 TABLET SUBLINGUAL
Status: COMPLETED | OUTPATIENT
Start: 2024-09-16 | End: 2024-09-16

## 2024-09-16 RX ORDER — METOPROLOL TARTRATE 25 MG/1
25 TABLET, FILM COATED ORAL ONCE
Status: DISCONTINUED | OUTPATIENT
Start: 2024-09-16 | End: 2024-09-17 | Stop reason: HOSPADM

## 2024-09-16 RX ORDER — METOPROLOL TARTRATE 100 MG
200 TABLET ORAL ONCE
Status: DISCONTINUED | OUTPATIENT
Start: 2024-09-16 | End: 2024-09-17 | Stop reason: HOSPADM

## 2024-09-16 RX ORDER — SODIUM CHLORIDE 0.9 % (FLUSH) 0.9 %
10 SYRINGE (ML) INJECTION EVERY 12 HOURS SCHEDULED
Status: DISCONTINUED | OUTPATIENT
Start: 2024-09-16 | End: 2024-09-17 | Stop reason: HOSPADM

## 2024-09-16 RX ORDER — DIPHENHYDRAMINE HCL 50 MG
50 CAPSULE ORAL
Status: COMPLETED | OUTPATIENT
Start: 2024-09-16 | End: 2024-09-16

## 2024-09-16 RX ORDER — METOPROLOL TARTRATE 100 MG
100 TABLET ORAL ONCE
Status: DISCONTINUED | OUTPATIENT
Start: 2024-09-16 | End: 2024-09-17 | Stop reason: HOSPADM

## 2024-09-16 RX ORDER — IOPAMIDOL 755 MG/ML
100 INJECTION, SOLUTION INTRAVASCULAR
Status: COMPLETED | OUTPATIENT
Start: 2024-09-16 | End: 2024-09-16

## 2024-09-16 RX ORDER — METOPROLOL TARTRATE 50 MG
50 TABLET ORAL
Status: DISCONTINUED | OUTPATIENT
Start: 2024-09-16 | End: 2024-09-17 | Stop reason: HOSPADM

## 2024-09-16 RX ADMIN — IOPAMIDOL 100 ML: 755 INJECTION, SOLUTION INTRAVENOUS at 12:51

## 2024-09-16 RX ADMIN — DIPHENHYDRAMINE HYDROCHLORIDE 50 MG: 50 CAPSULE ORAL at 10:34

## 2024-09-16 RX ADMIN — NITROGLYCERIN 0.8 MG: 0.4 TABLET SUBLINGUAL at 12:25

## 2024-09-16 RX ADMIN — METOPROLOL TARTRATE 5 MG: 1 INJECTION, SOLUTION INTRAVENOUS at 12:31

## 2024-09-17 DIAGNOSIS — Q23.1 BICUSPID AORTIC VALVE: Primary | ICD-10-CM

## 2024-09-17 DIAGNOSIS — Q25.1 COARCTATION OF AORTA: ICD-10-CM

## 2024-09-18 DIAGNOSIS — R07.89 CHEST PAIN, ATYPICAL: ICD-10-CM

## 2024-09-18 DIAGNOSIS — Q23.1 BICUSPID AORTIC VALVE: ICD-10-CM

## 2024-09-18 RX ORDER — PREDNISONE 50 MG/1
50 TABLET ORAL TAKE AS DIRECTED
Qty: 3 TABLET | Refills: 0 | Status: SHIPPED | OUTPATIENT
Start: 2024-09-18

## 2024-09-19 ENCOUNTER — HOSPITAL ENCOUNTER (OUTPATIENT)
Dept: PHYSICAL THERAPY | Facility: HOSPITAL | Age: 49
Setting detail: THERAPIES SERIES
Discharge: HOME | End: 2024-09-19
Payer: MEDICARE

## 2024-09-25 ENCOUNTER — TRANSCRIBE ORDERS (OUTPATIENT)
Dept: PHYSICAL THERAPY | Facility: HOSPITAL | Age: 49
End: 2024-09-25
Payer: MEDICARE

## 2024-09-25 DIAGNOSIS — M54.16 LUMBAR RADICULOPATHY: ICD-10-CM

## 2024-09-25 DIAGNOSIS — R26.81 GAIT INSTABILITY: ICD-10-CM

## 2024-09-25 DIAGNOSIS — M54.12 CERVICAL RADICULOPATHY: Primary | ICD-10-CM

## 2024-09-26 ENCOUNTER — APPOINTMENT (OUTPATIENT)
Dept: PHYSICAL THERAPY | Facility: HOSPITAL | Age: 49
End: 2024-09-26
Payer: MEDICARE

## 2024-09-26 ENCOUNTER — HOSPITAL ENCOUNTER (OUTPATIENT)
Dept: MRI IMAGING | Facility: HOSPITAL | Age: 49
Discharge: HOME | End: 2024-09-26
Admitting: INTERNAL MEDICINE
Payer: MEDICARE

## 2024-09-26 DIAGNOSIS — Q23.1 BICUSPID AORTIC VALVE: ICD-10-CM

## 2024-09-26 DIAGNOSIS — Q25.1 COARCTATION OF AORTA: ICD-10-CM

## 2024-09-26 LAB — CREAT BLDA-MCNC: 0.9 MG/DL (ref 0.6–1.3)

## 2024-09-26 PROCEDURE — C8911 MRA W/O FOL W/CONT, CHEST: HCPCS

## 2024-09-26 PROCEDURE — 0 GADOBENATE DIMEGLUMINE 529 MG/ML SOLUTION: Performed by: INTERNAL MEDICINE

## 2024-09-26 PROCEDURE — 82565 ASSAY OF CREATININE: CPT

## 2024-09-26 PROCEDURE — A9577 INJ MULTIHANCE: HCPCS | Performed by: INTERNAL MEDICINE

## 2024-09-26 RX ADMIN — GADOBENATE DIMEGLUMINE 13 ML: 529 INJECTION, SOLUTION INTRAVENOUS at 09:42

## 2024-10-15 ENCOUNTER — PREP FOR SURGERY (OUTPATIENT)
Dept: OTHER | Facility: HOSPITAL | Age: 49
End: 2024-10-15
Payer: MEDICARE

## 2024-10-15 DIAGNOSIS — I25.3 PFO WITH ATRIAL SEPTAL ANEURYSM: Primary | ICD-10-CM

## 2024-10-15 DIAGNOSIS — Z86.73 HISTORY OF CVA (CEREBROVASCULAR ACCIDENT): ICD-10-CM

## 2024-10-15 DIAGNOSIS — Q21.12 PFO WITH ATRIAL SEPTAL ANEURYSM: Primary | ICD-10-CM

## 2024-10-15 RX ORDER — DIPHENHYDRAMINE HYDROCHLORIDE 50 MG/ML
50 INJECTION INTRAMUSCULAR; INTRAVENOUS
OUTPATIENT
Start: 2024-10-15 | End: 2024-10-16

## 2024-10-15 RX ORDER — ASPIRIN 81 MG/1
81 TABLET ORAL DAILY
OUTPATIENT
Start: 2024-10-16

## 2024-10-15 RX ORDER — SODIUM CHLORIDE 0.9 % (FLUSH) 0.9 %
3 SYRINGE (ML) INJECTION EVERY 12 HOURS SCHEDULED
OUTPATIENT
Start: 2024-10-15

## 2024-10-15 RX ORDER — CLOPIDOGREL BISULFATE 75 MG/1
300 TABLET ORAL ONCE
OUTPATIENT
Start: 2024-10-15 | End: 2024-10-15

## 2024-10-15 RX ORDER — ASPIRIN 81 MG/1
324 TABLET, CHEWABLE ORAL ONCE
OUTPATIENT
Start: 2024-10-15 | End: 2024-10-15

## 2024-10-15 RX ORDER — PREDNISONE 50 MG/1
50 TABLET ORAL TAKE AS DIRECTED
Qty: 3 TABLET | Refills: 0 | Status: SHIPPED | OUTPATIENT
Start: 2024-10-15

## 2024-10-15 RX ORDER — DIPHENHYDRAMINE HCL 50 MG
50 CAPSULE ORAL
OUTPATIENT
Start: 2024-10-15 | End: 2024-10-15

## 2024-10-15 RX ORDER — SODIUM CHLORIDE 0.9 % (FLUSH) 0.9 %
10 SYRINGE (ML) INJECTION AS NEEDED
OUTPATIENT
Start: 2024-10-15

## 2024-10-15 RX ORDER — CLOPIDOGREL BISULFATE 75 MG/1
75 TABLET ORAL DAILY
OUTPATIENT
Start: 2024-10-16

## 2024-10-25 DIAGNOSIS — N52.9 ERECTILE DYSFUNCTION, UNSPECIFIED ERECTILE DYSFUNCTION TYPE: Primary | ICD-10-CM

## 2024-10-25 RX ORDER — TADALAFIL 20 MG/1
TABLET ORAL
Qty: 10 TABLET | Refills: 0 | Status: SHIPPED | OUTPATIENT
Start: 2024-10-25

## 2024-10-28 NOTE — PROGRESS NOTES
[facility] - PODIATRY    Today's Date: 10/30/2024     Patient Name: Rah Shin  MRN: [id number]  CSN: [id number]  PCP: Vanessa Schmidt APRN  Referring Provider: No ref. provider found    SUBJECTIVE     Chief Complaint   Patient presents with    Follow-up     Vanessa Schmidt APRN 03/29/2024 [facility]- pt states feet doing ok, did have his xray done- pt denies pain due to neuropathy    Diabetes     83mg/dl prior to coming to appt      HPI: Rah Shin, a 48 y.o.male, comes to clinic as a(n) established patient presenting for diabetic foot exam, complaining of foot pain, and complaining of toenail/callus issues. Patient has h/o ADD, Anemia, Anxiety, Bipolar 1, Depression, DM Type 2, GERD, HTN, Stroke, Neuropathy, . Patient is NIDDM with last stated BG level of 83mg/dl.   Patient reports numbness and tingling that extends down the leg. He states his toenails are elongated and thickened. He has difficulty caring for his nails due to thickness and neuropathy.  Patient reports he has seen neurosurgery and vascular surgery recently for back and leg issues. Reports he had a nerve conduction study completed and sees neurosurgery later today to discuss his testing.   Denies pain. Denies previous treatment. Denies any constitutional symptoms. No other pedal complaints at this time.      Past Medical History:   Diagnosis Date    ADD (attention deficit disorder)     Anemia     Anxiety     Arthritis     Asthma     Bipolar 1 disorder     Congenital heart disease     Depression     major depressive disorder    Diabetes mellitus     Difficulty walking 2021    Loss of balance/stroke    GERD (gastroesophageal reflux disease)     Headache     Hyperlipidemia 5/2024    My HDL was 127    Hypertension     Kidney stones     Mitral valve prolapse     MRSA (methicillin resistant Staphylococcus aureus)     Neuropathy in diabetes 2010    Peripheral neuropathy     Stroke     TIA  (transient ischemic attack)     Verruca 2020    One on toe on left foot, has not reappeared in over a year     Past Surgical History:   Procedure Laterality Date    CARDIAC CATHETERIZATION      COLONOSCOPY      ENDOSCOPY      INFECTED SKIN DEBRIDEMENT      MRSA in abdomen    TONSILLECTOMY      WISDOM TOOTH EXTRACTION       Family History   Problem Relation Age of Onset    Hyperlipidemia Mother     Hypertension Mother     Kidney disease Mother     Heart disease Mother     Clotting disorder Mother     Diabetes Mother     Liver disease Mother     Mental illness Mother     Hyperlipidemia Father      Social History     Socioeconomic History    Marital status: Single   Tobacco Use    Smoking status: Never     Passive exposure: Never    Smokeless tobacco: Never   Vaping Use    Vaping status: Never Used   Substance and Sexual Activity    Alcohol use: No    Drug use: Not Currently     Types: Methamphetamines     Comment: last used meth about one week ago    Sexual activity: Not Currently     Partners: Male     Allergies   Allergen Reactions    Contrast Dye (Echo Or Unknown Ct/Mr) Hives     Current Outpatient Medications   Medication Sig Dispense Refill    amphetamine-dextroamphetamine (ADDERALL) 10 MG tablet Take 1 tablet by mouth 2 (Two) Times a Day.      aspirin 81 MG chewable tablet Chew 1 tablet Daily.      atorvastatin (LIPITOR) 20 MG tablet Take 1 tablet by mouth Daily. 30 tablet 11    fluticasone (FLONASE) 50 MCG/ACT nasal spray instill ONE SPRAY in each nostril ONCE daily      Januvia 50 MG tablet       lamoTRIgine (LaMICtal) 200 MG tablet       metFORMIN (Glucophage) 1000 MG tablet Take 1 tablet by mouth 2 (Two) Times a Day With Meals. 180 tablet 3    OneTouch Ultra test strip 2 (Two) Times a Day. use for testing      tadalafil (CIALIS) 20 MG tablet TAKE 1 TABLET BY MOUTH EVERY DAY PRIOR TO SEXUAL ACTIVITY AS NEEDED 10 tablet 0    Vitamin D, Cholecalciferol, (CHOLECALCIFEROL) 10 MCG (400 UNIT) tablet Take 1 tablet  by mouth Daily.      amphetamine-dextroamphetamine XR (ADDERALL XR) 10 MG 24 hr capsule Take 1 capsule by mouth Every Morning      metoprolol tartrate (LOPRESSOR) 50 MG tablet Take 50 mg at Bedtime the Night Before Coronary CTA Appointment and In the Morning 1 Hour Prior to Coronary CTA Appointment 2 tablet 0    predniSONE (DELTASONE) 50 MG tablet Take 1 tablet by mouth Take As Directed for 3 doses. Take 1 tablet (50mg) 13 Hours 7 Hours & 1 Hour Prior to Exam 3 tablet 0    predniSONE (DELTASONE) 50 MG tablet Take 1 tablet by mouth Take As Directed for 3 doses. Take 1 tablet (50mg) 13 Hours 7 Hours & 1 Hour Prior to Exam 3 tablet 0     No current facility-administered medications for this visit.     Review of Systems   Constitutional:  Negative for activity change and fever.   HENT:  Negative for congestion.    Respiratory:  Negative for shortness of breath.    Cardiovascular:  Negative for chest pain and leg swelling.   Gastrointestinal:  Negative for abdominal pain, constipation, diarrhea, nausea and vomiting.   Musculoskeletal:  Positive for arthralgias. Negative for gait problem.   Skin:  Negative for color change and wound.        Elongated thickened toenails   Neurological:  Positive for numbness. Negative for dizziness and weakness.   Hematological:  Does not bruise/bleed easily.   Psychiatric/Behavioral:  Negative for agitation, behavioral problems and confusion.        OBJECTIVE     Vitals:    10/30/24 1311   BP: 132/76   Pulse: 72   SpO2: 97%         PHYSICAL EXAM  GEN:   Accompanied by none.     Foot/Ankle Exam    GENERAL  Appearance:  appears stated age  Orientation:  AAOx3  Affect:  appropriate  Gait:  unimpaired  Assistance:  independent  Right shoe gear: casual shoe  Left shoe gear: casual shoe    VASCULAR     Right Foot Vascularity   Dorsalis pedis:  2+  Posterior tibial:  2+  Skin temperature:  cool  Edema grading:  None  CFT:  3  Pedal hair growth:  Absent     Left Foot Vascularity   Dorsalis pedis:   2+  Posterior tibial:  2+  Skin temperature:  cool  Edema grading:  None  CFT:  3  Pedal hair growth:  Absent     NEUROLOGIC     Right Foot Neurologic   Light touch sensation: diminished  Vibratory sensation: diminished  Hot/Cold sensation: diminished  Protective Sensation using [place] Monofilament:   Sites intact: 10  Sites tested: 10     Left Foot Neurologic   Light touch sensation: diminished  Vibratory sensation: diminished  Hot/Cold sensation:  diminished  Protective Sensation using [place]endoza Monofilament:   Sites intact: 9  Sites tested: 10    MUSCULOSKELETAL     Right Foot Musculoskeletal   Ecchymosis:  none  Tenderness:  toenail problem    Arch:  Normal     Left Foot Musculoskeletal   Ecchymosis:  none  Tenderness:  toenail problem  Arch:  Normal    MUSCLE STRENGTH     Right Foot Muscle Strength   Foot dorsiflexion:  5  Foot plantar flexion:  5  Foot inversion:  5  Foot eversion:  5     Left Foot Muscle Strength   Foot dorsiflexion:  5  Foot plantar flexion:  5  Foot inversion:  5  Foot eversion:  5    RANGE OF MOTION     Right Foot Range of Motion   Foot and ankle ROM within normal limits       Left Foot Range of Motion   Foot and ankle ROM within normal limits      DERMATOLOGIC      Right Foot Dermatologic   Skin  Right foot skin is intact.   Nails  1.  Positive for elongated and abnormal thickness.  2.  Positive for elongated and abnormal thickness.  3.  Positive for elongated and abnormal thickness.  4.  Positive for elongated and abnormal thickness.  5.  Positive for elongated and abnormal thickness.     Left Foot Dermatologic   Skin  Left foot skin is intact.   Nails  1.  Positive for elongated and abnormal thickness.  2.  Positive for elongated and abnormal thickness.  3.  Positive for elongated and abnormal thickness.  4.  Positive for elongated and abnormally thick.  5.  Positive for elongated and abnormally thick.    Image:       RADIOLOGY/NUCLEAR:  No results  found.    LABORATORY/CULTURE RESULTS:      PATHOLOGY RESULTS:       ASSESSMENT/PLAN     Diagnoses and all orders for this visit:    1. Thickened nails (Primary)    2. Type 2 diabetes mellitus with diabetic polyneuropathy, without long-term current use of insulin    3. Bilateral foot pain    4. History of CVA (cerebrovascular accident)    5. Foot callus [L84]      Comprehensive lower extremity examination and evaluation was performed.  Discussed findings and treatment plan including risks, benefits, and treatment options with patient in detail. Patient agreed with treatment plan.  Discussed with patient that neuropathy can be due to low back issues and/or uncontrolled diabetes from years ago.   After verbal consent obtained, nail(s) x10 debrided of length and thickness with nail nipper without incidence  After verbal consent obtained, calluses x2 pared utilizing dermal curette and/or scalpel without incidence  Patient may maintain nails and calluses at home utilizing emery board or pumice stone between visits as needed  Reviewed at home diabetic foot care including daily foot checks   Continue to work with PCP for glucose control and diabetic shoe order.  An After Visit Summary was printed and given to the patient at discharge, including (if requested) any available informative/educational handouts regarding diagnosis, treatment, or medications. All questions were answered to patient/family satisfaction. Should symptoms fail to improve or worsen they agree to call or return to clinic or to go to the Emergency Department. Discussed the importance of following up with any needed screening tests/labs/specialist appointments and any requested follow-up recommended by me today. Importance of maintaining follow-up discussed and patient accepts that missed appointments can delay diagnosis and potentially lead to worsening of conditions.  Return in about 9 weeks (around 1/1/2025) for [facility], Follow-up with  Podiatry APRN., or sooner if acute issues arise.      This document has been electronically signed by CARINA Mccall on October 30, 2024 13:54 CDT

## 2024-10-30 ENCOUNTER — OFFICE VISIT (OUTPATIENT)
Age: 49
End: 2024-10-30
Payer: MEDICARE

## 2024-10-30 ENCOUNTER — OFFICE VISIT (OUTPATIENT)
Dept: NEUROSURGERY | Facility: CLINIC | Age: 49
End: 2024-10-30
Payer: MEDICARE

## 2024-10-30 VITALS — BODY MASS INDEX: 25.55 KG/M2 | HEIGHT: 66 IN | WEIGHT: 159 LBS

## 2024-10-30 VITALS
OXYGEN SATURATION: 97 % | DIASTOLIC BLOOD PRESSURE: 76 MMHG | SYSTOLIC BLOOD PRESSURE: 132 MMHG | WEIGHT: 158 LBS | BODY MASS INDEX: 25.39 KG/M2 | HEART RATE: 72 BPM | HEIGHT: 66 IN

## 2024-10-30 DIAGNOSIS — M79.671 BILATERAL FOOT PAIN: ICD-10-CM

## 2024-10-30 DIAGNOSIS — E66.3 OVERWEIGHT WITH BODY MASS INDEX (BMI) OF 26 TO 26.9 IN ADULT: ICD-10-CM

## 2024-10-30 DIAGNOSIS — E11.42 TYPE 2 DIABETES MELLITUS WITH DIABETIC POLYNEUROPATHY, WITHOUT LONG-TERM CURRENT USE OF INSULIN: ICD-10-CM

## 2024-10-30 DIAGNOSIS — G60.9 HEREDITARY AND IDIOPATHIC PERIPHERAL NEUROPATHY: ICD-10-CM

## 2024-10-30 DIAGNOSIS — L84 FOOT CALLUS: ICD-10-CM

## 2024-10-30 DIAGNOSIS — M54.12 CERVICAL RADICULOPATHY: Primary | ICD-10-CM

## 2024-10-30 DIAGNOSIS — M50.30 DDD (DEGENERATIVE DISC DISEASE), CERVICAL: ICD-10-CM

## 2024-10-30 DIAGNOSIS — M79.672 BILATERAL FOOT PAIN: ICD-10-CM

## 2024-10-30 DIAGNOSIS — Z86.73 HISTORY OF CVA (CEREBROVASCULAR ACCIDENT): ICD-10-CM

## 2024-10-30 DIAGNOSIS — Z78.9 NONSMOKER: ICD-10-CM

## 2024-10-30 DIAGNOSIS — L60.2 THICKENED NAILS: Primary | ICD-10-CM

## 2024-10-30 RX ORDER — DEXTROAMPHETAMINE SACCHARATE, AMPHETAMINE ASPARTATE, DEXTROAMPHETAMINE SULFATE AND AMPHETAMINE SULFATE 2.5; 2.5; 2.5; 2.5 MG/1; MG/1; MG/1; MG/1
10 TABLET ORAL 2 TIMES DAILY
COMMUNITY

## 2024-10-30 RX ORDER — DEXTROAMPHETAMINE SACCHARATE, AMPHETAMINE ASPARTATE, DEXTROAMPHETAMINE SULFATE, AND AMPHETAMINE SULFATE 5; 5; 5; 5 MG/1; MG/1; MG/1; MG/1
TABLET ORAL
COMMUNITY
Start: 2024-09-23

## 2024-10-30 NOTE — PROGRESS NOTES
"    Chief complaint:   Chief Complaint   Patient presents with    Follow-up     Pt reports to office for follow up states he was unable to complete PT. Stated he had some questions about EMG scan         Subjective     HPI: Rah comes in today for recheck.  He was unable to finish physical therapy and requests a new order for cervical PT. he continues with squeezing sensation and paresthesias affecting his right upper extremity as well as the right lower extremity as well as spasms in his feet that intensify at night.  He has paresthesias affecting both dorsal and plantar surfaces of both feet.  He has some persistent right-sided neck pain with radiation into the trapezius and parascapular region that worsens with activity and when he lays down at night.  He has noticed bilateral  weakness early in the morning.  He has not fallen.  He had nerve conduction studies completed of bilateral lower extremities.    He is scheduled for upcoming PFO with atrial septal aneurysm repair, Dr. Anthony.    Review of Systems      Objective      Vital Signs  Ht 167 cm (65.75\")   Wt 72.1 kg (159 lb)   BMI 25.86 kg/m²     Physical Exam  Constitutional:       Appearance: Normal appearance. He is well-developed.   HENT:      Head: Normocephalic.   Eyes:      Pupils: Pupils are equal, round, and reactive to light.   Cardiovascular:      Rate and Rhythm: Normal rate.   Pulmonary:      Effort: Pulmonary effort is normal.   Musculoskeletal:         General: Normal range of motion.      Cervical back: Normal range of motion.   Skin:     General: Skin is warm and dry.   Neurological:      Mental Status: He is oriented to person, place, and time.      GCS: GCS eye subscore is 4. GCS verbal subscore is 5. GCS motor subscore is 6.      Cranial Nerves: No cranial nerve deficit.      Sensory: Sensory deficit present.      Motor: Motor strength is normal.No weakness.      Coordination: Coordination normal.      Gait: Gait normal.      Deep " Tendon Reflexes: Reflexes are normal and symmetric. Reflexes normal.   Psychiatric:         Speech: Speech normal.         Behavior: Behavior normal.         Thought Content: Thought content normal.         Neurological Exam  Mental Status  Awake, alert and oriented to person, place and time. Oriented to person, place, and time. Speech is normal.    Cranial Nerves  CN III, IV, VI: Pupils equal round and reactive to light bilaterally.    Motor  Normal muscle bulk throughout. Normal muscle tone. Strength is 5/5 throughout all four extremities.  No focal motor deficits.    Sensory  Decreased sensation to light touch dorsal and plantar surfaces of both feet..    Reflexes  Deep tendon reflexes are 2+ and symmetric in all four extremities.    Gait   Normal gait. Tandem gait abnormality:  Unstable.       Imaging review: Previous cervical films including flexion-extension were reviewed and demonstrate moderate disc degeneration C5-6 and C6-7.  No anterior instability or acute findings.    Nerve conduction studies bilateral lower extremities show peripheral polyneuropathic process.      Assessment/Plan: I reviewed images in detail with Rah.  He has disc degeneration C5-6 and C6-7 and continues with right-sided neck pain with radiation to the trapezius and parascapular region with pain and paresthesias extending down his right arm.  He is neurologically stable.    For first line of conservative treatment for cervical pain, I recommend 6 weeks of cervical physical therapy 2-3 times a week for total of 6 weeks.    If he fails to get any symptom improvement with physical therapy, we will proceed with MRI of the cervical spine to see if there is any significant stenosis that needs addressed surgically.    We discussed nerve conduction studies in detail.  He states he has tried gabapentin in the past and did not really find it effective.  He has an upcoming appointment with neurology and he plans to discuss further at that  appointment.    He will follow-up with me after he completes physical therapy.  He can call for sooner appointment if he has any issues or concerns before next appointment.    Patient is a nonsmoker    The patient's Body mass index is 25.86 kg/m².. BMI is above normal parameters. Recommendations include: educational material    Diagnoses and all orders for this visit:    1. Cervical radiculopathy (Primary)  -     Ambulatory Referral to Physical Therapy for Evaluation & Treatment    2. DDD (degenerative disc disease), cervical  -     Ambulatory Referral to Physical Therapy for Evaluation & Treatment    3. Hereditary and idiopathic peripheral neuropathy    4. Nonsmoker    5. Overweight with body mass index (BMI) of 26 to 26.9 in adult    I spent 39 minutes caring for Rah on this date of service. This time includes time spent by me in the following activities: preparing for the visit, reviewing tests, obtaining and/or reviewing a separately obtained history, performing a medically appropriate examination and/or evaluation, counseling and educating the patient/family/caregiver, ordering medications, tests, or procedures, referring and communicating with other health care professionals, documenting information in the medical record, independently interpreting results and communicating that information with the patient/family/caregiver, and care coordination.      I discussed the patients findings and my recommendations with patient  Ramírez Leon PA-C  10/30/24  15:05 CDT

## 2024-10-30 NOTE — PATIENT INSTRUCTIONS
Follow-up after completion of PT  Keep follow-up appointment with neurology.  Call the office for sooner appointment if you have any issues or concerns.

## 2024-11-01 ENCOUNTER — TELEPHONE (OUTPATIENT)
Dept: CARDIOLOGY | Facility: CLINIC | Age: 49
End: 2024-11-01
Payer: MEDICARE

## 2024-11-01 NOTE — TELEPHONE ENCOUNTER
Alo Anthony MD  You5 minutes ago (4:01 PM)       We won't use any contrast for that, so pre-treatment with prednisone is not necessary this time       Notified patient. He voiced appreciation. Candy Price MA

## 2024-11-01 NOTE — TELEPHONE ENCOUNTER
Patient told Kym he needs prednisone sent to [facility] on [address] (for the 11/20 PFO closure). Thank you. Candy Price MA

## 2024-11-04 DIAGNOSIS — N52.9 ERECTILE DYSFUNCTION, UNSPECIFIED ERECTILE DYSFUNCTION TYPE: ICD-10-CM

## 2024-11-04 RX ORDER — TADALAFIL 20 MG/1
TABLET ORAL
Qty: 10 TABLET | Refills: 0 | Status: SHIPPED | OUTPATIENT
Start: 2024-11-04 | End: 2024-11-11

## 2024-11-11 DIAGNOSIS — N52.9 ERECTILE DYSFUNCTION, UNSPECIFIED ERECTILE DYSFUNCTION TYPE: ICD-10-CM

## 2024-11-11 RX ORDER — TADALAFIL 20 MG/1
TABLET ORAL
Qty: 10 TABLET | Refills: 0 | Status: SHIPPED | OUTPATIENT
Start: 2024-11-11

## 2024-11-20 ENCOUNTER — APPOINTMENT (OUTPATIENT)
Dept: CARDIOLOGY | Facility: HOSPITAL | Age: 49
End: 2024-11-20
Payer: MEDICARE

## 2024-11-20 ENCOUNTER — HOSPITAL ENCOUNTER (OUTPATIENT)
Facility: HOSPITAL | Age: 49
Discharge: HOME | End: 2024-11-21
Attending: INTERNAL MEDICINE | Admitting: INTERNAL MEDICINE
Payer: MEDICARE

## 2024-11-20 DIAGNOSIS — Z86.73 HISTORY OF CVA (CEREBROVASCULAR ACCIDENT): ICD-10-CM

## 2024-11-20 DIAGNOSIS — Q21.12 PFO WITH ATRIAL SEPTAL ANEURYSM: ICD-10-CM

## 2024-11-20 DIAGNOSIS — I25.3 PFO WITH ATRIAL SEPTAL ANEURYSM: ICD-10-CM

## 2024-11-20 LAB
ANION GAP SERPL CALCULATED.3IONS-SCNC: 12 MMOL/L (ref 5–15)
BUN SERPL-MCNC: 9 MG/DL (ref 6–20)
BUN/CREAT SERPL: 11.4 (ref 7–25)
CALCIUM SPEC-SCNC: 9.1 MG/DL (ref 8.6–10.5)
CHLORIDE SERPL-SCNC: 102 MMOL/L (ref 98–107)
CO2 SERPL-SCNC: 25 MMOL/L (ref 22–29)
CREAT SERPL-MCNC: 0.79 MG/DL (ref 0.76–1.27)
DEPRECATED RDW RBC AUTO: 39 FL (ref 37–54)
EGFRCR SERPLBLD CKD-EPI 2021: 108.2 ML/MIN/1.73
ERYTHROCYTE [DISTWIDTH] IN BLOOD BY AUTOMATED COUNT: 12.1 % (ref 12.3–15.4)
GLUCOSE SERPL-MCNC: 189 MG/DL (ref 65–99)
HCT VFR BLD AUTO: 37.9 % (ref 37.5–51)
HGB BLD-MCNC: 13.3 G/DL (ref 13–17.7)
MCH RBC QN AUTO: 30.9 PG (ref 26.6–33)
MCHC RBC AUTO-ENTMCNC: 35.1 G/DL (ref 31.5–35.7)
MCV RBC AUTO: 88.1 FL (ref 79–97)
PLATELET # BLD AUTO: 221 10*3/MM3 (ref 140–450)
PMV BLD AUTO: 8.8 FL (ref 6–12)
POTASSIUM SERPL-SCNC: 4.4 MMOL/L (ref 3.5–5.2)
RBC # BLD AUTO: 4.3 10*6/MM3 (ref 4.14–5.8)
SODIUM SERPL-SCNC: 139 MMOL/L (ref 136–145)
WBC NRBC COR # BLD AUTO: 5.01 10*3/MM3 (ref 3.4–10.8)

## 2024-11-20 PROCEDURE — C1894 INTRO/SHEATH, NON-LASER: HCPCS | Performed by: INTERNAL MEDICINE

## 2024-11-20 PROCEDURE — 80048 BASIC METABOLIC PNL TOTAL CA: CPT | Performed by: INTERNAL MEDICINE

## 2024-11-20 PROCEDURE — 93580 TRANSCATH CLOSURE OF ASD: CPT | Performed by: INTERNAL MEDICINE

## 2024-11-20 PROCEDURE — 85347 COAGULATION TIME ACTIVATED: CPT

## 2024-11-20 PROCEDURE — 25010000002 DIPHENHYDRAMINE PER 50 MG: Performed by: INTERNAL MEDICINE

## 2024-11-20 PROCEDURE — 25010000002 MIDAZOLAM HCL (PF) 5 MG/5ML SOLUTION: Performed by: INTERNAL MEDICINE

## 2024-11-20 PROCEDURE — 93325 DOPPLER ECHO COLOR FLOW MAPG: CPT | Performed by: INTERNAL MEDICINE

## 2024-11-20 PROCEDURE — 25010000002 HEPARIN (PORCINE) PER 1000 UNITS: Performed by: INTERNAL MEDICINE

## 2024-11-20 PROCEDURE — C1759 CATH, INTRA ECHOCARDIOGRAPHY: HCPCS | Performed by: INTERNAL MEDICINE

## 2024-11-20 PROCEDURE — 99152 MOD SED SAME PHYS/QHP 5/>YRS: CPT | Performed by: INTERNAL MEDICINE

## 2024-11-20 PROCEDURE — 76937 US GUIDE VASCULAR ACCESS: CPT | Performed by: INTERNAL MEDICINE

## 2024-11-20 PROCEDURE — C1769 GUIDE WIRE: HCPCS | Performed by: INTERNAL MEDICINE

## 2024-11-20 PROCEDURE — 25010000002 FENTANYL CITRATE (PF) 50 MCG/ML SOLUTION: Performed by: INTERNAL MEDICINE

## 2024-11-20 PROCEDURE — C1817 SEPTAL DEFECT IMP SYS: HCPCS | Performed by: INTERNAL MEDICINE

## 2024-11-20 PROCEDURE — 25010000002 HEPARIN (PORCINE) 2000-0.9 UNIT/L-% SOLUTION: Performed by: INTERNAL MEDICINE

## 2024-11-20 PROCEDURE — 93308 TTE F-UP OR LMTD: CPT

## 2024-11-20 PROCEDURE — C1887 CATHETER, GUIDING: HCPCS | Performed by: INTERNAL MEDICINE

## 2024-11-20 PROCEDURE — 25010000002 CEFAZOLIN PER 500 MG: Performed by: INTERNAL MEDICINE

## 2024-11-20 PROCEDURE — 25010000002 LIDOCAINE 2% SOLUTION: Performed by: INTERNAL MEDICINE

## 2024-11-20 PROCEDURE — 93662 INTRACARDIAC ECG (ICE): CPT | Performed by: INTERNAL MEDICINE

## 2024-11-20 PROCEDURE — 93321 DOPPLER ECHO F-UP/LMTD STD: CPT

## 2024-11-20 PROCEDURE — 99153 MOD SED SAME PHYS/QHP EA: CPT | Performed by: INTERNAL MEDICINE

## 2024-11-20 PROCEDURE — 93308 TTE F-UP OR LMTD: CPT | Performed by: INTERNAL MEDICINE

## 2024-11-20 PROCEDURE — 85027 COMPLETE CBC AUTOMATED: CPT | Performed by: INTERNAL MEDICINE

## 2024-11-20 PROCEDURE — 93325 DOPPLER ECHO COLOR FLOW MAPG: CPT

## 2024-11-20 DEVICE — OCCL PFO AMPLATZER TALISMAN CRV/45DEG 8F 25MM: Type: IMPLANTABLE DEVICE | Site: HEART | Status: FUNCTIONAL

## 2024-11-20 RX ORDER — DIPHENHYDRAMINE HYDROCHLORIDE 50 MG/ML
50 INJECTION INTRAMUSCULAR; INTRAVENOUS
Status: DISCONTINUED | OUTPATIENT
Start: 2024-11-20 | End: 2024-11-20

## 2024-11-20 RX ORDER — ASPIRIN 81 MG/1
324 TABLET, CHEWABLE ORAL ONCE
Status: COMPLETED | OUTPATIENT
Start: 2024-11-20 | End: 2024-11-20

## 2024-11-20 RX ORDER — TADALAFIL 20 MG/1
20 TABLET ORAL DAILY PRN
COMMUNITY

## 2024-11-20 RX ORDER — CLOPIDOGREL BISULFATE 75 MG/1
75 TABLET ORAL DAILY
Status: DISCONTINUED | OUTPATIENT
Start: 2024-11-21 | End: 2024-11-21 | Stop reason: HOSPADM

## 2024-11-20 RX ORDER — LAMOTRIGINE 200 MG/1
200 TABLET ORAL DAILY
COMMUNITY

## 2024-11-20 RX ORDER — NITROGLYCERIN 0.4 MG/1
0.4 TABLET SUBLINGUAL
Status: DISCONTINUED | OUTPATIENT
Start: 2024-11-20 | End: 2024-11-21 | Stop reason: HOSPADM

## 2024-11-20 RX ORDER — SODIUM CHLORIDE 0.9 % (FLUSH) 0.9 %
3 SYRINGE (ML) INJECTION EVERY 12 HOURS SCHEDULED
Status: DISCONTINUED | OUTPATIENT
Start: 2024-11-20 | End: 2024-11-20

## 2024-11-20 RX ORDER — DIPHENHYDRAMINE HYDROCHLORIDE 50 MG/ML
INJECTION INTRAMUSCULAR; INTRAVENOUS
Status: DISCONTINUED | OUTPATIENT
Start: 2024-11-20 | End: 2024-11-20 | Stop reason: HOSPADM

## 2024-11-20 RX ORDER — ASPIRIN 81 MG/1
81 TABLET ORAL DAILY
Status: DISCONTINUED | OUTPATIENT
Start: 2024-11-21 | End: 2024-11-21 | Stop reason: HOSPADM

## 2024-11-20 RX ORDER — LAMOTRIGINE 100 MG/1
200 TABLET ORAL DAILY
Status: DISCONTINUED | OUTPATIENT
Start: 2024-11-21 | End: 2024-11-21 | Stop reason: HOSPADM

## 2024-11-20 RX ORDER — HEPARIN SODIUM 200 [USP'U]/100ML
INJECTION, SOLUTION INTRAVENOUS
Status: DISCONTINUED | OUTPATIENT
Start: 2024-11-20 | End: 2024-11-20 | Stop reason: HOSPADM

## 2024-11-20 RX ORDER — CLOPIDOGREL BISULFATE 75 MG/1
75 TABLET ORAL DAILY
Status: DISCONTINUED | OUTPATIENT
Start: 2024-11-21 | End: 2024-11-20

## 2024-11-20 RX ORDER — ASPIRIN 81 MG/1
TABLET, CHEWABLE ORAL
Status: COMPLETED
Start: 2024-11-20 | End: 2024-11-21

## 2024-11-20 RX ORDER — DEXTROAMPHETAMINE SACCHARATE, AMPHETAMINE ASPARTATE, DEXTROAMPHETAMINE SULFATE AND AMPHETAMINE SULFATE 5; 5; 5; 5 MG/1; MG/1; MG/1; MG/1
20 TABLET ORAL DAILY
COMMUNITY

## 2024-11-20 RX ORDER — ATORVASTATIN CALCIUM 10 MG/1
20 TABLET, FILM COATED ORAL NIGHTLY
Status: DISCONTINUED | OUTPATIENT
Start: 2024-11-20 | End: 2024-11-21 | Stop reason: HOSPADM

## 2024-11-20 RX ORDER — LORATADINE 10 MG/1
10 CAPSULE, LIQUID FILLED ORAL DAILY
Status: ON HOLD | COMMUNITY
End: 2024-11-20

## 2024-11-20 RX ORDER — ACETAMINOPHEN 325 MG/1
650 TABLET ORAL EVERY 4 HOURS PRN
Status: DISCONTINUED | OUTPATIENT
Start: 2024-11-20 | End: 2024-11-21 | Stop reason: HOSPADM

## 2024-11-20 RX ORDER — FENTANYL CITRATE 50 UG/ML
INJECTION, SOLUTION INTRAMUSCULAR; INTRAVENOUS
Status: DISCONTINUED | OUTPATIENT
Start: 2024-11-20 | End: 2024-11-20 | Stop reason: HOSPADM

## 2024-11-20 RX ORDER — CLOPIDOGREL BISULFATE 75 MG/1
TABLET ORAL
Status: DISPENSED
Start: 2024-11-20 | End: 2024-11-20

## 2024-11-20 RX ORDER — HEPARIN SODIUM 5000 [USP'U]/ML
INJECTION, SOLUTION INTRAVENOUS; SUBCUTANEOUS
Status: DISCONTINUED | OUTPATIENT
Start: 2024-11-20 | End: 2024-11-20 | Stop reason: HOSPADM

## 2024-11-20 RX ORDER — DIPHENHYDRAMINE HCL 50 MG
50 CAPSULE ORAL
Status: DISCONTINUED | OUTPATIENT
Start: 2024-11-20 | End: 2024-11-20

## 2024-11-20 RX ORDER — LIDOCAINE HYDROCHLORIDE 20 MG/ML
INJECTION, SOLUTION INFILTRATION; PERINEURAL
Status: DISCONTINUED | OUTPATIENT
Start: 2024-11-20 | End: 2024-11-20 | Stop reason: HOSPADM

## 2024-11-20 RX ORDER — CLOPIDOGREL BISULFATE 75 MG/1
300 TABLET ORAL ONCE
Status: COMPLETED | OUTPATIENT
Start: 2024-11-20 | End: 2024-11-20

## 2024-11-20 RX ORDER — SODIUM CHLORIDE 0.9 % (FLUSH) 0.9 %
10 SYRINGE (ML) INJECTION AS NEEDED
Status: DISCONTINUED | OUTPATIENT
Start: 2024-11-20 | End: 2024-11-20

## 2024-11-20 RX ORDER — SODIUM CHLORIDE 9 MG/ML
INJECTION, SOLUTION INTRAVENOUS
Status: DISPENSED
Start: 2024-11-20 | End: 2024-11-20

## 2024-11-20 RX ORDER — LORATADINE 10 MG/1
10 TABLET ORAL DAILY
COMMUNITY

## 2024-11-20 RX ORDER — MIDAZOLAM HYDROCHLORIDE 5 MG/5ML
INJECTION, SOLUTION INTRAMUSCULAR; INTRAVENOUS
Status: DISCONTINUED | OUTPATIENT
Start: 2024-11-20 | End: 2024-11-20 | Stop reason: HOSPADM

## 2024-11-20 RX ORDER — FLUTICASONE PROPIONATE 50 MCG
1 SPRAY, SUSPENSION (ML) NASAL DAILY
COMMUNITY

## 2024-11-20 RX ADMIN — ASPIRIN 243 MG: 81 TABLET, CHEWABLE ORAL at 08:26

## 2024-11-20 RX ADMIN — ATORVASTATIN CALCIUM 20 MG: 10 TABLET, FILM COATED ORAL at 21:02

## 2024-11-20 RX ADMIN — ACETAMINOPHEN 650 MG: 325 TABLET ORAL at 16:19

## 2024-11-20 RX ADMIN — CLOPIDOGREL BISULFATE 300 MG: 75 TABLET ORAL at 08:26

## 2024-11-20 NOTE — Clinical Note
Hemostasis started on the right femoral vein. Figure 8 suturing was used in achieving hemostasis. Closure device deployed in the vessel and manual pressure applied to vessel. Manual pressure was held by Aaron. Manual pressure was held for 5 min. Hemostasis achieved successfully.

## 2024-11-20 NOTE — PLAN OF CARE
Goal Outcome Evaluation:  Plan of Care Reviewed With: patient        Progress: improving  Outcome Evaluation: Patient admitted to  [facility] from [facility]. PFO closure today. Right groin c/d/i, PPP. Bedrest until 1630. No c/o pain. Sinus 65-96.

## 2024-11-20 NOTE — PROCEDURES
"Please see procedural report under \"results\" tab.    No complications noted.  PFO confirmed by intracardiac echocardiography.  Successful closure of PFO with 25 mm [employer]. Terry Talisman occluder.  Patient tolerated procedure well.    Electronically signed by Alo Anthony MD, 11/20/24, 11:23 AM CST.      "

## 2024-11-21 ENCOUNTER — APPOINTMENT (OUTPATIENT)
Dept: GENERAL RADIOLOGY | Facility: HOSPITAL | Age: 49
End: 2024-11-21
Payer: MEDICARE

## 2024-11-21 VITALS
SYSTOLIC BLOOD PRESSURE: 113 MMHG | BODY MASS INDEX: 25.79 KG/M2 | OXYGEN SATURATION: 96 % | RESPIRATION RATE: 18 BRPM | HEART RATE: 71 BPM | HEIGHT: 65 IN | DIASTOLIC BLOOD PRESSURE: 67 MMHG | WEIGHT: 154.8 LBS | TEMPERATURE: 98.3 F

## 2024-11-21 DIAGNOSIS — Q21.12 PFO WITH ATRIAL SEPTAL ANEURYSM: ICD-10-CM

## 2024-11-21 DIAGNOSIS — Z86.73 HISTORY OF CVA (CEREBROVASCULAR ACCIDENT): ICD-10-CM

## 2024-11-21 DIAGNOSIS — I25.3 PFO WITH ATRIAL SEPTAL ANEURYSM: ICD-10-CM

## 2024-11-21 LAB
ACT BLD: 232 SECONDS (ref 82–152)
ANION GAP SERPL CALCULATED.3IONS-SCNC: 11 MMOL/L (ref 5–15)
BH CV ECHO MEAS - EDV(MOD-SP4): 79.8 ML
BH CV ECHO MEAS - ESV(MOD-SP4): 40.9 ML
BH CV ECHO MEAS - LV DIASTOLIC VOL/BSA (35-75): 45.1 CM2
BH CV ECHO MEAS - LV SYSTOLIC VOL/BSA (12-30): 23.1 CM2
BH CV ECHO MEAS - SV(MOD-SP4): 38.9 ML
BH CV ECHO MEAS - SVI(MOD-SP4): 22 ML/M2
BUN SERPL-MCNC: 13 MG/DL (ref 6–20)
BUN/CREAT SERPL: 14.9 (ref 7–25)
CALCIUM SPEC-SCNC: 8.9 MG/DL (ref 8.6–10.5)
CHLORIDE SERPL-SCNC: 101 MMOL/L (ref 98–107)
CHOLEST SERPL-MCNC: 132 MG/DL (ref 0–200)
CO2 SERPL-SCNC: 25 MMOL/L (ref 22–29)
CREAT SERPL-MCNC: 0.87 MG/DL (ref 0.76–1.27)
DEPRECATED RDW RBC AUTO: 40.2 FL (ref 37–54)
EGFRCR SERPLBLD CKD-EPI 2021: 105.1 ML/MIN/1.73
ERYTHROCYTE [DISTWIDTH] IN BLOOD BY AUTOMATED COUNT: 12.2 % (ref 12.3–15.4)
GLUCOSE SERPL-MCNC: 157 MG/DL (ref 65–99)
HCT VFR BLD AUTO: 38.4 % (ref 37.5–51)
HDLC SERPL-MCNC: 38 MG/DL (ref 40–60)
HGB BLD-MCNC: 13 G/DL (ref 13–17.7)
LDLC SERPL CALC-MCNC: 62 MG/DL (ref 0–100)
LDLC/HDLC SERPL: 1.45 {RATIO}
MCH RBC QN AUTO: 30.7 PG (ref 26.6–33)
MCHC RBC AUTO-ENTMCNC: 33.9 G/DL (ref 31.5–35.7)
MCV RBC AUTO: 90.8 FL (ref 79–97)
PLATELET # BLD AUTO: 204 10*3/MM3 (ref 140–450)
PMV BLD AUTO: 9.1 FL (ref 6–12)
POTASSIUM SERPL-SCNC: 4.2 MMOL/L (ref 3.5–5.2)
RBC # BLD AUTO: 4.23 10*6/MM3 (ref 4.14–5.8)
SODIUM SERPL-SCNC: 137 MMOL/L (ref 136–145)
TRIGL SERPL-MCNC: 195 MG/DL (ref 0–150)
VLDLC SERPL-MCNC: 32 MG/DL (ref 5–40)
WBC NRBC COR # BLD AUTO: 6.27 10*3/MM3 (ref 3.4–10.8)

## 2024-11-21 PROCEDURE — 85027 COMPLETE CBC AUTOMATED: CPT | Performed by: INTERNAL MEDICINE

## 2024-11-21 PROCEDURE — 71046 X-RAY EXAM CHEST 2 VIEWS: CPT

## 2024-11-21 PROCEDURE — 80061 LIPID PANEL: CPT | Performed by: INTERNAL MEDICINE

## 2024-11-21 PROCEDURE — 99239 HOSP IP/OBS DSCHRG MGMT >30: CPT | Performed by: NURSE PRACTITIONER

## 2024-11-21 PROCEDURE — 80048 BASIC METABOLIC PNL TOTAL CA: CPT | Performed by: INTERNAL MEDICINE

## 2024-11-21 RX ORDER — CLOPIDOGREL BISULFATE 75 MG/1
75 TABLET ORAL DAILY
Qty: 30 TABLET | Refills: 2 | Status: SHIPPED | OUTPATIENT
Start: 2024-11-22 | End: 2024-11-21 | Stop reason: SDUPTHER

## 2024-11-21 RX ORDER — CLOPIDOGREL BISULFATE 75 MG/1
75 TABLET ORAL DAILY
Qty: 30 TABLET | Refills: 2 | Status: SHIPPED | OUTPATIENT
Start: 2024-11-22

## 2024-11-21 RX ORDER — ASPIRIN 81 MG/1
81 TABLET ORAL DAILY
Qty: 30 TABLET | Refills: 11 | Status: SHIPPED | OUTPATIENT
Start: 2024-11-22

## 2024-11-21 RX ADMIN — ASPIRIN 81 MG: 81 TABLET ORAL at 08:44

## 2024-11-21 RX ADMIN — ASPIRIN 81 MG: 81 TABLET, COATED ORAL at 08:44

## 2024-11-21 RX ADMIN — LAMOTRIGINE 200 MG: 100 TABLET ORAL at 08:44

## 2024-11-21 RX ADMIN — ACETAMINOPHEN 650 MG: 325 TABLET ORAL at 01:33

## 2024-11-21 RX ADMIN — CLOPIDOGREL BISULFATE 75 MG: 75 TABLET ORAL at 08:44

## 2024-11-21 NOTE — DISCHARGE SUMMARY
"[facility] HEART GROUP DISCHARGE    Date of Discharge:  11/21/2024    Discharge Diagnosis:     -PFO s/p closure 11/20 per Dr. Anthony  -Recent cryptogenic stroke     Presenting Problem/History of Present Illness  PFO with atrial septal aneurysm [Q21.12, I25.3]  History of CVA (cerebrovascular accident) [Z86.73]    Per HPI by Dr. Anthony yesterday: \"48-year-old male who was initially referred to me by neurology on 9/9/2024 because of findings of a PFO in the setting of a recent cryptogenic stroke.  His RoPE score was 6, so I did feel as though PFO closure was indicated.  However, he is diabetic, and was complaining of some chest pain and palpitations when we met, so I did perform a coronary CT angiogram which did not show any obstructive disease.  Because of the bicuspid valve, we also ordered a MRI angiogram of the chest which showed no evidence of any aneurysm.  We did advise he start statin therapy, and discuss PFO closure.  He wanted to move forward with closure once workup for other potential cardiac abnormalities, as previously referenced, had been completed.\"       Hospital Course  Patient is a 48 y.o. male presented yesterday and underwent successful PFO closure per Dr. Anthony.  He has done well through the night.  Today he states his only complaint is mild soreness at his right groin access site.  He denies any chest pain, shortness of breath, palpitations.  He is maintaining sinus rhythm on telemetry.  He is hemodynamically stable.  He did have his limited echo and chest x-ray this morning.  See results below.  He is felt to be stable for discharge.  He will continue aspirin 81 mg daily indefinitely.  He will continue Plavix for 3 months postprocedure.  He we will have a follow-up with Dr. Anthony in 3 months in the structural heart clinic with echocardiogram with bubble study on the same day.  He will have a close follow-up with his PCP as well.      CXR today:    EXAMINATION: XR CHEST PA AND LATERAL- "  11/21/2024 8:16 AM 2 views     HISTORY: s/p pfo closure; Q21.12-Patent foramen ovale; I25.3-Aneurysm of  heart; Z86.73-Personal history of transient ischemic attack (TIA), and  cerebral infarction without residual deficits     COMPARISON: None.     TECHNIQUE: Frontal and lateral views of the chest were obtained.     FINDINGS:  PFO closure device in place. Mild LEFT basilar atelectasis. No airspace  consolidation or pneumothorax. The heart is not enlarged. The  cardiomediastinal silhouette and pulmonary vascularity are within normal  limits. The osseous structures and surrounding soft tissues demonstrate  no acute abnormality.        IMPRESSION:     1.  No acute cardiopulmonary process.                 This report was signed and finalized on 11/21/2024 8:20 AM by Dr. Ron Ruiz MD.    Echo today:      PFO occluder device in appropriate position, with desks on both sides of the interatrial septum.  No obvious flow across the septum by color Doppler.    No evidence of pericardial effusion.    Normal biventricular size and function.    Bicuspid aortic valve.    Limited study, to assess for the above.    Procedures Performed  Procedure(s):  Patent foramen ovale closure  11/20 1122 Note By: Alo Anthony MD      Conclusion:   1. Successful PFO closure with a 25mm SJM Amplatzer Talisman PFO occluder device      Plans:  -overnight observation with telemetry  -4 hours flat bedrest, then 2 hours at 30 degrees  -limited echo and 2-view CXR in AM prior to discharge  -continue ASA 81mg daily and plavix 75mg daily for 3 months, with plan for ASA 81mg daily indefinitely    Electronically signed by Alo Anthony MD, 11/20/24, 4:47 PM CST.     Lab Results (last 72 hours)       Procedure Component Value Units Date/Time    Lipid Panel [[id number]]  (Abnormal) Collected: 11/21/24 0257    Specimen: Blood Updated: 11/21/24 1003     Total Cholesterol 132 mg/dL      Triglycerides 195 mg/dL      HDL Cholesterol 38 mg/dL      LDL  Cholesterol  62 mg/dL      VLDL Cholesterol 32 mg/dL      LDL/HDL Ratio 1.45    Narrative:      Cholesterol Reference Ranges  (U.S. Department of Health and Human Services ATP III Classifications)    Desirable          <200 mg/dL  Borderline High    200-239 mg/dL  High Risk          >240 mg/dL      Triglyceride Reference Ranges  (U.S. Department of Health and Human Services ATP III Classifications)    Normal           <150 mg/dL  Borderline High  150-199 mg/dL  High             200-499 mg/dL  Very High        >500 mg/dL    HDL Reference Ranges  (U.S. Department of Health and Human Services ATP III Classifications)    Low     <40 mg/dl (major risk factor for CHD)  High    >60 mg/dl ('negative' risk factor for CHD)        LDL Reference Ranges  (U.S. Department of Health and Human Services ATP III Classifications)    Optimal          <100 mg/dL  Near Optimal     100-129 mg/dL  Borderline High  130-159 mg/dL  High             160-189 mg/dL  Very High        >189 mg/dL    Basic Metabolic Panel [[id number]]  (Abnormal) Collected: 11/21/24 0257    Specimen: Blood Updated: 11/21/24 0427     Glucose 157 mg/dL      BUN 13 mg/dL      Creatinine 0.87 mg/dL      Sodium 137 mmol/L      Potassium 4.2 mmol/L      Comment: Slight hemolysis detected by analyzer. Result may be falsely elevated.        Chloride 101 mmol/L      CO2 25.0 mmol/L      Calcium 8.9 mg/dL      BUN/Creatinine Ratio 14.9     Anion Gap 11.0 mmol/L      eGFR 105.1 mL/min/1.73     Narrative:      GFR Normal >60  Chronic Kidney Disease <60  Kidney Failure <15      CBC (No Diff) [[id number]]  (Abnormal) Collected: 11/21/24 0256    Specimen: Blood Updated: 11/21/24 0357     WBC 6.27 10*3/mm3      RBC 4.23 10*6/mm3      Hemoglobin 13.0 g/dL      Hematocrit 38.4 %      MCV 90.8 fL      MCH 30.7 pg      MCHC 33.9 g/dL      RDW 12.2 %      RDW-SD 40.2 fl      MPV 9.1 fL      Platelets 204 10*3/mm3     Basic Metabolic Panel [[id number]]  (Abnormal) Collected: 11/20/24 0822     Specimen: Blood Updated: 11/20/24 0856     Glucose 189 mg/dL      BUN 9 mg/dL      Creatinine 0.79 mg/dL      Sodium 139 mmol/L      Potassium 4.4 mmol/L      Chloride 102 mmol/L      CO2 25.0 mmol/L      Calcium 9.1 mg/dL      BUN/Creatinine Ratio 11.4     Anion Gap 12.0 mmol/L      eGFR 108.2 mL/min/1.73     Narrative:      GFR Normal >60  Chronic Kidney Disease <60  Kidney Failure <15      CBC (No Diff) [[id number]]  (Abnormal) Collected: 11/20/24 0822    Specimen: Blood Updated: 11/20/24 0837     WBC 5.01 10*3/mm3      RBC 4.30 10*6/mm3      Hemoglobin 13.3 g/dL      Hematocrit 37.9 %      MCV 88.1 fL      MCH 30.9 pg      MCHC 35.1 g/dL      RDW 12.1 %      RDW-SD 39.0 fl      MPV 8.8 fL      Platelets 221 10*3/mm3              Condition on Discharge:  Stable     Physical Exam at Discharge  General: alert and oriented  Card: RRR; 1/6 systolic murmur; NSR 60s-80s on tele without ectopy   Resp: CTA  Extrem: PPP, no edema, right groin access site CDI, no hematoma, redness, swelling, warmth or drainage    Vital Signs  Temp:  [97.2 °F (36.2 °C)-98.3 °F (36.8 °C)] 98.3 °F (36.8 °C)  Heart Rate:  [67-74] 71  Resp:  [13-18] 18  BP: ()/() 113/67        Discharge Disposition  Home or Self Care    Discharge Medications     Discharge Medications        New Medications        Instructions Start Date   aspirin 81 MG EC tablet  Replaces: aspirin 81 MG chewable tablet   81 mg, Oral, Daily   Start Date: November 22, 2024     clopidogrel 75 MG tablet  Commonly known as: PLAVIX   75 mg, Oral, Daily   Start Date: November 22, 2024            Continue These Medications        Instructions Start Date   amphetamine-dextroamphetamine 20 MG tablet  Commonly known as: ADDERALL   20 mg, Oral, Daily      atorvastatin 20 MG tablet  Commonly known as: LIPITOR   20 mg, Oral, Daily      Cialis 20 MG tablet  Generic drug: tadalafil   20 mg, Oral, Daily PRN      Claritin 10 MG tablet  Generic drug: loratadine   10 mg, Oral,  Daily      fluticasone 50 MCG/ACT nasal spray  Commonly known as: FLONASE   1 spray, Nasal, Daily      LaMICtal 200 MG tablet  Generic drug: lamoTRIgine   200 mg, Oral, Daily      metFORMIN 1000 MG tablet  Commonly known as: Glucophage   1,000 mg, Oral, 2 Times Daily With Meals      SITagliptin 50 MG tablet  Commonly known as: JANUVIA   50 mg, Oral, Daily      Vitamin D (Cholecalciferol) 10 MCG (400 UNIT) tablet  Commonly known as: CHOLECALCIFEROL   400 Units, Oral, Daily             Stop These Medications      aspirin 81 MG chewable tablet  Replaced by: aspirin 81 MG EC tablet              Discharge Diet: heart healthy, diabetic    Activity at Discharge:   No heavy lifting for 5-7 days greater than 10 pounds.  No heavy or strenuous pushing or pulling.  No tub baths, hot tubs, swimming pools, or submerging groin underwater for 1 week.  Wash groin site daily with antibacterial soap and water. Rinse and keep clean and dry.  No lotions, powders, or topical ointments to site. Keep open to air and dry.  Call our office with any concerns or if you notice redness, swelling, drainage, warmth, or pain at the site.     Follow-up Appointments  PCP 1-2 weeks  Dr. Anthony in Structural Clinic in 3 months with echo with bubble study the same day     Future Appointments   Date Time Provider Department Center   1/10/2025  2:45 PM Alexandra Monreal APRN [facility] POD P[facility] PAD   1/13/2025 12:30 PM Ramírez Leon PA-C [facility] NS PAD PAD   1/13/2025  1:30 PM Danyelle Coles APRN [facility] N PAD PAD         Test Results Pending at Discharge  Pending Labs       Order Current Status    Lipid Panel In process             Electronically signed by CARINA Field, 11/21/24, 9:03 AM CST.     Time:40 minutes

## 2024-11-21 NOTE — PLAN OF CARE
Goal Outcome Evaluation:           Progress: improving  Outcome Evaluation: VSS. C/o H/A. Gave Tylenol as ordered. Right groin C/D/I. S 67-77 on tele. Call light in reach. Safety maintained.

## 2024-11-21 NOTE — H&P
[facility] - CARDIOLOGY  HISTORY AND PHYSICAL    Date of Admission: 11/20/2024  Primary Care Physician: Vanessa Schmidt APRN    Subjective     Chief Complaint: Here for PFO closure    History of Present Illness    48-year-old male who was initially referred to me by neurology on 9/9/2024 because of findings of a PFO in the setting of a recent cryptogenic stroke.  His RoPE score was 6, so I did feel as though PFO closure was indicated.  However, he is diabetic, and was complaining of some chest pain and palpitations when we met, so I did perform a coronary CT angiogram which did not show any obstructive disease.  Because of the bicuspid valve, we also ordered a MRI angiogram of the chest which showed no evidence of any aneurysm.  We did advise he start statin therapy, and discuss PFO closure.  He wanted to move forward with closure once workup for other potential cardiac abnormalities, as previously referenced, had been completed.    Review of Systems   Otherwise complete ROS reviewed and negative except as mentioned in the HPI.    Past Medical History:   Past Medical History:   Diagnosis Date    ADD (attention deficit disorder)     Anemia     Anxiety     Arthritis     Asthma     Bipolar 1 disorder     Congenital heart disease     DDD (degenerative disc disease), cervical 10/30/2024    Depression     major depressive disorder    Diabetes mellitus     Difficulty walking 2021    Loss of balance/stroke    GERD (gastroesophageal reflux disease)     Headache     Hyperlipidemia 5/2024    My HDL was 127    Hypertension     Kidney stones     Mitral valve prolapse     MRSA (methicillin resistant Staphylococcus aureus)     Neuropathy in diabetes 2010    Peripheral neuropathy     Stroke     TIA (transient ischemic attack)     [place] 2020    One on toe on left foot, has not reappeared in over a year       Past Surgical History:  Past Surgical History:   Procedure Laterality Date    CARDIAC CATHETERIZATION      COLONOSCOPY       ENDOSCOPY      INFECTED SKIN DEBRIDEMENT      MRSA in abdomen    TONSILLECTOMY      WISDOM TOOTH EXTRACTION         Family History: family history includes Clotting disorder in his mother; Diabetes in his mother; Heart disease in his mother; Hyperlipidemia in his father and mother; Hypertension in his mother; Kidney disease in his mother; Liver disease in his mother; Mental illness in his mother.    Social History:  reports that he has never smoked. He has never been exposed to tobacco smoke. He has never used smokeless tobacco. He reports that he does not currently use drugs after having used the following drugs: Methamphetamines. He reports that he does not drink alcohol.    Medications:  Prior to Admission medications    Medication Sig Start Date End Date Taking? Authorizing Provider   amphetamine-dextroamphetamine (ADDERALL) 20 MG tablet Take 1 tablet by mouth Daily.   Yes Cleve Caceres MD   aspirin 81 MG chewable tablet Chew 1 tablet Daily.   Yes Cleve Caceres MD   atorvastatin (LIPITOR) 20 MG tablet Take 1 tablet by mouth Daily. 9/9/24  Yes Alo Anthony MD   fluticasone (FLONASE) 50 MCG/ACT nasal spray Administer 1 spray into the nostril(s) as directed by provider Daily.   Yes Cleve Caceres MD   lamoTRIgine (LaMICtal) 200 MG tablet Take 1 tablet by mouth Daily.   Yes Cleve Caceres MD   loratadine (Claritin) 10 MG tablet Take 1 tablet by mouth Daily.   Yes Cleve Caceres MD   metFORMIN (Glucophage) 1000 MG tablet Take 1 tablet by mouth 2 (Two) Times a Day With Meals. 1/24/23  Yes Henri Dillard DO   SITagliptin (JANUVIA) 50 MG tablet Take 1 tablet by mouth Daily.   Yes Cleve Caceres MD   Vitamin D, Cholecalciferol, (CHOLECALCIFEROL) 10 MCG (400 UNIT) tablet Take 1 tablet by mouth Daily.   Yes Cleve Caceres MD   Adderall 20 MG tablet  9/23/24 11/20/24 Yes Cleve Caceres MD   amphetamine-dextroamphetamine (ADDERALL) 10 MG tablet Take 1 tablet  "by mouth 2 (Two) Times a Day.  11/20/24 Yes Cleve Caceres MD   fluticasone (FLONASE) 50 MCG/ACT nasal spray instill ONE SPRAY in each nostril ONCE daily 5/13/24 11/20/24 Yes Cleve Caceres MD   Januvia 50 MG tablet  9/29/23 11/20/24 Yes Cleve Caceres MD   lamoTRIgine (LaMICtal) 200 MG tablet  1/1/03 11/20/24 Yes Cleve Caceres MD   Loratadine 10 MG capsule Take 1 capsule by mouth Daily.  11/20/24 Yes Cleve Caceres MD   OneTouch Ultra test strip 2 (Two) Times a Day. use for testing 3/5/24 11/20/24 Yes Cleve Caceres MD   tadalafil (CIALIS) 20 MG tablet TAKE 1 TABLET BY MOUTH EVERY DAY PRIOR TO SEXUAL ACTIVITY AS NEEDED 11/11/24 11/20/24 Yes Galindo Blum PA   tadalafil (Cialis) 20 MG tablet Take 1 tablet by mouth Daily As Needed for Erectile Dysfunction.    ProviderCleve MD     Allergies:  Allergies   Allergen Reactions    Contrast Dye (Echo Or Unknown Ct/Mr) Hives    Iodinated Contrast Media Hives, Itching and Rash     Had this when he had a heart cath in 2008       Objective     Vital Signs: /78 (BP Location: Left arm, Patient Position: Lying)   Pulse 71   Temp 97.9 °F (36.6 °C) (Oral)   Resp 16   Ht 165.1 cm (65\")   Wt 70.2 kg (154 lb 12.8 oz)   SpO2 97%   BMI 25.76 kg/m²     Vitals and nursing note reviewed.   Constitutional:       General: Not in acute distress.     Appearance: Not in distress.   Neck:      Vascular: No JVD or JVR. JVD normal.   Pulmonary:      Effort: Pulmonary effort is normal.      Breath sounds: Normal breath sounds.   Cardiovascular:      Normal rate. Regular rhythm.      Murmurs: There is a systolic murmur.      No gallop.  No rub.   Pulses:     Intact distal pulses.   Edema:     Peripheral edema absent.   Skin:     General: Skin is warm and dry.   Neurological:      Mental Status: Alert, oriented to person, place, and time and oriented to person, place and time.         Results Reviewed:  I have reviewed all laboratory " data, with pertinent findings: BMP unremarkable.  CBC unremarkable.    Results for orders placed during the hospital encounter of 07/31/24    Adult Transthoracic Echo Complete W/ Cont if Necessary Per Protocol    Interpretation Summary    Left ventricular ejection fraction appears to be 56 - 60%.    Left ventricular diastolic function was normal.    Small patent foramen ovale present with right to left shunting.    Saline test results are positive.    Estimated right ventricular systolic pressure from tricuspid regurgitation is normal (<35 mmHg).    Suspected bicuspid aortic valve without aortic regurgitation or aortic stenosis    Normal global longitudinal LV strain (GLS) = -20.0%.    Mild aortic arch dilatation    Recommend CTA or MRA of the thoracic aorta. May consider cardiac MRI for better visualization of the aortic valve          Assessment / Plan        Active Hospital Problems    Diagnosis     **PFO with atrial septal aneurysm     History of CVA (cerebrovascular accident)      I discussed transcatheter-based closure of patent foramen ovale with intracardiac echocardiography, the procedure, risks (including bleeding, infection, vascular damage [including minor oozing, bruising, bleeding, and up to and including the need for vascular surgery], contrast reaction, renal failure, respiratory failure, heart attack, stroke, arrhythmia and even death), benefits, and alternatives and the patient has voiced understanding and is willing to proceed.        Code Status: Full     I discussed the patients findings and my recommendations with: Patient    Estimated length of stay: 1 9    Alo Anthony MD   11/20/24   18:58 CST

## 2024-12-17 ENCOUNTER — HOSPITAL ENCOUNTER (EMERGENCY)
Facility: HOSPITAL | Age: 49
Discharge: HOME | End: 2024-12-18
Attending: EMERGENCY MEDICINE | Admitting: EMERGENCY MEDICINE
Payer: MEDICARE

## 2024-12-17 ENCOUNTER — NURSE TRIAGE (OUTPATIENT)
Dept: CALL CENTER | Facility: HOSPITAL | Age: 49
End: 2024-12-17
Payer: MEDICARE

## 2024-12-17 DIAGNOSIS — U07.1 COVID-19: Primary | ICD-10-CM

## 2024-12-17 PROCEDURE — 93010 ELECTROCARDIOGRAM REPORT: CPT | Performed by: INTERNAL MEDICINE

## 2024-12-17 PROCEDURE — 99284 EMERGENCY DEPT VISIT MOD MDM: CPT

## 2024-12-17 PROCEDURE — 93005 ELECTROCARDIOGRAM TRACING: CPT

## 2024-12-18 ENCOUNTER — APPOINTMENT (OUTPATIENT)
Dept: GENERAL RADIOLOGY | Facility: HOSPITAL | Age: 49
End: 2024-12-18
Payer: MEDICARE

## 2024-12-18 VITALS
HEART RATE: 86 BPM | SYSTOLIC BLOOD PRESSURE: 126 MMHG | HEIGHT: 65 IN | DIASTOLIC BLOOD PRESSURE: 76 MMHG | TEMPERATURE: 98.9 F | WEIGHT: 150 LBS | BODY MASS INDEX: 24.99 KG/M2 | OXYGEN SATURATION: 99 % | RESPIRATION RATE: 18 BRPM

## 2024-12-18 LAB
ALBUMIN SERPL-MCNC: 4.1 G/DL (ref 3.5–5.2)
ALBUMIN/GLOB SERPL: 1.5 G/DL
ALP SERPL-CCNC: 87 U/L (ref 39–117)
ALT SERPL W P-5'-P-CCNC: 24 U/L (ref 1–41)
ANION GAP SERPL CALCULATED.3IONS-SCNC: 11 MMOL/L (ref 5–15)
AST SERPL-CCNC: 16 U/L (ref 1–40)
BASOPHILS # BLD AUTO: 0.05 10*3/MM3 (ref 0–0.2)
BASOPHILS NFR BLD AUTO: 1 % (ref 0–1.5)
BILIRUB SERPL-MCNC: 0.3 MG/DL (ref 0–1.2)
BUN SERPL-MCNC: 8 MG/DL (ref 6–20)
BUN/CREAT SERPL: 9.4 (ref 7–25)
CALCIUM SPEC-SCNC: 8.9 MG/DL (ref 8.6–10.5)
CHLORIDE SERPL-SCNC: 104 MMOL/L (ref 98–107)
CO2 SERPL-SCNC: 24 MMOL/L (ref 22–29)
CREAT SERPL-MCNC: 0.85 MG/DL (ref 0.76–1.27)
DEPRECATED RDW RBC AUTO: 39.2 FL (ref 37–54)
EGFRCR SERPLBLD CKD-EPI 2021: 105.9 ML/MIN/1.73
EOSINOPHIL # BLD AUTO: 0.27 10*3/MM3 (ref 0–0.4)
EOSINOPHIL NFR BLD AUTO: 5.3 % (ref 0.3–6.2)
ERYTHROCYTE [DISTWIDTH] IN BLOOD BY AUTOMATED COUNT: 12.2 % (ref 12.3–15.4)
FLUAV RNA RESP QL NAA+PROBE: NOT DETECTED
FLUBV RNA RESP QL NAA+PROBE: NOT DETECTED
GEN 5 1HR TROPONIN T REFLEX: 31 NG/L
GLOBULIN UR ELPH-MCNC: 2.8 GM/DL
GLUCOSE SERPL-MCNC: 110 MG/DL (ref 65–99)
HCT VFR BLD AUTO: 34.1 % (ref 37.5–51)
HGB BLD-MCNC: 12.3 G/DL (ref 13–17.7)
IMM GRANULOCYTES # BLD AUTO: 0.02 10*3/MM3 (ref 0–0.05)
IMM GRANULOCYTES NFR BLD AUTO: 0.4 % (ref 0–0.5)
LYMPHOCYTES # BLD AUTO: 1.24 10*3/MM3 (ref 0.7–3.1)
LYMPHOCYTES NFR BLD AUTO: 24.4 % (ref 19.6–45.3)
MCH RBC QN AUTO: 31.8 PG (ref 26.6–33)
MCHC RBC AUTO-ENTMCNC: 36.1 G/DL (ref 31.5–35.7)
MCV RBC AUTO: 88.1 FL (ref 79–97)
MONOCYTES # BLD AUTO: 0.93 10*3/MM3 (ref 0.1–0.9)
MONOCYTES NFR BLD AUTO: 18.3 % (ref 5–12)
NEUTROPHILS NFR BLD AUTO: 2.58 10*3/MM3 (ref 1.7–7)
NEUTROPHILS NFR BLD AUTO: 50.6 % (ref 42.7–76)
NRBC BLD AUTO-RTO: 0 /100 WBC (ref 0–0.2)
PLATELET # BLD AUTO: 188 10*3/MM3 (ref 140–450)
PMV BLD AUTO: 8.8 FL (ref 6–12)
POTASSIUM SERPL-SCNC: 3.9 MMOL/L (ref 3.5–5.2)
PROT SERPL-MCNC: 6.9 G/DL (ref 6–8.5)
QT INTERVAL: 360 MS
QTC INTERVAL: 430 MS
RBC # BLD AUTO: 3.87 10*6/MM3 (ref 4.14–5.8)
RSV RNA RESP QL NAA+PROBE: NOT DETECTED
SARS-COV-2 RNA RESP QL NAA+PROBE: DETECTED
SODIUM SERPL-SCNC: 139 MMOL/L (ref 136–145)
TROPONIN T % DELTA: -9 %
TROPONIN T NUMERIC DELTA: -3 NG/L
TROPONIN T SERPL HS-MCNC: 34 NG/L
WBC NRBC COR # BLD AUTO: 5.09 10*3/MM3 (ref 3.4–10.8)

## 2024-12-18 PROCEDURE — 71046 X-RAY EXAM CHEST 2 VIEWS: CPT

## 2024-12-18 PROCEDURE — 85025 COMPLETE CBC W/AUTO DIFF WBC: CPT | Performed by: EMERGENCY MEDICINE

## 2024-12-18 PROCEDURE — 80053 COMPREHEN METABOLIC PANEL: CPT | Performed by: EMERGENCY MEDICINE

## 2024-12-18 PROCEDURE — 96374 THER/PROPH/DIAG INJ IV PUSH: CPT

## 2024-12-18 PROCEDURE — 25010000002 METHYLPREDNISOLONE PER 125 MG: Performed by: EMERGENCY MEDICINE

## 2024-12-18 PROCEDURE — 96375 TX/PRO/DX INJ NEW DRUG ADDON: CPT

## 2024-12-18 PROCEDURE — 84484 ASSAY OF TROPONIN QUANT: CPT | Performed by: EMERGENCY MEDICINE

## 2024-12-18 PROCEDURE — 87637 SARSCOV2&INF A&B&RSV AMP PRB: CPT | Performed by: EMERGENCY MEDICINE

## 2024-12-18 PROCEDURE — 25010000002 DIPHENHYDRAMINE PER 50 MG: Performed by: EMERGENCY MEDICINE

## 2024-12-18 PROCEDURE — 25010000002 PROCHLORPERAZINE 10 MG/2ML SOLUTION: Performed by: EMERGENCY MEDICINE

## 2024-12-18 PROCEDURE — 36415 COLL VENOUS BLD VENIPUNCTURE: CPT

## 2024-12-18 PROCEDURE — 25810000003 LACTATED RINGERS SOLUTION: Performed by: EMERGENCY MEDICINE

## 2024-12-18 RX ORDER — METHYLPREDNISOLONE SODIUM SUCCINATE 125 MG/2ML
125 INJECTION, POWDER, LYOPHILIZED, FOR SOLUTION INTRAMUSCULAR; INTRAVENOUS ONCE
Status: COMPLETED | OUTPATIENT
Start: 2024-12-18 | End: 2024-12-18

## 2024-12-18 RX ORDER — SODIUM CHLORIDE 0.9 % (FLUSH) 0.9 %
10 SYRINGE (ML) INJECTION AS NEEDED
Status: DISCONTINUED | OUTPATIENT
Start: 2024-12-18 | End: 2024-12-18 | Stop reason: HOSPADM

## 2024-12-18 RX ORDER — DIPHENHYDRAMINE HYDROCHLORIDE 50 MG/ML
25 INJECTION INTRAMUSCULAR; INTRAVENOUS ONCE
Status: COMPLETED | OUTPATIENT
Start: 2024-12-18 | End: 2024-12-18

## 2024-12-18 RX ORDER — ALBUTEROL SULFATE 90 UG/1
2 INHALANT RESPIRATORY (INHALATION) EVERY 6 HOURS PRN
Qty: 1 G | Refills: 0 | Status: SHIPPED | OUTPATIENT
Start: 2024-12-18

## 2024-12-18 RX ORDER — DEXAMETHASONE SODIUM PHOSPHATE 10 MG/ML
10 INJECTION INTRAMUSCULAR; INTRAVENOUS ONCE
Status: DISCONTINUED | OUTPATIENT
Start: 2024-12-18 | End: 2024-12-18 | Stop reason: HOSPADM

## 2024-12-18 RX ORDER — PROCHLORPERAZINE EDISYLATE 5 MG/ML
10 INJECTION INTRAMUSCULAR; INTRAVENOUS ONCE
Status: COMPLETED | OUTPATIENT
Start: 2024-12-18 | End: 2024-12-18

## 2024-12-18 RX ADMIN — PROCHLORPERAZINE EDISYLATE 10 MG: 5 INJECTION INTRAMUSCULAR; INTRAVENOUS at 00:30

## 2024-12-18 RX ADMIN — METHYLPREDNISOLONE SODIUM SUCCINATE 125 MG: 125 INJECTION, POWDER, FOR SOLUTION INTRAMUSCULAR; INTRAVENOUS at 00:30

## 2024-12-18 RX ADMIN — DIPHENHYDRAMINE HYDROCHLORIDE 25 MG: 50 INJECTION, SOLUTION INTRAMUSCULAR; INTRAVENOUS at 00:30

## 2024-12-18 RX ADMIN — SODIUM CHLORIDE, POTASSIUM CHLORIDE, SODIUM LACTATE AND CALCIUM CHLORIDE 1000 ML: 600; 310; 30; 20 INJECTION, SOLUTION INTRAVENOUS at 00:30

## 2024-12-18 NOTE — TELEPHONE ENCOUNTER
"Caller states recent PFO closure and tonight having palpitations, dizziness and some difficulty breathing. Caller states temperature check 100.3 and taking tylenol for headache  and muscle aches. Caller states he is having trouble laying flat. Caller asked about chest pain and states well it's difficult to describe but has been feeling palpitations. Caller advised per guideline.       Reason for Disposition   Difficulty breathing    Additional Information   Negative: Passed out (i.e., lost consciousness, collapsed and was not responding)   Negative: Shock suspected (e.g., cold/pale/clammy skin, too weak to stand, low BP, rapid pulse)   Negative: Difficult to awaken or acting confused (e.g., disoriented, slurred speech)   Negative: Visible sweat on face or sweat dripping down face   Negative: Unable to walk, or can only walk with assistance (e.g., requires support)   Negative: [1] Received SHOCK from implantable cardiac defibrillator AND [2] persisting symptoms (i.e., palpitations, lightheadedness)   Negative: [1] Dizziness, lightheadedness, or weakness AND [2] heart beating very rapidly (e.g., > 140 / minute)   Negative: [1] Dizziness, lightheadedness, or weakness AND [2] heart beating very slowly (e.g., < 50 / minute)   Negative: Sounds like a life-threatening emergency to the triager   Negative: Chest pain   Negative: Implantable Cardiac Defibrillator (ICD) or a pacemaker symptoms or questions    Answer Assessment - Initial Assessment Questions  1. DESCRIPTION: \"Please describe your heart rate or heartbeat that you are having\" (e.g., fast/slow, regular/irregular, skipped or extra beats, \"palpitations\")      States felling palpitations   2. ONSET: \"When did it start?\" (Minutes, hours or days)       Today   3. DURATION: \"How long does it last\" (e.g., seconds, minutes, hours)        4. PATTERN \"Does it come and go, or has it been constant since it started?\"  \"Does it get worse with exertion?\"   \"Are you feeling it " "now?\"        5. TAP: \"Using your hand, can you tap out what you are feeling on a chair or table in front of you, so that I can hear?\" (Note: not all patients can do this)          6. HEART RATE: \"Can you tell me your heart rate?\" \"How many beats in 15 seconds?\"  (Note: not all patients can do this)          7. RECURRENT SYMPTOM: \"Have you ever had this before?\" If Yes, ask: \"When was the last time?\" and \"What happened that time?\"         8. CAUSE: \"What do you think is causing the palpitations?\"      Not sure but recent PFO closure   9. CARDIAC HISTORY: \"Do you have any history of heart disease?\" (e.g., heart attack, angina, bypass surgery, angioplasty, arrhythmia)       Recent PFO closure   10. OTHER SYMPTOMS: \"Do you have any other symptoms?\" (e.g., dizziness, chest pain, sweating, difficulty breathing)        Trouble laying flat, muscle aches,Headache,Feeling Dizzy and some trouble breathing   11. PREGNANCY: \"Is there any chance you are pregnant?\" \"When was your last menstrual period?\"    Protocols used: Heart Rate and Heartbeat Questions-ADULT-AH    "

## 2024-12-18 NOTE — ED PROVIDER NOTES
Subjective   History of Present Illness  Pt presents to the  with report of cough/congestion/body aches/HA for the past day.  Reports had fever at home and has had chills.  No n/v. No trouble breathing.  Pt reports he has had periods of palpitations - feels like his heart was racing.  States he feels like he needs to cough something up.  Pt has hx of neuropathy in feet and states he has had some pain with this yesterday.  No swelling/discoloration. Reports bifrontal HA - throbbing.  No numb/weakness.  No vision/speech changes.          Review of Systems   Constitutional:  Positive for chills, fatigue and fever.   HENT:  Positive for congestion and sore throat.    Eyes:  Negative for visual disturbance.   Respiratory:  Positive for cough.    Cardiovascular:  Positive for palpitations. Negative for leg swelling.   Gastrointestinal:  Negative for abdominal pain, diarrhea, nausea and vomiting.   Skin:  Negative for rash.   Neurological:  Positive for headaches. Negative for weakness and numbness.   All other systems reviewed and are negative.      Past Medical History:   Diagnosis Date    ADD (attention deficit disorder)     Anemia     Anxiety     Arthritis     Asthma     Bipolar 1 disorder     Congenital heart disease     DDD (degenerative disc disease), cervical 10/30/2024    Depression     major depressive disorder    Diabetes mellitus     Difficulty walking 2021    Loss of balance/stroke    GERD (gastroesophageal reflux disease)     Headache     Hyperlipidemia 5/2024    My HDL was 127    Hypertension     Kidney stones     Mitral valve prolapse     MRSA (methicillin resistant Staphylococcus aureus)     Neuropathy in diabetes 2010    Peripheral neuropathy     Stroke     TIA (transient ischemic attack)     Verruca 2020    One on toe on left foot, has not reappeared in over a year       Allergies   Allergen Reactions    Contrast Dye (Echo Or Unknown Ct/Mr) Hives    Iodinated Contrast Media Hives, Itching and Rash      Had this when he had a heart cath in 2008       Past Surgical History:   Procedure Laterality Date    CARDIAC CATHETERIZATION      COLONOSCOPY      ENDOSCOPY      INFECTED SKIN DEBRIDEMENT      MRSA in abdomen    PATENT FORAMEN OVALE CLOSURE N/A 11/20/2024    Procedure: Patent foramen ovale closure;  Surgeon: Alo Anthony MD;  Location:  [facility];  Service: Cardiology;  Laterality: N/A;  with ICE    TONSILLECTOMY      WISDOM TOOTH EXTRACTION         Family History   Problem Relation Age of Onset    Hyperlipidemia Mother     Hypertension Mother     Kidney disease Mother     Heart disease Mother     Clotting disorder Mother     Diabetes Mother     Liver disease Mother     Mental illness Mother     Hyperlipidemia Father        Social History     Socioeconomic History    Marital status: Single   Tobacco Use    Smoking status: Never     Passive exposure: Never    Smokeless tobacco: Never   Vaping Use    Vaping status: Never Used   Substance and Sexual Activity    Alcohol use: No    Drug use: Not Currently     Types: Methamphetamines     Comment: last used meth about one week ago    Sexual activity: Not Currently     Partners: Male           Objective   Physical Exam  Vitals and nursing note reviewed.   Constitutional:       General: He is not in acute distress.  HENT:      Head: Normocephalic and atraumatic.      Nose: Nose normal.      Mouth/Throat:      Mouth: Mucous membranes are moist.      Pharynx: Posterior oropharyngeal erythema present. No oropharyngeal exudate.   Eyes:      Extraocular Movements: Extraocular movements intact.      Conjunctiva/sclera: Conjunctivae normal.      Pupils: Pupils are equal, round, and reactive to light.   Cardiovascular:      Rate and Rhythm: Normal rate and regular rhythm.      Pulses: Normal pulses.      Heart sounds: Normal heart sounds.   Pulmonary:      Effort: Pulmonary effort is normal.      Breath sounds: Normal breath sounds.   Abdominal:      General:  Abdomen is flat. Bowel sounds are normal.      Palpations: Abdomen is soft.   Musculoskeletal:      Right lower leg: No edema.      Left lower leg: No edema.   Skin:     General: Skin is warm and dry.      Capillary Refill: Capillary refill takes less than 2 seconds.   Neurological:      General: No focal deficit present.      Mental Status: He is alert and oriented to person, place, and time.      Cranial Nerves: No cranial nerve deficit.      Sensory: No sensory deficit.      Motor: No weakness.         Procedures           ED Course      Labs Reviewed   COVID-19/FLUA&B/RSV, NP SWAB IN TRANSPORT MEDIA 1 HR TAT - Abnormal; Notable for the following components:       Result Value    COVID19 Detected (*)     All other components within normal limits    Narrative:     Fact sheet for providers: https://www.fda.gov/media/197831/download    Fact sheet for patients: https://www.fda.gov/media/951832/download    Test performed by PCR.   COMPREHENSIVE METABOLIC PANEL - Abnormal; Notable for the following components:    Glucose 110 (*)     All other components within normal limits    Narrative:     GFR Categories in Chronic Kidney Disease (CKD)      GFR Category          GFR (mL/min/1.73)    Interpretation  G1                     90 or greater         Normal or high (1)  G2                      60-89                Mild decrease (1)  G3a                   45-59                Mild to moderate decrease  G3b                   30-44                Moderate to severe decrease  G4                    15-29                Severe decrease  G5                    14 or less           Kidney failure          (1)In the absence of evidence of kidney disease, neither GFR category G1 or G2 fulfill the criteria for CKD.    eGFR calculation 2021 CKD-EPI creatinine equation, which does not include race as a factor   TROPONIN - Abnormal; Notable for the following components:    HS Troponin T 34 (*)     All other components within normal limits     Narrative:     High Sensitive Troponin T Reference Range:  <14.0 ng/L- Negative Female for AMI  <22.0 ng/L- Negative Male for AMI  >=14 - Abnormal Female indicating possible myocardial injury.  >=22 - Abnormal Male indicating possible myocardial injury.   Clinicians would have to utilize clinical acumen, EKG, Troponin, and serial changes to determine if it is an Acute Myocardial Infarction or myocardial injury due to an underlying chronic condition.        CBC WITH AUTO DIFFERENTIAL - Abnormal; Notable for the following components:    RBC 3.87 (*)     Hemoglobin 12.3 (*)     Hematocrit 34.1 (*)     MCHC 36.1 (*)     RDW 12.2 (*)     Monocyte % 18.3 (*)     Monocytes, Absolute 0.93 (*)     All other components within normal limits   HIGH SENSITIVITIY TROPONIN T 1HR - Abnormal; Notable for the following components:    HS Troponin T 31 (*)     All other components within normal limits    Narrative:     High Sensitive Troponin T Reference Range:  <14.0 ng/L- Negative Female for AMI  <22.0 ng/L- Negative Male for AMI  >=14 - Abnormal Female indicating possible myocardial injury.  >=22 - Abnormal Male indicating possible myocardial injury.   Clinicians would have to utilize clinical acumen, EKG, Troponin, and serial changes to determine if it is an Acute Myocardial Infarction or myocardial injury due to an underlying chronic condition.        COVID PRE-OP / PRE-PROCEDURE SCREENING ORDER (NO ISOLATION)    Narrative:     The following orders were created for panel order COVID PRE-OP / PRE-PROCEDURE SCREENING ORDER (NO ISOLATION) - Swab, Nasal Cavity.  Procedure                               Abnormality         Status                     ---------                               -----------         ------                     COVID-19, FLU A/B, RSV P...[[id number]]  Abnormal            Final result                 Please view results for these tests on the individual orders.   CBC AND DIFFERENTIAL    Narrative:     The  following orders were created for panel order CBC & Differential.  Procedure                               Abnormality         Status                     ---------                               -----------         ------                     CBC Auto Differential[[id number]]        Abnormal            Final result                 Please view results for these tests on the individual orders.     XR Chest 2 View    (Results Pending)                                                        Medical Decision Making  Pt stable in EC - resting comfortably and in NAD.  + covid.  CXR with some pneumonitis d/t this.  No evid of SBI/sepsis.  Dbt PE/ACS.  No hypoxia.  Pt given decadron X 1 in EC.  Will d/c to home and recommend outpt f/u.  Given albuterol rx.  Prec given    Amount and/or Complexity of Data Reviewed  Labs: ordered.  Radiology: ordered.    Risk  Prescription drug management.        Final diagnoses:   COVID-19       ED Disposition  ED Disposition       ED Disposition   Discharge    Condition   Stable    Comment   --               Vanessa Schmidt APRN  [address] [place] [id number] [phone]               Medication List        New Prescriptions      albuterol sulfate  (90 Base) MCG/ACT inhaler  Commonly known as: PROVENTIL HFA;VENTOLIN HFA;PROAIR HFA  Inhale 2 puffs Every 6 (Six) Hours As Needed for Wheezing.               Where to Get Your Medications        These medications were sent to [facility] - [place], [place] - [address] [place] [address] - [phone]  - [phone]   [address] [place] [address]      Phone: [phone]   albuterol sulfate  (90 Base) MCG/ACT inhaler            Pavel Pratt, DO  12/18/24 0022       Pavel Pratt, DO  12/18/24 0023       Pavel Pratt, DO  12/18/24 0222

## 2024-12-23 ENCOUNTER — TELEPHONE (OUTPATIENT)
Dept: UROLOGY | Facility: CLINIC | Age: 49
End: 2024-12-23
Payer: MEDICARE

## 2024-12-23 DIAGNOSIS — N52.9 ERECTILE DYSFUNCTION, UNSPECIFIED ERECTILE DYSFUNCTION TYPE: Primary | ICD-10-CM

## 2024-12-23 RX ORDER — TADALAFIL 20 MG/1
20 TABLET ORAL DAILY PRN
Qty: 10 TABLET | Refills: 0 | Status: SHIPPED | OUTPATIENT
Start: 2024-12-23

## 2025-01-13 ENCOUNTER — OFFICE VISIT (OUTPATIENT)
Dept: NEUROLOGY | Facility: CLINIC | Age: 50
End: 2025-01-13
Payer: MEDICARE

## 2025-01-13 VITALS
DIASTOLIC BLOOD PRESSURE: 82 MMHG | HEIGHT: 65 IN | HEART RATE: 84 BPM | WEIGHT: 159 LBS | SYSTOLIC BLOOD PRESSURE: 138 MMHG | BODY MASS INDEX: 26.49 KG/M2 | OXYGEN SATURATION: 98 %

## 2025-01-13 DIAGNOSIS — E78.5 HYPERLIPIDEMIA LDL GOAL <70: ICD-10-CM

## 2025-01-13 DIAGNOSIS — F19.91 HISTORY OF ILLICIT DRUG USE: ICD-10-CM

## 2025-01-13 DIAGNOSIS — Z86.59 HISTORY OF BEHAVIORAL AND MENTAL HEALTH PROBLEMS: ICD-10-CM

## 2025-01-13 DIAGNOSIS — Z86.73 HISTORY OF STROKE WITHOUT RESIDUAL DEFICITS: Primary | ICD-10-CM

## 2025-01-13 DIAGNOSIS — Q21.12 PFO (PATENT FORAMEN OVALE): ICD-10-CM

## 2025-01-13 DIAGNOSIS — R20.2 PARESTHESIAS: ICD-10-CM

## 2025-01-13 PROCEDURE — 1160F RVW MEDS BY RX/DR IN RCRD: CPT

## 2025-01-13 PROCEDURE — 3079F DIAST BP 80-89 MM HG: CPT

## 2025-01-13 PROCEDURE — 99214 OFFICE O/P EST MOD 30 MIN: CPT

## 2025-01-13 PROCEDURE — 3075F SYST BP GE 130 - 139MM HG: CPT

## 2025-01-13 PROCEDURE — 1159F MED LIST DOCD IN RCRD: CPT

## 2025-01-13 NOTE — PROGRESS NOTES
Neurology Consult Note    [facility]  [address], [address]  [place] [address]  Phone: [phone]  Fax: [phone]    Referring Provider:   No ref. provider found  Primary Care Provider:  Vanessa Schmidt APRN    Reason for Consult:  History of stroke  Subjective        Rah Brandon Shin presents to [facility]    History of Present Illness  49-year-old male seen for follow-up of stroke.  Last seen in clinic on 7/10/2024.  Patient was referred to us for history of stroke.  Reportedly in 2023 he had an MRI performed for memory issues through [facility] neurology.  This did show a small lacunar infarction.  Patient was referred to us for evaluation through his PCPs office.  Reviewed his workup shows a echo nor a carotid ultrasound was performed.  We did proceed with these to identify stroke etiology.  Patient's complaints of memory actually noted improvement in his memory.    Patient's workup did reveal a PFO.  We did recommend patient to be seen by cardiology for possible PFO closure through the structural heart clinic.  Patient did have an elevated rope score.  He did see Dr. Alo brooks with our cardiology group.  He did undergo PFO closure through him.  Patient has tolerated that well.    Today patient presents by himself.  Reports to me no new stroke symptoms since last being seen he notes his memory is also back to baseline.  He has no concerns in regards to stroke.  He does tell me he has some symptoms of squeezing pain that originates in his right hip that goes down his leg.  This is worsened with anxiety.  He does have some residual paresthesias in his feet and arms in a glove and stocking distribution.  He does have a history of diabetes with A1c's being elevated at 11.  His mother also has neuropathy however he is a diabetic as well.  No complaints of weakness noted.  He has seen neurosurgery.  They did an EMG which showed a polyneuropathy.   He still undergoing treatment by them.  He has been tried on gabapentin the past however cannot tolerate the side effects.    He does report some nosebleeds since being on the dual platelet therapies.  He has had a couple that been hard to control.  He is utilizing humidifier at home as well as nasal moisturizer.  Patient Active Problem List   Diagnosis    Bipolar 1 disorder    Hypertension    Diabetes mellitus    ADD (attention deficit disorder)    Anxiety    GILL (dyspnea on exertion)    Family history of early CAD    Nonrheumatic aortic valve insufficiency    Vitamin D deficiency    Mixed hyperlipidemia    Hoarding behavior    Paresthesias    Cervical radiculopathy    Gait instability    Nonsmoker    Overweight with body mass index (BMI) of 26 to 26.9 in adult    Lumbar radiculopathy    History of CVA (cerebrovascular accident)    Bicuspid aortic valve    PFO with atrial septal aneurysm    DDD (degenerative disc disease), cervical    Hereditary and idiopathic peripheral neuropathy        Past Medical History:   Diagnosis Date    ADD (attention deficit disorder)     Anemia     Anxiety     Arthritis     Asthma     Bipolar 1 disorder     Congenital heart disease     DDD (degenerative disc disease), cervical 10/30/2024    Depression     major depressive disorder    Diabetes mellitus     Difficulty walking 2021    Loss of balance/stroke    GERD (gastroesophageal reflux disease)     Headache     Headache, tension-type     Hyperlipidemia 5/2024    My HDL was 127    Hypertension     Kidney stones     Memory loss     Migraine     Mitral valve prolapse     MRSA (methicillin resistant Staphylococcus aureus)     Neuropathy in diabetes 2010    Peripheral neuropathy     Shingles     Stroke     TIA (transient ischemic attack)     Verruca 2020    One on toe on left foot, has not reappeared in over a year    Vision loss         Social History     Socioeconomic History    Marital status: Single   Tobacco Use    Smoking status:  "Never     Passive exposure: Never    Smokeless tobacco: Never   Vaping Use    Vaping status: Never Used   Substance and Sexual Activity    Alcohol use: Not Currently    Drug use: Not Currently     Types: Methamphetamines     Comment: last used meth about one week ago    Sexual activity: Not Currently     Partners: Male        Allergies   Allergen Reactions    Contrast Dye (Echo Or Unknown Ct/Mr) Hives    Iodinated Contrast Media Hives, Itching and Rash     Had this when he had a heart cath in 2008          Current Outpatient Medications:     amphetamine-dextroamphetamine (ADDERALL) 20 MG tablet, Take 1 tablet by mouth Daily., Disp: , Rfl:     aspirin 81 MG EC tablet, Take 1 tablet by mouth Daily., Disp: 30 tablet, Rfl: 11    atorvastatin (LIPITOR) 20 MG tablet, Take 1 tablet by mouth Daily., Disp: 30 tablet, Rfl: 11    clopidogrel (PLAVIX) 75 MG tablet, Take 1 tablet by mouth Daily., Disp: 30 tablet, Rfl: 2    fluticasone (FLONASE) 50 MCG/ACT nasal spray, Administer 1 spray into the nostril(s) as directed by provider Daily., Disp: , Rfl:     lamoTRIgine (LaMICtal) 200 MG tablet, Take 1 tablet by mouth Daily., Disp: , Rfl:     loratadine (Claritin) 10 MG tablet, Take 1 tablet by mouth Daily., Disp: , Rfl:     metFORMIN (Glucophage) 1000 MG tablet, Take 1 tablet by mouth 2 (Two) Times a Day With Meals., Disp: 180 tablet, Rfl: 3    SITagliptin (JANUVIA) 50 MG tablet, Take 1 tablet by mouth Daily., Disp: , Rfl:     tadalafil (Cialis) 20 MG tablet, Take 1 tablet by mouth Daily As Needed for Erectile Dysfunction., Disp: 10 tablet, Rfl: 0    Vitamin D, Cholecalciferol, (CHOLECALCIFEROL) 10 MCG (400 UNIT) tablet, Take 1 tablet by mouth Daily., Disp: , Rfl:     VITAMIN K PO, Take  by mouth Daily., Disp: , Rfl:        Objective   Vital Signs:   /82   Pulse 84   Ht 165.1 cm (65\")   Wt 72.1 kg (159 lb)   SpO2 98%   BMI 26.46 kg/m²       Physical Exam  Vitals and nursing note reviewed.   Constitutional:       " Appearance: Normal appearance.   HENT:      Head: Normocephalic.   Eyes:      General: Lids are normal.      Extraocular Movements: Extraocular movements intact.      Pupils: Pupils are equal, round, and reactive to light.   Pulmonary:      Effort: Pulmonary effort is normal. No respiratory distress.   Skin:     General: Skin is warm and dry.   Neurological:      Mental Status: He is alert.      Motor: Motor strength is normal.     Deep Tendon Reflexes:      Reflex Scores:       Bicep reflexes are 2+ on the right side and 2+ on the left side.       Brachioradialis reflexes are 2+ on the right side and 2+ on the left side.       Patellar reflexes are 0 on the right side and 0 on the left side.       Achilles reflexes are 0 on the right side and 0 on the left side.  Psychiatric:         Speech: Speech normal.        Neurological Exam  Mental Status  Alert. Oriented to person, place, time and situation. Speech is normal. Language is fluent with no aphasia.    Cranial Nerves  CN II: Visual fields full to confrontation.  CN III, IV, VI: Extraocular movements intact bilaterally. Normal lids and orbits bilaterally. Pupils equal round and reactive to light bilaterally.  CN V: Facial sensation is normal.  CN VII: Full and symmetric facial movement.  CN IX, X: Palate elevates symmetrically. Normal gag reflex.  CN XI: Shoulder shrug strength is normal.  CN XII: Tongue midline without atrophy or fasciculations.    Motor   Strength is 5/5 throughout all four extremities.    Sensory  Light touch is normal in upper and lower extremities.     Reflexes                                            Right                      Left  Brachioradialis                    2+                         2+  Biceps                                 2+                         2+  Patellar                                0                         0  Achilles                                0                         0    Coordination  Right: Finger-to-nose  normal. Heel-to-shin normal.Left: Finger-to-nose normal. Heel-to-shin normal.    Gait  Casual gait is normal including stance, stride, and arm swing.      Result Review :   The following data was reviewed by: CARINA Murphy on 01/13/2025:  CMP          11/20/2024    08:22 11/21/2024    02:57 12/18/2024    00:29   CMP   Glucose 189  157  110    BUN 9  13  8    Creatinine 0.79  0.87  0.85    EGFR 108.2  105.1  105.9    Sodium 139  137  139    Potassium 4.4  4.2  3.9    Chloride 102  101  104    Calcium 9.1  8.9  8.9    Total Protein   6.9    Albumin   4.1    Globulin   2.8    Total Bilirubin   0.3    Alkaline Phosphatase   87    AST (SGOT)   16    ALT (SGPT)   24    Albumin/Globulin Ratio   1.5    BUN/Creatinine Ratio 11.4  14.9  9.4    Anion Gap 12.0  11.0  11.0      CBC w/diff          11/20/2024    08:22 11/21/2024    02:56 12/18/2024    00:29   CBC w/Diff   WBC 5.01  6.27  5.09    RBC 4.30  4.23  3.87    Hemoglobin 13.3  13.0  12.3    Hematocrit 37.9  38.4  34.1    MCV 88.1  90.8  88.1    MCH 30.9  30.7  31.8    MCHC 35.1  33.9  36.1    RDW 12.1  12.2  12.2    Platelets 221  204  188    Neutrophil Rel %   50.6    Immature Granulocyte Rel %   0.4    Lymphocyte Rel %   24.4    Monocyte Rel %   18.3    Eosinophil Rel %   5.3    Basophil Rel %   1.0      Lipid Panel          11/21/2024    02:57   Lipid Panel   Total Cholesterol 132    Triglycerides 195    HDL Cholesterol 38    VLDL Cholesterol 32    LDL Cholesterol  62    LDL/HDL Ratio 1.45    Progress Notes by Danyelle Coles APRN (07/10/2024 14:30)     Progress Notes by Ramírez Leon PA-C (10/30/2024 15:15)     EMG & Nerve Conduction Test (08/06/2024 15:00)     Progress Notes by Alo Anthony MD (09/09/2024 13:30)                   Impression:  Rah Shin is a 49 y.o. male who presents for follow-up of stroke.  Regards to his stroke this was small vessel.  Likely related to his uncontrolled diabetes and histories of hyperlipidemia and  hypertension.  At this time he is on maximum therapy.  Recommend him to continue his aspirin and atorvastatin.  He is currently on clopidogrel per cardiology.  Regards to his nosebleeds, likely added to the dual platelet therapies.  I did recommend him to reach out to Dr. Sinclair's office to discuss.  Regards to his neuropathy, this is likely diabetic polyneuropathy.  Would not recommend any further testing.  He is able to tolerate gabapentin past due to side effects.  If paresthesias do become bothersome he could be tried on Lyrica.  He may also be a candidate for duloxetine however has a history of mental health disorder.  Patient may follow-up our clinic as needed.  He could follow with his primary care for further treatment.    Diagnoses and all orders for this visit:    1. History of stroke without residual deficits (Primary)    2. Hyperlipidemia LDL goal <70    3. History of illicit drug use    4. PFO (patent foramen ovale)    5. Paresthesias    6. History of behavioral and mental health problems        Plan:  Continue aspirin 81 mg daily  Continue atorvastatin 20 mg nightly  Continue Plavix any 5 mg daily per cardiology  LDL goal less than 70  A1c goal less than 7.0  BP goal less than 130/80  Would not recommend further workup for neuropathy at this time  Patient has been on gabapentin the past for neuropathy, PCP could consider Lyrica versus duloxetine  Continue treatment per neurosurgery  Follow-up with cardiology as scheduled  Follow-up PCP as scheduled  Follow-up in our clinic as needed    The patient and I have discussed the plan of care and he is in full agreement at this time.     Follow Up   Return if symptoms worsen or fail to improve, for Stroke/TIA.            Danyelle Coles, CARINA  01/13/25  13:47 CST

## 2025-01-20 NOTE — PROGRESS NOTES
[facility] - PODIATRY    Today's Date: 01/27/2025     Patient Name: Rah Shin  MRN: [id number]  CSN: [id number]  PCP: Vanessa Schmidt APRN  Referring Provider: No ref. provider found    SUBJECTIVE     Chief Complaint   Patient presents with    Follow-up     Vanessa Schmidt APRN 01/25/24 Return in about 9 weeks Follow-up with Podiatry CARINA.-pt states he is here today for diabetic nail/care/neuropathy-pt denies pain    Diabetes     211 mg/dl BG     HPI: Rah Shin, a 49 y.o.male, comes to clinic as a(n) established patient presenting for diabetic foot exam, complaining of foot pain, and complaining of toenail/callus issues. Patient has h/o ADD, Anemia, Anxiety, Bipolar 1, Depression, DM Type 2, GERD, HTN, Stroke, Neuropathy . Patient is NIDDM with last stated BG level of 211mg/dl.   Patient reports numbness and tingling that extends down the leg. He states his toenails are elongated and thickened. He has difficulty caring for his nails due to thickness and neuropathy.  Patient reports he has seen neurosurgery and vascular surgery recently for back and leg issues.  Patient reports that he has seen neurosurgery for peripheral neuropathy.  Reports that his feet often feel like they are being squeezed.  Recently obtained new diabetic shoes.  Denies pain. Denies previous treatment. Denies any constitutional symptoms. No other pedal complaints at this time.      Past Medical History:   Diagnosis Date    ADD (attention deficit disorder)     Anemia     Anxiety     Arthritis     Asthma     Bipolar 1 disorder     Congenital heart disease     DDD (degenerative disc disease), cervical 10/30/2024    Depression     major depressive disorder    Diabetes mellitus     Difficulty walking 2021    Loss of balance/stroke    GERD (gastroesophageal reflux disease)     Headache     Headache, tension-type     Hyperlipidemia 5/2024    My HDL was 127    Hypertension     Kidney stones     Memory loss      Migraine     Mitral valve prolapse     MRSA (methicillin resistant Staphylococcus aureus)     Neuropathy in diabetes 2010    Peripheral neuropathy     Shingles     Stroke     TIA (transient ischemic attack)     Verruca 2020    One on toe on left foot, has not reappeared in over a year    Vision loss      Past Surgical History:   Procedure Laterality Date    CARDIAC CATHETERIZATION      COLONOSCOPY      ENDOSCOPY      INFECTED SKIN DEBRIDEMENT      MRSA in abdomen    PATENT FORAMEN OVALE CLOSURE N/A 11/20/2024    Procedure: Patent foramen ovale closure;  Surgeon: Alo Anthony MD;  Location:  [facility];  Service: Cardiology;  Laterality: N/A;  with ICE    TONSILLECTOMY      WISDOM TOOTH EXTRACTION       Family History   Problem Relation Age of Onset    Hyperlipidemia Mother     Hypertension Mother     Kidney disease Mother     Heart disease Mother     Clotting disorder Mother     Diabetes Mother     Liver disease Mother     Mental illness Mother     Neuropathy Mother     Hyperlipidemia Father      Social History     Socioeconomic History    Marital status: Single   Tobacco Use    Smoking status: Never     Passive exposure: Never    Smokeless tobacco: Never   Vaping Use    Vaping status: Never Used   Substance and Sexual Activity    Alcohol use: Not Currently    Drug use: Not Currently     Types: Methamphetamines     Comment: last used meth about one week ago    Sexual activity: Not Currently     Partners: Male     Allergies   Allergen Reactions    Contrast Dye (Echo Or Unknown Ct/Mr) Hives    Iodinated Contrast Media Hives, Itching and Rash     Had this when he had a heart cath in 2008     Current Outpatient Medications   Medication Sig Dispense Refill    amphetamine-dextroamphetamine (ADDERALL) 20 MG tablet Take 1 tablet by mouth Daily.      aspirin 81 MG EC tablet Take 1 tablet by mouth Daily. 30 tablet 11    atorvastatin (LIPITOR) 20 MG tablet Take 1 tablet by mouth Daily. 30 tablet 11     clopidogrel (PLAVIX) 75 MG tablet Take 1 tablet by mouth Daily. 30 tablet 2    fluticasone (FLONASE) 50 MCG/ACT nasal spray Administer 1 spray into the nostril(s) as directed by provider Daily.      lamoTRIgine (LaMICtal) 200 MG tablet Take 1 tablet by mouth Daily.      loratadine (Claritin) 10 MG tablet Take 1 tablet by mouth Daily.      metFORMIN (Glucophage) 1000 MG tablet Take 1 tablet by mouth 2 (Two) Times a Day With Meals. 180 tablet 3    SITagliptin (JANUVIA) 50 MG tablet Take 1 tablet by mouth Daily.      tadalafil (Cialis) 20 MG tablet Take 1 tablet by mouth Daily As Needed for Erectile Dysfunction. 10 tablet 0    traZODone (DESYREL) 50 MG tablet Take 1 tablet by mouth every night at bedtime.      Vitamin D, Cholecalciferol, (CHOLECALCIFEROL) 10 MCG (400 UNIT) tablet Take 1 tablet by mouth Daily.      VITAMIN K PO Take  by mouth Daily.       No current facility-administered medications for this visit.     Review of Systems   Constitutional:  Negative for activity change and fever.   HENT:  Negative for congestion.    Respiratory:  Negative for shortness of breath.    Cardiovascular:  Negative for chest pain and leg swelling.   Gastrointestinal:  Negative for abdominal pain, constipation, diarrhea, nausea and vomiting.   Musculoskeletal:  Positive for arthralgias. Negative for gait problem.   Skin:  Negative for color change and wound.        Elongated thickened toenails   Neurological:  Positive for numbness. Negative for dizziness and weakness.   Hematological:  Does not bruise/bleed easily.   Psychiatric/Behavioral:  Negative for agitation, behavioral problems and confusion.        OBJECTIVE     Vitals:    01/27/25 1325   BP: 120/90   Pulse: 94   SpO2: 96%           PHYSICAL EXAM  GEN:   Accompanied by none.     Foot/Ankle Exam    GENERAL  Appearance:  appears stated age  Orientation:  AAOx3  Affect:  appropriate  Gait:  unimpaired  Assistance:  independent  Right shoe gear: diabetic shoe  Left shoe  gear: diabetic shoe    VASCULAR     Right Foot Vascularity   Dorsalis pedis:  2+  Posterior tibial:  2+  Skin temperature:  cool  Edema grading:  None  CFT:  3  Pedal hair growth:  Absent     Left Foot Vascularity   Dorsalis pedis:  2+  Posterior tibial:  2+  Skin temperature:  cool  Edema grading:  None  CFT:  3  Pedal hair growth:  Absent     NEUROLOGIC     Right Foot Neurologic   Light touch sensation: diminished  Vibratory sensation: diminished  Hot/Cold sensation: diminished  Protective Sensation using [place] Monofilament:   Sites intact: 10  Sites tested: 10     Left Foot Neurologic   Light touch sensation: diminished  Vibratory sensation: diminished  Hot/Cold sensation:  diminished  Protective Sensation using [place] Monofilament:   Sites intact: 9  Sites tested: 10    MUSCULOSKELETAL     Right Foot Musculoskeletal   Ecchymosis:  none  Tenderness:  toenail problem    Arch:  Normal     Left Foot Musculoskeletal   Ecchymosis:  none  Tenderness:  toenail problem  Arch:  Normal    MUSCLE STRENGTH     Right Foot Muscle Strength   Foot dorsiflexion:  5  Foot plantar flexion:  5  Foot inversion:  5  Foot eversion:  5     Left Foot Muscle Strength   Foot dorsiflexion:  5  Foot plantar flexion:  5  Foot inversion:  5  Foot eversion:  5    RANGE OF MOTION     Right Foot Range of Motion   Foot and ankle ROM within normal limits       Left Foot Range of Motion   Foot and ankle ROM within normal limits      DERMATOLOGIC      Right Foot Dermatologic   Skin  Right foot skin is intact.   Nails  1.  Positive for elongated and abnormal thickness.  2.  Positive for elongated and abnormal thickness.  3.  Positive for elongated and abnormal thickness.  4.  Positive for elongated and abnormal thickness.  5.  Positive for elongated and abnormal thickness.     Left Foot Dermatologic   Skin  Left foot skin is intact.   Nails  1.  Positive for elongated and abnormal thickness.  2.  Positive for elongated and  abnormal thickness.  3.  Positive for elongated and abnormal thickness.  4.  Positive for elongated and abnormally thick.  5.  Positive for elongated and abnormally thick.    Image:       RADIOLOGY/NUCLEAR:  No results found.    LABORATORY/CULTURE RESULTS:      PATHOLOGY RESULTS:       ASSESSMENT/PLAN     Diagnoses and all orders for this visit:    1. Thickened nails (Primary)    2. Type 2 diabetes mellitus with diabetic polyneuropathy, without long-term current use of insulin    3. History of CVA (cerebrovascular accident)    4. Bilateral foot pain    5. Foot callus [L84]    6. Lumbar radiculopathy    7. Encounter for diabetic foot exam        Comprehensive lower extremity examination and evaluation was performed.  Discussed findings and treatment plan including risks, benefits, and treatment options with patient in detail. Patient agreed with treatment plan.  Notes reviewed from neurology provider.  Diabetic foot exam performed.  Notes from previous ED visit reviewed.  After verbal consent obtained, nail(s) x10 debrided of length and thickness with nail nipper without incidence  After verbal consent obtained, calluses x2 pared utilizing dermal curette and/or scalpel without incidence  Patient may maintain nails and calluses at home utilizing emery board or pumice stone between visits as needed  Reviewed at home diabetic foot care including daily foot checks   Continue follow-ups with neurology.  Continue to work with PCP for glucose control and diabetic shoe order.  An After Visit Summary was printed and given to the patient at discharge, including (if requested) any available informative/educational handouts regarding diagnosis, treatment, or medications. All questions were answered to patient/family satisfaction. Should symptoms fail to improve or worsen they agree to call or return to clinic or to go to the Emergency Department. Discussed the importance of following up with any needed screening  tests/labs/specialist appointments and any requested follow-up recommended by me today. Importance of maintaining follow-up discussed and patient accepts that missed appointments can delay diagnosis and potentially lead to worsening of conditions.  Return in about 9 weeks (around 3/31/2025) for Schedule [facility], With Alexandra LIM., or sooner if acute issues arise.      This document has been electronically signed by CARINA Mccall on January 27, 2025 13:51 CST

## 2025-01-27 ENCOUNTER — OFFICE VISIT (OUTPATIENT)
Age: 50
End: 2025-01-27
Payer: MEDICARE

## 2025-01-27 VITALS
DIASTOLIC BLOOD PRESSURE: 90 MMHG | SYSTOLIC BLOOD PRESSURE: 120 MMHG | HEART RATE: 94 BPM | BODY MASS INDEX: 26.16 KG/M2 | WEIGHT: 157 LBS | HEIGHT: 65 IN | OXYGEN SATURATION: 96 %

## 2025-01-27 DIAGNOSIS — M54.16 LUMBAR RADICULOPATHY: ICD-10-CM

## 2025-01-27 DIAGNOSIS — E11.42 TYPE 2 DIABETES MELLITUS WITH DIABETIC POLYNEUROPATHY, WITHOUT LONG-TERM CURRENT USE OF INSULIN: ICD-10-CM

## 2025-01-27 DIAGNOSIS — L60.2 THICKENED NAILS: Primary | ICD-10-CM

## 2025-01-27 DIAGNOSIS — L84 FOOT CALLUS: ICD-10-CM

## 2025-01-27 DIAGNOSIS — E11.9 ENCOUNTER FOR DIABETIC FOOT EXAM: ICD-10-CM

## 2025-01-27 DIAGNOSIS — M79.671 BILATERAL FOOT PAIN: ICD-10-CM

## 2025-01-27 DIAGNOSIS — Z86.73 HISTORY OF CVA (CEREBROVASCULAR ACCIDENT): ICD-10-CM

## 2025-01-27 DIAGNOSIS — M79.672 BILATERAL FOOT PAIN: ICD-10-CM

## 2025-01-27 RX ORDER — TRAZODONE HYDROCHLORIDE 50 MG/1
50 TABLET, FILM COATED ORAL
COMMUNITY
Start: 2025-01-15

## 2025-02-24 ENCOUNTER — HOSPITAL ENCOUNTER (OUTPATIENT)
Dept: CARDIOLOGY | Facility: HOSPITAL | Age: 50
Discharge: HOME | End: 2025-02-24
Admitting: NURSE PRACTITIONER
Payer: MEDICARE

## 2025-02-24 ENCOUNTER — OFFICE VISIT (OUTPATIENT)
Dept: CARDIOLOGY | Facility: CLINIC | Age: 50
End: 2025-02-24
Payer: MEDICARE

## 2025-02-24 VITALS
OXYGEN SATURATION: 95 % | BODY MASS INDEX: 26.82 KG/M2 | HEIGHT: 65 IN | HEART RATE: 88 BPM | SYSTOLIC BLOOD PRESSURE: 120 MMHG | WEIGHT: 161 LBS | DIASTOLIC BLOOD PRESSURE: 68 MMHG

## 2025-02-24 VITALS
BODY MASS INDEX: 26.16 KG/M2 | WEIGHT: 157 LBS | DIASTOLIC BLOOD PRESSURE: 90 MMHG | SYSTOLIC BLOOD PRESSURE: 120 MMHG | HEIGHT: 65 IN

## 2025-02-24 DIAGNOSIS — I25.3 PFO WITH ATRIAL SEPTAL ANEURYSM: Primary | ICD-10-CM

## 2025-02-24 DIAGNOSIS — I25.3 PFO WITH ATRIAL SEPTAL ANEURYSM: ICD-10-CM

## 2025-02-24 DIAGNOSIS — I25.10 NON-OCCLUSIVE CORONARY ARTERY DISEASE: ICD-10-CM

## 2025-02-24 DIAGNOSIS — Q23.81 BICUSPID AORTIC VALVE: ICD-10-CM

## 2025-02-24 DIAGNOSIS — Q21.12 PFO WITH ATRIAL SEPTAL ANEURYSM: ICD-10-CM

## 2025-02-24 DIAGNOSIS — Q21.12 PFO WITH ATRIAL SEPTAL ANEURYSM: Primary | ICD-10-CM

## 2025-02-24 DIAGNOSIS — I35.1 NONRHEUMATIC AORTIC VALVE INSUFFICIENCY: ICD-10-CM

## 2025-02-24 PROBLEM — R06.09 DOE (DYSPNEA ON EXERTION): Status: RESOLVED | Noted: 2017-04-18 | Resolved: 2025-02-24

## 2025-02-24 PROCEDURE — 99214 OFFICE O/P EST MOD 30 MIN: CPT | Performed by: INTERNAL MEDICINE

## 2025-02-24 PROCEDURE — 93306 TTE W/DOPPLER COMPLETE: CPT

## 2025-02-24 PROCEDURE — 1160F RVW MEDS BY RX/DR IN RCRD: CPT | Performed by: INTERNAL MEDICINE

## 2025-02-24 PROCEDURE — 93000 ELECTROCARDIOGRAM COMPLETE: CPT | Performed by: INTERNAL MEDICINE

## 2025-02-24 PROCEDURE — 1159F MED LIST DOCD IN RCRD: CPT | Performed by: INTERNAL MEDICINE

## 2025-02-24 PROCEDURE — 3078F DIAST BP <80 MM HG: CPT | Performed by: INTERNAL MEDICINE

## 2025-02-24 PROCEDURE — 3074F SYST BP LT 130 MM HG: CPT | Performed by: INTERNAL MEDICINE

## 2025-02-24 NOTE — PROGRESS NOTES
Subjective:     Encounter Date:2025      Patient ID: Rah Shin is a 49 y.o. male.    Chief Complaint: f/u PFO closure  History of Present Illness  The patient is a 49-year-old male who presents for follow-up of PFO closure.    He returns today for follow-up 3 months after undergoing successful PFO closure with a 25 mm [employer] Amplatzer PFO occluder device on 2024. The indication for this procedure was a prior cryptogenic stroke. He reports an overall improvement in his condition following the procedure, with a noted increase in energy levels. He underwent an ultrasound earlier today. A bubble study was also conducted. He has discontinued the use of clopidogrel as of last week.    Approximately 3 to 4 weeks post-procedure, he contracted COVID-19, which was his first encounter with the virus. This led to an episode of palpitations, prompting a visit to the emergency room. However, his condition has since improved. He experienced severe headaches during his COVID-19 infection but does not have a history of frequent headaches.    He is currently on atorvastatin, which he tolerates well, and it has effectively reduced his cholesterol levels. He recalls a previous adverse reaction to another statin medication, which he discontinued. His vitamin D level was previously recorded at 11 but has since improved.    He had a consultation with neurology in 2025, during which he was advised that no further visits were necessary unless new symptoms arose.    Supplemental Information  He has an endoscopy scheduled in 2 years and a colonoscopy in 9 years.    FAMILY HISTORY  His father  from COVID-19 during the early pandemic.        The following portions of the patient's history were reviewed and updated as appropriate: allergies, current medications, past family history, past medical history, past social history, past surgical history, and problem list.    Review of Systems   Constitutional:  Negative for malaise/fatigue.   Cardiovascular:  Negative for chest pain, claudication, dyspnea on exertion, leg swelling, near-syncope, orthopnea, palpitations, paroxysmal nocturnal dyspnea and syncope.   Respiratory:  Negative for shortness of breath.    Hematologic/Lymphatic: Does not bruise/bleed easily.           Current Outpatient Medications:     amphetamine-dextroamphetamine (ADDERALL) 20 MG tablet, Take 1 tablet by mouth Daily., Disp: , Rfl:     aspirin 81 MG EC tablet, Take 1 tablet by mouth Daily., Disp: 30 tablet, Rfl: 11    atorvastatin (LIPITOR) 20 MG tablet, Take 1 tablet by mouth Daily., Disp: 30 tablet, Rfl: 11    fluticasone (FLONASE) 50 MCG/ACT nasal spray, Administer 1 spray into the nostril(s) as directed by provider Daily., Disp: , Rfl:     lamoTRIgine (LaMICtal) 200 MG tablet, Take 1 tablet by mouth Daily., Disp: , Rfl:     loratadine (Claritin) 10 MG tablet, Take 1 tablet by mouth Daily., Disp: , Rfl:     metFORMIN (Glucophage) 1000 MG tablet, Take 1 tablet by mouth 2 (Two) Times a Day With Meals., Disp: 180 tablet, Rfl: 3    SITagliptin (JANUVIA) 50 MG tablet, Take 1 tablet by mouth Daily., Disp: , Rfl:     tadalafil (Cialis) 20 MG tablet, Take 1 tablet by mouth Daily As Needed for Erectile Dysfunction., Disp: 10 tablet, Rfl: 0    traZODone (DESYREL) 50 MG tablet, Take 1 tablet by mouth every night at bedtime., Disp: , Rfl:     Vitamin D, Cholecalciferol, (CHOLECALCIFEROL) 10 MCG (400 UNIT) tablet, Take 1 tablet by mouth Daily., Disp: , Rfl:     VITAMIN K PO, Take  by mouth Daily., Disp: , Rfl:        Objective:      Vitals:    02/24/25 1256   BP: 120/68   Pulse: 88   SpO2: 95%     Vitals and nursing note reviewed.   Constitutional:       General: Not in acute distress.     Appearance: Not in distress.   Neck:      Vascular: No JVD or JVR. JVD normal.   Pulmonary:      Effort: Pulmonary effort is normal.      Breath sounds: Normal breath sounds.   Cardiovascular:      Normal rate. Regular  rhythm.      Murmurs: There is no murmur.      No gallop.  No rub.   Pulses:     Intact distal pulses.   Edema:     Peripheral edema absent.   Skin:     General: Skin is warm and dry.   Neurological:      Mental Status: Alert, oriented to person, place, and time and oriented to person, place and time.       Physical Exam      Lab Review:         ECG 12 Lead    Date/Time: 2/24/2025 1:07 PM  Performed by: Alo Anthony MD    Authorized by: Alo Anthony MD  Comparison: compared with previous ECG from 12/17/2024  Comparison to previous ECG: Borderline criteria for inferior infarct are no longer present  Rhythm: sinus rhythm  Rate: normal  BPM: 88  QRS axis: left    Clinical impression: abnormal EKG        Results  Laboratory Studies  LDL cholesterol down to 62.    Imaging  Echocardiogram shows normal heart function on both sides, with no leak around the PFO closure device. Bicuspid aortic valve with mild aortic regurgitation is stable.      Results for orders placed during the hospital encounter of 02/24/25    Adult Transthoracic Echo Complete W/ Cont if Necessary Per Protocol    Interpretation Summary    Left ventricular systolic function is normal. Left ventricular ejection fraction appears to be 56 - 60%.    Estimated right ventricular systolic pressure from tricuspid regurgitation is normal (<35 mmHg).    Normal size and function of the right ventricle.    Bicuspid aortic valve (previously demonstrated), without any significant stenosis or regurgitation.    PFO occluder device in stable position, with disks on each side of the interatrial septum.  No evidence of flow through or around the device, with a negative bubble study.        Assessment/Plan:     Problem List Items Addressed This Visit (all established and stable)         Cardiac and Vasculature    Nonrheumatic aortic valve insufficiency    Bicuspid aortic valve    Relevant Orders    ECG 12 Lead    PFO with atrial septal aneurysm - Primary    Relevant  Orders    ECG 12 Lead    Non-occlusive coronary artery disease: nl CT-FFR in '24 but with non-obstructive plaque         Recommendations/plans:    Assessment & Plan  1. Patent Foramen Ovale (PFO) with atrial septal aneurysm: Established, now resolved, status post successful transcatheter base closure on 11/20/2024 with a 25 mm [employer]. [employer] Amplatzer PFO occluder device for purposes of prior cryptogenic stroke. He has done well with recovery and today's echocardiogram has a normal bubble study indicating complete endothelialization of the device.   -clopidogrel can be discontinued at this time, but he should remain on aspirin 81 mg daily for life.   -No further testing or surveillance regarding this would be required.    2. Bicuspid aortic valve with mild aortic regurgitation.  Stable on today's echocardiogram. Plan to continue seeing him once yearly and will not need another echocardiogram to reassess, barring any clinical change or change in exam, for another 3 years.    3. Nonobstructive coronary artery disease.  Noted on coronary CT angiogram performed last year. He is on aspirin 81 mg daily and moderate intensity statin with a notable reduction in his LDL. He is having no other symptoms at present to investigate. Continue preventative medical therapy as outlined.    Follow-up  The patient will follow up in 1 year with Shanice or sooner with new or worsening symptoms.      Alo Anthony MD  02/24/2025  22:12 CST    Transcribed from ambient dictation for Alo Anthony MD by Alo Anthony MD.  02/24/25   13:39 CST    Patient or patient representative verbalized consent to the visit recording.

## 2025-02-25 PROBLEM — I25.10 NON-OCCLUSIVE CORONARY ARTERY DISEASE: Status: ACTIVE | Noted: 2025-02-25

## 2025-02-25 LAB
AV MEAN PRESS GRAD SYS DOP V1V2: 6.4 MMHG
AV VMAX SYS DOP: 162.5 CM/SEC
BH CV ECHO MEAS - AO MAX PG: 10.6 MMHG
BH CV ECHO MEAS - AO V2 VTI: 37.1 CM
BH CV ECHO MEAS - EDV(CUBED): 90.8 ML
BH CV ECHO MEAS - EDV(MOD-SP2): 93.7 ML
BH CV ECHO MEAS - EDV(MOD-SP4): 100.6 ML
BH CV ECHO MEAS - EF(MOD-SP2): 50.3 %
BH CV ECHO MEAS - EF(MOD-SP4): 45.8 %
BH CV ECHO MEAS - ESV(CUBED): 22.2 ML
BH CV ECHO MEAS - ESV(MOD-SP2): 46.5 ML
BH CV ECHO MEAS - ESV(MOD-SP4): 54.6 ML
BH CV ECHO MEAS - FS: 37.5 %
BH CV ECHO MEAS - IVS/LVPW: 1.22 CM
BH CV ECHO MEAS - IVSD: 0.79 CM
BH CV ECHO MEAS - LAT PEAK E' VEL: 13.8 CM/SEC
BH CV ECHO MEAS - LV DIASTOLIC VOL/BSA (35-75): 56.4 CM2
BH CV ECHO MEAS - LV MASS(C)D: 99 GRAMS
BH CV ECHO MEAS - LV MAX PG: 4.8 MMHG
BH CV ECHO MEAS - LV MEAN PG: 3.5 MMHG
BH CV ECHO MEAS - LV SYSTOLIC VOL/BSA (12-30): 30.6 CM2
BH CV ECHO MEAS - LV V1 MAX: 109.2 CM/SEC
BH CV ECHO MEAS - LV V1 VTI: 26.5 CM
BH CV ECHO MEAS - LVIDD: 4.5 CM
BH CV ECHO MEAS - LVIDS: 2.8 CM
BH CV ECHO MEAS - LVPWD: 0.65 CM
BH CV ECHO MEAS - MED PEAK E' VEL: 8.3 CM/SEC
BH CV ECHO MEAS - MV A MAX VEL: 83.5 CM/SEC
BH CV ECHO MEAS - MV DEC SLOPE: 479.6 CM/SEC2
BH CV ECHO MEAS - MV DEC TIME: 0.2 SEC
BH CV ECHO MEAS - MV E MAX VEL: 94.1 CM/SEC
BH CV ECHO MEAS - MV E/A: 1.13
BH CV ECHO MEAS - PI END-D VEL: 136.5 CM/SEC
BH CV ECHO MEAS - RAP SYSTOLE: 3 MMHG
BH CV ECHO MEAS - RVSP: 20.4 MMHG
BH CV ECHO MEAS - SV(MOD-SP2): 47.2 ML
BH CV ECHO MEAS - SV(MOD-SP4): 46.1 ML
BH CV ECHO MEAS - SVI(MOD-SP2): 26.4 ML/M2
BH CV ECHO MEAS - SVI(MOD-SP4): 25.8 ML/M2
BH CV ECHO MEAS - TAPSE (>1.6): 1.67 CM
BH CV ECHO MEAS - TR MAX PG: 17.4 MMHG
BH CV ECHO MEAS - TR MAX VEL: 208.4 CM/SEC
BH CV ECHO MEASUREMENTS AVERAGE E/E' RATIO: 8.52
BH CV XLRA - RV BASE: 3.5 CM
BH CV XLRA - RV LENGTH: 6.1 CM
BH CV XLRA - RV MID: 3 CM
LEFT ATRIUM VOLUME INDEX: 28.7 ML/M2
LEFT ATRIUM VOLUME: 51 ML

## 2025-04-02 ENCOUNTER — TELEPHONE (OUTPATIENT)
Age: 50
End: 2025-04-02

## 2025-04-02 NOTE — PROGRESS NOTES
[facility] - PODIATRY    Today's Date: 04/04/2025     Patient Name: Rah Shin  MRN: [id number]  CSN: [id number]  PCP: Vanessa Schmidt APRN  Referring Provider: No ref. provider found    SUBJECTIVE     Chief Complaint   Patient presents with    Follow-up     Vanessa Schmidt APRN  9 weeks for [facility]  Pt here for nail/foot care. Pt reports no pain other than his neuropathy.     HPI: Rah Shin, a 49 y.o.male, comes to clinic as a(n) established patient presenting for diabetic foot exam, complaining of foot pain, and complaining of toenail/callus issues. Patient has h/o ADD, Anemia, Anxiety, Bipolar 1, Depression, DM Type 2, GERD, HTN, Stroke, Neuropathy . Patient is NIDDM and unsure of last BG level.   Patient reports numbness and tingling that extends down the leg. He states his toenails are elongated and thickened. He has difficulty caring for his nails due to thickness and neuropathy. Reports he has noticed a new thickened area to his bilateral heels.  Denies pain. Patient reports he has seen neurosurgery and vascular surgery recently for back and leg issues.  Denies pain. Denies previous treatment. Denies any constitutional symptoms. No other pedal complaints at this time.      Past Medical History:   Diagnosis Date    ADD (attention deficit disorder)     Allergic     Anemia     Anxiety     Arthritis     Asthma     Bipolar 1 disorder     Callus     Cataract     Congenital heart disease     DDD (degenerative disc disease), cervical 10/30/2024    Depression     major depressive disorder    Diabetes mellitus     Difficulty walking 2021    Loss of balance/stroke    GERD (gastroesophageal reflux disease)     Headache     Headache, tension-type     Hyperlipidemia     My HDL was 127    Hypertension     Ingrown toenail     Kidney stones     Memory loss     Migraine     Mitral valve prolapse     MRSA (methicillin resistant Staphylococcus aureus)     Neuropathy  in diabetes     Non-occlusive coronary artery disease 02/25/2025    CT-FFR in 2024    Peripheral neuropathy     Shingles     Stroke     TIA (transient ischemic attack)     Verruca 2020    One on toe on left foot, has not reappeared in over a year    Vision loss      Past Surgical History:   Procedure Laterality Date    CARDIAC CATHETERIZATION      COLONOSCOPY      ENDOSCOPY      INFECTED SKIN DEBRIDEMENT      MRSA in abdomen    PATENT FORAMEN OVALE CLOSURE N/A 11/20/2024    Procedure: Patent foramen ovale closure;  Surgeon: Alo Anthony MD;  Location:  [facility];  Service: Cardiology;  Laterality: N/A;  with ICE    TONSILLECTOMY      WISDOM TOOTH EXTRACTION       Family History   Problem Relation Age of Onset    Hyperlipidemia Mother     Hypertension Mother     Kidney disease Mother     Heart disease Mother     Clotting disorder Mother     Diabetes Mother     Liver disease Mother     Mental illness Mother     Neuropathy Mother     Anemia Mother     Heart attack Mother     Anxiety disorder Mother     Arthritis Mother     Depression Mother     Hyperlipidemia Father     Hearing loss Father     COPD Brother     Drug abuse Brother     Drug abuse Brother     Early death Brother      Social History     Socioeconomic History    Marital status: Single   Tobacco Use    Smoking status: Never     Passive exposure: Never    Smokeless tobacco: Never   Vaping Use    Vaping status: Never Used   Substance and Sexual Activity    Alcohol use: Not Currently    Drug use: Not Currently     Types: Methamphetamines     Comment: last used meth about one week ago    Sexual activity: Not Currently     Partners: Male     Allergies   Allergen Reactions    Contrast Dye (Echo Or Unknown Ct/Mr) Hives    Iodinated Contrast Media Hives, Itching and Rash     Had this when he had a heart cath in 2008     Current Outpatient Medications   Medication Sig Dispense Refill    amphetamine-dextroamphetamine (ADDERALL) 20 MG tablet Take  1 tablet by mouth Daily.      aspirin 81 MG EC tablet Take 1 tablet by mouth Daily. 30 tablet 11    atorvastatin (LIPITOR) 20 MG tablet Take 1 tablet by mouth Daily. 30 tablet 11    fluticasone (FLONASE) 50 MCG/ACT nasal spray Administer 1 spray into the nostril(s) as directed by provider Daily.      lamoTRIgine (LaMICtal) 200 MG tablet Take 1 tablet by mouth Daily.      loratadine (Claritin) 10 MG tablet Take 1 tablet by mouth Daily.      metFORMIN (Glucophage) 1000 MG tablet Take 1 tablet by mouth 2 (Two) Times a Day With Meals. 180 tablet 3    SITagliptin (JANUVIA) 50 MG tablet Take 1 tablet by mouth Daily.      tadalafil (Cialis) 20 MG tablet Take 1 tablet by mouth Daily As Needed for Erectile Dysfunction. 10 tablet 0    traZODone (DESYREL) 50 MG tablet Take 1 tablet by mouth every night at bedtime.      Vitamin D, Cholecalciferol, (CHOLECALCIFEROL) 10 MCG (400 UNIT) tablet Take 1 tablet by mouth Daily.      VITAMIN K PO Take  by mouth Daily.       No current facility-administered medications for this visit.     Review of Systems   Constitutional:  Negative for activity change and fever.   HENT:  Negative for congestion.    Respiratory:  Negative for shortness of breath.    Cardiovascular:  Negative for chest pain and leg swelling.   Gastrointestinal:  Negative for abdominal pain, constipation, diarrhea, nausea and vomiting.   Musculoskeletal:  Positive for arthralgias. Negative for gait problem.   Skin:  Negative for color change and wound.        Elongated thickened toenails   Neurological:  Positive for numbness. Negative for dizziness and weakness.   Hematological:  Does not bruise/bleed easily.   Psychiatric/Behavioral:  Negative for agitation, behavioral problems and confusion.        OBJECTIVE     Vitals:    04/04/25 0906   BP: 122/80   Pulse: 82   SpO2: 97%       PHYSICAL EXAM  GEN:   Accompanied by none.     Foot/Ankle Exam    GENERAL  Appearance:  appears stated age  Orientation:  AAOx3  Affect:   appropriate  Gait:  unimpaired  Assistance:  independent  Right shoe gear: diabetic shoe  Left shoe gear: diabetic shoe    VASCULAR     Right Foot Vascularity   Dorsalis pedis:  2+  Posterior tibial:  2+  Skin temperature:  cool  Edema grading:  None  CFT:  3  Pedal hair growth:  Absent     Left Foot Vascularity   Dorsalis pedis:  2+  Posterior tibial:  2+  Skin temperature:  cool  Edema grading:  None  CFT:  3  Pedal hair growth:  Absent     NEUROLOGIC     Right Foot Neurologic   Light touch sensation: diminished  Vibratory sensation: diminished  Hot/Cold sensation: diminished  Protective Sensation using Avon-Mendoza Monofilament:   Sites intact: 10  Sites tested: 10     Left Foot Neurologic   Light touch sensation: diminished  Vibratory sensation: diminished  Hot/Cold sensation:  diminished  Protective Sensation using Avon-Mendoza Monofilament:   Sites intact: 9  Sites tested: 10    MUSCULOSKELETAL     Right Foot Musculoskeletal   Ecchymosis:  none  Tenderness:  toenail problem    Arch:  Normal     Left Foot Musculoskeletal   Ecchymosis:  none  Tenderness:  toenail problem  Arch:  Normal    MUSCLE STRENGTH     Right Foot Muscle Strength   Foot dorsiflexion:  5  Foot plantar flexion:  5  Foot inversion:  5  Foot eversion:  5     Left Foot Muscle Strength   Foot dorsiflexion:  5  Foot plantar flexion:  5  Foot inversion:  5  Foot eversion:  5    RANGE OF MOTION     Right Foot Range of Motion   Foot and ankle ROM within normal limits       Left Foot Range of Motion   Foot and ankle ROM within normal limits      DERMATOLOGIC      Right Foot Dermatologic   Skin  Right foot skin is intact.   Nails  1.  Positive for elongated and abnormal thickness.  2.  Positive for elongated and abnormal thickness.  3.  Positive for elongated and abnormal thickness.  4.  Positive for elongated and abnormal thickness.  5.  Positive for elongated and abnormal thickness.     Left Foot Dermatologic   Skin  Left foot skin is intact.    Nails  1.  Positive for elongated and abnormal thickness.  2.  Positive for elongated and abnormal thickness.  3.  Positive for elongated and abnormal thickness.  4.  Positive for elongated and abnormally thick.  5.  Positive for elongated and abnormally thick.    Image:       RADIOLOGY/NUCLEAR:  No results found.    LABORATORY/CULTURE RESULTS:      PATHOLOGY RESULTS:       ASSESSMENT/PLAN     Diagnoses and all orders for this visit:    1. Thickened nails (Primary)    2. Type 2 diabetes mellitus with diabetic polyneuropathy, without long-term current use of insulin    3. Bilateral foot pain    4. History of CVA (cerebrovascular accident)    5. Foot callus [L84]    6. Lumbar radiculopathy        Comprehensive lower extremity examination and evaluation was performed.  Discussed findings and treatment plan including risks, benefits, and treatment options with patient in detail. Patient agreed with treatment plan.  After verbal consent obtained, nail(s) x10 debrided of length and thickness with nail nipper without incidence  After verbal consent obtained, calluses x3 pared utilizing dermal curette and/or scalpel without incidence  Patient may maintain nails and calluses at home utilizing emery board or pumice stone between visits as needed  Reviewed at home diabetic foot care including daily foot checks   Continue follow-ups with neurology.  Continue to work with PCP for glucose control and diabetic shoe order.  An After Visit Summary was printed and given to the patient at discharge, including (if requested) any available informative/educational handouts regarding diagnosis, treatment, or medications. All questions were answered to patient/family satisfaction. Should symptoms fail to improve or worsen they agree to call or return to clinic or to go to the Emergency Department. Discussed the importance of following up with any needed screening tests/labs/specialist appointments and any requested follow-up recommended  by me today. Importance of maintaining follow-up discussed and patient accepts that missed appointments can delay diagnosis and potentially lead to worsening of conditions.  Return in about 9 weeks (around 6/6/2025) for Diabetic foot care clinic, With Alexandra LIM., or sooner if acute issues arise.      This document has been electronically signed by CARINA Mccall on April 4, 2025 09:29 CDT

## 2025-04-02 NOTE — TELEPHONE ENCOUNTER
Outbound call to patient, no answer, lm with all information of new appt on 04/04/25 at 9 am.      Attempted to reschedule appt from 04/02/25 with Alexandra due to weather per ADMIN.  If patient calls back please reschedule at that time. HUB TO RELAY.

## 2025-04-04 ENCOUNTER — OFFICE VISIT (OUTPATIENT)
Age: 50
End: 2025-04-04
Payer: MEDICARE

## 2025-04-04 VITALS
DIASTOLIC BLOOD PRESSURE: 80 MMHG | BODY MASS INDEX: 26.33 KG/M2 | OXYGEN SATURATION: 97 % | SYSTOLIC BLOOD PRESSURE: 122 MMHG | WEIGHT: 158 LBS | HEIGHT: 65 IN | HEART RATE: 82 BPM

## 2025-04-04 DIAGNOSIS — L84 FOOT CALLUS: ICD-10-CM

## 2025-04-04 DIAGNOSIS — Z86.73 HISTORY OF CVA (CEREBROVASCULAR ACCIDENT): ICD-10-CM

## 2025-04-04 DIAGNOSIS — E11.42 TYPE 2 DIABETES MELLITUS WITH DIABETIC POLYNEUROPATHY, WITHOUT LONG-TERM CURRENT USE OF INSULIN: ICD-10-CM

## 2025-04-04 DIAGNOSIS — M79.672 BILATERAL FOOT PAIN: ICD-10-CM

## 2025-04-04 DIAGNOSIS — M79.671 BILATERAL FOOT PAIN: ICD-10-CM

## 2025-04-04 DIAGNOSIS — L60.2 THICKENED NAILS: Primary | ICD-10-CM

## 2025-04-04 DIAGNOSIS — M54.16 LUMBAR RADICULOPATHY: ICD-10-CM

## 2025-05-05 ENCOUNTER — NURSE TRIAGE (OUTPATIENT)
Dept: CALL CENTER | Facility: HOSPITAL | Age: 50
End: 2025-05-05
Payer: MEDICARE

## 2025-05-05 NOTE — TELEPHONE ENCOUNTER
137/90 is BP. Feeling tired today. Had a cardiac procedure in November. Takes baby Aspirin. Hx CVA and DM. HR 96. Feels irregular to him. Unable to verify at this time. Some SOA. Comes and goes. Started 2 hours ago. Not present at this time. Feels it may be anxiety related. Advised that protocol recommends ED. He is unable to get there and has no one to drive him. Has a call out to his neighbor to take him. Advised he can call 911 for EMS. Options discussed. All questions answered.       Reason for Disposition   Difficulty breathing    Additional Information   Negative: Passed out (i.e., lost consciousness, collapsed and was not responding)   Negative: Shock suspected (e.g., cold/pale/clammy skin, too weak to stand, low BP, rapid pulse)   Negative: Difficult to awaken or acting confused (e.g., disoriented, slurred speech)   Negative: Visible sweat on face or sweat dripping down face   Negative: Unable to walk, or can only walk with assistance (e.g., requires support)   Negative: [1] Received SHOCK from implantable cardiac defibrillator AND [2] persisting symptoms (i.e., palpitations, lightheadedness)   Negative: [1] Dizziness, lightheadedness, or weakness AND [2] heart beating very rapidly (e.g., > 140 / minute)   Negative: [1] Dizziness, lightheadedness, or weakness AND [2] heart beating very slowly (e.g., < 50 / minute)   Negative: Sounds like a life-threatening emergency to the triager   Negative: Chest pain   Negative: Implantable Cardiac Defibrillator (ICD) or a pacemaker symptoms or questions   Negative: Dizziness, lightheadedness, or weakness   Negative: [1] Heart beating very rapidly (e.g., > 140 / minute) AND [2] present now  (Exception: During exercise.)   Negative: Heart beating very slowly (e.g., < 50 / minute)  (Exception: Athlete and heart rate normal for caller.)   Negative: New or worsened shortness of breath with activity (dyspnea on exertion)   Negative: Patient sounds very sick or weak to the  "triager   Negative: [1] Heart beating very rapidly (e.g., > 140 / minute) AND [2] not present now  (Exception: During exercise.)   Negative: [1] Skipped or extra beat(s) AND [2] increases with exercise or exertion   Negative: [1] Skipped or extra beat(s) AND [2] occurs 4 or more times per minute   Negative: New or worsened ankle swelling   Negative: History of heart disease (i.e., heart attack, bypass surgery, angina, angioplasty, CHF)  (Exception: Brief heartbeat symptoms that went away and now feels well.)   Negative: Age > 60 years  (Exception: Brief heartbeat symptoms that went away and now feels well.)   Negative: Taking water pill (i.e., diuretic) or heart medication (e.g., digoxin)   Negative: Wearing a \"Holter monitor\" or \"cardiac event monitor\"   Negative: [1] Received SHOCK from implantable cardiac defibrillator AND [2] now feels well    Answer Assessment - Initial Assessment Questions  1. DESCRIPTION: \"Please describe your heart rate or heartbeat that you are having\" (e.g., fast/slow, regular/irregular, skipped or extra beats, \"palpitations\")      137/90 is BP. Feeling tired today. Had a cardiac procedure in November. Takes baby Aspirin. Hx CVA and DM. HR 96. Feels irregular. Some SOA. Comes and goes. Started 2 hours ago.  2. ONSET: \"When did it start?\" (Minutes, hours or days)       2 hours ago   3. DURATION: \"How long does it last\" (e.g., seconds, minutes, hours)      On and off  4. PATTERN \"Does it come and go, or has it been constant since it started?\"  \"Does it get worse with exertion?\"   \"Are you feeling it now?\"      Stays noticeable despite activity   5. TAP: \"Using your hand, can you tap out what you are feeling on a chair or table in front of you, so that I can hear?\" (Note: not all patients can do this)        na  6. HEART RATE: \"Can you tell me your heart rate?\" \"How many beats in 15 seconds?\"  (Note: not all patients can do this)        96  7. RECURRENT SYMPTOM: \"Have you ever had this " "before?\" If Yes, ask: \"When was the last time?\" and \"What happened that time?\"       no  8. CAUSE: \"What do you think is causing the palpitations?\"      Maybe anxiety   9. CARDIAC HISTORY: \"Do you have any history of heart disease?\" (e.g., heart attack, angina, bypass surgery, angioplasty, arrhythmia)       Yes. See EPIC  10. OTHER SYMPTOMS: \"Do you have any other symptoms?\" (e.g., dizziness, chest pain, sweating, difficulty breathing)        SOA   11. PREGNANCY: \"Is there any chance you are pregnant?\" \"When was your last menstrual period?\"        na    Protocols used: Heart Rate and Heartbeat Questions-ADULT-AH    "

## 2025-05-14 DIAGNOSIS — R76.8 ELEVATED SERUM IMMUNOGLOBULIN FREE LIGHT CHAIN LEVEL: Primary | ICD-10-CM

## 2025-05-16 ENCOUNTER — TELEPHONE (OUTPATIENT)
Dept: CARDIOLOGY | Facility: CLINIC | Age: 50
End: 2025-05-16
Payer: MEDICARE

## 2025-05-16 NOTE — TELEPHONE ENCOUNTER
Spoke with Dr Wells office. They have the same recommendations on abx. Staff is going to ask the Oral physician to write the script. If he does not write it, the office will call back.

## 2025-05-20 ENCOUNTER — HOSPITAL ENCOUNTER (OUTPATIENT)
Dept: INFUSION THERAPY | Age: 50
Discharge: HOME | End: 2025-05-20
Payer: MEDICARE

## 2025-05-20 DIAGNOSIS — R76.8 ELEVATED SERUM IMMUNOGLOBULIN FREE LIGHT CHAIN LEVEL: ICD-10-CM

## 2025-05-20 LAB
ALBUMIN SERPL-MCNC: 4.3 G/DL (ref 3.5–5.2)
ALP SERPL-CCNC: 76 U/L (ref 40–129)
ALT SERPL-CCNC: 30 U/L (ref 5–41)
ANION GAP SERPL CALCULATED.3IONS-SCNC: 11 MMOL/L (ref 7–19)
AST SERPL-CCNC: 21 U/L (ref 5–40)
BASOPHILS # BLD: 0.06 K/UL (ref 0–0.2)
BASOPHILS NFR BLD: 1 % (ref 0–1)
BILIRUB SERPL-MCNC: 0.3 MG/DL (ref 0–1.2)
BUN SERPL-MCNC: 14 MG/DL (ref 6–20)
CALCIUM SERPL-MCNC: 8.7 MG/DL (ref 8.6–10)
CHLORIDE SERPL-SCNC: 100 MMOL/L (ref 98–107)
CO2 SERPL-SCNC: 24 MMOL/L (ref 22–29)
CREAT SERPL-MCNC: 0.8 MG/DL (ref 0.7–1.2)
EOSINOPHIL # BLD: 0.92 K/UL (ref 0–0.6)
EOSINOPHIL NFR BLD: 14.8 % (ref 0–5)
ERYTHROCYTE [DISTWIDTH] IN BLOOD BY AUTOMATED COUNT: 12.2 % (ref 11.5–14.5)
GLUCOSE SERPL-MCNC: 197 MG/DL (ref 70–99)
HCT VFR BLD AUTO: 36.6 % (ref 42–52)
HGB BLD-MCNC: 12.9 G/DL (ref 14–18)
LYMPHOCYTES # BLD: 1.88 K/UL (ref 1.1–4.5)
LYMPHOCYTES NFR BLD: 30.3 % (ref 20–40)
MCH RBC QN AUTO: 30.7 PG (ref 27–31)
MCHC RBC AUTO-ENTMCNC: 35.2 G/DL (ref 33–37)
MCV RBC AUTO: 87.1 FL (ref 80–94)
MONOCYTES # BLD: 0.57 K/UL (ref 0–0.9)
MONOCYTES NFR BLD: 9.2 % (ref 1–10)
NEUTROPHILS # BLD: 2.77 K/UL (ref 1.5–7.5)
NEUTS SEG NFR BLD: 44.5 % (ref 50–65)
PLATELET # BLD AUTO: 241 K/UL (ref 130–400)
PMV BLD AUTO: 8.6 FL (ref 9.4–12.4)
POTASSIUM SERPL-SCNC: 4.7 MMOL/L (ref 3.5–5.1)
PROT SERPL-MCNC: 7.5 G/DL (ref 6.4–8.3)
RBC # BLD AUTO: 4.2 M/UL (ref 4.7–6.1)
SODIUM SERPL-SCNC: 135 MMOL/L (ref 136–145)
WBC # BLD AUTO: 6.21 K/UL (ref 4.8–10.8)

## 2025-05-20 PROCEDURE — 86334 IMMUNOFIX E-PHORESIS SERUM: CPT

## 2025-05-20 PROCEDURE — 83883 ASSAY NEPHELOMETRY NOT SPEC: CPT

## 2025-05-20 PROCEDURE — 83521 IG LIGHT CHAINS FREE EACH: CPT

## 2025-05-20 PROCEDURE — 36415 COLL VENOUS BLD VENIPUNCTURE: CPT

## 2025-05-20 PROCEDURE — 80053 COMPREHEN METABOLIC PANEL: CPT

## 2025-05-20 PROCEDURE — 85025 COMPLETE CBC W/AUTO DIFF WBC: CPT

## 2025-05-20 PROCEDURE — 84155 ASSAY OF PROTEIN SERUM: CPT

## 2025-05-20 PROCEDURE — 82784 ASSAY IGA/IGD/IGG/IGM EACH: CPT

## 2025-05-20 PROCEDURE — 84165 PROTEIN E-PHORESIS SERUM: CPT

## 2025-05-24 LAB
ALBUMIN SERPL-MCNC: 4.07 G/DL (ref 3.75–5.01)
ALPHA1 GLOB SERPL ELPH-MCNC: 0.28 G/DL (ref 0.19–0.46)
ALPHA2 GLOB SERPL ELPH-MCNC: 0.59 G/DL (ref 0.48–1.05)
B-GLOBULIN SERPL ELPH-MCNC: 0.94 G/DL (ref 0.48–1.1)
DEPRECATED KAPPA LC FREE/LAMBDA SER: 1.65 {RATIO} (ref 0.26–1.65)
EER MONOCLONAL PROTEIN AND FLC, SERUM: ABNORMAL
GAMMA GLOB SERPL ELPH-MCNC: 1.32 G/DL (ref 0.62–1.51)
IGA SERPL-MCNC: 198 MG/DL (ref 68–408)
IGG SERPL-MCNC: 1287 MG/DL (ref 768–1632)
IGM SERPL-MCNC: 145 MG/DL (ref 35–263)
INTERPRETATION SERPL IFE-IMP: ABNORMAL
INTERPRETATION SERPL IFE-IMP: ABNORMAL
KAPPA LC FREE SER-MCNC: 41.05 MG/L (ref 3.3–19.4)
LAMBDA LC FREE SERPL-MCNC: 24.9 MG/L (ref 5.71–26.3)
MONOCLONAL PROTEIN, SERUM: ABNORMAL G/DL
PROT SERPL-MCNC: 7.2 G/DL (ref 6.3–8.2)

## 2025-05-28 ENCOUNTER — TELEPHONE (OUTPATIENT)
Dept: HEMATOLOGY | Age: 50
End: 2025-05-28

## 2025-05-28 NOTE — TELEPHONE ENCOUNTER
I called patient and left detailed voicemail about their appointment on 06/02/2025. I made patient aware please DO NOT arrive any earlier than the appointment time (That appointment time was given) and to come at the time of the follow up and NOT the time of the lab appointment if it is different than the follow up appt time. I also made patient aware they are allowed to eat if needed (Our labs are not fasting labs) however we need them to drink plenty of water to hydrate their veins properly before coming to these appointments because this will make their lab draw much easier for them and the phlebotomist. I also made patient aware that we are now located at the [facility] at [address]. Located between [facility] and the [facility]. Front entrance faces [place]. Left office number for patient to call if they had any questions or needed to reschedule their appointment.

## 2025-05-29 ENCOUNTER — TELEPHONE (OUTPATIENT)
Dept: UROLOGY | Facility: CLINIC | Age: 50
End: 2025-05-29
Payer: MEDICARE

## 2025-05-29 DIAGNOSIS — N52.9 ERECTILE DYSFUNCTION, UNSPECIFIED ERECTILE DYSFUNCTION TYPE: ICD-10-CM

## 2025-05-29 RX ORDER — TADALAFIL 20 MG/1
20 TABLET ORAL DAILY PRN
Qty: 10 TABLET | Refills: 0 | Status: SHIPPED | OUTPATIENT
Start: 2025-05-29

## 2025-05-30 NOTE — PROGRESS NOTES
OP HEMATOLOGY/ONCOLOGY PROGRESS NOTE       Pt Name: Rubens Pulido  YOB: 1975  MRN: [id number]  PCP: PAM Maldonado  Neurology: Dr. Audie Lind  Date of evaluation: 6/26/25    History Obtained From:  patient, electronic medical record    CHIEF COMPLAINT:    Chief Complaint   Patient presents with    Follow-up     Follow up- pt stated had pfo closure in November at [facility] [place] and had COVid in December,last 3 month having previous symptoms from 2022 had labs last month bone and muscle pains and needed to come see you, neurological issues seen neurologist with 2 mri's scheduled, pt has had stroke previously,concerned about labs, pt is asking if these occurances are due to mold and wanted to know about referral to specialist, last 3 months glucose level have been greater than 8.4 and PCP has increased januvia ( has      History of Present Illness  Rubens Pulido is a 49-year-old  gentleman returning to the clinic for annual surveillance regarding a history of elevated kappa light chains. Abnormal laboratory findings were noted during a neurology evaluation for numbness in his hands and feet. Evaluation has shown no overt concern for underlying plasma cell dyscrasia. He completed serology on 05/20/2025 and is here to discuss the results.    Over the past three months, he has been experiencing symptoms reminiscent of those in 2023, including muscle and bone pain. He reports elevated eosinophil levels in recent lab results and suspects an inflammatory process. Exposure to black mold in his living environment is a concern, and he is seeking advice on potential testing for toxins. Significant stress and anxiety have been present recently. He is scheduled to see his primary care physician next month.    Blood sugar levels (HgbA1c) have been consistently around 6 or 7 for the past two years but have fluctuated significantly in the past three months, with a glucose reading of 378 this morning and  is:   Hospital Outpatient Visit on 05/20/2025   Component Date Value Ref Range Status    SPE/ARTI Interpretation 05/20/2025 See Note   Final    Comment: Normal SPEP pattern. ARTI gel shows a normal pattern; no monoclonal  proteins  seen.      Albumin 05/20/2025 4.07  3.75 - 5.01 g/dL Final    Alpha-1-Globulin 05/20/2025 0.28  0.19 - 0.46 g/dL Final    Alpha-2-Globulin 05/20/2025 0.59  0.48 - 1.05 g/dL Final    Beta Globulin 05/20/2025 0.94  0.48 - 1.10 g/dL Final    Gamma Globulin 05/20/2025 1.32  0.62 - 1.51 g/dL Final    +IMM 05/20/2025 ARTI Done   Final    IgA 05/20/2025 198  68 - 408 mg/dL Final    IgG 05/20/2025 1287  768 - 1632 mg/dL Final    IgM 05/20/2025 145  35 - 263 mg/dL Final    Total Protein 05/20/2025 7.2  6.3 - 8.2 g/dL Final    Kappa Free Light Chains QNT 05/20/2025 41.05 (H)  3.30 - 19.40 mg/L Final    Comment: INTERPRETIVE INFORMATION: Kappa Qnt Free Light Chains  Undetected antigen excess is a rare event but cannot be excluded. Free  light  chain results should always be interpreted in conjunction with other  clinical  and laboratory findings.      Lambda Free Light Chains QNT 05/20/2025 24.90  5.71 - 26.30 mg/L Final    Comment: INTERPRETIVE INFORMATION: Lambda Qnt Free Light Chains  Undetected antigen excess is a rare event but cannot be excluded. Free  light  chain results should always be interpreted in conjunction with other  clinical  and laboratory findings.      Kappa/Lambda Fluid C Ratio 05/20/2025 1.65  0.26 - 1.65 Final    Monoclonal Protein, Serum 05/20/2025 Not Applicable  <=0.00 g/dL Final    EER Monoclonal Protein and FLC, Se* 05/20/2025    Final    Comment: Authorized individuals can access the [facility] Enhanced Report  with an [employer] Connect account using the following link.    Your local lab can assist you in obtaining the patient  report if you don't have a Connect account.    [email or web address]  Performed By: [employer]  [address] [place] [address]

## 2025-06-02 ENCOUNTER — TELEPHONE (OUTPATIENT)
Dept: UROLOGY | Facility: CLINIC | Age: 50
End: 2025-06-02
Payer: MEDICARE

## 2025-06-02 DIAGNOSIS — N52.9 ERECTILE DYSFUNCTION, UNSPECIFIED ERECTILE DYSFUNCTION TYPE: Primary | ICD-10-CM

## 2025-06-02 RX ORDER — TADALAFIL 20 MG/1
20 TABLET ORAL AS NEEDED
Qty: 90 TABLET | Refills: 3 | Status: SHIPPED | OUTPATIENT
Start: 2025-06-02

## 2025-06-04 ENCOUNTER — HOSPITAL ENCOUNTER (EMERGENCY)
Facility: HOSPITAL | Age: 50
Discharge: HOME | End: 2025-06-04
Attending: FAMILY MEDICINE | Admitting: FAMILY MEDICINE
Payer: MEDICARE

## 2025-06-04 VITALS
RESPIRATION RATE: 16 BRPM | WEIGHT: 150.1 LBS | SYSTOLIC BLOOD PRESSURE: 146 MMHG | OXYGEN SATURATION: 100 % | HEART RATE: 92 BPM | BODY MASS INDEX: 25.01 KG/M2 | TEMPERATURE: 99.3 F | HEIGHT: 65 IN | DIASTOLIC BLOOD PRESSURE: 87 MMHG

## 2025-06-04 DIAGNOSIS — E11.649 HYPOGLYCEMIA ASSOCIATED WITH DIABETES: Primary | ICD-10-CM

## 2025-06-04 LAB
ANION GAP SERPL CALCULATED.3IONS-SCNC: 10 MMOL/L (ref 5–15)
BASOPHILS # BLD AUTO: 0.08 10*3/MM3 (ref 0–0.2)
BASOPHILS NFR BLD AUTO: 1.2 % (ref 0–1.5)
BUN SERPL-MCNC: 15.8 MG/DL (ref 6–20)
BUN/CREAT SERPL: 20 (ref 7–25)
CALCIUM SPEC-SCNC: 9.1 MG/DL (ref 8.6–10.5)
CHLORIDE SERPL-SCNC: 99 MMOL/L (ref 98–107)
CO2 SERPL-SCNC: 24 MMOL/L (ref 22–29)
CREAT SERPL-MCNC: 0.79 MG/DL (ref 0.76–1.27)
DEPRECATED RDW RBC AUTO: 36.7 FL (ref 37–54)
EGFRCR SERPLBLD CKD-EPI 2021: 107.6 ML/MIN/1.73
EOSINOPHIL # BLD AUTO: 0.47 10*3/MM3 (ref 0–0.4)
EOSINOPHIL NFR BLD AUTO: 7.1 % (ref 0.3–6.2)
ERYTHROCYTE [DISTWIDTH] IN BLOOD BY AUTOMATED COUNT: 12 % (ref 12.3–15.4)
GLUCOSE BLDC GLUCOMTR-MCNC: 170 MG/DL (ref 70–130)
GLUCOSE SERPL-MCNC: 173 MG/DL (ref 65–99)
HCT VFR BLD AUTO: 36.7 % (ref 37.5–51)
HGB BLD-MCNC: 13 G/DL (ref 13–17.7)
IMM GRANULOCYTES # BLD AUTO: 0.02 10*3/MM3 (ref 0–0.05)
IMM GRANULOCYTES NFR BLD AUTO: 0.3 % (ref 0–0.5)
LYMPHOCYTES # BLD AUTO: 2.17 10*3/MM3 (ref 0.7–3.1)
LYMPHOCYTES NFR BLD AUTO: 33 % (ref 19.6–45.3)
MAGNESIUM SERPL-MCNC: 1.9 MG/DL (ref 1.6–2.6)
MCH RBC QN AUTO: 30.2 PG (ref 26.6–33)
MCHC RBC AUTO-ENTMCNC: 35.4 G/DL (ref 31.5–35.7)
MCV RBC AUTO: 85.2 FL (ref 79–97)
MONOCYTES # BLD AUTO: 0.61 10*3/MM3 (ref 0.1–0.9)
MONOCYTES NFR BLD AUTO: 9.3 % (ref 5–12)
NEUTROPHILS NFR BLD AUTO: 3.23 10*3/MM3 (ref 1.7–7)
NEUTROPHILS NFR BLD AUTO: 49.1 % (ref 42.7–76)
NRBC BLD AUTO-RTO: 0 /100 WBC (ref 0–0.2)
PLATELET # BLD AUTO: 217 10*3/MM3 (ref 140–450)
PMV BLD AUTO: 8.9 FL (ref 6–12)
POTASSIUM SERPL-SCNC: 4.3 MMOL/L (ref 3.5–5.2)
RBC # BLD AUTO: 4.31 10*6/MM3 (ref 4.14–5.8)
SODIUM SERPL-SCNC: 133 MMOL/L (ref 136–145)
WBC NRBC COR # BLD AUTO: 6.58 10*3/MM3 (ref 3.4–10.8)

## 2025-06-04 PROCEDURE — 80048 BASIC METABOLIC PNL TOTAL CA: CPT | Performed by: FAMILY MEDICINE

## 2025-06-04 PROCEDURE — 83735 ASSAY OF MAGNESIUM: CPT | Performed by: FAMILY MEDICINE

## 2025-06-04 PROCEDURE — 85025 COMPLETE CBC W/AUTO DIFF WBC: CPT | Performed by: FAMILY MEDICINE

## 2025-06-04 PROCEDURE — 82948 REAGENT STRIP/BLOOD GLUCOSE: CPT

## 2025-06-04 PROCEDURE — 99283 EMERGENCY DEPT VISIT LOW MDM: CPT | Performed by: FAMILY MEDICINE

## 2025-06-04 RX ORDER — SODIUM CHLORIDE 0.9 % (FLUSH) 0.9 %
10 SYRINGE (ML) INJECTION AS NEEDED
Status: DISCONTINUED | OUTPATIENT
Start: 2025-06-04 | End: 2025-06-04 | Stop reason: HOSPADM

## 2025-06-04 NOTE — ED PROVIDER NOTES
Subjective   History of Present Illness  Patient presents emergency room after having a hypoglycemic event.  He has a history of type 2 diabetes mellitus.  He takes multiple medications for his diabetes.  He states that he felt weak and hypoglycemic.  He drank a Coke and ate a banana and his blood glucose dropped him further.  He states that his blood glucose was 90 and dropped to 84.  EMS reports that they had a blood glucose of 129.  Patient denies any other symptoms at this time.        Review of Systems   All other systems reviewed and are negative.      Past Medical History:   Diagnosis Date    ADD (attention deficit disorder)     Allergic     Anemia     Anxiety     Arthritis     Asthma     Bipolar 1 disorder     Callus     Cataract     Congenital heart disease     DDD (degenerative disc disease), cervical 10/30/2024    Depression     major depressive disorder    Diabetes mellitus     Difficulty walking 2021    Loss of balance/stroke    GERD (gastroesophageal reflux disease)     Headache     Headache, tension-type     Hyperlipidemia     My HDL was 127    Hypertension     Ingrown toenail     Kidney stones     Memory loss     Migraine     Mitral valve prolapse     MRSA (methicillin resistant Staphylococcus aureus)     Neuropathy in diabetes     Non-occlusive coronary artery disease 02/25/2025    CT-FFR in 2024    Peripheral neuropathy     Shingles     Stroke     TIA (transient ischemic attack)     Verruca 2020    One on toe on left foot, has not reappeared in over a year    Vision loss        Allergies   Allergen Reactions    Contrast Dye (Echo Or Unknown Ct/Mr) Hives    Iodinated Contrast Media Hives, Itching and Rash     Had this when he had a heart cath in 2008       Past Surgical History:   Procedure Laterality Date    CARDIAC CATHETERIZATION      COLONOSCOPY      ENDOSCOPY      INFECTED SKIN DEBRIDEMENT      MRSA in abdomen    PATENT FORAMEN OVALE CLOSURE N/A 11/20/2024    Procedure: Patent foramen ovale  closure;  Surgeon: Alo Anthony MD;  Location:  [facility];  Service: Cardiology;  Laterality: N/A;  with ICE    TONSILLECTOMY      WISDOM TOOTH EXTRACTION         Family History   Problem Relation Age of Onset    Hyperlipidemia Mother     Hypertension Mother     Kidney disease Mother     Heart disease Mother     Clotting disorder Mother     Diabetes Mother     Liver disease Mother     Mental illness Mother     Neuropathy Mother     Anemia Mother     Heart attack Mother     Anxiety disorder Mother     Arthritis Mother     Depression Mother     Hyperlipidemia Father     Hearing loss Father     COPD Brother     Drug abuse Brother     Drug abuse Brother     Early death Brother        Social History     Socioeconomic History    Marital status: Single   Tobacco Use    Smoking status: Never     Passive exposure: Never    Smokeless tobacco: Never   Vaping Use    Vaping status: Never Used   Substance and Sexual Activity    Alcohol use: Not Currently    Drug use: Not Currently     Types: Methamphetamines     Comment: last used meth about one week ago    Sexual activity: Not Currently     Partners: Male           Objective   Physical Exam  Vitals and nursing note reviewed.   Constitutional:       Appearance: Normal appearance.   HENT:      Head: Normocephalic and atraumatic.      Mouth/Throat:      Mouth: Mucous membranes are moist.   Eyes:      Extraocular Movements: Extraocular movements intact.      Pupils: Pupils are equal, round, and reactive to light.   Cardiovascular:      Rate and Rhythm: Normal rate and regular rhythm.      Heart sounds: Normal heart sounds.   Pulmonary:      Effort: Pulmonary effort is normal.      Breath sounds: Normal breath sounds.   Abdominal:      General: Bowel sounds are normal.      Palpations: Abdomen is soft.      Tenderness: There is no abdominal tenderness.   Skin:     General: Skin is warm and dry.   Neurological:      General: No focal deficit present.      Mental  Status: He is alert and oriented to person, place, and time.   Psychiatric:         Mood and Affect: Mood normal.         Behavior: Behavior normal.         Procedures           ED Course                                                     Lab Results (last 24 hours)       Procedure Component Value Units Date/Time    POC Glucose Once [[id number]]  (Abnormal) Collected: 06/04/25 1436    Specimen: Blood Updated: 06/04/25 1446     Glucose 170 mg/dL      Comment: : [id number] Jennifer Monae ID: [id number]       CBC & Differential [[id number]]  (Abnormal) Collected: 06/04/25 1437    Specimen: Blood Updated: 06/04/25 1446    Narrative:      The following orders were created for panel order CBC & Differential.  Procedure                               Abnormality         Status                     ---------                               -----------         ------                     CBC Auto Differential[[id number]]        Abnormal            Final result                 Please view results for these tests on the individual orders.    Basic Metabolic Panel [[id number]]  (Abnormal) Collected: 06/04/25 1437    Specimen: Blood Updated: 06/04/25 1506     Glucose 173 mg/dL      BUN 15.8 mg/dL      Creatinine 0.79 mg/dL      Sodium 133 mmol/L      Potassium 4.3 mmol/L      Comment: Slight hemolysis detected by analyzer. Result may be falsely elevated.        Chloride 99 mmol/L      CO2 24.0 mmol/L      Calcium 9.1 mg/dL      BUN/Creatinine Ratio 20.0     Anion Gap 10.0 mmol/L      eGFR 107.6 mL/min/1.73     Narrative:      GFR Categories in Chronic Kidney Disease (CKD)              GFR Category          GFR (mL/min/1.73)    Interpretation  G1                    90 or greater        Normal or high (1)  G2                    60-89                Mild decrease (1)  G3a                   45-59                Mild to moderate decrease  G3b                   30-44                Moderate to severe decrease  G4                     15-29                Severe decrease  G5                    14 or less           Kidney failure    (1)In the absence of evidence of kidney disease, neither GFR category G1 or G2 fulfill the criteria for CKD.    eGFR calculation 2021 CKD-EPI creatinine equation, which does not include race as a factor    Magnesium [[id number]]  (Normal) Collected: 06/04/25 1437    Specimen: Blood Updated: 06/04/25 1506     Magnesium 1.9 mg/dL     CBC Auto Differential [[id number]]  (Abnormal) Collected: 06/04/25 1437    Specimen: Blood Updated: 06/04/25 1446     WBC 6.58 10*3/mm3      RBC 4.31 10*6/mm3      Hemoglobin 13.0 g/dL      Hematocrit 36.7 %      MCV 85.2 fL      MCH 30.2 pg      MCHC 35.4 g/dL      RDW 12.0 %      RDW-SD 36.7 fl      MPV 8.9 fL      Platelets 217 10*3/mm3      Neutrophil % 49.1 %      Lymphocyte % 33.0 %      Monocyte % 9.3 %      Eosinophil % 7.1 %      Basophil % 1.2 %      Immature Grans % 0.3 %      Neutrophils, Absolute 3.23 10*3/mm3      Lymphocytes, Absolute 2.17 10*3/mm3      Monocytes, Absolute 0.61 10*3/mm3      Eosinophils, Absolute 0.47 10*3/mm3      Basophils, Absolute 0.08 10*3/mm3      Immature Grans, Absolute 0.02 10*3/mm3      nRBC 0.0 /100 WBC             Medical Decision Making  I had a discussion with the patient regarding diagnosis, diagnostic results, treatment plan, and medications.  The patient indicated understanding of these instructions.  I spent adequate time at the bedside prior to discharge necessary to discuss the aftercare instructions, giving patient education, providing explanations of the results of our evaluations/findings, and my decision making to assure that the patient understand the plan of care.  Time was allotted to answer questions at that time and throughout the ED course.  Emphasis was placed on timely follow-up after discharge.  I also discussed the potential for the development of an acute emergent condition requiring further evaluation, return to the ER,  admission, or even surgical intervention. I discussed that we found nothing during the visit today indicating the need for further ER workup at this time, admission to the hospital, or the presence of an acute unstable medical condition.  I encouraged the patient to return to the emergency department immediately for ANY concerns, worsening, new complaints, or if symptoms persist and unable to seek follow-up in a timely fashion.  The patient expressed understanding and agreement with this plan.      Problems Addressed:  Hypoglycemia associated with diabetes: complicated acute illness or injury    Amount and/or Complexity of Data Reviewed  Labs: ordered. Decision-making details documented in ED Course.    Risk  Prescription drug management.        Final diagnoses:   Hypoglycemia associated with diabetes       ED Disposition  ED Disposition       ED Disposition   Discharge    Condition   Stable    Comment   --               Vanessa Schmidt, APRN  [address] [place] [address] [phone]    Schedule an appointment as soon as possible for a visit       [facility] EMERGENCY DEPARTMENT  [address] [place] [id number] [phone]    As needed, If symptoms worsen         Medication List      No changes were made to your prescriptions during this visit.            Salazar Aviles MD  06/04/25 [id number]

## 2025-06-06 ENCOUNTER — OFFICE VISIT (OUTPATIENT)
Age: 50
End: 2025-06-06
Payer: MEDICARE

## 2025-06-06 VITALS
SYSTOLIC BLOOD PRESSURE: 126 MMHG | WEIGHT: 162 LBS | DIASTOLIC BLOOD PRESSURE: 84 MMHG | OXYGEN SATURATION: 97 % | HEART RATE: 86 BPM | BODY MASS INDEX: 26.99 KG/M2 | HEIGHT: 65 IN

## 2025-06-06 DIAGNOSIS — M25.571 ACUTE BILATERAL ANKLE PAIN: ICD-10-CM

## 2025-06-06 DIAGNOSIS — E11.42 TYPE 2 DIABETES MELLITUS WITH DIABETIC POLYNEUROPATHY, WITHOUT LONG-TERM CURRENT USE OF INSULIN: ICD-10-CM

## 2025-06-06 DIAGNOSIS — L84 FOOT CALLUS: ICD-10-CM

## 2025-06-06 DIAGNOSIS — M25.572 ACUTE BILATERAL ANKLE PAIN: ICD-10-CM

## 2025-06-06 DIAGNOSIS — Z86.73 HISTORY OF CVA (CEREBROVASCULAR ACCIDENT): ICD-10-CM

## 2025-06-06 DIAGNOSIS — R26.81 GAIT INSTABILITY: ICD-10-CM

## 2025-06-06 DIAGNOSIS — M79.672 BILATERAL FOOT PAIN: ICD-10-CM

## 2025-06-06 DIAGNOSIS — M54.16 LUMBAR RADICULOPATHY: ICD-10-CM

## 2025-06-06 DIAGNOSIS — L60.2 THICKENED NAILS: Primary | ICD-10-CM

## 2025-06-06 DIAGNOSIS — M79.671 BILATERAL FOOT PAIN: ICD-10-CM

## 2025-06-09 ENCOUNTER — TELEPHONE (OUTPATIENT)
Dept: NEUROLOGY | Facility: CLINIC | Age: 50
End: 2025-06-09

## 2025-06-09 NOTE — TELEPHONE ENCOUNTER
Caller: Rah Shin    Relationship: Self    Best call back number:   Telephone Information:   Mobile [phone]       What was the call regarding: MOVED PT'S APPT TO LATER IN DAY TOMORROW DUE TO TRANSPORTATION ISSUE

## 2025-06-10 ENCOUNTER — OFFICE VISIT (OUTPATIENT)
Dept: NEUROLOGY | Facility: CLINIC | Age: 50
End: 2025-06-10
Payer: MEDICARE

## 2025-06-10 ENCOUNTER — TELEPHONE (OUTPATIENT)
Dept: HEMATOLOGY | Age: 50
End: 2025-06-10

## 2025-06-10 VITALS
DIASTOLIC BLOOD PRESSURE: 76 MMHG | SYSTOLIC BLOOD PRESSURE: 138 MMHG | WEIGHT: 162 LBS | HEIGHT: 65 IN | HEART RATE: 98 BPM | BODY MASS INDEX: 26.99 KG/M2 | OXYGEN SATURATION: 98 %

## 2025-06-10 DIAGNOSIS — H93.12 TINNITUS OF LEFT EAR: Primary | ICD-10-CM

## 2025-06-10 DIAGNOSIS — Z71.1 CONCERN ABOUT MEMORY: ICD-10-CM
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
## 2025-06-23 ENCOUNTER — TELEPHONE (OUTPATIENT)
Dept: HEMATOLOGY | Age: 50
End: 2025-06-23

## 2025-06-23 NOTE — TELEPHONE ENCOUNTER

## 2025-06-24 DIAGNOSIS — Z71.1 CONCERN ABOUT MEMORY: ICD-10-CM

## 2025-06-24 DIAGNOSIS — H93.12 TINNITUS OF LEFT EAR: Primary | ICD-10-CM

## 2025-06-26 ENCOUNTER — HOSPITAL ENCOUNTER (OUTPATIENT)
Dept: INFUSION THERAPY | Age: 50
Discharge: HOME | End: 2025-06-26
Payer: MEDICARE

## 2025-06-26 ENCOUNTER — OFFICE VISIT (OUTPATIENT)
Dept: HEMATOLOGY | Age: 50
End: 2025-06-26
Payer: MEDICARE

## 2025-06-26 VITALS
SYSTOLIC BLOOD PRESSURE: 128 MMHG | OXYGEN SATURATION: 99 % | DIASTOLIC BLOOD PRESSURE: 66 MMHG | BODY MASS INDEX: 27.51 KG/M2 | HEART RATE: 81 BPM | WEIGHT: 165.1 LBS | TEMPERATURE: 97.4 F | HEIGHT: 65 IN

## 2025-06-26 DIAGNOSIS — Z71.89 CARE PLAN DISCUSSED WITH PATIENT: ICD-10-CM

## 2025-06-26 DIAGNOSIS — G62.9 NEUROPATHY: ICD-10-CM

## 2025-06-26 DIAGNOSIS — R76.8 ELEVATED SERUM IMMUNOGLOBULIN FREE LIGHT CHAIN LEVEL: Primary | ICD-10-CM

## 2025-06-26 DIAGNOSIS — D64.9 NORMOCYTIC ANEMIA: ICD-10-CM

## 2025-06-26 DIAGNOSIS — R89.8: ICD-10-CM

## 2025-06-26 PROCEDURE — 99211 OFF/OP EST MAY X REQ PHY/QHP: CPT

## 2025-06-26 PROCEDURE — 3078F DIAST BP <80 MM HG: CPT | Performed by: NURSE PRACTITIONER

## 2025-06-26 PROCEDURE — 99213 OFFICE O/P EST LOW 20 MIN: CPT | Performed by: NURSE PRACTITIONER

## 2025-06-26 PROCEDURE — 3074F SYST BP LT 130 MM HG: CPT | Performed by: NURSE PRACTITIONER

## 2025-06-26 RX ORDER — BLOOD SUGAR DIAGNOSTIC
1 STRIP MISCELLANEOUS 3 TIMES DAILY
COMMUNITY
Start: 2025-05-15

## 2025-06-26 RX ORDER — LANCETS 33 GAUGE
1 EACH MISCELLANEOUS DAILY
COMMUNITY
Start: 2025-05-15

## 2025-06-26 RX ORDER — FLUTICASONE PROPIONATE 50 MCG
1 SPRAY, SUSPENSION (ML) NASAL DAILY
COMMUNITY

## 2025-06-26 RX ORDER — DEXTROAMPHETAMINE SACCHARATE, AMPHETAMINE ASPARTATE MONOHYDRATE, DEXTROAMPHETAMINE SULFATE AND AMPHETAMINE SULFATE 2.5; 2.5; 2.5; 2.5 MG/1; MG/1; MG/1; MG/1
10 CAPSULE, EXTENDED RELEASE ORAL 2 TIMES DAILY
COMMUNITY

## 2025-06-26 RX ORDER — TRAZODONE HYDROCHLORIDE 50 MG/1
50 TABLET ORAL NIGHTLY
COMMUNITY
Start: 2025-01-15

## 2025-06-26 RX ORDER — ATORVASTATIN CALCIUM 20 MG/1
20 TABLET, FILM COATED ORAL DAILY
COMMUNITY

## 2025-06-26 RX ORDER — TADALAFIL 20 MG/1
20 TABLET ORAL PRN
COMMUNITY
Start: 2025-05-29

## 2025-06-26 RX ORDER — LORATADINE 10 MG/1
10 TABLET ORAL DAILY
COMMUNITY

## 2025-06-26 ASSESSMENT — ENCOUNTER SYMPTOMS
TROUBLE SWALLOWING: 0
EYE DISCHARGE: 0
CONSTIPATION: 0
VOMITING: 0
EYE ITCHING: 0
SHORTNESS OF BREATH: 0
WHEEZING: 0
NAUSEA: 0
DIARRHEA: 0
SORE THROAT: 0
BACK PAIN: 1
COUGH: 0

## 2025-07-08 ENCOUNTER — TELEPHONE (OUTPATIENT)
Dept: NEUROLOGY | Facility: CLINIC | Age: 50
End: 2025-07-08
Payer: MEDICARE

## 2025-07-08 DIAGNOSIS — Z91.041 CONTRAST MEDIA ALLERGY: Primary | ICD-10-CM

## 2025-07-08 DIAGNOSIS — R45.89 ANXIETY ABOUT TREATMENT: Primary | ICD-10-CM

## 2025-07-08 RX ORDER — DIPHENHYDRAMINE HCL 25 MG
50 TABLET ORAL
Status: CANCELLED | OUTPATIENT
Start: 2025-07-08 | End: 2025-07-08

## 2025-07-08 RX ORDER — PREDNISONE 50 MG/1
50 TABLET ORAL TAKE AS DIRECTED
Qty: 3 TABLET | Refills: 0 | Status: CANCELLED | OUTPATIENT
Start: 2025-07-08

## 2025-07-08 RX ORDER — DIAZEPAM 2 MG/1
2 TABLET ORAL AS NEEDED
Qty: 2 TABLET | Refills: 0 | Status: SHIPPED | OUTPATIENT
Start: 2025-07-08

## 2025-07-08 RX ORDER — DIPHENHYDRAMINE HYDROCHLORIDE 50 MG/ML
50 INJECTION, SOLUTION INTRAMUSCULAR; INTRAVENOUS
Status: CANCELLED | OUTPATIENT
Start: 2025-07-08 | End: 2025-07-08

## 2025-07-08 RX ORDER — PREDNISONE 50 MG/1
50 TABLET ORAL TAKE AS DIRECTED
Qty: 3 TABLET | Refills: 0 | Status: SHIPPED | OUTPATIENT
Start: 2025-07-08

## 2025-07-08 RX ORDER — DIPHENHYDRAMINE HCL 25 MG
50 TABLET ORAL ONCE
Qty: 2 TABLET | Refills: 0 | Status: SHIPPED | OUTPATIENT
Start: 2025-07-08 | End: 2025-07-08

## 2025-07-08 NOTE — TELEPHONE ENCOUNTER
Called pt to relay the following message from Danyelle Coles. I have sent in prednisone 50 mg tablets, he is to take 1 tablet at 13 hours, 7 hours and 1 hour before the MRI.  Additionally I sent in Benadryl 25 mg tablets, he should take 2 tablets 1 hour before his MRI.  Do not take the Valium.  Recommend to discard this in a controlled substance receptacle at the local pharmacy. Left message for him to call our office back. I am going to send these directions in my chart also.

## 2025-07-08 NOTE — TELEPHONE ENCOUNTER
Caller: Rah Shin    Relationship: Self    Best call back number: [phone]    What was the call regarding: PT WAS RETURNING THE [employer] MESSAGE TO [facility]. PT DISCONNECTED BEFORE OFFICE ANSWERED.     PLEASE REVIEW AND ADVISE

## 2025-07-09 NOTE — TELEPHONE ENCOUNTER
Called pt back to make sure he understood the directions I left in my chart.  He said he did understand them also he had both of these medications at home.

## 2025-07-10 ENCOUNTER — TELEPHONE (OUTPATIENT)
Dept: NEUROLOGY | Facility: CLINIC | Age: 50
End: 2025-07-10

## 2025-07-11 ENCOUNTER — HOSPITAL ENCOUNTER (OUTPATIENT)
Dept: MRI IMAGING | Facility: HOSPITAL | Age: 50
Discharge: HOME | End: 2025-07-11
Payer: MEDICARE

## 2025-07-11 ENCOUNTER — RESULTS FOLLOW-UP (OUTPATIENT)
Dept: NEUROLOGY | Facility: CLINIC | Age: 50
End: 2025-07-11
Payer: MEDICARE

## 2025-07-11 DIAGNOSIS — H93.12 TINNITUS OF LEFT EAR: ICD-10-CM

## 2025-07-11 DIAGNOSIS — Z71.1 CONCERN ABOUT MEMORY: ICD-10-CM

## 2025-07-11 PROCEDURE — 70544 MR ANGIOGRAPHY HEAD W/O DYE: CPT

## 2025-07-11 PROCEDURE — A9573 GADOPICLENOL 0.5 MMOL/ML SOLUTION: HCPCS

## 2025-07-11 PROCEDURE — 70553 MRI BRAIN STEM W/O & W/DYE: CPT

## 2025-07-11 PROCEDURE — 76014 MR SFTY IMPLT&/FB ASMT STF 1: CPT

## 2025-07-11 PROCEDURE — 25510000001 GADOPICLENOL 0.5 MMOL/ML SOLUTION

## 2025-07-11 PROCEDURE — 82565 ASSAY OF CREATININE: CPT

## 2025-07-11 RX ADMIN — GADOPICLENOL 7.5 ML: 485.1 INJECTION INTRAVENOUS at 09:39

## 2025-07-13 LAB — CREAT BLDA-MCNC: 0.8 MG/DL (ref 0.6–1.3)

## 2025-07-14 DIAGNOSIS — Z71.1 CONCERN ABOUT MEMORY: ICD-10-CM

## 2025-07-14 DIAGNOSIS — Z86.73 HISTORY OF STROKE: ICD-10-CM

## 2025-07-14 DIAGNOSIS — R90.89 ABNORMAL FINDING ON MRI OF BRAIN: Primary | ICD-10-CM

## 2025-07-14 DIAGNOSIS — G03.9 MENINGITIS, UNSPECIFIED: ICD-10-CM

## 2025-07-23 ENCOUNTER — TELEPHONE (OUTPATIENT)
Dept: INTERVENTIONAL RADIOLOGY/VASCULAR | Facility: HOSPITAL | Age: 50
End: 2025-07-23
Payer: MEDICARE

## 2025-07-23 NOTE — TELEPHONE ENCOUNTER
Called patient to confirm appointment and went over instructions for the lumbar puncture.  Patient confirmed and voiced understanding.  
no concerns

## 2025-07-24 ENCOUNTER — HOSPITAL ENCOUNTER (OUTPATIENT)
Dept: GENERAL RADIOLOGY | Facility: HOSPITAL | Age: 50
Discharge: HOME | End: 2025-07-24
Payer: MEDICARE

## 2025-07-24 ENCOUNTER — DOCUMENTATION (OUTPATIENT)
Dept: NEUROLOGY | Facility: CLINIC | Age: 50
End: 2025-07-24
Payer: MEDICARE

## 2025-07-24 VITALS
RESPIRATION RATE: 18 BRPM | WEIGHT: 159.17 LBS | DIASTOLIC BLOOD PRESSURE: 78 MMHG | HEIGHT: 66 IN | HEART RATE: 74 BPM | TEMPERATURE: 98.4 F | BODY MASS INDEX: 25.58 KG/M2 | SYSTOLIC BLOOD PRESSURE: 126 MMHG | OXYGEN SATURATION: 100 %

## 2025-07-24 DIAGNOSIS — R90.89 ABNORMAL FINDING ON MRI OF BRAIN: ICD-10-CM

## 2025-07-24 DIAGNOSIS — Z86.73 HISTORY OF STROKE: ICD-10-CM

## 2025-07-24 DIAGNOSIS — Z71.1 CONCERN ABOUT MEMORY: ICD-10-CM

## 2025-07-24 DIAGNOSIS — G03.9 MENINGITIS, UNSPECIFIED: ICD-10-CM

## 2025-07-24 LAB
APPEARANCE CSF: CLEAR
APPEARANCE CSF: CLEAR
C GATTII+NEOFOR DNA CSF QL NAA+NON-PROBE: NOT DETECTED
CMV DNA CSF QL NAA+PROBE: NOT DETECTED
COLOR CSF: COLORLESS
COLOR CSF: COLORLESS
COLOR SPUN CSF: COLORLESS
COLOR SPUN CSF: COLORLESS
E COLI K1 DNA CSF QL NAA+NON-PROBE: NOT DETECTED
EV RNA CSF QL NAA+PROBE: NOT DETECTED
GLUCOSE CSF-MCNC: 97 MG/DL (ref 40–70)
GP B STREP DNA SPEC QL NAA+PROBE: NOT DETECTED
HAEM INFLU SEROTYP DNA SPEC NAA+PROBE: NOT DETECTED
HHV6 DNA CSF QL NAA+PROBE: NOT DETECTED
HOLD SPECIMEN: NORMAL
HSV1 DNA CSF QL NAA+PROBE: NOT DETECTED
HSV2 DNA CSF QL NAA+PROBE: NOT DETECTED
INR PPP: 0.96 (ref 0.91–1.09)
L MONOCYTOG RRNA SPEC QL PROBE: NOT DETECTED
METHOD: NORMAL
METHOD: NORMAL
N MEN DNA SPEC QL NAA+PROBE: NOT DETECTED
NUC CELL # CSF MANUAL: 0 /MM3 (ref 0–5)
NUC CELL # CSF MANUAL: 1 /MM3 (ref 0–5)
PARECHOVIRUS A RNA CSF QL NAA+NON-PROBE: NOT DETECTED
PLATELET # BLD AUTO: 192 10*3/MM3 (ref 140–450)
PROT CSF-MCNC: 51 MG/DL (ref 15–45)
PROTHROMBIN TIME: 13.2 SECONDS (ref 11.8–14.8)
RBC # CSF MANUAL: 0 /MM3 (ref 0–0)
RBC # CSF MANUAL: 0 /MM3 (ref 0–0)
S PNEUM DNA CSF QL NAA+NON-PROBE: NOT DETECTED
SPECIMEN VOL CSF: 12.5 ML
SPECIMEN VOL CSF: 12.5 ML
TUBE # CSF: 1
TUBE # CSF: 3
VZV DNA CSF QL NAA+PROBE: NOT DETECTED

## 2025-07-24 PROCEDURE — 89050 BODY FLUID CELL COUNT: CPT

## 2025-07-24 PROCEDURE — 87205 SMEAR GRAM STAIN: CPT

## 2025-07-24 PROCEDURE — 87798 DETECT AGENT NOS DNA AMP: CPT

## 2025-07-24 PROCEDURE — 87483 CNS DNA AMP PROBE TYPE 12-25: CPT

## 2025-07-24 PROCEDURE — 87015 SPECIMEN INFECT AGNT CONCNTJ: CPT

## 2025-07-24 PROCEDURE — 25010000002 LIDOCAINE 1 % SOLUTION

## 2025-07-24 PROCEDURE — 87070 CULTURE OTHR SPECIMN AEROBIC: CPT

## 2025-07-24 PROCEDURE — 85049 AUTOMATED PLATELET COUNT: CPT | Performed by: RADIOLOGY

## 2025-07-24 PROCEDURE — 84157 ASSAY OF PROTEIN OTHER: CPT

## 2025-07-24 PROCEDURE — 87075 CULTR BACTERIA EXCEPT BLOOD: CPT

## 2025-07-24 PROCEDURE — 82945 GLUCOSE OTHER FLUID: CPT

## 2025-07-24 PROCEDURE — 85610 PROTHROMBIN TIME: CPT | Performed by: RADIOLOGY

## 2025-07-24 RX ORDER — LIDOCAINE HYDROCHLORIDE 10 MG/ML
5 INJECTION, SOLUTION INFILTRATION; PERINEURAL ONCE
Status: COMPLETED | OUTPATIENT
Start: 2025-07-24 | End: 2025-07-24

## 2025-07-24 RX ADMIN — LIDOCAINE HYDROCHLORIDE 5 ML: 10 INJECTION, SOLUTION INFILTRATION; PERINEURAL at 10:34

## 2025-07-24 NOTE — PROGRESS NOTES
Yanci from the lab called regarding Mr. Shin's LP this morning. They did not draw the Beta Amyloid; the doctor said he would go in & draw another tube, if Bassett would like & the patient agrees. I did speak with Bassett and let Yanci know Bassett does not want to put through patient through another LP today.

## 2025-07-25 ENCOUNTER — TELEPHONE (OUTPATIENT)
Dept: NEUROLOGY | Facility: CLINIC | Age: 50
End: 2025-07-25
Payer: MEDICARE

## 2025-07-25 ENCOUNTER — RESULTS FOLLOW-UP (OUTPATIENT)
Dept: GENERAL RADIOLOGY | Facility: HOSPITAL | Age: 50
End: 2025-07-25
Payer: MEDICARE

## 2025-07-25 NOTE — PROGRESS NOTES
Please let patient know overall CSF studies are okay.  I am waiting some further testing.  Once these come back we will let him know.

## 2025-07-25 NOTE — TELEPHONE ENCOUNTER
I let the pt know the test results we have so far and he called back wanting to know if [facility]  does testing for MS because he has been reading about it and he says he has a lot of the symptoms.  he said they had one vial of fluid left and was wondering if that could be used to test for MS.  I let him know that Danyelle Sharyn was gone for the day. So I would send him a message.

## 2025-07-25 NOTE — LETTER
Rah Taylor Kip  [address] [place] [address] [address]  [address]  [facility] [id number]    July 25, 2025     Dear Mr. Shin:    Below are the results from your recent visit:    Resulted Orders   Glucose, CSF - Cerebrospinal Fluid, Lumbar Puncture   Result Value Ref Range    Glucose, CSF 97 (H) 40 - 70 mg/dL   Protein, CSF - Cerebrospinal Fluid, Lumbar Puncture   Result Value Ref Range    Protein, Total (CSF) 51.0 (H) 15.0 - 45.0 mg/dL   CSF Tube - Cerebrospinal Fluid, Lumbar Puncture   Result Value Ref Range    Extra Tube Hold for add-ons.       Comment:      Auto resulted.   Culture, CSF - Cerebrospinal Fluid, Lumbar Puncture   Result Value Ref Range    CSF Culture No growth     Gram Stain No WBCs or organisms seen    Meningitis / Encephalitis Panel, PCR - Cerebrospinal Fluid, Lumbar Puncture   Result Value Ref Range    ESCHERICHIA COLI K1, PCR Not Detected Not Detected    HAEMOPHILUS INFLUENZAE, PCR Not Detected Not Detected    LISTERIA MONOCYTOGENES, PCR Not Detected Not Detected    NEISSERIA MENINGITIDIS, PCR Not Detected Not Detected    STREPTOCOCCUS AGALACTIAE, PCR Not Detected Not Detected    STREPTOCOCCUS PNEUMONIAE, PCR Not Detected Not Detected    CYTOMEGALOVIRUS (CMV), PCR Not Detected Not Detected    ENTEROVIRUS, PCR Not Detected Not Detected    HERPES SIMPLEX VIRUS 1 (HSV-1), PCR Not Detected Not Detected    HERPES SIMPLEX VIRUS 2 (HSV-2), PCR Not Detected Not Detected    HUMAN PARECHOVIRUS, PCR Not Detected Not Detected    VARICELLA ZOSTER VIRUS (VZV), PCR Not Detected Not Detected    CRYPTOCOCCUS NEOFORMANS / GATTII, PCR Not Detected Not Detected    HUMAN HERPES VIRUS 6 PCR Not Detected Not Detected   Cell Count, CSF - Cerebrospinal Fluid, Lumbar Puncture   Result Value Ref Range    Color, CSF Colorless Colorless    Supernatant Color, CSF Colorless Colorless    Appearance, CSF Clear Clear    RBC, CSF 0 0 - 0 /mm3    Nucleated Cells, CSF 1 0 - 5 /mm3    Volume, CSF 12.5 mL    Tube Number, CSF 1     Method: Hemacytometer  Method    Cell Count, CSF - Cerebrospinal Fluid, Lumbar Puncture   Result Value Ref Range    Color, CSF Colorless Colorless    Supernatant Color, CSF Colorless Colorless    Appearance, CSF Clear Clear    RBC, CSF 0 0 - 0 /mm3    Nucleated Cells, CSF 0 0 - 5 /mm3    Volume, CSF 12.5 mL    Tube Number, CSF 3     Method: Hemacytometer Method      Danyelle Coles has reviewed you test. And overall CSF studies are okay. He is waiting some further testing. Once these come back we will let you know.       If you have any questions or concerns, please don't hesitate to call.    Thank you   Melita OROPEZA  neurology        Sincerely,        Danyelle Coles, APRN

## 2025-07-27 LAB
BACTERIA SPEC AEROBE CULT: NORMAL
BACTERIA SPEC ANAEROBE CULT: NORMAL
GRAM STN SPEC: NORMAL
JCPYV DNA CSF QL NAA+PROBE: NEGATIVE

## 2025-07-29 LAB — BACTERIA SPEC ANAEROBE CULT: NORMAL

## 2025-08-08 ENCOUNTER — OFFICE VISIT (OUTPATIENT)
Age: 50
End: 2025-08-08
Payer: MEDICARE

## 2025-08-08 VITALS
BODY MASS INDEX: 25.55 KG/M2 | OXYGEN SATURATION: 98 % | DIASTOLIC BLOOD PRESSURE: 76 MMHG | SYSTOLIC BLOOD PRESSURE: 124 MMHG | HEART RATE: 83 BPM | WEIGHT: 159 LBS | HEIGHT: 66 IN

## 2025-08-08 DIAGNOSIS — M79.671 BILATERAL FOOT PAIN: ICD-10-CM

## 2025-08-08 DIAGNOSIS — M79.672 LEFT FOOT PAIN: ICD-10-CM

## 2025-08-08 DIAGNOSIS — E11.9 ENCOUNTER FOR DIABETIC FOOT EXAM: ICD-10-CM

## 2025-08-08 DIAGNOSIS — L84 FOOT CALLUS: ICD-10-CM

## 2025-08-08 DIAGNOSIS — M79.672 BILATERAL FOOT PAIN: ICD-10-CM

## 2025-08-08 DIAGNOSIS — M54.16 LUMBAR RADICULOPATHY: ICD-10-CM

## 2025-08-08 DIAGNOSIS — E11.42 TYPE 2 DIABETES MELLITUS WITH DIABETIC POLYNEUROPATHY, WITHOUT LONG-TERM CURRENT USE OF INSULIN: ICD-10-CM

## 2025-08-08 DIAGNOSIS — R26.81 GAIT INSTABILITY: ICD-10-CM

## 2025-08-08 DIAGNOSIS — Z86.73 HISTORY OF CVA (CEREBROVASCULAR ACCIDENT): ICD-10-CM

## 2025-08-08 DIAGNOSIS — L60.2 THICKENED NAILS: Primary | ICD-10-CM

## 2025-08-08 RX ORDER — SITAGLIPTIN 100 MG/1
100 TABLET, FILM COATED ORAL DAILY
COMMUNITY

## (undated) DEVICE — PK CATH CARD 30 CA/4

## (undated) DEVICE — CATH EP IMG/CARD VIEWFLEX EXTRA/ICE VF04 120DEG 9F 90CM

## (undated) DEVICE — GW AMPLTZ SUPERSTIFF SHT/TPR STR .035IN 260CM

## (undated) DEVICE — SOL IRR NACL 0.9PCT BO 1000ML

## (undated) DEVICE — ENDO KIT,LOURDES HOSPITAL: Brand: MEDLINE INDUSTRIES, INC.

## (undated) DEVICE — CANNULA NSL AD L7FT DIV O2 CO2 W/ M LUERLOCK TRMPT CONN

## (undated) DEVICE — PAD, DEFIB, ADULT, RADIOTRANS, PHYSIO: Brand: MEDLINE

## (undated) DEVICE — SOLIDIFIER LIQ LIQUILOC/PLUS W/TREAT 2000CC

## (undated) DEVICE — CANN NASL ETCO2 LO/FLO A/

## (undated) DEVICE — KT NDL GUIDE STRL 18GA

## (undated) DEVICE — NDL ART SECUR LK 18G 2 3/4IN

## (undated) DEVICE — CLEANING SPONGE: Brand: KOALA™

## (undated) DEVICE — PINNACLE INTRODUCER SHEATH: Brand: PINNACLE

## (undated) DEVICE — DRAPE,FEM ANGIO,2 WIND,STERILE: Brand: MEDLINE

## (undated) DEVICE — CATH F6INF TL MP A2 125CM 2SH: Brand: INFINITI

## (undated) DEVICE — ADAPTER CLEANING PORPOISE CLEANING

## (undated) DEVICE — GW STARTER FXD/CORE PTFE/COAT J/TP/3MM .035IN 10X150CM

## (undated) DEVICE — BITE BLOCK ENDOSCP AD 60 FR W/ ADJ STRP PLAS GRN BLOX

## (undated) DEVICE — SINGLE PORT MANIFOLD: Brand: NEPTUNE 2

## (undated) DEVICE — SYS DEL TREVISIO 45D 9F 80CM

## (undated) DEVICE — BRUSH ENDOSCP 2 END CHN HEDGEHOG

## (undated) DEVICE — FORCEPS BX 240CM 2.4MM L NDL RAD JAW 4 M00513334

## (undated) DEVICE — SLV CBL IVL 5X96IN